# Patient Record
Sex: FEMALE | Race: WHITE | Employment: OTHER
[De-identification: names, ages, dates, MRNs, and addresses within clinical notes are randomized per-mention and may not be internally consistent; named-entity substitution may affect disease eponyms.]

---

## 2017-01-16 DIAGNOSIS — Z79.4 TYPE 2 DIABETES MELLITUS WITH DIABETIC POLYNEUROPATHY, WITH LONG-TERM CURRENT USE OF INSULIN (HCC): ICD-10-CM

## 2017-01-16 DIAGNOSIS — E11.42 TYPE 2 DIABETES MELLITUS WITH DIABETIC POLYNEUROPATHY, WITH LONG-TERM CURRENT USE OF INSULIN (HCC): ICD-10-CM

## 2017-01-16 RX ORDER — NAPROXEN SODIUM 220 MG
TABLET ORAL
Qty: 100 EACH | Refills: 5 | Status: SHIPPED | OUTPATIENT
Start: 2017-01-16 | End: 2017-09-07 | Stop reason: SDUPTHER

## 2017-01-23 ENCOUNTER — OFFICE VISIT (OUTPATIENT)
Dept: FAMILY MEDICINE CLINIC | Facility: CLINIC | Age: 57
End: 2017-01-23

## 2017-01-23 ENCOUNTER — TELEPHONE (OUTPATIENT)
Dept: FAMILY MEDICINE CLINIC | Facility: CLINIC | Age: 57
End: 2017-01-23

## 2017-01-23 VITALS
HEIGHT: 70 IN | HEART RATE: 84 BPM | RESPIRATION RATE: 16 BRPM | WEIGHT: 293 LBS | SYSTOLIC BLOOD PRESSURE: 123 MMHG | DIASTOLIC BLOOD PRESSURE: 80 MMHG | TEMPERATURE: 97.7 F | BODY MASS INDEX: 41.95 KG/M2

## 2017-01-23 DIAGNOSIS — K62.5 RECTAL BLEEDING: ICD-10-CM

## 2017-01-23 DIAGNOSIS — Z23 NEED FOR PNEUMOCOCCAL VACCINATION: ICD-10-CM

## 2017-01-23 DIAGNOSIS — K62.89 RECTAL PAIN: Primary | ICD-10-CM

## 2017-01-23 DIAGNOSIS — Z79.4 TYPE 2 DIABETES MELLITUS WITH DIABETIC POLYNEUROPATHY, WITH LONG-TERM CURRENT USE OF INSULIN (HCC): ICD-10-CM

## 2017-01-23 DIAGNOSIS — M79.7 FIBROMYALGIA: ICD-10-CM

## 2017-01-23 DIAGNOSIS — E11.42 TYPE 2 DIABETES MELLITUS WITH DIABETIC POLYNEUROPATHY, WITH LONG-TERM CURRENT USE OF INSULIN (HCC): ICD-10-CM

## 2017-01-23 LAB — HBA1C MFR BLD: 7.9 %

## 2017-01-23 PROCEDURE — 83036 HEMOGLOBIN GLYCOSYLATED A1C: CPT | Performed by: INTERNAL MEDICINE

## 2017-01-23 PROCEDURE — 90471 IMMUNIZATION ADMIN: CPT | Performed by: INTERNAL MEDICINE

## 2017-01-23 PROCEDURE — 99214 OFFICE O/P EST MOD 30 MIN: CPT | Performed by: INTERNAL MEDICINE

## 2017-01-23 PROCEDURE — 90732 PPSV23 VACC 2 YRS+ SUBQ/IM: CPT | Performed by: INTERNAL MEDICINE

## 2017-01-23 RX ORDER — FLUCONAZOLE 150 MG/1
TABLET ORAL
COMMUNITY
Start: 2016-12-14 | End: 2017-04-21

## 2017-01-23 RX ORDER — POLYETHYLENE GLYCOL 3350 17 G/17G
POWDER, FOR SOLUTION ORAL
Qty: 1 BOTTLE | Refills: 5 | Status: SHIPPED | OUTPATIENT
Start: 2017-01-23 | End: 2017-09-07 | Stop reason: ALTCHOICE

## 2017-01-23 RX ORDER — MELOXICAM 15 MG/1
TABLET ORAL
Qty: 30 TABLET | Refills: 3 | Status: SHIPPED | OUTPATIENT
Start: 2017-01-23 | End: 2017-04-21

## 2017-01-23 RX ORDER — AMITRIPTYLINE HYDROCHLORIDE 25 MG/1
TABLET, FILM COATED ORAL
Qty: 30 TABLET | Status: CANCELLED | OUTPATIENT
Start: 2017-01-23

## 2017-01-23 RX ORDER — BIOTIN 1 MG
TABLET ORAL
Qty: 30 TABLET | Refills: 2 | Status: CANCELLED | OUTPATIENT
Start: 2017-01-23

## 2017-01-23 RX ORDER — ASPIRIN 81 MG/1
TABLET ORAL
Qty: 30 TABLET | Refills: 5 | Status: SHIPPED | OUTPATIENT
Start: 2017-01-23 | End: 2017-04-21 | Stop reason: SDUPTHER

## 2017-01-23 RX ORDER — GABAPENTIN 600 MG/1
600 TABLET ORAL 3 TIMES DAILY
Qty: 90 TABLET | Refills: 3 | Status: SHIPPED | OUTPATIENT
Start: 2017-01-23 | End: 2017-04-21 | Stop reason: SDUPTHER

## 2017-01-23 RX ORDER — GABAPENTIN 600 MG/1
TABLET ORAL
COMMUNITY
Start: 2017-01-11 | End: 2017-01-23 | Stop reason: SDUPTHER

## 2017-01-23 RX ORDER — TIZANIDINE 4 MG/1
TABLET ORAL
Qty: 60 TABLET | Refills: 0 | Status: CANCELLED | OUTPATIENT
Start: 2017-01-23

## 2017-01-23 RX ORDER — CETIRIZINE HYDROCHLORIDE 10 MG/1
TABLET ORAL
Qty: 30 TABLET | Refills: 3 | Status: SHIPPED | OUTPATIENT
Start: 2017-01-23 | End: 2017-04-21

## 2017-01-23 RX ORDER — CARVEDILOL 25 MG/1
25 TABLET ORAL 2 TIMES DAILY
Qty: 60 TABLET | Refills: 3 | Status: SHIPPED | OUTPATIENT
Start: 2017-01-23 | End: 2017-04-21 | Stop reason: SDUPTHER

## 2017-01-23 RX ORDER — ROPINIROLE 1 MG/1
TABLET, FILM COATED ORAL
Qty: 90 TABLET | Refills: 2 | Status: SHIPPED | OUTPATIENT
Start: 2017-01-23 | End: 2017-09-07 | Stop reason: SDUPTHER

## 2017-01-23 RX ORDER — AMITRIPTYLINE HYDROCHLORIDE 25 MG/1
TABLET, FILM COATED ORAL
COMMUNITY
Start: 2017-01-11 | End: 2017-06-30 | Stop reason: SDUPTHER

## 2017-01-23 RX ORDER — AMMONIUM LACTATE 12 G/100G
CREAM TOPICAL
COMMUNITY
Start: 2017-01-11 | End: 2017-09-07 | Stop reason: ALTCHOICE

## 2017-01-23 ASSESSMENT — PATIENT HEALTH QUESTIONNAIRE - PHQ9
2. FEELING DOWN, DEPRESSED OR HOPELESS: 0
SUM OF ALL RESPONSES TO PHQ QUESTIONS 1-9: 0
SUM OF ALL RESPONSES TO PHQ9 QUESTIONS 1 & 2: 0
1. LITTLE INTEREST OR PLEASURE IN DOING THINGS: 0

## 2017-01-26 RX ORDER — BIOTIN 1 MG
TABLET ORAL
Qty: 30 TABLET | Refills: 0 | Status: SHIPPED | OUTPATIENT
Start: 2017-01-26 | End: 2017-02-23 | Stop reason: SDUPTHER

## 2017-01-31 DIAGNOSIS — M79.7 FIBROMYALGIA: ICD-10-CM

## 2017-02-01 RX ORDER — TIZANIDINE 4 MG/1
TABLET ORAL
Qty: 60 TABLET | Refills: 3 | Status: SHIPPED | OUTPATIENT
Start: 2017-02-01 | End: 2017-05-26 | Stop reason: SDUPTHER

## 2017-02-03 ASSESSMENT — ENCOUNTER SYMPTOMS
BLURRED VISION: 0
EYE REDNESS: 0
NAUSEA: 0
BLOOD IN STOOL: 1
HEARTBURN: 0
ABDOMINAL PAIN: 0
SHORTNESS OF BREATH: 0
BACK PAIN: 1
COUGH: 0
CONSTIPATION: 1

## 2017-02-17 ENCOUNTER — TELEPHONE (OUTPATIENT)
Dept: FAMILY MEDICINE CLINIC | Facility: CLINIC | Age: 57
End: 2017-02-17

## 2017-02-17 RX ORDER — GUAIFENESIN/DEXTROMETHORPHAN 100-10MG/5
5 SYRUP ORAL 3 TIMES DAILY PRN
Qty: 120 ML | Refills: 1 | Status: SHIPPED | OUTPATIENT
Start: 2017-02-17 | End: 2017-02-27

## 2017-02-20 ENCOUNTER — OFFICE VISIT (OUTPATIENT)
Dept: FAMILY MEDICINE CLINIC | Facility: CLINIC | Age: 57
End: 2017-02-20

## 2017-02-20 VITALS
RESPIRATION RATE: 16 BRPM | BODY MASS INDEX: 41.95 KG/M2 | HEART RATE: 84 BPM | WEIGHT: 293 LBS | SYSTOLIC BLOOD PRESSURE: 123 MMHG | HEIGHT: 70 IN | TEMPERATURE: 97.8 F | DIASTOLIC BLOOD PRESSURE: 79 MMHG

## 2017-02-20 DIAGNOSIS — E78.00 PURE HYPERCHOLESTEROLEMIA: ICD-10-CM

## 2017-02-20 DIAGNOSIS — M10.132: ICD-10-CM

## 2017-02-20 DIAGNOSIS — T56.0X1A: ICD-10-CM

## 2017-02-20 DIAGNOSIS — J20.8 ACUTE BRONCHITIS DUE TO OTHER SPECIFIED ORGANISMS: Primary | ICD-10-CM

## 2017-02-20 DIAGNOSIS — I10 ESSENTIAL HYPERTENSION: ICD-10-CM

## 2017-02-20 PROCEDURE — 99213 OFFICE O/P EST LOW 20 MIN: CPT | Performed by: INTERNAL MEDICINE

## 2017-02-20 RX ORDER — COLCHICINE 0.6 MG/1
0.6 TABLET ORAL DAILY
Qty: 15 TABLET | Refills: 0 | Status: SHIPPED | OUTPATIENT
Start: 2017-02-20 | End: 2017-07-26 | Stop reason: SDUPTHER

## 2017-02-20 RX ORDER — AZITHROMYCIN 250 MG/1
TABLET, FILM COATED ORAL
Qty: 1 PACKET | Refills: 0 | Status: SHIPPED | OUTPATIENT
Start: 2017-02-20 | End: 2017-03-02

## 2017-02-20 ASSESSMENT — ENCOUNTER SYMPTOMS
COUGH: 1
WHEEZING: 1
ALLERGIC/IMMUNOLOGIC NEGATIVE: 1

## 2017-02-20 ASSESSMENT — PATIENT HEALTH QUESTIONNAIRE - PHQ9
SUM OF ALL RESPONSES TO PHQ QUESTIONS 1-9: 0
2. FEELING DOWN, DEPRESSED OR HOPELESS: 0
1. LITTLE INTEREST OR PLEASURE IN DOING THINGS: 0
SUM OF ALL RESPONSES TO PHQ9 QUESTIONS 1 & 2: 0

## 2017-03-07 RX ORDER — ACETAMINOPHEN 500 MG
500 TABLET ORAL EVERY 6 HOURS PRN
Qty: 30 TABLET | Refills: 1 | Status: SHIPPED | OUTPATIENT
Start: 2017-03-07 | End: 2017-09-07 | Stop reason: ALTCHOICE

## 2017-03-16 RX ORDER — FUROSEMIDE 40 MG/1
TABLET ORAL
Qty: 30 TABLET | Refills: 0 | Status: SHIPPED | OUTPATIENT
Start: 2017-03-16 | End: 2017-04-16 | Stop reason: SDUPTHER

## 2017-03-20 RX ORDER — CETIRIZINE HYDROCHLORIDE 10 MG/1
TABLET ORAL
Qty: 30 TABLET | Refills: 0 | Status: SHIPPED | OUTPATIENT
Start: 2017-03-20 | End: 2017-04-21

## 2017-03-27 DIAGNOSIS — E13.40 NEUROPATHY DUE TO SECONDARY DIABETES (HCC): ICD-10-CM

## 2017-03-27 DIAGNOSIS — T40.2X5A CONSTIPATION DUE TO OPIOID THERAPY: ICD-10-CM

## 2017-03-27 DIAGNOSIS — K59.03 CONSTIPATION DUE TO OPIOID THERAPY: ICD-10-CM

## 2017-03-27 RX ORDER — DOCUSATE SODIUM 100 MG/1
CAPSULE, LIQUID FILLED ORAL
Qty: 30 CAPSULE | Refills: 0 | Status: SHIPPED | OUTPATIENT
Start: 2017-03-27 | End: 2017-09-07 | Stop reason: SDUPTHER

## 2017-03-27 RX ORDER — BACLOFEN 20 MG
TABLET ORAL
Qty: 30 TABLET | Refills: 0 | Status: SHIPPED | OUTPATIENT
Start: 2017-03-27 | End: 2017-04-21 | Stop reason: SDUPTHER

## 2017-04-21 ENCOUNTER — OFFICE VISIT (OUTPATIENT)
Dept: FAMILY MEDICINE CLINIC | Age: 57
End: 2017-04-21
Payer: MEDICARE

## 2017-04-21 ENCOUNTER — TELEPHONE (OUTPATIENT)
Dept: FAMILY MEDICINE CLINIC | Age: 57
End: 2017-04-21

## 2017-04-21 VITALS
BODY MASS INDEX: 41.95 KG/M2 | HEART RATE: 77 BPM | DIASTOLIC BLOOD PRESSURE: 75 MMHG | WEIGHT: 293 LBS | SYSTOLIC BLOOD PRESSURE: 123 MMHG | HEIGHT: 70 IN | RESPIRATION RATE: 16 BRPM | TEMPERATURE: 97 F

## 2017-04-21 DIAGNOSIS — M79.7 FIBROMYALGIA: ICD-10-CM

## 2017-04-21 DIAGNOSIS — I10 ESSENTIAL HYPERTENSION: ICD-10-CM

## 2017-04-21 DIAGNOSIS — R68.2 DRY MOUTH: ICD-10-CM

## 2017-04-21 DIAGNOSIS — J45.30 MILD PERSISTENT ASTHMA WITHOUT COMPLICATION: ICD-10-CM

## 2017-04-21 DIAGNOSIS — M25.511 ACUTE PAIN OF RIGHT SHOULDER: ICD-10-CM

## 2017-04-21 DIAGNOSIS — F32.89 OTHER DEPRESSION: ICD-10-CM

## 2017-04-21 DIAGNOSIS — E66.01 MORBID OBESITY WITH BMI OF 50.0-59.9, ADULT (HCC): ICD-10-CM

## 2017-04-21 LAB — HBA1C MFR BLD: 7.6 %

## 2017-04-21 PROCEDURE — 83036 HEMOGLOBIN GLYCOSYLATED A1C: CPT | Performed by: INTERNAL MEDICINE

## 2017-04-21 PROCEDURE — 99214 OFFICE O/P EST MOD 30 MIN: CPT | Performed by: INTERNAL MEDICINE

## 2017-04-21 RX ORDER — FLUTICASONE PROPIONATE 50 MCG
1 SPRAY, SUSPENSION (ML) NASAL DAILY
Qty: 1 BOTTLE | Refills: 3 | Status: SHIPPED | OUTPATIENT
Start: 2017-04-21 | End: 2017-09-07 | Stop reason: ALTCHOICE

## 2017-04-21 RX ORDER — CARVEDILOL 25 MG/1
25 TABLET ORAL 2 TIMES DAILY
Qty: 60 TABLET | Refills: 3 | Status: SHIPPED | OUTPATIENT
Start: 2017-04-21 | End: 2017-09-07 | Stop reason: SDUPTHER

## 2017-04-21 RX ORDER — ETODOLAC 400 MG/1
400 TABLET, FILM COATED ORAL 2 TIMES DAILY
Qty: 60 TABLET | Refills: 0 | Status: SHIPPED | OUTPATIENT
Start: 2017-04-21 | End: 2017-05-18 | Stop reason: SDUPTHER

## 2017-04-21 RX ORDER — ASPIRIN 81 MG/1
TABLET ORAL
Qty: 30 TABLET | Refills: 5 | Status: SHIPPED | OUTPATIENT
Start: 2017-04-21 | End: 2017-09-07 | Stop reason: SDUPTHER

## 2017-04-21 RX ORDER — BUDESONIDE AND FORMOTEROL FUMARATE DIHYDRATE 160; 4.5 UG/1; UG/1
2 AEROSOL RESPIRATORY (INHALATION) 2 TIMES DAILY
Qty: 1 INHALER | Refills: 6 | Status: SHIPPED | OUTPATIENT
Start: 2017-04-21 | End: 2018-04-12

## 2017-04-21 RX ORDER — GABAPENTIN 600 MG/1
600 TABLET ORAL 3 TIMES DAILY
Qty: 90 TABLET | Refills: 3 | Status: SHIPPED | OUTPATIENT
Start: 2017-04-21 | End: 2017-09-07 | Stop reason: SDUPTHER

## 2017-04-21 RX ORDER — LORATADINE 10 MG/1
TABLET ORAL
COMMUNITY
End: 2017-09-07 | Stop reason: SDUPTHER

## 2017-04-21 RX ORDER — LISINOPRIL 10 MG/1
TABLET ORAL
Qty: 30 TABLET | Refills: 5 | Status: SHIPPED | OUTPATIENT
Start: 2017-04-21 | End: 2017-09-07 | Stop reason: SDUPTHER

## 2017-04-21 ASSESSMENT — PATIENT HEALTH QUESTIONNAIRE - PHQ9
2. FEELING DOWN, DEPRESSED OR HOPELESS: 1
SUM OF ALL RESPONSES TO PHQ9 QUESTIONS 1 & 2: 2
1. LITTLE INTEREST OR PLEASURE IN DOING THINGS: 1
SUM OF ALL RESPONSES TO PHQ QUESTIONS 1-9: 2

## 2017-04-23 ASSESSMENT — ENCOUNTER SYMPTOMS
SORE THROAT: 0
ABDOMINAL PAIN: 0
BLURRED VISION: 0
BACK PAIN: 1
SHORTNESS OF BREATH: 0
BLOOD IN STOOL: 0
EYE REDNESS: 0
COUGH: 0
CONSTIPATION: 0
NAUSEA: 0

## 2017-05-11 ENCOUNTER — HOSPITAL ENCOUNTER (OUTPATIENT)
Age: 57
Setting detail: SPECIMEN
Discharge: HOME | End: 2017-05-11
Payer: MEDICARE

## 2017-05-11 DIAGNOSIS — R68.2 DRY MOUTH: ICD-10-CM

## 2017-05-11 DIAGNOSIS — E66.01 MORBID OBESITY WITH BMI OF 50.0-59.9, ADULT (HCC): ICD-10-CM

## 2017-05-11 DIAGNOSIS — I10 ESSENTIAL HYPERTENSION: ICD-10-CM

## 2017-05-11 LAB
ALBUMIN SERPL-MCNC: 3.9 G/DL (ref 3.5–5.2)
ALBUMIN/GLOBULIN RATIO: 1.1 (ref 1–2.5)
ALP BLD-CCNC: 75 U/L (ref 35–104)
ALT SERPL-CCNC: 24 U/L (ref 5–33)
ANION GAP SERPL CALCULATED.3IONS-SCNC: 15 MMOL/L (ref 9–17)
AST SERPL-CCNC: 22 U/L
BILIRUB SERPL-MCNC: 0.36 MG/DL (ref 0.3–1.2)
BILIRUBIN DIRECT: 0.15 MG/DL
BILIRUBIN, INDIRECT: 0.21 MG/DL (ref 0–1)
BUN BLDV-MCNC: 20 MG/DL (ref 6–20)
BUN/CREAT BLD: ABNORMAL (ref 9–20)
CALCIUM SERPL-MCNC: 9.5 MG/DL (ref 8.6–10.4)
CHLORIDE BLD-SCNC: 96 MMOL/L (ref 98–107)
CHOLESTEROL/HDL RATIO: 6.7
CHOLESTEROL: 200 MG/DL
CO2: 26 MMOL/L (ref 20–31)
CREAT SERPL-MCNC: 0.89 MG/DL (ref 0.5–0.9)
CREATININE URINE: 31.6 MG/DL (ref 28–217)
GFR AFRICAN AMERICAN: >60 ML/MIN
GFR NON-AFRICAN AMERICAN: >60 ML/MIN
GFR SERPL CREATININE-BSD FRML MDRD: ABNORMAL ML/MIN/{1.73_M2}
GFR SERPL CREATININE-BSD FRML MDRD: ABNORMAL ML/MIN/{1.73_M2}
GLOBULIN: NORMAL G/DL (ref 1.5–3.8)
GLUCOSE BLD-MCNC: 189 MG/DL (ref 70–99)
HDLC SERPL-MCNC: 30 MG/DL
LDL CHOLESTEROL DIRECT: 102 MG/DL
LDL CHOLESTEROL: ABNORMAL MG/DL (ref 0–130)
MICROALBUMIN/CREAT 24H UR: 13 MG/L
MICROALBUMIN/CREAT UR-RTO: 41 MCG/MG CREAT
POTASSIUM SERPL-SCNC: 5.2 MMOL/L (ref 3.7–5.3)
SODIUM BLD-SCNC: 137 MMOL/L (ref 135–144)
TOTAL PROTEIN: 7.3 G/DL (ref 6.4–8.3)
TRIGL SERPL-MCNC: 522 MG/DL
VLDLC SERPL CALC-MCNC: ABNORMAL MG/DL (ref 1–30)

## 2017-05-12 LAB
ANA REFERENCE RANGE:: ABNORMAL
ANTI DNA DOUBLE STRANDED: 19 IU/ML
ANTI JO-1 IGG: 4 U/ML
ANTI RNP AB: 6 U/ML
ANTI SSA: 125 U/ML
ANTI SSB: 5 U/ML
ANTI-CENTROMERE: 2 U/ML
ANTI-NUCLEAR ANTIBODY (ANA): POSITIVE
ANTI-SCLERODERMA: 9 U/ML
ANTI-SMITH: 15 U/ML
HISTONE ANTIBODY: 6 U/ML

## 2017-05-18 DIAGNOSIS — M79.7 FIBROMYALGIA: ICD-10-CM

## 2017-05-18 DIAGNOSIS — M35.00 SJOGREN'S SYNDROME (HCC): Primary | ICD-10-CM

## 2017-05-18 DIAGNOSIS — M25.511 ACUTE PAIN OF RIGHT SHOULDER: ICD-10-CM

## 2017-05-19 RX ORDER — ETODOLAC 400 MG/1
400 TABLET, FILM COATED ORAL 2 TIMES DAILY
Qty: 60 TABLET | Refills: 0 | Status: SHIPPED | OUTPATIENT
Start: 2017-05-19 | End: 2017-06-12 | Stop reason: SDUPTHER

## 2017-05-23 ENCOUNTER — TELEPHONE (OUTPATIENT)
Dept: FAMILY MEDICINE CLINIC | Age: 57
End: 2017-05-23

## 2017-05-23 DIAGNOSIS — M35.00 SJOGREN'S SYNDROME (HCC): Primary | ICD-10-CM

## 2017-05-26 DIAGNOSIS — M79.7 FIBROMYALGIA: ICD-10-CM

## 2017-05-26 RX ORDER — TIZANIDINE 4 MG/1
TABLET ORAL
Qty: 60 TABLET | Refills: 3 | Status: SHIPPED | OUTPATIENT
Start: 2017-05-26 | End: 2017-09-07 | Stop reason: SDUPTHER

## 2017-06-12 DIAGNOSIS — M79.7 FIBROMYALGIA: ICD-10-CM

## 2017-06-12 DIAGNOSIS — M25.511 ACUTE PAIN OF RIGHT SHOULDER: ICD-10-CM

## 2017-06-12 RX ORDER — ETODOLAC 400 MG/1
400 TABLET, FILM COATED ORAL 2 TIMES DAILY
Qty: 60 TABLET | Refills: 0 | Status: SHIPPED | OUTPATIENT
Start: 2017-06-12 | End: 2017-07-13 | Stop reason: SDUPTHER

## 2017-06-30 DIAGNOSIS — E11.42 TYPE 2 DIABETES MELLITUS WITH DIABETIC POLYNEUROPATHY, WITH LONG-TERM CURRENT USE OF INSULIN (HCC): ICD-10-CM

## 2017-06-30 DIAGNOSIS — Z79.4 TYPE 2 DIABETES MELLITUS WITH DIABETIC POLYNEUROPATHY, WITH LONG-TERM CURRENT USE OF INSULIN (HCC): ICD-10-CM

## 2017-06-30 RX ORDER — AMITRIPTYLINE HYDROCHLORIDE 25 MG/1
25 TABLET, FILM COATED ORAL NIGHTLY
Qty: 30 TABLET | Refills: 3 | Status: SHIPPED | OUTPATIENT
Start: 2017-06-30 | End: 2017-09-07 | Stop reason: SDUPTHER

## 2017-07-07 RX ORDER — COVID-19 ANTIGEN TEST
KIT MISCELLANEOUS
Qty: 30 TABLET | Refills: 5 | Status: SHIPPED | OUTPATIENT
Start: 2017-07-07 | End: 2017-09-07 | Stop reason: ALTCHOICE

## 2017-07-13 DIAGNOSIS — M79.7 FIBROMYALGIA: ICD-10-CM

## 2017-07-13 DIAGNOSIS — M25.511 ACUTE PAIN OF RIGHT SHOULDER: ICD-10-CM

## 2017-07-13 RX ORDER — ETODOLAC 400 MG/1
400 TABLET, FILM COATED ORAL 2 TIMES DAILY
Qty: 60 TABLET | Refills: 0 | Status: SHIPPED | OUTPATIENT
Start: 2017-07-13 | End: 2017-08-10 | Stop reason: SDUPTHER

## 2017-07-28 RX ORDER — COLCHICINE 0.6 MG/1
0.6 TABLET ORAL DAILY
Qty: 15 TABLET | Refills: 0 | Status: SHIPPED | OUTPATIENT
Start: 2017-07-28 | End: 2017-09-07 | Stop reason: ALTCHOICE

## 2017-07-31 RX ORDER — COLCHICINE 0.6 MG/1
0.6 TABLET ORAL DAILY
Qty: 15 TABLET | Refills: 0 | Status: SHIPPED | OUTPATIENT
Start: 2017-07-31 | End: 2017-09-07

## 2017-08-06 DIAGNOSIS — E13.40 NEUROPATHY DUE TO SECONDARY DIABETES (HCC): ICD-10-CM

## 2017-08-07 ENCOUNTER — HOSPITAL ENCOUNTER (OUTPATIENT)
Dept: MAMMOGRAPHY | Age: 57
Discharge: HOME | End: 2017-08-07
Payer: MEDICARE

## 2017-08-07 DIAGNOSIS — Z13.9 SCREENING FOR CONDITION: ICD-10-CM

## 2017-08-07 PROCEDURE — 77063 BREAST TOMOSYNTHESIS BI: CPT

## 2017-08-07 RX ORDER — BACLOFEN 20 MG
TABLET ORAL
Qty: 30 TABLET | Refills: 0 | Status: SHIPPED | OUTPATIENT
Start: 2017-08-07 | End: 2017-09-07 | Stop reason: SDUPTHER

## 2017-08-08 RX ORDER — CALCIUM CARBONATE/VITAMIN D3 500MG-5MCG
TABLET ORAL
Qty: 60 TABLET | Refills: 0 | Status: SHIPPED | OUTPATIENT
Start: 2017-08-08 | End: 2017-09-07

## 2017-08-10 ENCOUNTER — TELEPHONE (OUTPATIENT)
Dept: FAMILY MEDICINE CLINIC | Facility: CLINIC | Age: 57
End: 2017-08-10

## 2017-08-10 DIAGNOSIS — M79.7 FIBROMYALGIA: ICD-10-CM

## 2017-08-10 DIAGNOSIS — M25.511 ACUTE PAIN OF RIGHT SHOULDER: ICD-10-CM

## 2017-08-10 RX ORDER — ALLOPURINOL 300 MG/1
TABLET ORAL
Qty: 90 TABLET | Refills: 3 | Status: SHIPPED | OUTPATIENT
Start: 2017-08-10 | End: 2017-09-07 | Stop reason: SDUPTHER

## 2017-08-10 RX ORDER — ETODOLAC 400 MG/1
400 TABLET, FILM COATED ORAL 2 TIMES DAILY
Qty: 60 TABLET | Refills: 0 | Status: SHIPPED | OUTPATIENT
Start: 2017-08-10 | End: 2017-09-07 | Stop reason: SDUPTHER

## 2017-08-10 RX ORDER — FUROSEMIDE 40 MG/1
TABLET ORAL
Qty: 30 TABLET | Refills: 3 | Status: SHIPPED | OUTPATIENT
Start: 2017-08-10 | End: 2017-09-07 | Stop reason: SDUPTHER

## 2017-08-10 RX ORDER — CITALOPRAM 40 MG/1
TABLET ORAL
Qty: 30 TABLET | Refills: 3 | Status: SHIPPED | OUTPATIENT
Start: 2017-08-10 | End: 2017-09-07 | Stop reason: SDUPTHER

## 2017-08-11 RX ORDER — FLUCONAZOLE 100 MG/1
100 TABLET ORAL ONCE
Qty: 1 TABLET | Refills: 1 | Status: SHIPPED | OUTPATIENT
Start: 2017-08-11 | End: 2017-08-11

## 2017-08-14 ENCOUNTER — TELEPHONE (OUTPATIENT)
Dept: INTERNAL MEDICINE | Age: 57
End: 2017-08-14

## 2017-08-15 RX ORDER — DOXYCYCLINE HYCLATE 100 MG
100 TABLET ORAL 2 TIMES DAILY
Qty: 14 TABLET | Refills: 0 | Status: SHIPPED | OUTPATIENT
Start: 2017-08-15 | End: 2017-08-22

## 2017-08-17 ENCOUNTER — OFFICE VISIT (OUTPATIENT)
Dept: FAMILY MEDICINE CLINIC | Age: 57
End: 2017-08-17
Payer: MEDICARE

## 2017-08-17 ENCOUNTER — TELEPHONE (OUTPATIENT)
Dept: FAMILY MEDICINE CLINIC | Age: 57
End: 2017-08-17

## 2017-08-17 VITALS
HEART RATE: 84 BPM | WEIGHT: 293 LBS | DIASTOLIC BLOOD PRESSURE: 83 MMHG | BODY MASS INDEX: 41.95 KG/M2 | TEMPERATURE: 97 F | RESPIRATION RATE: 16 BRPM | HEIGHT: 70 IN | OXYGEN SATURATION: 96 % | SYSTOLIC BLOOD PRESSURE: 129 MMHG

## 2017-08-17 DIAGNOSIS — J44.1 COPD EXACERBATION (HCC): Primary | ICD-10-CM

## 2017-08-17 PROCEDURE — 99213 OFFICE O/P EST LOW 20 MIN: CPT | Performed by: INTERNAL MEDICINE

## 2017-08-17 RX ORDER — AZITHROMYCIN 250 MG/1
TABLET, FILM COATED ORAL
Qty: 6 TABLET | Refills: 0 | Status: SHIPPED | OUTPATIENT
Start: 2017-08-17 | End: 2017-08-27

## 2017-08-17 RX ORDER — ALBUTEROL SULFATE 2.5 MG/3ML
2.5 SOLUTION RESPIRATORY (INHALATION) EVERY 6 HOURS PRN
Qty: 120 EACH | Refills: 3 | Status: SHIPPED | OUTPATIENT
Start: 2017-08-17 | End: 2019-05-13 | Stop reason: SDUPTHER

## 2017-08-17 RX ORDER — PREDNISONE 10 MG/1
TABLET ORAL
Qty: 32 TABLET | Refills: 0 | Status: SHIPPED | OUTPATIENT
Start: 2017-08-17 | End: 2017-09-07 | Stop reason: ALTCHOICE

## 2017-08-18 ENCOUNTER — TELEPHONE (OUTPATIENT)
Dept: FAMILY MEDICINE CLINIC | Age: 57
End: 2017-08-18

## 2017-08-18 RX ORDER — ALBUTEROL SULFATE 2.5 MG/3ML
2.5 SOLUTION RESPIRATORY (INHALATION) ONCE
Status: SHIPPED | OUTPATIENT
Start: 2017-08-18

## 2017-08-28 ENCOUNTER — TELEPHONE (OUTPATIENT)
Dept: FAMILY MEDICINE CLINIC | Age: 57
End: 2017-08-28

## 2017-08-28 DIAGNOSIS — J20.9 ACUTE BRONCHITIS, UNSPECIFIED ORGANISM: Primary | ICD-10-CM

## 2017-08-29 ENCOUNTER — HOSPITAL ENCOUNTER (EMERGENCY)
Age: 57
Discharge: HOME | End: 2017-08-29
Attending: EMERGENCY MEDICINE
Payer: MEDICARE

## 2017-08-29 ENCOUNTER — APPOINTMENT (OUTPATIENT)
Dept: GENERAL RADIOLOGY | Age: 57
End: 2017-08-29
Payer: MEDICARE

## 2017-08-29 ENCOUNTER — TELEPHONE (OUTPATIENT)
Dept: FAMILY MEDICINE CLINIC | Age: 57
End: 2017-08-29

## 2017-08-29 VITALS
OXYGEN SATURATION: 97 % | BODY MASS INDEX: 41.95 KG/M2 | HEART RATE: 83 BPM | RESPIRATION RATE: 24 BRPM | WEIGHT: 293 LBS | HEIGHT: 70 IN | SYSTOLIC BLOOD PRESSURE: 116 MMHG | TEMPERATURE: 98.3 F | DIASTOLIC BLOOD PRESSURE: 71 MMHG

## 2017-08-29 DIAGNOSIS — J06.9 UPPER RESPIRATORY TRACT INFECTION, UNSPECIFIED TYPE: Primary | ICD-10-CM

## 2017-08-29 PROCEDURE — 71020 XR CHEST STANDARD TWO VW: CPT

## 2017-08-29 PROCEDURE — 93005 ELECTROCARDIOGRAM TRACING: CPT

## 2017-08-29 PROCEDURE — 99283 EMERGENCY DEPT VISIT LOW MDM: CPT

## 2017-08-29 RX ORDER — DEXTROMETHORPHAN HYDROBROMIDE AND PROMETHAZINE HYDROCHLORIDE 15; 6.25 MG/5ML; MG/5ML
5 SYRUP ORAL 4 TIMES DAILY PRN
Qty: 180 ML | Refills: 0 | Status: SHIPPED | OUTPATIENT
Start: 2017-08-29 | End: 2017-09-05

## 2017-08-29 RX ORDER — BENZONATATE 100 MG/1
100 CAPSULE ORAL 3 TIMES DAILY PRN
Qty: 30 CAPSULE | Refills: 0 | Status: SHIPPED | OUTPATIENT
Start: 2017-08-29 | End: 2017-09-05

## 2017-08-29 ASSESSMENT — ENCOUNTER SYMPTOMS
WHEEZING: 0
COLOR CHANGE: 0
COUGH: 1
DIARRHEA: 0
SORE THROAT: 0
VOMITING: 0
ABDOMINAL PAIN: 0
NAUSEA: 0
CONSTIPATION: 0
SINUS PRESSURE: 0
SHORTNESS OF BREATH: 1
RHINORRHEA: 0

## 2017-08-30 LAB
EKG ATRIAL RATE: 81 BPM
EKG P AXIS: -45 DEGREES
EKG P-R INTERVAL: 138 MS
EKG Q-T INTERVAL: 356 MS
EKG QRS DURATION: 86 MS
EKG QTC CALCULATION (BAZETT): 413 MS
EKG R AXIS: 3 DEGREES
EKG T AXIS: 21 DEGREES
EKG VENTRICULAR RATE: 81 BPM

## 2017-09-06 DIAGNOSIS — M79.7 FIBROMYALGIA: ICD-10-CM

## 2017-09-06 DIAGNOSIS — M25.511 ACUTE PAIN OF RIGHT SHOULDER: ICD-10-CM

## 2017-09-07 ENCOUNTER — OFFICE VISIT (OUTPATIENT)
Dept: FAMILY MEDICINE CLINIC | Age: 57
End: 2017-09-07
Payer: MEDICARE

## 2017-09-07 VITALS
SYSTOLIC BLOOD PRESSURE: 121 MMHG | DIASTOLIC BLOOD PRESSURE: 77 MMHG | BODY MASS INDEX: 41.95 KG/M2 | RESPIRATION RATE: 16 BRPM | WEIGHT: 293 LBS | HEIGHT: 70 IN | HEART RATE: 82 BPM | TEMPERATURE: 98.2 F

## 2017-09-07 DIAGNOSIS — Z79.4 TYPE 2 DIABETES MELLITUS WITH DIABETIC POLYNEUROPATHY, WITH LONG-TERM CURRENT USE OF INSULIN (HCC): Primary | ICD-10-CM

## 2017-09-07 DIAGNOSIS — T40.2X5A CONSTIPATION DUE TO OPIOID THERAPY: ICD-10-CM

## 2017-09-07 DIAGNOSIS — I10 ESSENTIAL HYPERTENSION: ICD-10-CM

## 2017-09-07 DIAGNOSIS — E78.00 PURE HYPERCHOLESTEROLEMIA: ICD-10-CM

## 2017-09-07 DIAGNOSIS — K59.03 CONSTIPATION DUE TO OPIOID THERAPY: ICD-10-CM

## 2017-09-07 DIAGNOSIS — J30.2 SEASONAL ALLERGIC RHINITIS, UNSPECIFIED ALLERGIC RHINITIS TRIGGER: ICD-10-CM

## 2017-09-07 DIAGNOSIS — E55.9 VITAMIN D DEFICIENCY: ICD-10-CM

## 2017-09-07 DIAGNOSIS — M10.9 GOUT, UNSPECIFIED CAUSE, UNSPECIFIED CHRONICITY, UNSPECIFIED SITE: ICD-10-CM

## 2017-09-07 DIAGNOSIS — M79.7 FIBROMYALGIA: ICD-10-CM

## 2017-09-07 DIAGNOSIS — E11.42 TYPE 2 DIABETES MELLITUS WITH DIABETIC POLYNEUROPATHY, WITH LONG-TERM CURRENT USE OF INSULIN (HCC): Primary | ICD-10-CM

## 2017-09-07 DIAGNOSIS — G25.81 RLS (RESTLESS LEGS SYNDROME): ICD-10-CM

## 2017-09-07 DIAGNOSIS — E66.01 MORBID OBESITY, UNSPECIFIED OBESITY TYPE (HCC): ICD-10-CM

## 2017-09-07 DIAGNOSIS — E83.42 HYPOMAGNESEMIA: ICD-10-CM

## 2017-09-07 DIAGNOSIS — E13.40 NEUROPATHY DUE TO SECONDARY DIABETES (HCC): ICD-10-CM

## 2017-09-07 DIAGNOSIS — F32.A DEPRESSION, UNSPECIFIED DEPRESSION TYPE: ICD-10-CM

## 2017-09-07 LAB — HBA1C MFR BLD: 7.3 %

## 2017-09-07 PROCEDURE — 99214 OFFICE O/P EST MOD 30 MIN: CPT | Performed by: INTERNAL MEDICINE

## 2017-09-07 PROCEDURE — 83036 HEMOGLOBIN GLYCOSYLATED A1C: CPT | Performed by: INTERNAL MEDICINE

## 2017-09-07 RX ORDER — FUROSEMIDE 40 MG/1
TABLET ORAL
Qty: 90 TABLET | Refills: 1 | Status: SHIPPED | OUTPATIENT
Start: 2017-09-07 | End: 2017-12-05

## 2017-09-07 RX ORDER — CARVEDILOL 25 MG/1
25 TABLET ORAL 2 TIMES DAILY
Qty: 60 TABLET | Refills: 3 | Status: CANCELLED | OUTPATIENT
Start: 2017-09-07

## 2017-09-07 RX ORDER — LORATADINE 10 MG/1
TABLET ORAL
Qty: 90 TABLET | Refills: 1 | Status: SHIPPED | OUTPATIENT
Start: 2017-09-07 | End: 2018-01-04 | Stop reason: ALTCHOICE

## 2017-09-07 RX ORDER — ASPIRIN 81 MG/1
TABLET ORAL
Qty: 90 TABLET | Refills: 1 | Status: SHIPPED | OUTPATIENT
Start: 2017-09-07 | End: 2018-04-12

## 2017-09-07 RX ORDER — AMITRIPTYLINE HYDROCHLORIDE 50 MG/1
50 TABLET, FILM COATED ORAL NIGHTLY
Qty: 30 TABLET | Refills: 2 | Status: SHIPPED | OUTPATIENT
Start: 2017-09-07 | End: 2017-12-05 | Stop reason: SDUPTHER

## 2017-09-07 RX ORDER — ETODOLAC 400 MG/1
400 TABLET, FILM COATED ORAL 2 TIMES DAILY
Qty: 60 TABLET | Refills: 0 | Status: SHIPPED | OUTPATIENT
Start: 2017-09-07 | End: 2017-10-09 | Stop reason: SDUPTHER

## 2017-09-07 RX ORDER — ROPINIROLE 1 MG/1
TABLET, FILM COATED ORAL
Qty: 90 TABLET | Refills: 1 | Status: SHIPPED | OUTPATIENT
Start: 2017-09-07 | End: 2017-11-06 | Stop reason: SDUPTHER

## 2017-09-07 RX ORDER — CITALOPRAM 40 MG/1
TABLET ORAL
Qty: 90 TABLET | Refills: 1 | Status: SHIPPED | OUTPATIENT
Start: 2017-09-07 | End: 2017-12-05

## 2017-09-07 RX ORDER — ALLOPURINOL 300 MG/1
TABLET ORAL
Qty: 90 TABLET | Refills: 1 | Status: SHIPPED | OUTPATIENT
Start: 2017-09-07 | End: 2018-01-18 | Stop reason: SDUPTHER

## 2017-09-07 RX ORDER — CARVEDILOL 25 MG/1
25 TABLET ORAL 2 TIMES DAILY
Qty: 180 TABLET | Refills: 1 | Status: SHIPPED | OUTPATIENT
Start: 2017-09-07 | End: 2018-05-13 | Stop reason: SDUPTHER

## 2017-09-07 RX ORDER — BIOTIN 1 MG
TABLET ORAL
Qty: 90 TABLET | Refills: 1 | Status: SHIPPED | OUTPATIENT
Start: 2017-09-07 | End: 2018-04-12

## 2017-09-07 RX ORDER — GABAPENTIN 600 MG/1
600 TABLET ORAL
Qty: 180 TABLET | Refills: 2 | Status: SHIPPED | OUTPATIENT
Start: 2017-09-07 | End: 2018-01-15 | Stop reason: SDUPTHER

## 2017-09-07 RX ORDER — CALCIUM CARBONATE/VITAMIN D3 500MG-5MCG
TABLET ORAL
Qty: 90 TABLET | Refills: 1 | Status: SHIPPED | OUTPATIENT
Start: 2017-09-07 | End: 2018-07-03 | Stop reason: ALTCHOICE

## 2017-09-07 RX ORDER — BACLOFEN 20 MG
TABLET ORAL
Qty: 90 TABLET | Refills: 0 | Status: SHIPPED | OUTPATIENT
Start: 2017-09-07 | End: 2017-12-05 | Stop reason: SDUPTHER

## 2017-09-07 RX ORDER — NAPROXEN SODIUM 220 MG
TABLET ORAL
Qty: 100 EACH | Refills: 5 | Status: SHIPPED | OUTPATIENT
Start: 2017-09-07 | End: 2018-01-04

## 2017-09-07 RX ORDER — TIZANIDINE 4 MG/1
TABLET ORAL
Qty: 60 TABLET | Refills: 3 | Status: SHIPPED | OUTPATIENT
Start: 2017-09-07 | End: 2018-02-07 | Stop reason: SDUPTHER

## 2017-09-07 RX ORDER — LISINOPRIL 10 MG/1
TABLET ORAL
Qty: 30 TABLET | Refills: 5 | Status: SHIPPED | OUTPATIENT
Start: 2017-09-07 | End: 2018-04-12 | Stop reason: SDUPTHER

## 2017-09-07 RX ORDER — DOCUSATE SODIUM 100 MG/1
CAPSULE, LIQUID FILLED ORAL
Qty: 90 CAPSULE | Refills: 1 | Status: SHIPPED | OUTPATIENT
Start: 2017-09-07 | End: 2018-07-03 | Stop reason: ALTCHOICE

## 2017-09-12 ENCOUNTER — TELEPHONE (OUTPATIENT)
Dept: FAMILY MEDICINE CLINIC | Age: 57
End: 2017-09-12

## 2017-09-13 ENCOUNTER — TELEPHONE (OUTPATIENT)
Dept: FAMILY MEDICINE CLINIC | Age: 57
End: 2017-09-13

## 2017-10-09 DIAGNOSIS — M79.7 FIBROMYALGIA: ICD-10-CM

## 2017-10-09 DIAGNOSIS — M25.511 ACUTE PAIN OF RIGHT SHOULDER: ICD-10-CM

## 2017-10-10 RX ORDER — ETODOLAC 400 MG/1
400 TABLET, FILM COATED ORAL 2 TIMES DAILY
Qty: 60 TABLET | Refills: 0 | Status: SHIPPED | OUTPATIENT
Start: 2017-10-10 | End: 2017-11-14 | Stop reason: SDUPTHER

## 2017-11-06 DIAGNOSIS — G25.81 RLS (RESTLESS LEGS SYNDROME): ICD-10-CM

## 2017-11-06 RX ORDER — ROPINIROLE 1 MG/1
TABLET, FILM COATED ORAL
Qty: 90 TABLET | Refills: 1 | Status: SHIPPED | OUTPATIENT
Start: 2017-11-06 | End: 2018-05-14 | Stop reason: SDUPTHER

## 2017-11-06 NOTE — TELEPHONE ENCOUNTER
Faxed medication request pended medication please advise thank you! Next Visit Date:    Last ov 9/7/17    No future appointments. Health Maintenance   Topic Date Due    Hepatitis C screen  1960    Diabetic foot exam  07/15/2016    Flu vaccine (1) 09/01/2017    DTaP/Tdap/Td vaccine (1 - Tdap) 01/24/2027 (Originally 9/17/2016)    Diabetic microalbuminuria test  05/11/2018    Lipid screen  05/11/2018    Diabetic hemoglobin A1C test  09/07/2018    Diabetic retinal exam  10/30/2018    Breast cancer screen  08/07/2019    Colon cancer screen colonoscopy  08/17/2025    Pneumococcal med risk  Completed    HIV screen  Completed       Hemoglobin A1C (%)   Date Value   09/07/2017 7.3   04/21/2017 7.6   01/23/2017 7.9             ( goal A1C is < 7)   Microalb/Crt.  Ratio (mcg/mg creat)   Date Value   05/11/2017 41 (H)     LDL Cholesterol (mg/dL)   Date Value   05/11/2017            (goal LDL is <100)   AST (U/L)   Date Value   05/11/2017 22     ALT (U/L)   Date Value   05/11/2017 24     BUN (mg/dL)   Date Value   05/11/2017 20     BP Readings from Last 3 Encounters:   09/07/17 121/77   08/29/17 116/71   08/17/17 129/83          (goal 120/80)    All Future Testing planned in [facility]  Lab Frequency Next Occurrence   XR Chest Standard TWO VW Once 11/29/2017   Magnesium Once 04/16/2018   Uric Acid Once 04/16/2018   Vitamin D 25 Hydroxy Once 31/60/0645   Basic Metabolic Panel Once 85/59/0262               Patient Active Problem List:     HLD (hyperlipidemia)     HTN (hypertension)     Morbid obesity ([address]     Neuropathy due to secondary diabetes ([address]     Depression     Mild persistent asthma without complication     Type 2 diabetes mellitus with diabetic polyneuropathy, with long-term current use of insulin ([address]

## 2017-11-14 DIAGNOSIS — M79.7 FIBROMYALGIA: ICD-10-CM

## 2017-11-14 DIAGNOSIS — M25.511 ACUTE PAIN OF RIGHT SHOULDER: ICD-10-CM

## 2017-11-15 RX ORDER — ETODOLAC 400 MG/1
400 TABLET, FILM COATED ORAL 2 TIMES DAILY
Qty: 60 TABLET | Refills: 1 | Status: SHIPPED | OUTPATIENT
Start: 2017-11-15 | End: 2018-01-04

## 2017-12-04 ENCOUNTER — OFFICE VISIT (OUTPATIENT)
Dept: FAMILY MEDICINE CLINIC | Age: 57
End: 2017-12-04
Payer: MEDICARE

## 2017-12-04 VITALS
BODY MASS INDEX: 41.95 KG/M2 | HEART RATE: 80 BPM | HEIGHT: 70 IN | TEMPERATURE: 97.7 F | OXYGEN SATURATION: 98 % | WEIGHT: 293 LBS | SYSTOLIC BLOOD PRESSURE: 124 MMHG | DIASTOLIC BLOOD PRESSURE: 78 MMHG

## 2017-12-04 DIAGNOSIS — R53.83 FATIGUE, UNSPECIFIED TYPE: Primary | ICD-10-CM

## 2017-12-04 DIAGNOSIS — L02.419 CELLULITIS AND ABSCESS OF LEG: ICD-10-CM

## 2017-12-04 DIAGNOSIS — L03.119 CELLULITIS AND ABSCESS OF LEG: ICD-10-CM

## 2017-12-04 PROCEDURE — 87804 INFLUENZA ASSAY W/OPTIC: CPT | Performed by: NURSE PRACTITIONER

## 2017-12-04 PROCEDURE — G8484 FLU IMMUNIZE NO ADMIN: HCPCS | Performed by: NURSE PRACTITIONER

## 2017-12-04 PROCEDURE — 1036F TOBACCO NON-USER: CPT | Performed by: NURSE PRACTITIONER

## 2017-12-04 PROCEDURE — 3017F COLORECTAL CA SCREEN DOC REV: CPT | Performed by: NURSE PRACTITIONER

## 2017-12-04 PROCEDURE — G8417 CALC BMI ABV UP PARAM F/U: HCPCS | Performed by: NURSE PRACTITIONER

## 2017-12-04 PROCEDURE — G8427 DOCREV CUR MEDS BY ELIG CLIN: HCPCS | Performed by: NURSE PRACTITIONER

## 2017-12-04 PROCEDURE — 99213 OFFICE O/P EST LOW 20 MIN: CPT | Performed by: NURSE PRACTITIONER

## 2017-12-04 PROCEDURE — 3014F SCREEN MAMMO DOC REV: CPT | Performed by: NURSE PRACTITIONER

## 2017-12-04 ASSESSMENT — ENCOUNTER SYMPTOMS
CHEST TIGHTNESS: 0
ABDOMINAL PAIN: 0
VISUAL CHANGE: 0
SWOLLEN GLANDS: 0
SORE THROAT: 0
ALLERGIC/IMMUNOLOGIC NEGATIVE: 1
NAUSEA: 0
SHORTNESS OF BREATH: 0
CHANGE IN BOWEL HABIT: 0
DIARRHEA: 0
EYE DISCHARGE: 0
COUGH: 0
EYES NEGATIVE: 1
ROS SKIN COMMENTS: R UPPER THIGH
VOMITING: 0
EYE ITCHING: 0

## 2017-12-04 ASSESSMENT — PATIENT HEALTH QUESTIONNAIRE - PHQ9
1. LITTLE INTEREST OR PLEASURE IN DOING THINGS: 0
2. FEELING DOWN, DEPRESSED OR HOPELESS: 0
SUM OF ALL RESPONSES TO PHQ QUESTIONS 1-9: 0
SUM OF ALL RESPONSES TO PHQ9 QUESTIONS 1 & 2: 0

## 2017-12-04 NOTE — TELEPHONE ENCOUNTER
Electronic medication refill request,pharmacy on file. Last appointment 09/2017. Please Advise! Contacted patient for follow up appt. Patient currently at [facility] having abscess removed. Patient will call back to schedule. Next Visit Date:  No future appointments. Health Maintenance   Topic Date Due    Hepatitis C screen  1960    Diabetic foot exam  07/15/2016    Flu vaccine (1) 09/01/2017    DTaP/Tdap/Td vaccine (1 - Tdap) 01/24/2027 (Originally 9/17/2016)    Diabetic microalbuminuria test  05/11/2018    Lipid screen  05/11/2018    Diabetic hemoglobin A1C test  09/07/2018    Diabetic retinal exam  10/30/2018    Breast cancer screen  08/07/2019    Colon cancer screen colonoscopy  08/17/2025    Pneumococcal med risk  Completed    HIV screen  Completed       Hemoglobin A1C (%)   Date Value   09/07/2017 7.3   04/21/2017 7.6   01/23/2017 7.9             ( goal A1C is < 7)   Microalb/Crt.  Ratio (mcg/mg creat)   Date Value   05/11/2017 41 (H)     LDL Cholesterol (mg/dL)   Date Value   05/11/2017            (goal LDL is <100)   AST (U/L)   Date Value   05/11/2017 22     ALT (U/L)   Date Value   05/11/2017 24     BUN (mg/dL)   Date Value   05/11/2017 20     BP Readings from Last 3 Encounters:   12/04/17 124/78   09/07/17 121/77   08/29/17 116/71          (goal 120/80)    All Future Testing planned in [facility]  Lab Frequency Next Occurrence   XR Chest Standard TWO VW Once 11/29/2017   Magnesium Once 04/16/2018   Uric Acid Once 04/16/2018   Vitamin D 25 Hydroxy Once 88/87/1325   Basic Metabolic Panel Once 01/20/2122               Patient Active Problem List:     HLD (hyperlipidemia)     HTN (hypertension)     Morbid obesity ([address]     Neuropathy due to secondary diabetes ([address]     Depression     Mild persistent asthma without complication     Type 2 diabetes mellitus with diabetic polyneuropathy, with long-term current use of insulin ([address]

## 2017-12-04 NOTE — PROGRESS NOTES
BUNIONECTOMY Left 02-22-16    with arthrodesis and 2 through 4 with hallux valgus arthroplasty 5th toe on left    COLONOSCOPY  8/17/2015    2 polyps removed, diverticulosis, internal hemorrhoids    FINGER SURGERY      re-attachment of left middle finger    FOOT SURGERY Right 11/11/2015    bunionectomy, 2-3-4th pinning, 5th arthroplasty    HERNIA REPAIR      UMBILICAL    HYSTERECTOMY, TOTAL ABDOMINAL  2001       Family History   Problem Relation Age of Onset    Cancer Mother 61     breast     High Blood Pressure Mother     Stroke Father 62    Depression Father 64     comitted sucide     Diabetes Sister 36    High Blood Pressure Sister     Depression Sister     Cancer Brother 64     prostate    Diabetes Brother     High Blood Pressure Brother     Cancer Paternal Aunt 71     colon, rectal     Cancer Paternal Grandfather 79     lung    Heart Disease Neg Hx        Social History   Substance Use Topics    Smoking status: Former Smoker     Types: Cigarettes     Quit date: 10/25/2013    Smokeless tobacco: Never Used    Alcohol use 0.0 oz/week      Comment: occassional       Current Outpatient Prescriptions   Medication Sig Dispense Refill    etodolac (LODINE) 400 MG tablet Take 1 tablet by mouth 2 times daily 60 tablet 1    rOPINIRole (REQUIP) 1 MG tablet TK 1 T PO QHS 90 tablet 1    insulin 70-30 (NOVOLIN 70/30) (70-30) 100 UNIT per ML injection vial Inject 70 Units into the skin 2 times daily (before meals) 20 mL 2    canagliflozin (INVOKANA) 100 MG TABS tablet Take 1 tablet by mouth every morning (before breakfast) 30 tablet 2    amitriptyline (ELAVIL) 50 MG tablet Take 1 tablet by mouth nightly 30 tablet 2    gabapentin (NEURONTIN) 600 MG tablet Take 1 tablet by mouth 6 times daily 180 tablet 2    allopurinol (ZYLOPRIM) 300 MG tablet TAKE 1 TABLET BY MOUTH EVERY DAY 90 tablet 1    aspirin (ASPIRIN LOW DOSE) 81 MG EC tablet TAKE 1 TABLET BY MOUTH DAILY 90 tablet 1    Biotin 1000 MCG TABS TAKE ONE TABLET BY MOUTH DAILY 90 tablet 1    carvedilol (COREG) 25 MG tablet Take 1 tablet by mouth 2 times daily 180 tablet 1    citalopram (CELEXA) 40 MG tablet TAKE 1 TABLET BY MOUTH DAILY 90 tablet 1    docusate sodium (DOK) 100 MG capsule TAKE ONE CAPSULE BY MOUTH DAILY 90 capsule 1    furosemide (LASIX) 40 MG tablet TAKE ONE TABLET BY MOUTH DAILY 90 tablet 1    lisinopril (PRINIVIL;ZESTRIL) 10 MG tablet TAKE 1 TABLET BY MOUTH DAILY 30 tablet 5    loratadine (CLARITIN) 10 MG tablet 0 tablets.  90 tablet 1    Magnesium Oxide 500 MG TABS TAKE 1 TABLET BY MOUTH DAILY 90 tablet 0    tiZANidine (ZANAFLEX) 4 MG tablet TAKE 1 TABLET BY MOUTH TWICE DAILY 60 tablet 3    OYSCO 500 + D 500-200 MG-UNIT per tablet TK 1 T PO D 90 tablet 1    Insulin Syringe-Needle U-100 (INSULIN SYRINGE 1CC/30GX5/16\") 30G X 5/16\" 1 ML MISC U  each 5    Multiple Vitamins-Minerals (THERAGRAN-M PREMIER 50 PLUS) TABS TAKE ONE TABLET BY MOUTH DAILY 90 tablet 1    albuterol (PROVENTIL) (2.5 MG/3ML) 0.083% nebulizer solution Take 3 mLs by nebulization every 6 hours as needed for Wheezing or Shortness of Breath 120 each 3    Respiratory Therapy Supplies (NEBULIZER COMPRESSOR) KIT Use as directed 1 kit 0    tiotropium (SPIRIVA HANDIHALER) 18 MCG inhalation capsule Inhale 1 capsule into the lungs daily 30 capsule 3    budesonide-formoterol (SYMBICORT) 160-4.5 MCG/ACT AERO Inhale 2 puffs into the lungs 2 times daily 1 Inhaler 6    albuterol sulfate HFA (PROAIR HFA) 108 (90 BASE) MCG/ACT inhaler Inhale 2 puffs into the lungs every 6 hours as needed for Wheezing 1 Inhaler 3    ONETOUCH VERIO strip   1 each 3 times daily   0     Current Facility-Administered Medications   Medication Dose Route Frequency Provider Last Rate Last Dose    ipratropium (ATROVENT) 0.02 % nebulizer solution 0.5 mg  0.5 mg Nebulization Once Franklin Hill MD        albuterol (PROVENTIL) nebulizer solution 2.5 mg  2.5 mg Nebulization Once Poncho Rico Betancourt MD         Allergies   Allergen Reactions    Cymbalta [Duloxetine Hcl] Anaphylaxis    Januvia [Sitagliptin] Shortness Of Breath    Ciprofloxacin Other (See Comments)     muscle spasms occur  muscle spasms occur  cramping    Duloxetine     Lyrica [Pregabalin] Other (See Comments)     Does not help after 2 weeks     Metformin Diarrhea    Metformin And Related Diarrhea    Statins Other (See Comments)     myalgias  myalgias    Morphine Diarrhea, Nausea And Vomiting, Swelling and Nausea Only           Subjective:      Review of Systems   Constitutional: Positive for fatigue and fever. Negative for appetite change, chills and diaphoresis. HENT: Negative for congestion, postnasal drip and sore throat. Eyes: Negative. Negative for discharge and itching. Respiratory: Negative for cough, chest tightness and shortness of breath. Cardiovascular: Negative for chest pain, palpitations and leg swelling. Gastrointestinal: Negative for abdominal pain, anorexia, change in bowel habit, diarrhea, nausea and vomiting. Endocrine: Negative. Genitourinary: Negative for dysuria, frequency and urgency. Musculoskeletal: Negative for arthralgias, joint swelling, myalgias, neck pain and neck stiffness. Skin: Positive for wound. Negative for rash. R upper thigh    Allergic/Immunologic: Negative. Neurological: Negative for dizziness, vertigo, weakness, light-headedness, numbness and headaches. Hematological: Negative for adenopathy. Psychiatric/Behavioral: Negative for confusion. Objective:     Physical Exam   Constitutional: She is oriented to person, place, and time. She appears well-developed and well-nourished. HENT:   Head: Normocephalic. Right Ear: External ear normal.   Left Ear: External ear normal.   Nose: Nose normal.   Mouth/Throat: Oropharynx is clear and moist.   Eyes: Conjunctivae and EOM are normal. Pupils are equal, round, and reactive to light.    Neck: Normal

## 2017-12-05 DIAGNOSIS — E11.42 TYPE 2 DIABETES MELLITUS WITH DIABETIC POLYNEUROPATHY, WITH LONG-TERM CURRENT USE OF INSULIN (HCC): ICD-10-CM

## 2017-12-05 DIAGNOSIS — Z79.4 TYPE 2 DIABETES MELLITUS WITH DIABETIC POLYNEUROPATHY, WITH LONG-TERM CURRENT USE OF INSULIN (HCC): ICD-10-CM

## 2017-12-05 DIAGNOSIS — E13.40 NEUROPATHY DUE TO SECONDARY DIABETES (HCC): ICD-10-CM

## 2017-12-05 RX ORDER — BACLOFEN 20 MG
TABLET ORAL
Qty: 90 TABLET | Refills: 0 | Status: SHIPPED | OUTPATIENT
Start: 2017-12-05 | End: 2018-01-04

## 2017-12-05 RX ORDER — FUROSEMIDE 40 MG/1
TABLET ORAL
Qty: 30 TABLET | Refills: 0 | Status: SHIPPED | OUTPATIENT
Start: 2017-12-05 | End: 2018-01-04 | Stop reason: SDUPTHER

## 2017-12-05 RX ORDER — AMITRIPTYLINE HYDROCHLORIDE 50 MG/1
50 TABLET, FILM COATED ORAL NIGHTLY
Qty: 30 TABLET | Refills: 2 | Status: SHIPPED | OUTPATIENT
Start: 2017-12-05 | End: 2018-03-09 | Stop reason: SDUPTHER

## 2017-12-05 RX ORDER — CITALOPRAM 40 MG/1
TABLET ORAL
Qty: 30 TABLET | Refills: 0 | Status: SHIPPED | OUTPATIENT
Start: 2017-12-05 | End: 2018-09-06 | Stop reason: SDUPTHER

## 2017-12-05 NOTE — TELEPHONE ENCOUNTER
Faxed medication request pended medication please advise thank you! Next Visit Date:    Last ov 9/7/17    No future appointments. Health Maintenance   Topic Date Due    Hepatitis C screen  1960    Diabetic foot exam  07/15/2016    Flu vaccine (1) 09/01/2017    DTaP/Tdap/Td vaccine (1 - Tdap) 01/24/2027 (Originally 9/17/2016)    Diabetic microalbuminuria test  05/11/2018    Lipid screen  05/11/2018    Diabetic hemoglobin A1C test  09/07/2018    Diabetic retinal exam  10/30/2018    Breast cancer screen  08/07/2019    Colon cancer screen colonoscopy  08/17/2025    Pneumococcal med risk  Completed    HIV screen  Completed       Hemoglobin A1C (%)   Date Value   09/07/2017 7.3   04/21/2017 7.6   01/23/2017 7.9             ( goal A1C is < 7)   Microalb/Crt.  Ratio (mcg/mg creat)   Date Value   05/11/2017 41 (H)     LDL Cholesterol (mg/dL)   Date Value   05/11/2017            (goal LDL is <100)   AST (U/L)   Date Value   05/11/2017 22     ALT (U/L)   Date Value   05/11/2017 24     BUN (mg/dL)   Date Value   05/11/2017 20     BP Readings from Last 3 Encounters:   12/04/17 124/78   09/07/17 121/77   08/29/17 116/71          (goal 120/80)    All Future Testing planned in [facility]  Lab Frequency Next Occurrence   XR Chest Standard TWO VW Once 11/29/2017   Magnesium Once 04/16/2018   Uric Acid Once 04/16/2018   Vitamin D 25 Hydroxy Once 96/51/9609   Basic Metabolic Panel Once 89/79/8342               Patient Active Problem List:     HLD (hyperlipidemia)     HTN (hypertension)     Morbid obesity ([address]     Neuropathy due to secondary diabetes ([address]     Depression     Mild persistent asthma without complication     Type 2 diabetes mellitus with diabetic polyneuropathy, with long-term current use of insulin ([address]

## 2018-01-04 ENCOUNTER — OFFICE VISIT (OUTPATIENT)
Dept: FAMILY MEDICINE CLINIC | Age: 58
End: 2018-01-04
Payer: MEDICARE

## 2018-01-04 VITALS
HEIGHT: 70 IN | RESPIRATION RATE: 16 BRPM | HEART RATE: 76 BPM | TEMPERATURE: 97.9 F | DIASTOLIC BLOOD PRESSURE: 67 MMHG | WEIGHT: 293 LBS | BODY MASS INDEX: 41.95 KG/M2 | SYSTOLIC BLOOD PRESSURE: 99 MMHG

## 2018-01-04 DIAGNOSIS — R79.89 CREATININE ELEVATION: ICD-10-CM

## 2018-01-04 DIAGNOSIS — M25.561 CHRONIC PAIN OF RIGHT KNEE: ICD-10-CM

## 2018-01-04 DIAGNOSIS — M1A.0220 CHRONIC GOUT OF LEFT ELBOW, UNSPECIFIED CAUSE: ICD-10-CM

## 2018-01-04 DIAGNOSIS — I10 ESSENTIAL HYPERTENSION: ICD-10-CM

## 2018-01-04 DIAGNOSIS — Z79.4 TYPE 2 DIABETES MELLITUS WITH DIABETIC POLYNEUROPATHY, WITH LONG-TERM CURRENT USE OF INSULIN (HCC): Primary | ICD-10-CM

## 2018-01-04 DIAGNOSIS — E11.42 TYPE 2 DIABETES MELLITUS WITH DIABETIC POLYNEUROPATHY, WITH LONG-TERM CURRENT USE OF INSULIN (HCC): Primary | ICD-10-CM

## 2018-01-04 DIAGNOSIS — G89.29 CHRONIC PAIN OF RIGHT KNEE: ICD-10-CM

## 2018-01-04 PROCEDURE — 3046F HEMOGLOBIN A1C LEVEL >9.0%: CPT | Performed by: INTERNAL MEDICINE

## 2018-01-04 PROCEDURE — G8482 FLU IMMUNIZE ORDER/ADMIN: HCPCS | Performed by: INTERNAL MEDICINE

## 2018-01-04 PROCEDURE — 99214 OFFICE O/P EST MOD 30 MIN: CPT | Performed by: INTERNAL MEDICINE

## 2018-01-04 PROCEDURE — G8427 DOCREV CUR MEDS BY ELIG CLIN: HCPCS | Performed by: INTERNAL MEDICINE

## 2018-01-04 PROCEDURE — G8417 CALC BMI ABV UP PARAM F/U: HCPCS | Performed by: INTERNAL MEDICINE

## 2018-01-04 PROCEDURE — 3017F COLORECTAL CA SCREEN DOC REV: CPT | Performed by: INTERNAL MEDICINE

## 2018-01-04 PROCEDURE — 1036F TOBACCO NON-USER: CPT | Performed by: INTERNAL MEDICINE

## 2018-01-04 PROCEDURE — 3014F SCREEN MAMMO DOC REV: CPT | Performed by: INTERNAL MEDICINE

## 2018-01-04 RX ORDER — COLCHICINE 0.6 MG/1
0.6 TABLET ORAL DAILY
Qty: 30 TABLET | Refills: 1 | Status: SHIPPED | OUTPATIENT
Start: 2018-01-04 | End: 2018-04-12

## 2018-01-04 RX ORDER — FUROSEMIDE 20 MG/1
TABLET ORAL
Qty: 30 TABLET | Refills: 2 | Status: SHIPPED | OUTPATIENT
Start: 2018-01-04 | End: 2018-04-05 | Stop reason: SDUPTHER

## 2018-01-04 RX ORDER — FLUCONAZOLE 100 MG/1
TABLET ORAL
Refills: 0 | COMMUNITY
Start: 2017-12-11 | End: 2018-01-04

## 2018-01-04 RX ORDER — FEXOFENADINE HCL 180 MG/1
180 TABLET ORAL DAILY
Qty: 30 TABLET | Refills: 2 | Status: SHIPPED | OUTPATIENT
Start: 2018-01-04 | End: 2018-04-12

## 2018-01-04 RX ORDER — OXYCODONE HYDROCHLORIDE AND ACETAMINOPHEN 5; 325 MG/1; MG/1
TABLET ORAL
Refills: 0 | COMMUNITY
Start: 2017-12-11 | End: 2018-04-12

## 2018-01-04 NOTE — PROGRESS NOTES
Prediabetic)  09/07/2018    Diabetic retinal exam  10/30/2018    Breast cancer screen  08/07/2019    Colon cancer screen colonoscopy  08/17/2025    Flu vaccine  Completed    Pneumococcal med risk  Completed    HIV screen  Completed
XR KNEE RIGHT (3 VIEWS); Future      · Decrease lasix to 20 mg daily   · Start colchicine 0.6 mg daily   · XR right knee   · Continue other meds as before   · Right thigh abscess- S/P I&D healinh well, no discharge. Packing already removed by patient. · Palma received counseling on the following healthy behaviors: exercise and medication adherence  · Discussed use, benefit, and side effects of prescribed medications. Barriers to medication compliance addressed. All patient questions answered. Pt voiced understanding. · The return visit should be in 4 weeks      Che Ochoa MD  Attending and Faculty Internal Medicine  [facility]  [address] [place] [address] [facility]  [address]  Dept: [phone]  1/4/18      This note is created with the assistance of a speech-recognition program. While intending to generate a document that actually reflects the content of the visit, the document can still have some mistakes which may not have been identified and corrected by editing.

## 2018-01-11 DIAGNOSIS — Z79.4 TYPE 2 DIABETES MELLITUS WITH DIABETIC POLYNEUROPATHY, WITH LONG-TERM CURRENT USE OF INSULIN (HCC): ICD-10-CM

## 2018-01-11 DIAGNOSIS — E11.42 TYPE 2 DIABETES MELLITUS WITH DIABETIC POLYNEUROPATHY, WITH LONG-TERM CURRENT USE OF INSULIN (HCC): ICD-10-CM

## 2018-01-11 NOTE — TELEPHONE ENCOUNTER
Faxed medication request. Pended medication. Please advise thank you! Next Visit Date:    Last ov 1/4/17    Future Appointments  Date Time Provider Abbe Burgessi   2/8/2018 2:00 PM MD [address] Maintenance   Topic Date Due    Hepatitis C screen  1960    Diabetic foot exam  07/15/2016    DTaP/Tdap/Td vaccine (1 - Tdap) 01/24/2027 (Originally 9/17/2016)    Diabetic microalbuminuria test  05/11/2018    Lipid screen  05/11/2018    Potassium monitoring  05/11/2018    Creatinine monitoring  05/11/2018    A1C test (Diabetic or Prediabetic)  09/07/2018    Diabetic retinal exam  10/30/2018    Breast cancer screen  08/07/2019    Colon cancer screen colonoscopy  08/17/2025    Flu vaccine  Completed    Pneumococcal med risk  Completed    HIV screen  Completed       Hemoglobin A1C (%)   Date Value   09/07/2017 7.3   04/21/2017 7.6   01/23/2017 7.9             ( goal A1C is < 7)   Microalb/Crt.  Ratio (mcg/mg creat)   Date Value   05/11/2017 41 (H)     LDL Cholesterol (mg/dL)   Date Value   05/11/2017            (goal LDL is <100)   AST (U/L)   Date Value   05/11/2017 22     ALT (U/L)   Date Value   05/11/2017 24     BUN (mg/dL)   Date Value   05/11/2017 20     BP Readings from Last 3 Encounters:   01/04/18 99/67   12/04/17 124/78   09/07/17 121/77          (goal 120/80)    All Future Testing planned in [facility]  Lab Frequency Next Occurrence   XR Chest Standard TWO VW Once 02/14/2018   Magnesium Once 04/16/2018   Vitamin D 25 Hydroxy Once 04/16/2018   Uric Acid Once 01/04/2018   XR KNEE RIGHT (3 VIEWS) Once 50/94/6518   Basic Metabolic Panel Once 10/91/4671               Patient Active Problem List:     HLD (hyperlipidemia)     HTN (hypertension)     Morbid obesity ([address]     Neuropathy due to secondary diabetes ([address]     Depression     Mild persistent asthma without complication     Type 2 diabetes mellitus with diabetic polyneuropathy, with long-term current

## 2018-01-15 DIAGNOSIS — M79.7 FIBROMYALGIA: ICD-10-CM

## 2018-01-15 RX ORDER — GABAPENTIN 600 MG/1
600 TABLET ORAL 3 TIMES DAILY
Qty: 90 TABLET | Refills: 0 | Status: SHIPPED | OUTPATIENT
Start: 2018-01-15 | End: 2018-02-12 | Stop reason: SDUPTHER

## 2018-01-18 ENCOUNTER — OFFICE VISIT (OUTPATIENT)
Dept: FAMILY MEDICINE CLINIC | Age: 58
End: 2018-01-18
Payer: MEDICARE

## 2018-01-18 VITALS
BODY MASS INDEX: 41.95 KG/M2 | HEART RATE: 93 BPM | SYSTOLIC BLOOD PRESSURE: 120 MMHG | RESPIRATION RATE: 16 BRPM | DIASTOLIC BLOOD PRESSURE: 78 MMHG | HEIGHT: 70 IN | TEMPERATURE: 98 F | WEIGHT: 293 LBS

## 2018-01-18 DIAGNOSIS — Z79.4 TYPE 2 DIABETES MELLITUS WITH DIABETIC POLYNEUROPATHY, WITH LONG-TERM CURRENT USE OF INSULIN (HCC): Primary | ICD-10-CM

## 2018-01-18 DIAGNOSIS — L02.214 ABSCESS OF GROIN, RIGHT: ICD-10-CM

## 2018-01-18 DIAGNOSIS — M10.9 GOUT, UNSPECIFIED CAUSE, UNSPECIFIED CHRONICITY, UNSPECIFIED SITE: ICD-10-CM

## 2018-01-18 DIAGNOSIS — E11.42 TYPE 2 DIABETES MELLITUS WITH DIABETIC POLYNEUROPATHY, WITH LONG-TERM CURRENT USE OF INSULIN (HCC): Primary | ICD-10-CM

## 2018-01-18 LAB — HBA1C MFR BLD: 7.3 %

## 2018-01-18 PROCEDURE — G8427 DOCREV CUR MEDS BY ELIG CLIN: HCPCS | Performed by: INTERNAL MEDICINE

## 2018-01-18 PROCEDURE — 3045F PR MOST RECENT HEMOGLOBIN A1C LEVEL 7.0-9.0%: CPT | Performed by: INTERNAL MEDICINE

## 2018-01-18 PROCEDURE — G8482 FLU IMMUNIZE ORDER/ADMIN: HCPCS | Performed by: INTERNAL MEDICINE

## 2018-01-18 PROCEDURE — 1036F TOBACCO NON-USER: CPT | Performed by: INTERNAL MEDICINE

## 2018-01-18 PROCEDURE — 99213 OFFICE O/P EST LOW 20 MIN: CPT | Performed by: INTERNAL MEDICINE

## 2018-01-18 PROCEDURE — 3014F SCREEN MAMMO DOC REV: CPT | Performed by: INTERNAL MEDICINE

## 2018-01-18 PROCEDURE — G8417 CALC BMI ABV UP PARAM F/U: HCPCS | Performed by: INTERNAL MEDICINE

## 2018-01-18 PROCEDURE — 3017F COLORECTAL CA SCREEN DOC REV: CPT | Performed by: INTERNAL MEDICINE

## 2018-01-18 PROCEDURE — 83036 HEMOGLOBIN GLYCOSYLATED A1C: CPT | Performed by: INTERNAL MEDICINE

## 2018-01-18 RX ORDER — DOXYCYCLINE HYCLATE 100 MG
100 TABLET ORAL 2 TIMES DAILY
Qty: 28 TABLET | Refills: 0 | Status: SHIPPED | OUTPATIENT
Start: 2018-01-18 | End: 2018-02-01

## 2018-01-18 RX ORDER — ALLOPURINOL 300 MG/1
TABLET ORAL
Qty: 90 TABLET | Refills: 1 | Status: SHIPPED | OUTPATIENT
Start: 2018-01-18 | End: 2018-11-09 | Stop reason: SDUPTHER

## 2018-01-18 NOTE — PROGRESS NOTES
Visit Information    Have you changed or started any medications since your last visit including any over-the-counter medicines, vitamins, or herbal medicines? no   Are you having any side effects from any of your medications? -  no  Have you stopped taking any of your medications? Is so, why? -  no    Have you seen any other physician or provider since your last visit? No  Have you had any other diagnostic tests since your last visit? No  Have you been seen in the emergency room and/or had an admission to a hospital since we last saw you? No  Have you had your routine dental cleaning in the past 6 months? yes -     Have you activated your [employer] account? If not, what are your barriers?  Yes     Patient Care Team:  Kay Jaramillo MD as PCP - General (Internal Medicine)    Medical History Review  Past Medical, Family, and Social History reviewed and does not contribute to the patient presenting condition    Health Maintenance   Topic Date Due    Hepatitis C screen  1960    Diabetic foot exam  07/15/2016    DTaP/Tdap/Td vaccine (1 - Tdap) 01/24/2027 (Originally 9/17/2016)    Diabetic microalbuminuria test  05/11/2018    Lipid screen  05/11/2018    Potassium monitoring  05/11/2018    Creatinine monitoring  05/11/2018    A1C test (Diabetic or Prediabetic)  09/07/2018    Diabetic retinal exam  10/30/2018    Breast cancer screen  08/07/2019    Colon cancer screen colonoscopy  08/17/2025    Flu vaccine  Completed    Pneumococcal med risk  Completed    HIV screen  Completed

## 2018-01-18 NOTE — PROGRESS NOTES
tablet Take 1 tablet by mouth 3 times daily for 31 days.  90 tablet 0    insulin 70-30 (NOVOLIN 70/30) (70-30) 100 UNIT per ML injection vial Inject 70 Units into the skin 2 times daily (before meals) 20 mL 2    oxyCODONE-acetaminophen (PERCOCET) 5-325 MG per tablet TK 1 T PO Q 6 H  0    furosemide (LASIX) 20 MG tablet TAKE ONE TABLET BY MOUTH DAILY 30 tablet 2    fexofenadine (ALLEGRA ALLERGY) 180 MG tablet Take 1 tablet by mouth daily 30 tablet 2    colchicine (COLCRYS) 0.6 MG tablet Take 1 tablet by mouth daily 30 tablet 1    citalopram (CELEXA) 40 MG tablet TAKE 1 TABLET BY MOUTH DAILY 30 tablet 0    amitriptyline (ELAVIL) 50 MG tablet Take 1 tablet by mouth nightly 30 tablet 2    rOPINIRole (REQUIP) 1 MG tablet TK 1 T PO QHS 90 tablet 1    allopurinol (ZYLOPRIM) 300 MG tablet TAKE 1 TABLET BY MOUTH EVERY DAY 90 tablet 1    aspirin (ASPIRIN LOW DOSE) 81 MG EC tablet TAKE 1 TABLET BY MOUTH DAILY 90 tablet 1    Biotin 1000 MCG TABS TAKE ONE TABLET BY MOUTH DAILY 90 tablet 1    carvedilol (COREG) 25 MG tablet Take 1 tablet by mouth 2 times daily 180 tablet 1    docusate sodium (DOK) 100 MG capsule TAKE ONE CAPSULE BY MOUTH DAILY 90 capsule 1    lisinopril (PRINIVIL;ZESTRIL) 10 MG tablet TAKE 1 TABLET BY MOUTH DAILY 30 tablet 5    tiZANidine (ZANAFLEX) 4 MG tablet TAKE 1 TABLET BY MOUTH TWICE DAILY 60 tablet 3    OYSCO 500 + D 500-200 MG-UNIT per tablet TK 1 T PO D 90 tablet 1    Multiple Vitamins-Minerals (THERAGRAN-M PREMIER 50 PLUS) TABS TAKE ONE TABLET BY MOUTH DAILY 90 tablet 1    albuterol (PROVENTIL) (2.5 MG/3ML) 0.083% nebulizer solution Take 3 mLs by nebulization every 6 hours as needed for Wheezing or Shortness of Breath 120 each 3    tiotropium (SPIRIVA HANDIHALER) 18 MCG inhalation capsule Inhale 1 capsule into the lungs daily 30 capsule 3    budesonide-formoterol (SYMBICORT) 160-4.5 MCG/ACT AERO Inhale 2 puffs into the lungs 2 times daily 1 Inhaler 6    albuterol sulfate HFA (PROAIR PHYSICAL EXAM:        /78 (Site: Left Arm, Position: Sitting, Cuff Size: Small Adult) Comment (Site): wrist  Pulse 93   Temp 98 °F (36.7 °C) (Oral)   Resp 16   Ht 5' 10\" (1.778 m)   Wt (!) 373 lb 6.4 oz (169.4 kg)   BMI 53.58 kg/m²      General appearance - well appearing, not in  distress. Mental status - alert and cooperative . Head: Normocephalic, without obvious abnormality, atraumatic. Eye: PERRL, conjunctiva/corneas clear, EOM's intact. Neck - Supple, no significant adenopathy . Chest - Clear to auscultation, no wheezes, rales or rhonchi, symmetric air entry. Heart -  regular rhythm, normal S1, S2, no murmurs. Abdomen - Soft, nontender, nondistended, no masses or organomegaly. Neurological - Alert, oriented, normal speech, no focal findings or movement disorder noted. Musculoskeletal - No joint tenderness, deformity or swelling. Extremities -  No pedal edema, no clubbing or cyanosis. Groin - healing scar of old I&D, a small abscess with minimal induration nearby. No significant pus drainage. Mild tenderness present      Labs:       Chemistry        Component Value Date/Time     05/11/2017 1100    K 5.2 05/11/2017 1100    CL 96 (L) 05/11/2017 1100    CO2 26 05/11/2017 1100    BUN 20 05/11/2017 1100    CREATININE 0.89 05/11/2017 1100        Component Value Date/Time    CALCIUM 9.5 05/11/2017 1100    ALKPHOS 75 05/11/2017 1100    AST 22 05/11/2017 1100    ALT 24 05/11/2017 1100    BILITOT 0.36 05/11/2017 1100          No results for input(s): GLUCOSE in the last 72 hours.   Lab Results   Component Value Date    WBC 8.6 02/01/2016    HGB 13.1 02/01/2016    HCT 39.8 02/01/2016    MCV 95.8 02/01/2016     02/01/2016     Lab Results   Component Value Date    TSH 1.55 08/01/2016     Lab Results   Component Value Date    LABA1C 7.3 09/07/2017     Lab Results   Component Value Date    LABMICR 41 (H) 05/11/2017     No components found for: CHLPL  Lab Results   Component Value Date    TRIG 522 (H) 05/11/2017     Lab Results   Component Value Date    HDL 30 (L) 05/11/2017     Lab Results   Component Value Date    LDLCHOLESTEROL      05/11/2017    LDLDIRECT 102 (H) 05/11/2017     The 10-year ASCVD risk score (Mely Thompson, et al., 2013) is: 8.4%    Values used to calculate the score:      Age: 65 years      Sex: Female      Is Non- : No      Diabetic: Yes      Tobacco smoker: No      Systolic Blood Pressure: 313 mmHg      Is BP treated: Yes      HDL Cholesterol: 30 mg/dL      Total Cholesterol: 200 mg/dL    Health Maintenance Due   Topic Date Due    Hepatitis C screen  1960    Diabetic foot exam  07/15/2016        ASSESSMENT AND PLAN    1. Abscess of groin, right    - doxycycline hyclate (VIBRA-TABS) 100 MG tablet; Take 1 tablet by mouth 2 times daily for 14 days  Dispense: 28 tablet; Refill: 0  - mupirocin (BACTROBAN) 2 % ointment; Apply 3 times daily. Dispense: 1 Tube; Refill: 1    2. Gout, unspecified cause, unspecified chronicity, unspecified site    - allopurinol (ZYLOPRIM) 300 MG tablet; TAKE 1 TABLET BY MOUTH EVERY DAY  Dispense: 90 tablet; Refill: 1    3. Type 2 diabetes mellitus with diabetic polyneuropathy, with long-term current use of insulin (HCC)    - POCT glycosylated hemoglobin (Hb A1C)      · Doxy 100 mg BID x 2 weeks   · Bactroban for application 2-3 times /day   · A1c check   · Pt advised to use heating pad, local analgesic patch, massages for back pain relief. · Palma received counseling on the following healthy behaviors: exercise and medication adherence  · Discussed use, benefit, and side effects of prescribed medications. Barriers to medication compliance addressed. All patient questions answered. Pt voiced understanding.    · The return visit - Keep next appointment with MD Gayle Livingston MD  Attending and Faculty Internal Medicine  [facility]  [address] [place] [facility] [address] [facility]  [address]  Dept: [phone]  1/18/18      This note is created with the assistance of a speech-recognition program. While intending to generate a document that actually reflects the content of the visit, the document can still have some mistakes which may not have been identified and corrected by editing.

## 2018-02-07 DIAGNOSIS — M79.7 FIBROMYALGIA: ICD-10-CM

## 2018-02-07 RX ORDER — TIZANIDINE 4 MG/1
TABLET ORAL
Qty: 60 TABLET | Refills: 0 | Status: SHIPPED | OUTPATIENT
Start: 2018-02-07 | End: 2018-03-09 | Stop reason: SDUPTHER

## 2018-02-07 NOTE — TELEPHONE ENCOUNTER
Faxed medication request, Pended medication. Please advise thank you! Next Visit Date:    Last ov 1/18/18    Future Appointments  Date Time Provider Abbe Burgessi   2/8/2018 2:00 PM MD Placido Pacheco 56 Maintenance   Topic Date Due    Hepatitis C screen  1960    Diabetic foot exam  07/15/2016    DTaP/Tdap/Td vaccine (1 - Tdap) 01/24/2027 (Originally 9/17/2016)    Diabetic microalbuminuria test  05/11/2018    Lipid screen  05/11/2018    Potassium monitoring  05/11/2018    Creatinine monitoring  05/11/2018    Diabetic retinal exam  10/30/2018    A1C test (Diabetic or Prediabetic)  01/18/2019    Breast cancer screen  08/07/2019    Colon cancer screen colonoscopy  08/17/2025    Flu vaccine  Completed    Pneumococcal med risk  Completed    HIV screen  Completed       Hemoglobin A1C (%)   Date Value   01/18/2018 7.3   09/07/2017 7.3   04/21/2017 7.6             ( goal A1C is < 7)   Microalb/Crt.  Ratio (mcg/mg creat)   Date Value   05/11/2017 41 (H)     LDL Cholesterol (mg/dL)   Date Value   05/11/2017            (goal LDL is <100)   AST (U/L)   Date Value   05/11/2017 22     ALT (U/L)   Date Value   05/11/2017 24     BUN (mg/dL)   Date Value   05/11/2017 20     BP Readings from Last 3 Encounters:   01/18/18 120/78   01/04/18 99/67   12/04/17 124/78          (goal 120/80)    All Future Testing planned in [facility]  Lab Frequency Next Occurrence   XR Chest Standard TWO VW Once 02/14/2018   Magnesium Once 04/16/2018   Vitamin D 25 Hydroxy Once 04/16/2018   Uric Acid Once 07/12/2018   XR KNEE RIGHT (3 VIEWS) Once 52/23/3975   Basic Metabolic Panel Once 63/56/0887               Patient Active Problem List:     HLD (hyperlipidemia)     HTN (hypertension)     Morbid obesity ([address]     Neuropathy due to secondary diabetes ([address]     Depression     Mild persistent asthma without complication     Type 2 diabetes mellitus with diabetic polyneuropathy, with long-term current use of insulin ([address]

## 2018-02-08 ENCOUNTER — OFFICE VISIT (OUTPATIENT)
Dept: FAMILY MEDICINE CLINIC | Age: 58
End: 2018-02-08
Payer: MEDICARE

## 2018-02-08 VITALS
WEIGHT: 293 LBS | TEMPERATURE: 97.8 F | HEART RATE: 86 BPM | HEIGHT: 70 IN | DIASTOLIC BLOOD PRESSURE: 79 MMHG | BODY MASS INDEX: 41.95 KG/M2 | SYSTOLIC BLOOD PRESSURE: 122 MMHG | RESPIRATION RATE: 16 BRPM

## 2018-02-08 DIAGNOSIS — R76.8 ANA POSITIVE: ICD-10-CM

## 2018-02-08 DIAGNOSIS — L02.214 GROIN ABSCESS: ICD-10-CM

## 2018-02-08 DIAGNOSIS — E66.01 MORBID OBESITY (HCC): ICD-10-CM

## 2018-02-08 DIAGNOSIS — E11.42 TYPE 2 DIABETES MELLITUS WITH DIABETIC POLYNEUROPATHY, WITH LONG-TERM CURRENT USE OF INSULIN (HCC): Primary | ICD-10-CM

## 2018-02-08 DIAGNOSIS — Z79.4 TYPE 2 DIABETES MELLITUS WITH DIABETIC POLYNEUROPATHY, WITH LONG-TERM CURRENT USE OF INSULIN (HCC): Primary | ICD-10-CM

## 2018-02-08 PROCEDURE — 1036F TOBACCO NON-USER: CPT | Performed by: INTERNAL MEDICINE

## 2018-02-08 PROCEDURE — G8417 CALC BMI ABV UP PARAM F/U: HCPCS | Performed by: INTERNAL MEDICINE

## 2018-02-08 PROCEDURE — 99214 OFFICE O/P EST MOD 30 MIN: CPT | Performed by: INTERNAL MEDICINE

## 2018-02-08 PROCEDURE — 3017F COLORECTAL CA SCREEN DOC REV: CPT | Performed by: INTERNAL MEDICINE

## 2018-02-08 PROCEDURE — G8482 FLU IMMUNIZE ORDER/ADMIN: HCPCS | Performed by: INTERNAL MEDICINE

## 2018-02-08 PROCEDURE — G8427 DOCREV CUR MEDS BY ELIG CLIN: HCPCS | Performed by: INTERNAL MEDICINE

## 2018-02-08 PROCEDURE — 3014F SCREEN MAMMO DOC REV: CPT | Performed by: INTERNAL MEDICINE

## 2018-02-08 PROCEDURE — 3045F PR MOST RECENT HEMOGLOBIN A1C LEVEL 7.0-9.0%: CPT | Performed by: INTERNAL MEDICINE

## 2018-02-08 RX ORDER — DOXYCYCLINE HYCLATE 100 MG
100 TABLET ORAL 2 TIMES DAILY
Qty: 20 TABLET | Refills: 0 | Status: SHIPPED | OUTPATIENT
Start: 2018-02-08 | End: 2018-02-18

## 2018-02-08 RX ORDER — MELOXICAM 15 MG/1
15 TABLET ORAL DAILY
COMMUNITY
End: 2018-02-08 | Stop reason: SDUPTHER

## 2018-02-08 RX ORDER — MELOXICAM 15 MG/1
15 TABLET ORAL DAILY
Qty: 30 TABLET | Refills: 2 | Status: SHIPPED | OUTPATIENT
Start: 2018-02-08 | End: 2018-09-17 | Stop reason: SDUPTHER

## 2018-02-08 NOTE — PROGRESS NOTES
Negative for vomiting, abdominal pain, constipation and diarrhea. Endocrine: Negative for cold intolerance, heat intolerance, polydipsia and polyuria. Genitourinary: Negative for dysuria and frequency. Musculoskeletal: Negative for  Myalgia, back pain, bone pain and arthralgias. Neurological: Negative for dizziness, weakness and headaches. PHYSICAL EXAM:        /79 (Site: Left Arm, Position: Sitting, Cuff Size: Large Adult)   Pulse 86   Temp 97.8 °F (36.6 °C) (Oral)   Resp 16   Ht 5' 10\" (1.778 m)   Wt (!) 375 lb 9.6 oz (170.4 kg)   BMI 53.89 kg/m²      General appearance - well appearing, not in  distress. Mental status - alert and cooperative . Head: Normocephalic, without obvious abnormality, atraumatic. Eye: PERRL, conjunctiva/corneas clear, EOM's intact. Neck - Supple, no significant adenopathy . Chest - Clear to auscultation, no wheezes, rales or rhonchi, symmetric air entry. Heart -  regular rhythm, normal S1, S2, no murmurs. Abdomen - Soft, nontender, nondistended, no masses or organomegaly. Her groin exam did not reveal any abscess however a small mildly tender lump was palpable. Neurological - Alert, oriented, normal speech, no focal findings or movement disorder noted. Musculoskeletal - No joint tenderness, deformity or swelling. Extremities -  No pedal edema, no clubbing or cyanosis. Labs:       Chemistry        Component Value Date/Time     05/11/2017 1100    K 5.2 05/11/2017 1100    CL 96 (L) 05/11/2017 1100    CO2 26 05/11/2017 1100    BUN 20 05/11/2017 1100    CREATININE 0.89 05/11/2017 1100        Component Value Date/Time    CALCIUM 9.5 05/11/2017 1100    ALKPHOS 75 05/11/2017 1100    AST 22 05/11/2017 1100    ALT 24 05/11/2017 1100    BILITOT 0.36 05/11/2017 1100          No results for input(s): GLUCOSE in the last 72 hours.   Lab Results   Component Value Date    WBC 8.6 02/01/2016    HGB 13.1 02/01/2016    HCT 39.8 02/01/2016    MCV 95.8 adherence and tobacco cessation  · Discussed use, benefit, and side effects of prescribed medications. Barriers to medication compliance addressed. All patient questions answered. Pt voiced understanding. · The return visit should be in 8 weeks      Carolee Clemente MD  Attending and Faculty Internal Medicine  [facility]  [address] [facility]  [address]  Dept: [phone]  2/8/18      This note is created with the assistance of a speech-recognition program. While intending to generate a document that actually reflects the content of the visit, the document can still have some mistakes which may not have been identified and corrected by editing.

## 2018-02-08 NOTE — PROGRESS NOTES
Chronic Disease Visit Information    BP Readings from Last 3 Encounters:   01/18/18 120/78   01/04/18 99/67   12/04/17 124/78          Hemoglobin A1C (%)   Date Value   01/18/2018 7.3   09/07/2017 7.3   04/21/2017 7.6     Microalb/Crt. Ratio (mcg/mg creat)   Date Value   05/11/2017 41 (H)     LDL Cholesterol (mg/dL)   Date Value   05/11/2017          HDL (mg/dL)   Date Value   05/11/2017 30 (L)     BUN (mg/dL)   Date Value   05/11/2017 20     CREATININE (mg/dL)   Date Value   05/11/2017 0.89     Glucose (mg/dL)   Date Value   05/11/2017 189 (H)            Have you changed or started any medications since your last visit including any over-the-counter medicines, vitamins, or herbal medicines? no   Are you having any side effects from any of your medications? -  no  Have you stopped taking any of your medications? Is so, why? -  no    Have you seen any other physician or provider since your last visit? No  Have you had any other diagnostic tests since your last visit? No  Have you been seen in the emergency room and/or had an admission to a hospital since we last saw you? No  Have you had your annual diabetic retinal (eye) exam? Yes - Records Requested  Have you had your routine dental cleaning in the past 6 months? yes -     Have you activated your [facility] account? If not, what are your barriers?  Yes     Patient Care Team:  Che Ochoa MD as PCP - General (Internal Medicine)         Medical History Review  Past Medical, Family, and Social History reviewed and does contribute to the patient presenting condition    Health Maintenance   Topic Date Due    Hepatitis C screen  1960    Diabetic foot exam  07/15/2016    DTaP/Tdap/Td vaccine (1 - Tdap) 01/24/2027 (Originally 9/17/2016)    Diabetic microalbuminuria test  05/11/2018    Lipid screen  05/11/2018    Potassium monitoring  05/11/2018    Creatinine monitoring  05/11/2018    Diabetic retinal exam  10/30/2018    A1C test (Diabetic or Prediabetic) 01/18/2019    Breast cancer screen  08/07/2019    Colon cancer screen colonoscopy  08/17/2025    Flu vaccine  Completed    Pneumococcal med risk  Completed    HIV screen  Completed

## 2018-02-12 DIAGNOSIS — E11.42 TYPE 2 DIABETES MELLITUS WITH DIABETIC POLYNEUROPATHY, WITH LONG-TERM CURRENT USE OF INSULIN (HCC): Primary | ICD-10-CM

## 2018-02-12 DIAGNOSIS — Z79.4 TYPE 2 DIABETES MELLITUS WITH DIABETIC POLYNEUROPATHY, WITH LONG-TERM CURRENT USE OF INSULIN (HCC): Primary | ICD-10-CM

## 2018-02-12 DIAGNOSIS — M79.7 FIBROMYALGIA: ICD-10-CM

## 2018-02-12 RX ORDER — GABAPENTIN 600 MG/1
600 TABLET ORAL 3 TIMES DAILY
Qty: 90 TABLET | Refills: 2 | Status: SHIPPED | OUTPATIENT
Start: 2018-02-12 | End: 2018-10-18

## 2018-03-09 DIAGNOSIS — E11.42 TYPE 2 DIABETES MELLITUS WITH DIABETIC POLYNEUROPATHY, WITH LONG-TERM CURRENT USE OF INSULIN (HCC): ICD-10-CM

## 2018-03-09 DIAGNOSIS — M79.7 FIBROMYALGIA: ICD-10-CM

## 2018-03-09 DIAGNOSIS — Z79.4 TYPE 2 DIABETES MELLITUS WITH DIABETIC POLYNEUROPATHY, WITH LONG-TERM CURRENT USE OF INSULIN (HCC): ICD-10-CM

## 2018-03-10 RX ORDER — TIZANIDINE 4 MG/1
TABLET ORAL
Qty: 60 TABLET | Refills: 0 | Status: SHIPPED | OUTPATIENT
Start: 2018-03-10 | End: 2018-04-09 | Stop reason: SDUPTHER

## 2018-03-10 RX ORDER — AMITRIPTYLINE HYDROCHLORIDE 50 MG/1
50 TABLET, FILM COATED ORAL NIGHTLY
Qty: 30 TABLET | Refills: 2 | Status: SHIPPED | OUTPATIENT
Start: 2018-03-10 | End: 2018-05-13 | Stop reason: SDUPTHER

## 2018-04-05 RX ORDER — FUROSEMIDE 20 MG/1
TABLET ORAL
Qty: 30 TABLET | Refills: 2 | Status: SHIPPED | OUTPATIENT
Start: 2018-04-05 | End: 2018-05-13 | Stop reason: SDUPTHER

## 2018-04-09 DIAGNOSIS — M79.7 FIBROMYALGIA: ICD-10-CM

## 2018-04-09 DIAGNOSIS — E11.42 TYPE 2 DIABETES MELLITUS WITH DIABETIC POLYNEUROPATHY, WITH LONG-TERM CURRENT USE OF INSULIN (HCC): ICD-10-CM

## 2018-04-09 DIAGNOSIS — Z79.4 TYPE 2 DIABETES MELLITUS WITH DIABETIC POLYNEUROPATHY, WITH LONG-TERM CURRENT USE OF INSULIN (HCC): ICD-10-CM

## 2018-04-09 RX ORDER — TIZANIDINE 4 MG/1
TABLET ORAL
Qty: 60 TABLET | Refills: 0 | Status: SHIPPED | OUTPATIENT
Start: 2018-04-09 | End: 2018-04-12 | Stop reason: SDUPTHER

## 2018-04-12 ENCOUNTER — OFFICE VISIT (OUTPATIENT)
Dept: FAMILY MEDICINE CLINIC | Age: 58
End: 2018-04-12
Payer: MEDICARE

## 2018-04-12 VITALS
DIASTOLIC BLOOD PRESSURE: 73 MMHG | RESPIRATION RATE: 16 BRPM | WEIGHT: 293 LBS | TEMPERATURE: 97.9 F | HEART RATE: 92 BPM | BODY MASS INDEX: 41.95 KG/M2 | SYSTOLIC BLOOD PRESSURE: 108 MMHG | HEIGHT: 70 IN

## 2018-04-12 DIAGNOSIS — E11.42 TYPE 2 DIABETES MELLITUS WITH DIABETIC POLYNEUROPATHY, WITH LONG-TERM CURRENT USE OF INSULIN (HCC): ICD-10-CM

## 2018-04-12 DIAGNOSIS — Z79.4 TYPE 2 DIABETES MELLITUS WITH DIABETIC POLYNEUROPATHY, WITH LONG-TERM CURRENT USE OF INSULIN (HCC): ICD-10-CM

## 2018-04-12 DIAGNOSIS — I10 ESSENTIAL HYPERTENSION: ICD-10-CM

## 2018-04-12 DIAGNOSIS — E66.01 MORBID OBESITY (HCC): Primary | ICD-10-CM

## 2018-04-12 DIAGNOSIS — M79.7 FIBROMYALGIA: ICD-10-CM

## 2018-04-12 PROCEDURE — 3014F SCREEN MAMMO DOC REV: CPT | Performed by: INTERNAL MEDICINE

## 2018-04-12 PROCEDURE — 3045F PR MOST RECENT HEMOGLOBIN A1C LEVEL 7.0-9.0%: CPT | Performed by: INTERNAL MEDICINE

## 2018-04-12 PROCEDURE — 1036F TOBACCO NON-USER: CPT | Performed by: INTERNAL MEDICINE

## 2018-04-12 PROCEDURE — 2022F DILAT RTA XM EVC RTNOPTHY: CPT | Performed by: INTERNAL MEDICINE

## 2018-04-12 PROCEDURE — G8417 CALC BMI ABV UP PARAM F/U: HCPCS | Performed by: INTERNAL MEDICINE

## 2018-04-12 PROCEDURE — 99214 OFFICE O/P EST MOD 30 MIN: CPT | Performed by: INTERNAL MEDICINE

## 2018-04-12 PROCEDURE — 3017F COLORECTAL CA SCREEN DOC REV: CPT | Performed by: INTERNAL MEDICINE

## 2018-04-12 PROCEDURE — G8427 DOCREV CUR MEDS BY ELIG CLIN: HCPCS | Performed by: INTERNAL MEDICINE

## 2018-04-12 RX ORDER — TIZANIDINE 4 MG/1
TABLET ORAL
Qty: 60 TABLET | Refills: 0 | Status: SHIPPED | OUTPATIENT
Start: 2018-04-12 | End: 2018-06-08 | Stop reason: SDUPTHER

## 2018-04-12 RX ORDER — LISINOPRIL 10 MG/1
TABLET ORAL
Qty: 90 TABLET | Refills: 1 | Status: SHIPPED | OUTPATIENT
Start: 2018-04-12 | End: 2018-05-14 | Stop reason: SDUPTHER

## 2018-05-01 ENCOUNTER — TELEPHONE (OUTPATIENT)
Dept: FAMILY MEDICINE CLINIC | Age: 58
End: 2018-05-01

## 2018-05-14 DIAGNOSIS — G25.81 RLS (RESTLESS LEGS SYNDROME): ICD-10-CM

## 2018-05-14 RX ORDER — ROPINIROLE 1 MG/1
TABLET, FILM COATED ORAL
Qty: 90 TABLET | Refills: 0 | Status: SHIPPED | OUTPATIENT
Start: 2018-05-14 | End: 2018-11-09 | Stop reason: SDUPTHER

## 2018-05-29 ENCOUNTER — OFFICE VISIT (OUTPATIENT)
Dept: FAMILY MEDICINE CLINIC | Age: 58
End: 2018-05-29
Payer: MEDICARE

## 2018-05-29 VITALS
DIASTOLIC BLOOD PRESSURE: 65 MMHG | BODY MASS INDEX: 41.95 KG/M2 | WEIGHT: 293 LBS | SYSTOLIC BLOOD PRESSURE: 97 MMHG | HEART RATE: 96 BPM | RESPIRATION RATE: 16 BRPM | TEMPERATURE: 99 F | HEIGHT: 70 IN

## 2018-05-29 DIAGNOSIS — J45.30 MILD PERSISTENT ASTHMA WITHOUT COMPLICATION: ICD-10-CM

## 2018-05-29 DIAGNOSIS — E11.42 TYPE 2 DIABETES MELLITUS WITH DIABETIC POLYNEUROPATHY, WITH LONG-TERM CURRENT USE OF INSULIN (HCC): ICD-10-CM

## 2018-05-29 DIAGNOSIS — Z00.00 HEALTH CARE MAINTENANCE: ICD-10-CM

## 2018-05-29 DIAGNOSIS — J30.9 ALLERGIC SINUSITIS: ICD-10-CM

## 2018-05-29 DIAGNOSIS — Z79.4 TYPE 2 DIABETES MELLITUS WITH DIABETIC POLYNEUROPATHY, WITH LONG-TERM CURRENT USE OF INSULIN (HCC): ICD-10-CM

## 2018-05-29 DIAGNOSIS — Z23 NEED FOR SHINGLES VACCINE: ICD-10-CM

## 2018-05-29 DIAGNOSIS — I10 ESSENTIAL HYPERTENSION: Primary | ICD-10-CM

## 2018-05-29 DIAGNOSIS — G50.0 TRIGEMINAL NEURALGIA: ICD-10-CM

## 2018-05-29 LAB — HBA1C MFR BLD: 6.4 %

## 2018-05-29 PROCEDURE — 3044F HG A1C LEVEL LT 7.0%: CPT | Performed by: INTERNAL MEDICINE

## 2018-05-29 PROCEDURE — 2022F DILAT RTA XM EVC RTNOPTHY: CPT | Performed by: INTERNAL MEDICINE

## 2018-05-29 PROCEDURE — G8427 DOCREV CUR MEDS BY ELIG CLIN: HCPCS | Performed by: INTERNAL MEDICINE

## 2018-05-29 PROCEDURE — 83036 HEMOGLOBIN GLYCOSYLATED A1C: CPT | Performed by: INTERNAL MEDICINE

## 2018-05-29 PROCEDURE — 1036F TOBACCO NON-USER: CPT | Performed by: INTERNAL MEDICINE

## 2018-05-29 PROCEDURE — G8417 CALC BMI ABV UP PARAM F/U: HCPCS | Performed by: INTERNAL MEDICINE

## 2018-05-29 PROCEDURE — 99214 OFFICE O/P EST MOD 30 MIN: CPT | Performed by: INTERNAL MEDICINE

## 2018-05-29 PROCEDURE — 3017F COLORECTAL CA SCREEN DOC REV: CPT | Performed by: INTERNAL MEDICINE

## 2018-05-29 RX ORDER — FLUTICASONE PROPIONATE 50 MCG
1 SPRAY, SUSPENSION (ML) NASAL DAILY
Qty: 1 BOTTLE | Refills: 3 | Status: SHIPPED | OUTPATIENT
Start: 2018-05-29 | End: 2018-05-29 | Stop reason: SDUPTHER

## 2018-05-29 RX ORDER — PEN NEEDLE, DIABETIC 32GX 5/32"
NEEDLE, DISPOSABLE MISCELLANEOUS
COMMUNITY
Start: 2018-03-12 | End: 2018-09-11

## 2018-05-29 RX ORDER — NAPROXEN SODIUM 220 MG
TABLET ORAL
Refills: 5 | COMMUNITY
Start: 2018-05-09 | End: 2018-10-18

## 2018-05-29 RX ORDER — FLUTICASONE PROPIONATE 50 MCG
SPRAY, SUSPENSION (ML) NASAL
Qty: 48 G | Refills: 3 | Status: SHIPPED | OUTPATIENT
Start: 2018-05-29 | End: 2019-01-08 | Stop reason: SDUPTHER

## 2018-05-29 RX ORDER — TIOTROPIUM BROMIDE 18 UG/1
CAPSULE ORAL; RESPIRATORY (INHALATION)
Refills: 3 | COMMUNITY
Start: 2018-05-09 | End: 2018-07-03 | Stop reason: ALTCHOICE

## 2018-05-31 ENCOUNTER — TELEPHONE (OUTPATIENT)
Dept: FAMILY MEDICINE CLINIC | Age: 58
End: 2018-05-31

## 2018-06-07 DIAGNOSIS — F32.A DEPRESSION, UNSPECIFIED DEPRESSION TYPE: ICD-10-CM

## 2018-06-07 RX ORDER — CITALOPRAM 40 MG/1
TABLET ORAL
Qty: 90 TABLET | Refills: 0 | Status: SHIPPED | OUTPATIENT
Start: 2018-06-07 | End: 2018-07-03 | Stop reason: ALTCHOICE

## 2018-06-08 DIAGNOSIS — M79.7 FIBROMYALGIA: ICD-10-CM

## 2018-06-08 DIAGNOSIS — J44.1 COPD EXACERBATION (HCC): ICD-10-CM

## 2018-06-08 RX ORDER — TIOTROPIUM BROMIDE 18 UG/1
CAPSULE ORAL; RESPIRATORY (INHALATION)
Qty: 30 CAPSULE | Refills: 0 | Status: SHIPPED | OUTPATIENT
Start: 2018-06-08 | End: 2018-09-11 | Stop reason: SDUPTHER

## 2018-06-08 RX ORDER — TIZANIDINE 4 MG/1
TABLET ORAL
Qty: 60 TABLET | Refills: 0 | Status: SHIPPED | OUTPATIENT
Start: 2018-06-08 | End: 2018-06-08 | Stop reason: SDUPTHER

## 2018-06-08 RX ORDER — TIZANIDINE 4 MG/1
TABLET ORAL
Qty: 180 TABLET | Refills: 0 | Status: SHIPPED | OUTPATIENT
Start: 2018-06-08 | End: 2018-09-03 | Stop reason: SDUPTHER

## 2018-06-19 DIAGNOSIS — Z79.4 TYPE 2 DIABETES MELLITUS WITH DIABETIC POLYNEUROPATHY, WITH LONG-TERM CURRENT USE OF INSULIN (HCC): ICD-10-CM

## 2018-06-19 DIAGNOSIS — E11.42 TYPE 2 DIABETES MELLITUS WITH DIABETIC POLYNEUROPATHY, WITH LONG-TERM CURRENT USE OF INSULIN (HCC): ICD-10-CM

## 2018-07-03 ENCOUNTER — OFFICE VISIT (OUTPATIENT)
Dept: NEUROLOGY | Age: 58
End: 2018-07-03
Payer: MEDICARE

## 2018-07-03 VITALS
DIASTOLIC BLOOD PRESSURE: 73 MMHG | HEART RATE: 91 BPM | WEIGHT: 293 LBS | HEIGHT: 70 IN | BODY MASS INDEX: 41.95 KG/M2 | SYSTOLIC BLOOD PRESSURE: 115 MMHG

## 2018-07-03 DIAGNOSIS — R51.9 FACIAL PAIN: Primary | ICD-10-CM

## 2018-07-03 DIAGNOSIS — E66.01 OBESITY, MORBID, BMI 50 OR HIGHER (HCC): ICD-10-CM

## 2018-07-03 DIAGNOSIS — G62.9 SENSORY NEUROPATHY: ICD-10-CM

## 2018-07-03 PROCEDURE — 99204 OFFICE O/P NEW MOD 45 MIN: CPT | Performed by: PSYCHIATRY & NEUROLOGY

## 2018-07-03 PROCEDURE — 1036F TOBACCO NON-USER: CPT | Performed by: PSYCHIATRY & NEUROLOGY

## 2018-07-03 PROCEDURE — G8417 CALC BMI ABV UP PARAM F/U: HCPCS | Performed by: PSYCHIATRY & NEUROLOGY

## 2018-07-03 PROCEDURE — 3017F COLORECTAL CA SCREEN DOC REV: CPT | Performed by: PSYCHIATRY & NEUROLOGY

## 2018-07-03 PROCEDURE — G8427 DOCREV CUR MEDS BY ELIG CLIN: HCPCS | Performed by: PSYCHIATRY & NEUROLOGY

## 2018-07-03 RX ORDER — LACOSAMIDE 50 MG/1
TABLET ORAL
Qty: 120 TABLET | Refills: 3 | Status: SHIPPED | OUTPATIENT
Start: 2018-07-03 | End: 2018-08-21 | Stop reason: DRUGHIGH

## 2018-07-03 RX ORDER — LIDOCAINE 50 MG/G
OINTMENT TOPICAL
Qty: 240 G | Refills: 3 | Status: SHIPPED | OUTPATIENT
Start: 2018-07-03 | End: 2018-08-21 | Stop reason: ALTCHOICE

## 2018-07-03 NOTE — PROGRESS NOTES
02/01/2016       PAST MEDICAL HISTORY:         Diagnosis Date    Anxiety     Asthma     Bronchitis     Depression     Fibromyalgia     Gout     HLD (hyperlipidemia) 7/1/2015    Hypertension     [facility]     Obesity     Obstructive sleep apnea     Osteoarthritis     Type 2 diabetes mellitus with diabetic polyneuropathy, with long-term current use of insulin ([facility] 9/7/2017    Type II or unspecified type diabetes mellitus without mention of complication, not stated as uncontrolled     Urinary incontinence         PAST SURGICAL HISTORY:         Procedure Laterality Date    APPENDECTOMY      TAKEN WITH HYSTERECTOMY    BUNIONECTOMY Left 02-22-16    with arthrodesis and 2 through 4 with hallux valgus arthroplasty 5th toe on left    COLONOSCOPY  8/17/2015    2 polyps removed, diverticulosis, internal hemorrhoids    FINGER SURGERY      re-attachment of left middle finger    FOOT SURGERY Right 11/11/2015    bunionectomy, 2-3-4th pinning, 5th arthroplasty    HERNIA REPAIR      UMBILICAL    HYSTERECTOMY, TOTAL ABDOMINAL  2001        SOCIAL HISTORY:     Social History     Social History    Marital status:      Spouse name: N/A    Number of children: 0    Years of education: N/A     Occupational History    Not on file.      Social History Main Topics    Smoking status: Former Smoker     Types: Cigarettes     Quit date: 10/25/2013    Smokeless tobacco: Never Used    Alcohol use 0.0 oz/week      Comment: occassional     Drug use: No    Sexual activity: Yes     Partners: Male     Other Topics Concern    Not on file     Social History Narrative    No narrative on file       MEDICATIONS:     Current Outpatient Prescriptions   Medication Sig Dispense Refill    insulin 70-30 (NOVOLIN 70/30) (70-30) 100 UNIT per ML injection vial Inject 70 Units into the skin 2 times daily (before meals) 20 mL 2    SPIRIVA HANDIHALER 18 MCG inhalation capsule INHALE THE CONTENTS OF 1 CAPSULE INTO THE LUNGS DAILY 30 capsule 0    tiZANidine (ZANAFLEX) 4 MG tablet TAKE 1 TABLET BY MOUTH TWICE DAILY 180 tablet 0    BD PEN NEEDLE ALLI U/F 32G X 4 MM MISC       Insulin Syringe-Needle U-100 (INSULIN SYRINGE 1CC/30GX5/16\") 30G X 5/16\" 1 ML MISC USE TWICE DAILY AS DIRECTED  5    Liraglutide (VICTOZA) 18 MG/3ML SOPN SC injection Inject 1.8 mg into the skin daily Please give patient # 30 32g 4mm needles with  2 refills 9 pen 2    fluticasone (FLONASE) 50 MCG/ACT nasal spray SHAKE LIQUID AND USE 1 SPRAY IN EACH NOSTRIL DAILY 48 g 3    lisinopril (PRINIVIL;ZESTRIL) 10 MG tablet TAKE 1 TABLET BY MOUTH DAILY 90 tablet 1    carvedilol (COREG) 25 MG tablet TAKE 1 TABLET BY MOUTH TWICE DAILY 180 tablet 1    furosemide (LASIX) 20 MG tablet TAKE 1 TABLET BY MOUTH DAILY 90 tablet 1    amitriptyline (ELAVIL) 50 MG tablet TAKE 1 TABLET BY MOUTH EVERY NIGHT 90 tablet 1    rOPINIRole (REQUIP) 1 MG tablet TAKE 1 TABLET BY MOUTH EVERY NIGHT AT BEDTIME 90 tablet 0    gabapentin (NEURONTIN) 600 MG tablet Take 1 tablet by mouth 3 times daily for 90 days.  90 tablet 2    meloxicam (MOBIC) 15 MG tablet Take 1 tablet by mouth daily 30 tablet 2    allopurinol (ZYLOPRIM) 300 MG tablet TAKE 1 TABLET BY MOUTH EVERY DAY 90 tablet 1    citalopram (CELEXA) 40 MG tablet TAKE 1 TABLET BY MOUTH DAILY 30 tablet 0    albuterol (PROVENTIL) (2.5 MG/3ML) 0.083% nebulizer solution Take 3 mLs by nebulization every 6 hours as needed for Wheezing or Shortness of Breath 120 each 3    albuterol sulfate HFA (PROAIR HFA) 108 (90 BASE) MCG/ACT inhaler Inhale 2 puffs into the lungs every 6 hours as needed for Wheezing 1 Inhaler 3     Current Facility-Administered Medications   Medication Dose Route Frequency Provider Last Rate Last Dose    ipratropium (ATROVENT) 0.02 % nebulizer solution 0.5 mg  0.5 mg Nebulization Once Ama Burk MD        albuterol (PROVENTIL) nebulizer solution 2.5 mg  2.5 mg Nebulization Once Ama Burk MD

## 2018-07-09 DIAGNOSIS — M1A.0220 CHRONIC GOUT OF LEFT ELBOW, UNSPECIFIED CAUSE: ICD-10-CM

## 2018-07-09 RX ORDER — COLCHICINE 0.6 MG/1
0.6 TABLET ORAL DAILY
Qty: 30 TABLET | Refills: 1 | Status: SHIPPED | OUTPATIENT
Start: 2018-07-09 | End: 2018-09-02 | Stop reason: SDUPTHER

## 2018-07-09 NOTE — TELEPHONE ENCOUNTER
Patient called in requesting pended medication. Please advise thank you! Next Visit Date:    Last ov 5/29/18    Future Appointments  Date Time Provider Abbe Vasquez   8/21/2018 10:20 AM Kacy Remy MD Neuro Spec [facility]   9/11/2018 11:40 AM MD Shantel Fair FP [facility] [address] Maintenance   Topic Date Due    Hepatitis C screen  1960    Shingles Vaccine (1 of 2 - 2 Dose Series) 04/27/2011    Diabetic microalbuminuria test  05/11/2018    Lipid screen  05/11/2018    Potassium monitoring  05/11/2018    Creatinine monitoring  05/11/2018    DTaP/Tdap/Td vaccine (1 - Tdap) 01/24/2027 (Originally 9/17/2016)    Flu vaccine (1) 09/01/2018    Diabetic retinal exam  10/30/2018    Diabetic foot exam  04/12/2019    A1C test (Diabetic or Prediabetic)  05/29/2019    Breast cancer screen  08/07/2019    Colon cancer screen colonoscopy  05/18/2027    Pneumococcal med risk  Completed    HIV screen  Completed       Hemoglobin A1C (%)   Date Value   05/29/2018 6.4   01/18/2018 7.3   09/07/2017 7.3             ( goal A1C is < 7)   Microalb/Crt.  Ratio (mcg/mg creat)   Date Value   05/11/2017 41 (H)     LDL Cholesterol (mg/dL)   Date Value   05/11/2017 08/01/2016 77       (goal LDL is <100)   AST (U/L)   Date Value   05/11/2017 22     ALT (U/L)   Date Value   05/11/2017 24     BUN (mg/dL)   Date Value   05/11/2017 20     BP Readings from Last 3 Encounters:   07/03/18 115/73   05/29/18 97/65   04/12/18 108/73          (goal 120/80)    All Future Testing planned in [facility]  Lab Frequency Next Occurrence   Magnesium Once 08/30/2018   Vitamin D 25 Hydroxy Once 08/30/2018   Uric Acid Once 07/12/2018   XR KNEE RIGHT (3 VIEWS) Once 48/09/1671   Basic Metabolic Panel Once 71/84/4365   Lipase Once 10/30/2018   CBC Auto Differential Once 10/26/2018   Comprehensive Metabolic Panel Once 60/37/0297   Lipid Panel Once 10/26/2018   Microalbumin, Ur Once 10/26/2018   Hepatitis C Antibody Once 12/06/2018   MRI ORBITS FACE NECK W WO CONTRAST Once 07/10/2018   Creatinine, Serum Once 07/10/2018               Patient Active Problem List:     HLD (hyperlipidemia)     HTN (hypertension)     Morbid obesity (HCC)     Neuropathy due to secondary diabetes ([facility]     Depression     Mild persistent asthma without complication     Type 2 diabetes mellitus with diabetic polyneuropathy, with long-term current use of insulin (HCC)     TIFFANI positive

## 2018-07-12 ENCOUNTER — HOSPITAL ENCOUNTER (OUTPATIENT)
Age: 58
Discharge: HOME | End: 2018-07-12
Payer: MEDICARE

## 2018-07-12 ENCOUNTER — HOSPITAL ENCOUNTER (OUTPATIENT)
Dept: MRI IMAGING | Facility: CLINIC | Age: 58
Discharge: HOME | End: 2018-07-14
Payer: MEDICARE

## 2018-07-12 DIAGNOSIS — R51.9 FACIAL PAIN: ICD-10-CM

## 2018-07-12 DIAGNOSIS — I10 ESSENTIAL HYPERTENSION: ICD-10-CM

## 2018-07-12 DIAGNOSIS — E11.42 TYPE 2 DIABETES MELLITUS WITH DIABETIC POLYNEUROPATHY, WITH LONG-TERM CURRENT USE OF INSULIN (HCC): ICD-10-CM

## 2018-07-12 DIAGNOSIS — Z00.00 HEALTH CARE MAINTENANCE: ICD-10-CM

## 2018-07-12 DIAGNOSIS — Z79.4 TYPE 2 DIABETES MELLITUS WITH DIABETIC POLYNEUROPATHY, WITH LONG-TERM CURRENT USE OF INSULIN (HCC): ICD-10-CM

## 2018-07-12 LAB
ABSOLUTE EOS #: 0.22 K/UL (ref 0–0.44)
ABSOLUTE IMMATURE GRANULOCYTE: 0.06 K/UL (ref 0–0.3)
ABSOLUTE LYMPH #: 1.41 K/UL (ref 1.1–3.7)
ABSOLUTE MONO #: 0.64 K/UL (ref 0.1–1.2)
ALBUMIN SERPL-MCNC: 3.6 G/DL (ref 3.5–5.2)
ALBUMIN/GLOBULIN RATIO: 1.1 (ref 1–2.5)
ALP BLD-CCNC: 86 U/L (ref 35–104)
ALT SERPL-CCNC: 19 U/L (ref 5–33)
ANION GAP SERPL CALCULATED.3IONS-SCNC: 16 MMOL/L (ref 9–17)
AST SERPL-CCNC: 16 U/L
BASOPHILS # BLD: 0 % (ref 0–2)
BASOPHILS ABSOLUTE: 0.04 K/UL (ref 0–0.2)
BILIRUB SERPL-MCNC: 0.25 MG/DL (ref 0.3–1.2)
BUN BLDV-MCNC: 18 MG/DL (ref 6–20)
BUN/CREAT BLD: ABNORMAL (ref 9–20)
CALCIUM SERPL-MCNC: 9.3 MG/DL (ref 8.6–10.4)
CHLORIDE BLD-SCNC: 101 MMOL/L (ref 98–107)
CHOLESTEROL/HDL RATIO: 5.5
CHOLESTEROL: 164 MG/DL
CO2: 23 MMOL/L (ref 20–31)
CREAT SERPL-MCNC: 0.97 MG/DL (ref 0.5–0.9)
CREATININE URINE: 28.3 MG/DL (ref 28–217)
DIFFERENTIAL TYPE: ABNORMAL
EOSINOPHILS RELATIVE PERCENT: 2 % (ref 1–4)
GFR AFRICAN AMERICAN: >60 ML/MIN
GFR NON-AFRICAN AMERICAN: 59 ML/MIN
GFR SERPL CREATININE-BSD FRML MDRD: ABNORMAL ML/MIN/{1.73_M2}
GFR SERPL CREATININE-BSD FRML MDRD: ABNORMAL ML/MIN/{1.73_M2}
GLUCOSE BLD-MCNC: 137 MG/DL (ref 70–99)
HCT VFR BLD CALC: 40.5 % (ref 36.3–47.1)
HDLC SERPL-MCNC: 30 MG/DL
HEMOGLOBIN: 13 G/DL (ref 11.9–15.1)
HEPATITIS C ANTIBODY: NONREACTIVE
IMMATURE GRANULOCYTES: 1 %
LDL CHOLESTEROL: 84 MG/DL (ref 0–130)
LIPASE: 25 U/L (ref 13–60)
LYMPHOCYTES # BLD: 16 % (ref 24–43)
MCH RBC QN AUTO: 30.9 PG (ref 25.2–33.5)
MCHC RBC AUTO-ENTMCNC: 32.1 G/DL (ref 28.4–34.8)
MCV RBC AUTO: 96.2 FL (ref 82.6–102.9)
MICROALBUMIN/CREAT 24H UR: <12 MG/L
MICROALBUMIN/CREAT UR-RTO: NORMAL MCG/MG CREAT
MONOCYTES # BLD: 7 % (ref 3–12)
NRBC AUTOMATED: 0 PER 100 WBC
PDW BLD-RTO: 13.4 % (ref 11.8–14.4)
PLATELET # BLD: 200 K/UL (ref 138–453)
PLATELET ESTIMATE: ABNORMAL
PMV BLD AUTO: 11.3 FL (ref 8.1–13.5)
POC CREATININE: 1.1 MG/DL (ref 0.6–1.4)
POTASSIUM SERPL-SCNC: 4.8 MMOL/L (ref 3.7–5.3)
RBC # BLD: 4.21 M/UL (ref 3.95–5.11)
RBC # BLD: ABNORMAL 10*6/UL
SEG NEUTROPHILS: 74 % (ref 36–65)
SEGMENTED NEUTROPHILS ABSOLUTE COUNT: 6.75 K/UL (ref 1.5–8.1)
SODIUM BLD-SCNC: 140 MMOL/L (ref 135–144)
TOTAL PROTEIN: 6.8 G/DL (ref 6.4–8.3)
TRIGL SERPL-MCNC: 248 MG/DL
VLDLC SERPL CALC-MCNC: ABNORMAL MG/DL (ref 1–30)
WBC # BLD: 9.1 K/UL (ref 3.5–11.3)
WBC # BLD: ABNORMAL 10*3/UL

## 2018-07-12 PROCEDURE — 85025 COMPLETE CBC W/AUTO DIFF WBC: CPT

## 2018-07-12 PROCEDURE — 82043 UR ALBUMIN QUANTITATIVE: CPT

## 2018-07-12 PROCEDURE — 80053 COMPREHEN METABOLIC PANEL: CPT

## 2018-07-12 PROCEDURE — 82570 ASSAY OF URINE CREATININE: CPT

## 2018-07-12 PROCEDURE — 86803 HEPATITIS C AB TEST: CPT

## 2018-07-12 PROCEDURE — 36415 COLL VENOUS BLD VENIPUNCTURE: CPT

## 2018-07-12 PROCEDURE — 70543 MRI ORBT/FAC/NCK W/O &W/DYE: CPT

## 2018-07-12 PROCEDURE — 6360000004 HC RX CONTRAST MEDICATION: Performed by: PSYCHIATRY & NEUROLOGY

## 2018-07-12 PROCEDURE — 83690 ASSAY OF LIPASE: CPT

## 2018-07-12 PROCEDURE — A9579 GAD-BASE MR CONTRAST NOS,1ML: HCPCS | Performed by: PSYCHIATRY & NEUROLOGY

## 2018-07-12 PROCEDURE — 80061 LIPID PANEL: CPT

## 2018-07-12 PROCEDURE — 82565 ASSAY OF CREATININE: CPT

## 2018-07-12 PROCEDURE — 2580000003 HC RX 258: Performed by: PSYCHIATRY & NEUROLOGY

## 2018-07-12 RX ORDER — SODIUM CHLORIDE 0.9 % (FLUSH) 0.9 %
10 SYRINGE (ML) INJECTION 2 TIMES DAILY
Status: DISCONTINUED | OUTPATIENT
Start: 2018-07-12 | End: 2018-07-15 | Stop reason: HOSPADM

## 2018-07-12 RX ADMIN — GADOTERIDOL 20 ML: 279.3 INJECTION, SOLUTION INTRAVENOUS at 11:59

## 2018-07-12 RX ADMIN — Medication 10 ML: at 11:59

## 2018-08-06 RX ORDER — SYRING-NEEDL,DISP,INSUL,0.3 ML 31 GX5/16"
SYRINGE, EMPTY DISPOSABLE MISCELLANEOUS
Qty: 60 EACH | Refills: 2 | Status: SHIPPED | OUTPATIENT
Start: 2018-08-06 | End: 2018-09-11

## 2018-08-06 NOTE — TELEPHONE ENCOUNTER
obesity ([facility]     Neuropathy due to secondary diabetes ([facility]     Depression     Mild persistent asthma without complication     Type 2 diabetes mellitus with diabetic polyneuropathy, with long-term current use of insulin (HCC)     TIFFANI positive

## 2018-08-17 DIAGNOSIS — E11.42 TYPE 2 DIABETES MELLITUS WITH DIABETIC POLYNEUROPATHY, WITH LONG-TERM CURRENT USE OF INSULIN (HCC): ICD-10-CM

## 2018-08-17 DIAGNOSIS — Z79.4 TYPE 2 DIABETES MELLITUS WITH DIABETIC POLYNEUROPATHY, WITH LONG-TERM CURRENT USE OF INSULIN (HCC): ICD-10-CM

## 2018-08-17 RX ORDER — HUMAN INSULIN 100 [USP'U]/ML
INJECTION, SUSPENSION SUBCUTANEOUS
Qty: 20 ML | Refills: 3 | Status: SHIPPED | OUTPATIENT
Start: 2018-08-17 | End: 2018-10-18 | Stop reason: ALTCHOICE

## 2018-08-17 NOTE — TELEPHONE ENCOUNTER
to secondary diabetes ([facility]     Depression     Mild persistent asthma without complication     Type 2 diabetes mellitus with diabetic polyneuropathy, with long-term current use of insulin (HCC)     TIFFANI positive

## 2018-08-21 ENCOUNTER — OFFICE VISIT (OUTPATIENT)
Dept: NEUROLOGY | Age: 58
End: 2018-08-21
Payer: MEDICARE

## 2018-08-21 VITALS
WEIGHT: 293 LBS | HEART RATE: 90 BPM | SYSTOLIC BLOOD PRESSURE: 132 MMHG | HEIGHT: 70 IN | BODY MASS INDEX: 41.95 KG/M2 | DIASTOLIC BLOOD PRESSURE: 77 MMHG

## 2018-08-21 DIAGNOSIS — G62.9 PERIPHERAL POLYNEUROPATHY: ICD-10-CM

## 2018-08-21 DIAGNOSIS — E66.01 OBESITY, MORBID, BMI 50 OR HIGHER (HCC): ICD-10-CM

## 2018-08-21 DIAGNOSIS — R51.9 FACIAL PAIN: ICD-10-CM

## 2018-08-21 DIAGNOSIS — F32.A DEPRESSION, UNSPECIFIED DEPRESSION TYPE: Primary | ICD-10-CM

## 2018-08-21 DIAGNOSIS — F17.200 SMOKING: ICD-10-CM

## 2018-08-21 DIAGNOSIS — R52 PAIN: ICD-10-CM

## 2018-08-21 PROCEDURE — G8417 CALC BMI ABV UP PARAM F/U: HCPCS | Performed by: PSYCHIATRY & NEUROLOGY

## 2018-08-21 PROCEDURE — 3017F COLORECTAL CA SCREEN DOC REV: CPT | Performed by: PSYCHIATRY & NEUROLOGY

## 2018-08-21 PROCEDURE — 4004F PT TOBACCO SCREEN RCVD TLK: CPT | Performed by: PSYCHIATRY & NEUROLOGY

## 2018-08-21 PROCEDURE — G8427 DOCREV CUR MEDS BY ELIG CLIN: HCPCS | Performed by: PSYCHIATRY & NEUROLOGY

## 2018-08-21 PROCEDURE — 99215 OFFICE O/P EST HI 40 MIN: CPT | Performed by: PSYCHIATRY & NEUROLOGY

## 2018-08-21 RX ORDER — LACOSAMIDE 50 MG/1
TABLET ORAL
Qty: 240 TABLET | Refills: 3 | Status: SHIPPED | OUTPATIENT
Start: 2018-08-21 | End: 2018-10-03 | Stop reason: ALTCHOICE
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
## 2018-08-28 DIAGNOSIS — R56.9 SEIZURE (HCC): Primary | ICD-10-CM

## 2018-08-28 RX ORDER — LACOSAMIDE 100 MG/1
100 TABLET ORAL 2 TIMES DAILY
Qty: 60 TABLET | Refills: 0 | Status: SHIPPED | OUTPATIENT
Start: 2018-08-28 | End: 2018-09-20 | Stop reason: SDUPTHER

## 2018-08-28 RX ORDER — LACOSAMIDE 50 MG/1
TABLET ORAL
Qty: 70 TABLET | Refills: 0 | Status: SHIPPED | OUTPATIENT
Start: 2018-08-28 | End: 2018-10-03 | Stop reason: ALTCHOICE

## 2018-09-03 DIAGNOSIS — M79.7 FIBROMYALGIA: ICD-10-CM

## 2018-09-04 NOTE — TELEPHONE ENCOUNTER
HTN (hypertension)     Morbid obesity (HCC)     Neuropathy due to secondary diabetes ([facility] [address] [address]     Depression     Mild persistent asthma without complication     Type 2 diabetes mellitus with diabetic polyneuropathy, with long-term current use of insulin (HCC)     TIFFANI positive

## 2018-09-05 RX ORDER — TIZANIDINE 4 MG/1
TABLET ORAL
Qty: 180 TABLET | Refills: 0 | Status: SHIPPED | OUTPATIENT
Start: 2018-09-05 | End: 2018-12-02 | Stop reason: SDUPTHER

## 2018-09-06 RX ORDER — CITALOPRAM 40 MG/1
TABLET ORAL
Qty: 90 TABLET | Refills: 0 | Status: SHIPPED | OUTPATIENT
Start: 2018-09-06 | End: 2018-09-11 | Stop reason: ALTCHOICE

## 2018-09-06 NOTE — TELEPHONE ENCOUNTER
obesity ([facility] [address]     Neuropathy due to secondary diabetes ([facility] [address]     Depression     Mild persistent asthma without complication     Type 2 diabetes mellitus with diabetic polyneuropathy, with long-term current use of insulin (HCC)     TIFFANI positive

## 2018-09-11 ENCOUNTER — OFFICE VISIT (OUTPATIENT)
Dept: FAMILY MEDICINE CLINIC | Age: 58
End: 2018-09-11
Payer: MEDICARE

## 2018-09-11 VITALS
HEIGHT: 70 IN | BODY MASS INDEX: 41.95 KG/M2 | RESPIRATION RATE: 16 BRPM | TEMPERATURE: 98.1 F | SYSTOLIC BLOOD PRESSURE: 100 MMHG | HEART RATE: 81 BPM | WEIGHT: 293 LBS | DIASTOLIC BLOOD PRESSURE: 67 MMHG

## 2018-09-11 DIAGNOSIS — Z23 NEED FOR SHINGLES VACCINE: ICD-10-CM

## 2018-09-11 DIAGNOSIS — J44.9 CHRONIC OBSTRUCTIVE PULMONARY DISEASE, UNSPECIFIED COPD TYPE (HCC): ICD-10-CM

## 2018-09-11 DIAGNOSIS — E11.42 TYPE 2 DIABETES MELLITUS WITH DIABETIC POLYNEUROPATHY, WITH LONG-TERM CURRENT USE OF INSULIN (HCC): Primary | ICD-10-CM

## 2018-09-11 DIAGNOSIS — M79.7 FIBROMYALGIA: ICD-10-CM

## 2018-09-11 DIAGNOSIS — I10 ESSENTIAL HYPERTENSION: ICD-10-CM

## 2018-09-11 DIAGNOSIS — Z23 NEED FOR INFLUENZA VACCINATION: ICD-10-CM

## 2018-09-11 DIAGNOSIS — F32.A DEPRESSION, UNSPECIFIED DEPRESSION TYPE: ICD-10-CM

## 2018-09-11 DIAGNOSIS — Z79.4 TYPE 2 DIABETES MELLITUS WITH DIABETIC POLYNEUROPATHY, WITH LONG-TERM CURRENT USE OF INSULIN (HCC): Primary | ICD-10-CM

## 2018-09-11 PROCEDURE — 3017F COLORECTAL CA SCREEN DOC REV: CPT | Performed by: INTERNAL MEDICINE

## 2018-09-11 PROCEDURE — 99214 OFFICE O/P EST MOD 30 MIN: CPT | Performed by: INTERNAL MEDICINE

## 2018-09-11 PROCEDURE — G8417 CALC BMI ABV UP PARAM F/U: HCPCS | Performed by: INTERNAL MEDICINE

## 2018-09-11 PROCEDURE — 3044F HG A1C LEVEL LT 7.0%: CPT | Performed by: INTERNAL MEDICINE

## 2018-09-11 PROCEDURE — G0008 ADMIN INFLUENZA VIRUS VAC: HCPCS | Performed by: INTERNAL MEDICINE

## 2018-09-11 PROCEDURE — 2022F DILAT RTA XM EVC RTNOPTHY: CPT | Performed by: INTERNAL MEDICINE

## 2018-09-11 PROCEDURE — G8926 SPIRO NO PERF OR DOC: HCPCS | Performed by: INTERNAL MEDICINE

## 2018-09-11 PROCEDURE — G0444 DEPRESSION SCREEN ANNUAL: HCPCS | Performed by: INTERNAL MEDICINE

## 2018-09-11 PROCEDURE — 3023F SPIROM DOC REV: CPT | Performed by: INTERNAL MEDICINE

## 2018-09-11 PROCEDURE — G8427 DOCREV CUR MEDS BY ELIG CLIN: HCPCS | Performed by: INTERNAL MEDICINE

## 2018-09-11 PROCEDURE — 90686 IIV4 VACC NO PRSV 0.5 ML IM: CPT | Performed by: INTERNAL MEDICINE

## 2018-09-11 PROCEDURE — 4004F PT TOBACCO SCREEN RCVD TLK: CPT | Performed by: INTERNAL MEDICINE

## 2018-09-11 RX ORDER — LISINOPRIL 5 MG/1
TABLET ORAL
Qty: 90 TABLET | Refills: 1 | Status: SHIPPED | OUTPATIENT
Start: 2018-09-11 | End: 2018-10-18

## 2018-09-11 RX ORDER — GABAPENTIN 100 MG/1
100 CAPSULE ORAL 3 TIMES DAILY
Qty: 90 CAPSULE | Refills: 2 | Status: SHIPPED | OUTPATIENT
Start: 2018-09-11 | End: 2019-01-08

## 2018-09-11 RX ORDER — BUPROPION HYDROCHLORIDE 150 MG/1
150 TABLET ORAL EVERY MORNING
Qty: 30 TABLET | Refills: 1 | Status: SHIPPED | OUTPATIENT
Start: 2018-09-11 | End: 2018-11-09 | Stop reason: SDUPTHER

## 2018-09-11 ASSESSMENT — PATIENT HEALTH QUESTIONNAIRE - PHQ9
3. TROUBLE FALLING OR STAYING ASLEEP: 1
9. THOUGHTS THAT YOU WOULD BE BETTER OFF DEAD, OR OF HURTING YOURSELF: 1
8. MOVING OR SPEAKING SO SLOWLY THAT OTHER PEOPLE COULD HAVE NOTICED. OR THE OPPOSITE, BEING SO FIGETY OR RESTLESS THAT YOU HAVE BEEN MOVING AROUND A LOT MORE THAN USUAL: 3
5. POOR APPETITE OR OVEREATING: 3
6. FEELING BAD ABOUT YOURSELF - OR THAT YOU ARE A FAILURE OR HAVE LET YOURSELF OR YOUR FAMILY DOWN: 3
SUM OF ALL RESPONSES TO PHQ9 QUESTIONS 1 & 2: 6
SUM OF ALL RESPONSES TO PHQ QUESTIONS 1-9: 23
7. TROUBLE CONCENTRATING ON THINGS, SUCH AS READING THE NEWSPAPER OR WATCHING TELEVISION: 3
1. LITTLE INTEREST OR PLEASURE IN DOING THINGS: 3
10. IF YOU CHECKED OFF ANY PROBLEMS, HOW DIFFICULT HAVE THESE PROBLEMS MADE IT FOR YOU TO DO YOUR WORK, TAKE CARE OF THINGS AT HOME, OR GET ALONG WITH OTHER PEOPLE: 1
SUM OF ALL RESPONSES TO PHQ QUESTIONS 1-9: 23
4. FEELING TIRED OR HAVING LITTLE ENERGY: 3
2. FEELING DOWN, DEPRESSED OR HOPELESS: 3

## 2018-09-11 NOTE — PROGRESS NOTES
[facility] PHYSICIANS  [facility] [place] [address] [facility] [address]  Dept: [phone]      Today's Date: 9/11/2018  Patient Name: Lien Trimble  Patient's age: 63 y.o., 1960        CHIEF COMPLAINT:    Chief Complaint   Patient presents with    3 Month Follow-Up    Hypertension    Diabetes       History Obtained From:  patient    HISTORY OF PRESENT ILLNESS:      The patient is a 63 y.o. old  female and is here to Follow-up for hypertension, diabetes and depression. Her blood pressure is 827 systolic today. She is on lisinopril 10 mg daily. She complains of occasional dizziness. We'll decrease lisinopril to 5 mg daily. She is on Victoza for diabetes and tolerating it well. She states her depression is not stable however she is not too enthusiastic about seeing a psychiatrist or psychologist.  She is on Celexa and thinks that that is not helping her at all. She is also on Elavil 50 mg daily for fibromyalgia that helps her sleep well. She is also on gabapentin 300 mg 3 times a day. She does not report much benefit from gabapentin and would like to taper it off. We'll decrease, Prandin 200 mg 3 times a day and gradually taper it off. She is willing to try Wellbutrin in place of Celexa. On the basis of positive PHQ-9 screening (PHQ-9 Total Score: 6), the following plan was implemented: medication prescribed: Wellbutrin- 150- patient will call for any significant medication side effects or worsening symptoms of depression. Patient will follow-up in 4 week(s) with PCP.    Patient Active Problem List   Diagnosis    HLD (hyperlipidemia)    HTN (hypertension)    Morbid obesity ([address]    Neuropathy due to secondary diabetes ([address]    Depression    Mild persistent asthma without complication    Type 2 diabetes mellitus with diabetic polyneuropathy, with long-term current use of insulin (HCC)    TIFFANI positive       Past Medical History:   has a past MOUTH DAILY 90 tablet 1    carvedilol (COREG) 25 MG tablet TAKE 1 TABLET BY MOUTH TWICE DAILY 180 tablet 1    furosemide (LASIX) 20 MG tablet TAKE 1 TABLET BY MOUTH DAILY 90 tablet 1    amitriptyline (ELAVIL) 50 MG tablet TAKE 1 TABLET BY MOUTH EVERY NIGHT 90 tablet 1    rOPINIRole (REQUIP) 1 MG tablet TAKE 1 TABLET BY MOUTH EVERY NIGHT AT BEDTIME 90 tablet 0    meloxicam (MOBIC) 15 MG tablet Take 1 tablet by mouth daily 30 tablet 2    allopurinol (ZYLOPRIM) 300 MG tablet TAKE 1 TABLET BY MOUTH EVERY DAY 90 tablet 1    albuterol (PROVENTIL) (2.5 MG/3ML) 0.083% nebulizer solution Take 3 mLs by nebulization every 6 hours as needed for Wheezing or Shortness of Breath 120 each 3    albuterol sulfate HFA (PROAIR HFA) 108 (90 BASE) MCG/ACT inhaler Inhale 2 puffs into the lungs every 6 hours as needed for Wheezing 1 Inhaler 3    Liraglutide (VICTOZA) 18 MG/3ML SOPN SC injection Inject 1.8 mg into the skin daily Please give patient # 30 32g 4mm needles with  2 refills 9 pen 2    gabapentin (NEURONTIN) 600 MG tablet Take 1 tablet by mouth 3 times daily for 90 days. 90 tablet 2     Current Facility-Administered Medications   Medication Dose Route Frequency Provider Last Rate Last Dose    ipratropium (ATROVENT) 0.02 % nebulizer solution 0.5 mg  0.5 mg Nebulization Once Nafisa Shabazz MD        albuterol (PROVENTIL) nebulizer solution 2.5 mg  2.5 mg Nebulization Once Nafisa Shabazz MD             Allergies:  Cymbalta [duloxetine hcl]; Januvia [sitagliptin]; Ciprofloxacin; Duloxetine; Lyrica [pregabalin]; Metformin; Metformin and related; Statins; and Morphine    Social History:   reports that she has been smoking Cigarettes. She has a 21.00 pack-year smoking history. She has never used smokeless tobacco. She reports that she drinks alcohol. She reports that she does not use drugs.      Family History: family history includes Cancer (age of onset: 64) in her brother; Cancer (age of onset: 61) in her mother; morning  Dispense: 30 tablet; Refill: 1    4. Need for influenza vaccination    - INFLUENZA, QUADV, 3 YRS AND OLDER, IM, PF, PREFILL SYR OR SDV, 0.5ML (FLUZONE QUADV, PF)    5. Fibromyalgia    - gabapentin (NEURONTIN) 100 MG capsule; Take 1 capsule by mouth 3 times daily for 90 days. .  Dispense: 90 capsule; Refill: 2    6. Need for shingles vaccine    - zoster recombinant adjuvanted vaccine (SHINGRIX) 50 MCG SUSR injection; Inject 0.5 mLs into the muscle once for 1 dose  Dispense: 0.5 mL; Refill: 0    7. COPD exacerbation (HCC)    - tiotropium (SPIRIVA HANDIHALER) 18 MCG inhalation capsule; INHALE THE CONTENTS OF 1 CAPSULE INTO THE LUNGS DAILY  Dispense: 90 capsule; Refill: 1      · Decrease Lisinopril to 5 mg daily   · Stop Celexa and start Wellbutrin , continue Elavil   · Decrease Neurontin to 100 mg TID  · Flu vaccine and shingles vaccine   · Palma received counseling on the following healthy behaviors: exercise and medication adherence  · Discussed use, benefit, and side effects of prescribed medications. Barriers to medication compliance addressed. All patient questions answered. Pt voiced understanding. · The return visit should be in 4 weeks      Adalberto Ferreira MD  Attending and Faculty Internal Medicine  [facility] PHYSICIANS  [address] [facility] [place] [place] [phone] [facility]  [address]  Dept: [phone]  9/11/18      This note is created with the assistance of a speech-recognition program. While intending to generate a document that actually reflects the content of the visit, the document can still have some mistakes which may not have been identified and corrected by editing.

## 2018-09-11 NOTE — PROGRESS NOTES
Chronic Disease Visit Information    BP Readings from Last 3 Encounters:   08/21/18 132/77   07/03/18 115/73   05/29/18 97/65          Hemoglobin A1C (%)   Date Value   05/29/2018 6.4   01/18/2018 7.3   09/07/2017 7.3     Microalb/Crt. Ratio (mcg/mg creat)   Date Value   07/12/2018 CANNOT BE CALCULATED     LDL Cholesterol (mg/dL)   Date Value   07/12/2018 84     HDL (mg/dL)   Date Value   07/12/2018 30 (L)     BUN (mg/dL)   Date Value   07/12/2018 18     CREATININE (mg/dL)   Date Value   07/12/2018 0.97 (H)     POC Creatinine (mg/dL)   Date Value   07/12/2018 1.1     Glucose (mg/dL)   Date Value   07/12/2018 137 (H)            Have you changed or started any medications since your last visit including any over-the-counter medicines, vitamins, or herbal medicines? no   Are you having any side effects from any of your medications? -  no  Have you stopped taking any of your medications? Is so, why? -  no    Have you seen any other physician or provider since your last visit? Yes - Records Requested Dr. Jaswant Carrillo Neurology   Have you had any other diagnostic tests since your last visit? No  Have you been seen in the emergency room and/or had an admission to a hospital since we last saw you? No  Have you had your annual diabetic retinal (eye) exam? Yes - Records Requested  Have you had your routine dental cleaning in the past 6 months? yes -     Have you activated your [employer] account? If not, what are your barriers?  Yes     Patient Care Team:  Malika Arenas MD as PCP - General (Internal Medicine)         Medical History Review  Past Medical, Family, and Social History reviewed and does contribute to the patient presenting condition    Health Maintenance   Topic Date Due    Shingles Vaccine (1 of 2 - 2 Dose Series) 04/27/2011    Flu vaccine (1) 09/01/2018    DTaP/Tdap/Td vaccine (1 - Tdap) 01/24/2027 (Originally 9/17/2016)    Diabetic retinal exam  10/30/2018    Diabetic foot exam  04/12/2019    A1C test (Diabetic

## 2018-09-26 DIAGNOSIS — R51.9 FACIAL PAIN: ICD-10-CM

## 2018-09-26 RX ORDER — LACOSAMIDE 50 MG/1
TABLET ORAL
Qty: 60 TABLET | Refills: 0 | Status: SHIPPED | OUTPATIENT
Start: 2018-09-26 | End: 2018-10-03 | Stop reason: ALTCHOICE

## 2018-09-26 NOTE — TELEPHONE ENCOUNTER
Pharmacy didn't have the 50 mg Vimpat. She takes 100 mg. bid and a 50 mg. bid. I looked and the 50 mg. went to [facility]. She said that is a mistake should go to [facility]. [facility] and cancelled the RX.  Please send through

## 2018-10-03 ENCOUNTER — OFFICE VISIT (OUTPATIENT)
Dept: NEUROLOGY | Age: 58
End: 2018-10-03
Payer: MEDICARE

## 2018-10-03 VITALS
HEIGHT: 70 IN | BODY MASS INDEX: 41.95 KG/M2 | DIASTOLIC BLOOD PRESSURE: 85 MMHG | WEIGHT: 293 LBS | HEART RATE: 92 BPM | SYSTOLIC BLOOD PRESSURE: 128 MMHG

## 2018-10-03 DIAGNOSIS — G62.9 PERIPHERAL POLYNEUROPATHY: ICD-10-CM

## 2018-10-03 DIAGNOSIS — F17.200 SMOKING: ICD-10-CM

## 2018-10-03 DIAGNOSIS — R30.0 DYSURIA: ICD-10-CM

## 2018-10-03 DIAGNOSIS — F32.A DEPRESSION, UNSPECIFIED DEPRESSION TYPE: ICD-10-CM

## 2018-10-03 DIAGNOSIS — R33.9 URINARY RETENTION: Primary | ICD-10-CM

## 2018-10-03 DIAGNOSIS — R51.9 FACIAL PAIN: ICD-10-CM

## 2018-10-03 PROCEDURE — G8417 CALC BMI ABV UP PARAM F/U: HCPCS | Performed by: PSYCHIATRY & NEUROLOGY

## 2018-10-03 PROCEDURE — 99215 OFFICE O/P EST HI 40 MIN: CPT | Performed by: PSYCHIATRY & NEUROLOGY

## 2018-10-03 PROCEDURE — 4004F PT TOBACCO SCREEN RCVD TLK: CPT | Performed by: PSYCHIATRY & NEUROLOGY

## 2018-10-03 PROCEDURE — G8427 DOCREV CUR MEDS BY ELIG CLIN: HCPCS | Performed by: PSYCHIATRY & NEUROLOGY

## 2018-10-03 PROCEDURE — 3017F COLORECTAL CA SCREEN DOC REV: CPT | Performed by: PSYCHIATRY & NEUROLOGY

## 2018-10-03 PROCEDURE — G8482 FLU IMMUNIZE ORDER/ADMIN: HCPCS | Performed by: PSYCHIATRY & NEUROLOGY

## 2018-10-03 NOTE — PROGRESS NOTES
(2.5 MG/3ML) 0.083% nebulizer solution Take 3 mLs by nebulization every 6 hours as needed for Wheezing or Shortness of Breath 120 each 3    albuterol sulfate HFA (PROAIR HFA) 108 (90 BASE) MCG/ACT inhaler Inhale 2 puffs into the lungs every 6 hours as needed for Wheezing 1 Inhaler 3    gabapentin (NEURONTIN) 600 MG tablet Take 1 tablet by mouth 3 times daily for 90 days.  90 tablet 2     Current Facility-Administered Medications   Medication Dose Route Frequency Provider Last Rate Last Dose    ipratropium (ATROVENT) 0.02 % nebulizer solution 0.5 mg  0.5 mg Nebulization Once Poncho Ibanez MD        albuterol (PROVENTIL) nebulizer solution 2.5 mg  2.5 mg Nebulization Once Marialuisa Sánchez MD           LABS & TESTS:      Lab Results   Component Value Date    WBC 9.1 07/12/2018    HGB 13.0 07/12/2018    HCT 40.5 07/12/2018    MCV 96.2 07/12/2018     07/12/2018         REVIEW OF SYSTEMS:      CONSTITUTIONAL Weight change: absent, Appetite change: present, Fatigue: present    HEENT Ears: normal, Visual disturbance: present    RESPIRATORY Shortness of breath: absent, Cough: present    CARDIOVASCULAR Chest pain: absent, Leg swelling :absent    GI Constipation: present, Diarrhea: present, Swallowing change: present     Urinary frequency: present, Urinary urgency: present, Urinary incontinence: present    MUSCULOSKELETAL Neck pain: absent, Back pain: absent, Stiffness: present, Muscle pain: present, Joint pain: present Restless legs: present    DERMATOLOGIC Hair loss: absent, Skin changes: absent    NEUROLOGIC Memory loss: absent, Confusion: present, Seizures: present Trouble walking or imbalance: present, Dizziness: present, Weakness: present, Numbness: absent Tremor: absent, Spasm: absent, Speech difficulty: absent, Headache: present, Light sensitivity: absent    PSYCHIATRIC Anxiety: absent, Hallucination: absent, Mood disorder: absent    HEMATOLOGIC Abnormal bleeding: absent, Anemia: absent, Clotting

## 2018-10-16 DIAGNOSIS — E11.42 TYPE 2 DIABETES MELLITUS WITH DIABETIC POLYNEUROPATHY, WITH LONG-TERM CURRENT USE OF INSULIN (HCC): ICD-10-CM

## 2018-10-16 DIAGNOSIS — Z79.4 TYPE 2 DIABETES MELLITUS WITH DIABETIC POLYNEUROPATHY, WITH LONG-TERM CURRENT USE OF INSULIN (HCC): ICD-10-CM

## 2018-10-17 RX ORDER — FUROSEMIDE 20 MG/1
TABLET ORAL
Qty: 90 TABLET | Refills: 1 | Status: SHIPPED | OUTPATIENT
Start: 2018-10-17 | End: 2018-10-18

## 2018-10-17 RX ORDER — CITALOPRAM 40 MG/1
TABLET ORAL
Qty: 90 TABLET | Refills: 1 | Status: SHIPPED | OUTPATIENT
Start: 2018-10-17 | End: 2019-01-08

## 2018-10-17 RX ORDER — AMITRIPTYLINE HYDROCHLORIDE 50 MG/1
TABLET, FILM COATED ORAL
Qty: 90 TABLET | Refills: 1 | Status: SHIPPED | OUTPATIENT
Start: 2018-10-17 | End: 2019-01-08 | Stop reason: SDUPTHER

## 2018-10-18 ENCOUNTER — OFFICE VISIT (OUTPATIENT)
Dept: FAMILY MEDICINE CLINIC | Age: 58
End: 2018-10-18
Payer: MEDICARE

## 2018-10-18 ENCOUNTER — HOSPITAL ENCOUNTER (OUTPATIENT)
Age: 58
Discharge: HOME | End: 2018-10-18
Payer: MEDICARE

## 2018-10-18 ENCOUNTER — HOSPITAL ENCOUNTER (OUTPATIENT)
Age: 58
Setting detail: SPECIMEN
Discharge: HOME | End: 2018-10-18
Payer: MEDICARE

## 2018-10-18 VITALS
SYSTOLIC BLOOD PRESSURE: 77 MMHG | WEIGHT: 293 LBS | HEART RATE: 97 BPM | HEIGHT: 70 IN | RESPIRATION RATE: 16 BRPM | BODY MASS INDEX: 41.95 KG/M2 | DIASTOLIC BLOOD PRESSURE: 60 MMHG | TEMPERATURE: 97.8 F

## 2018-10-18 DIAGNOSIS — F32.A DEPRESSION, UNSPECIFIED DEPRESSION TYPE: ICD-10-CM

## 2018-10-18 DIAGNOSIS — Z79.4 TYPE 2 DIABETES MELLITUS WITH DIABETIC POLYNEUROPATHY, WITH LONG-TERM CURRENT USE OF INSULIN (HCC): ICD-10-CM

## 2018-10-18 DIAGNOSIS — R33.9 URINARY RETENTION: Primary | ICD-10-CM

## 2018-10-18 DIAGNOSIS — N39.0 URINARY TRACT INFECTION WITHOUT HEMATURIA, SITE UNSPECIFIED: ICD-10-CM

## 2018-10-18 DIAGNOSIS — K21.9 GASTROESOPHAGEAL REFLUX DISEASE WITHOUT ESOPHAGITIS: ICD-10-CM

## 2018-10-18 DIAGNOSIS — I95.2 HYPOTENSION DUE TO DRUGS: ICD-10-CM

## 2018-10-18 DIAGNOSIS — E11.42 TYPE 2 DIABETES MELLITUS WITH DIABETIC POLYNEUROPATHY, WITH LONG-TERM CURRENT USE OF INSULIN (HCC): ICD-10-CM

## 2018-10-18 LAB
ABSOLUTE EOS #: 0.29 K/UL (ref 0–0.44)
ABSOLUTE IMMATURE GRANULOCYTE: 0.14 K/UL (ref 0–0.3)
ABSOLUTE LYMPH #: 2.09 K/UL (ref 1.1–3.7)
ABSOLUTE MONO #: 0.99 K/UL (ref 0.1–1.2)
ANION GAP SERPL CALCULATED.3IONS-SCNC: 14 MMOL/L (ref 9–17)
BASOPHILS # BLD: 1 % (ref 0–2)
BASOPHILS ABSOLUTE: 0.07 K/UL (ref 0–0.2)
BUN BLDV-MCNC: 19 MG/DL (ref 6–20)
BUN/CREAT BLD: ABNORMAL (ref 9–20)
CALCIUM SERPL-MCNC: 9.7 MG/DL (ref 8.6–10.4)
CHLORIDE BLD-SCNC: 95 MMOL/L (ref 98–107)
CO2: 26 MMOL/L (ref 20–31)
CREAT SERPL-MCNC: 1.24 MG/DL (ref 0.5–0.9)
DIFFERENTIAL TYPE: ABNORMAL
EOSINOPHILS RELATIVE PERCENT: 3 % (ref 1–4)
GFR AFRICAN AMERICAN: 54 ML/MIN
GFR NON-AFRICAN AMERICAN: 45 ML/MIN
GFR SERPL CREATININE-BSD FRML MDRD: ABNORMAL ML/MIN/{1.73_M2}
GFR SERPL CREATININE-BSD FRML MDRD: ABNORMAL ML/MIN/{1.73_M2}
GLUCOSE BLD-MCNC: 95 MG/DL (ref 70–99)
HBA1C MFR BLD: 5.7 %
HCT VFR BLD CALC: 44.1 % (ref 36.3–47.1)
HEMOGLOBIN: 14.2 G/DL (ref 11.9–15.1)
IMMATURE GRANULOCYTES: 1 %
LYMPHOCYTES # BLD: 18 % (ref 24–43)
MCH RBC QN AUTO: 30.9 PG (ref 25.2–33.5)
MCHC RBC AUTO-ENTMCNC: 32.2 G/DL (ref 28.4–34.8)
MCV RBC AUTO: 96.1 FL (ref 82.6–102.9)
MONOCYTES # BLD: 9 % (ref 3–12)
NRBC AUTOMATED: 0 PER 100 WBC
PDW BLD-RTO: 14.2 % (ref 11.8–14.4)
PLATELET # BLD: 227 K/UL (ref 138–453)
PLATELET ESTIMATE: ABNORMAL
PMV BLD AUTO: 12.2 FL (ref 8.1–13.5)
POTASSIUM SERPL-SCNC: 4 MMOL/L (ref 3.7–5.3)
RBC # BLD: 4.59 M/UL (ref 3.95–5.11)
RBC # BLD: ABNORMAL 10*6/UL
SEG NEUTROPHILS: 68 % (ref 36–65)
SEGMENTED NEUTROPHILS ABSOLUTE COUNT: 7.75 K/UL (ref 1.5–8.1)
SODIUM BLD-SCNC: 135 MMOL/L (ref 135–144)
WBC # BLD: 11.3 K/UL (ref 3.5–11.3)
WBC # BLD: ABNORMAL 10*3/UL

## 2018-10-18 PROCEDURE — 99214 OFFICE O/P EST MOD 30 MIN: CPT | Performed by: INTERNAL MEDICINE

## 2018-10-18 PROCEDURE — G8482 FLU IMMUNIZE ORDER/ADMIN: HCPCS | Performed by: INTERNAL MEDICINE

## 2018-10-18 PROCEDURE — G8417 CALC BMI ABV UP PARAM F/U: HCPCS | Performed by: INTERNAL MEDICINE

## 2018-10-18 PROCEDURE — 4004F PT TOBACCO SCREEN RCVD TLK: CPT | Performed by: INTERNAL MEDICINE

## 2018-10-18 PROCEDURE — 2022F DILAT RTA XM EVC RTNOPTHY: CPT | Performed by: INTERNAL MEDICINE

## 2018-10-18 PROCEDURE — 36415 COLL VENOUS BLD VENIPUNCTURE: CPT

## 2018-10-18 PROCEDURE — 81002 URINALYSIS NONAUTO W/O SCOPE: CPT | Performed by: INTERNAL MEDICINE

## 2018-10-18 PROCEDURE — 3017F COLORECTAL CA SCREEN DOC REV: CPT | Performed by: INTERNAL MEDICINE

## 2018-10-18 PROCEDURE — 85025 COMPLETE CBC W/AUTO DIFF WBC: CPT

## 2018-10-18 PROCEDURE — G8427 DOCREV CUR MEDS BY ELIG CLIN: HCPCS | Performed by: INTERNAL MEDICINE

## 2018-10-18 PROCEDURE — 80048 BASIC METABOLIC PNL TOTAL CA: CPT

## 2018-10-18 PROCEDURE — 3044F HG A1C LEVEL LT 7.0%: CPT | Performed by: INTERNAL MEDICINE

## 2018-10-18 PROCEDURE — 83036 HEMOGLOBIN GLYCOSYLATED A1C: CPT | Performed by: INTERNAL MEDICINE

## 2018-10-18 RX ORDER — CEPHALEXIN 500 MG/1
500 CAPSULE ORAL 2 TIMES DAILY
Qty: 14 CAPSULE | Refills: 0 | Status: SHIPPED | OUTPATIENT
Start: 2018-10-18 | End: 2018-10-25

## 2018-10-18 RX ORDER — OMEPRAZOLE 20 MG/1
20 CAPSULE, DELAYED RELEASE ORAL
Qty: 30 CAPSULE | Refills: 3 | Status: SHIPPED | OUTPATIENT
Start: 2018-10-18 | End: 2019-01-08 | Stop reason: SDUPTHER

## 2018-10-18 NOTE — PROGRESS NOTES
[facility] PHYSICIANS  [facility] [place] [address] [place] [address]  Dept: [phone]      Today's Date: 10/18/2018  Patient Name: Tamera Mireles  Patient's age: 63 y.o., 1960        CHIEF COMPLAINT:    Chief Complaint   Patient presents with    1 Month Follow-Up     Major Depression: Use Dx Calculator to add appropriate specificity - HCC59 2019 MAJOR DEPRESSIVE, BIPOLAR, AND PARANOID DISORDERS, HCC Gap    Dizziness    Headache    Abdominal Pain     pressure     Gastroesophageal Reflux    Urinary Retention       History Obtained From:  patient    HISTORY OF PRESENT ILLNESS:      The patient is a 63 y.o. old  female and is here to Follow-up for diabetes and hypertension. She has been feeling dizzy. Her blood pressure is 77 systolic. She is on lisinopril 5 mg daily and Lasix 20 mg daily. She denies any leg edema at present. She also complains of decreased urine output and high colored urine with foul odor. Her urine dipstick in office showed leukocyte esterase positive. He denies any fever or dysuria. For diabetes she is on NovoLog 70/30 and she takes 70 units twice a day. Her blood sugar has been running in low 100s to less than 100. Her A1c is 5.7. She is on Victoza and has lost almost 25 pounds in last 6 months. She states she has lack of appetite after she has a started Victoza. She also complains of heartburn for last 1 month. She has in using Tums over-the-counter. Denies any hematemesis or melena. She is on Wellbutrin for depression and has helped her mild to moderately. We will adjust the dose once her hypotension improves.     Patient Active Problem List   Diagnosis    HLD (hyperlipidemia)    HTN (hypertension)    Morbid obesity ([address]    Neuropathy due to secondary diabetes ([address]    Depression    Mild persistent asthma without complication    Type 2 diabetes mellitus with diabetic polyneuropathy, with long-term current use 07/12/2018 1020    AST 16 07/12/2018 1020    ALT 19 07/12/2018 1020    BILITOT 0.25 (L) 07/12/2018 1020          No results for input(s): GLUCOSE in the last 72 hours. Lab Results   Component Value Date    WBC 9.1 07/12/2018    HGB 13.0 07/12/2018    HCT 40.5 07/12/2018    MCV 96.2 07/12/2018     07/12/2018     Lab Results   Component Value Date    TSH 1.55 08/01/2016     Lab Results   Component Value Date    LABA1C 5.7 10/18/2018     Lab Results   Component Value Date    LABMICR CANNOT BE CALCULATED 07/12/2018     No components found for: CHLPL  Lab Results   Component Value Date    TRIG 248 (H) 07/12/2018     Lab Results   Component Value Date    HDL 30 (L) 07/12/2018     Lab Results   Component Value Date    LDLCHOLESTEROL 84 07/12/2018    LDLDIRECT 102 (H) 05/11/2017     The ASCVD Risk score (Graeme Beatty, et al., 2013) failed to calculate for the following reasons: The valid systolic blood pressure range is 90 to 200 mmHg    Health Maintenance Due   Topic Date Due    Shingles Vaccine (1 of 2 - 2 Dose Series) 04/27/2011    Diabetic retinal exam  10/30/2018        ASSESSMENT AND PLAN    1. Urinary retention    - POCT Urinalysis no Micro    2. Type 2 diabetes mellitus with diabetic polyneuropathy, with long-term current use of insulin ([facility])    - POCT glycosylated hemoglobin (Hb A1C)  - insulin glargine (TOUJEO SOLOSTAR) 300 UNIT/ML injection pen; Inject 50 Units into the skin nightly  Dispense: 3 pen; Refill: 3  - Insulin Pen Needle 32G X 4 MM MISC; 1 each by Does not apply route daily  Dispense: 100 each; Refill: 3    3. Gastroesophageal reflux disease without esophagitis    - omeprazole (PRILOSEC) 20 MG delayed release capsule; Take 1 capsule by mouth daily (with breakfast)  Dispense: 30 capsule; Refill: 3    4. Hypotension due to drugs  Stop lisinopril and lasix     5. Urinary tract infection without hematuria, site unspecified    - cephALEXin (KEFLEX) 500 MG capsule;  Take 1 capsule by mouth 2 times daily for 7 days  Dispense: 14 capsule; Refill: 0  - CBC Auto Differential; Future  - Basic Metabolic Panel; Future  - Urinalysis Reflex to Culture; Future      · Will stop lisinopril and Lasix. · We will stop NovoLog 70 test 30 and start her on Toujeo 50 units daily. She is advised to increase Toujeo by 2 units every 2-3 days if her fasting blood sugar runs more than 120. · Continue Victoza as before. · She also has heartburn for which he'll start omeprazole 20 mg daily. · Labs as ordered. · Start Keflex for UTI. Will follow urine culture. · Palma received counseling on the following healthy behaviors: exercise and medication adherence  · Discussed use, benefit, and side effects of prescribed medications. Barriers to medication compliance addressed. All patient questions answered. Pt voiced understanding. · The return visit should be in 1 week      Marialuisa Sánchez MD  Attending and Faculty Internal Medicine  [facility] PHYSICIANS  [address] [place] [phone] [facility]  [address]  Dept: [phone]  10/18/18      This note is created with the assistance of a speech-recognition program. While intending to generate a document that actually reflects the content of the visit, the document can still have some mistakes which may not have been identified and corrected by editing.

## 2018-10-19 DIAGNOSIS — N39.0 URINARY TRACT INFECTION WITHOUT HEMATURIA, SITE UNSPECIFIED: ICD-10-CM

## 2018-10-19 LAB
-: NORMAL
AMORPHOUS: NORMAL
BACTERIA: NORMAL
BILIRUBIN URINE: NEGATIVE
CASTS UA: NORMAL /LPF (ref 0–8)
COLOR: YELLOW
COMMENT UA: ABNORMAL
CRYSTALS, UA: NORMAL /HPF
EPITHELIAL CELLS UA: NORMAL /HPF (ref 0–5)
GLUCOSE URINE: NEGATIVE
KETONES, URINE: NEGATIVE
LEUKOCYTE ESTERASE, URINE: ABNORMAL
MUCUS: NORMAL
NITRITE, URINE: NEGATIVE
OTHER OBSERVATIONS UA: NORMAL
PH UA: 6 (ref 5–8)
PROTEIN UA: ABNORMAL
RBC UA: NORMAL /HPF (ref 0–4)
RENAL EPITHELIAL, UA: NORMAL /HPF
SPECIFIC GRAVITY UA: 1.01 (ref 1–1.03)
TRICHOMONAS: NORMAL
TURBIDITY: CLEAR
URINE HGB: NEGATIVE
UROBILINOGEN, URINE: NORMAL
WBC UA: NORMAL /HPF (ref 0–5)
YEAST: NORMAL

## 2018-10-21 LAB
CULTURE: ABNORMAL
CULTURE: ABNORMAL
Lab: ABNORMAL
ORGANISM: ABNORMAL
ORGANISM: ABNORMAL
SPECIMEN DESCRIPTION: ABNORMAL
STATUS: ABNORMAL

## 2018-11-01 ENCOUNTER — OFFICE VISIT (OUTPATIENT)
Dept: FAMILY MEDICINE CLINIC | Age: 58
End: 2018-11-01
Payer: MEDICARE

## 2018-11-01 VITALS
BODY MASS INDEX: 41.95 KG/M2 | DIASTOLIC BLOOD PRESSURE: 70 MMHG | HEIGHT: 70 IN | SYSTOLIC BLOOD PRESSURE: 120 MMHG | WEIGHT: 293 LBS | RESPIRATION RATE: 16 BRPM | HEART RATE: 92 BPM | TEMPERATURE: 97.5 F

## 2018-11-01 DIAGNOSIS — Z79.4 TYPE 2 DIABETES MELLITUS WITH DIABETIC POLYNEUROPATHY, WITH LONG-TERM CURRENT USE OF INSULIN (HCC): Primary | ICD-10-CM

## 2018-11-01 DIAGNOSIS — E66.01 MORBID OBESITY (HCC): ICD-10-CM

## 2018-11-01 DIAGNOSIS — M25.50 MULTIPLE JOINT PAIN: ICD-10-CM

## 2018-11-01 DIAGNOSIS — F32.A DEPRESSION, UNSPECIFIED DEPRESSION TYPE: ICD-10-CM

## 2018-11-01 DIAGNOSIS — G43.909 MIGRAINE WITHOUT STATUS MIGRAINOSUS, NOT INTRACTABLE, UNSPECIFIED MIGRAINE TYPE: ICD-10-CM

## 2018-11-01 DIAGNOSIS — R76.8 ANA POSITIVE: ICD-10-CM

## 2018-11-01 DIAGNOSIS — I10 ESSENTIAL HYPERTENSION: ICD-10-CM

## 2018-11-01 DIAGNOSIS — E11.42 TYPE 2 DIABETES MELLITUS WITH DIABETIC POLYNEUROPATHY, WITH LONG-TERM CURRENT USE OF INSULIN (HCC): Primary | ICD-10-CM

## 2018-11-01 DIAGNOSIS — R79.89 CREATININE ELEVATION: ICD-10-CM

## 2018-11-01 PROCEDURE — G8417 CALC BMI ABV UP PARAM F/U: HCPCS | Performed by: INTERNAL MEDICINE

## 2018-11-01 PROCEDURE — G8482 FLU IMMUNIZE ORDER/ADMIN: HCPCS | Performed by: INTERNAL MEDICINE

## 2018-11-01 PROCEDURE — G8427 DOCREV CUR MEDS BY ELIG CLIN: HCPCS | Performed by: INTERNAL MEDICINE

## 2018-11-01 PROCEDURE — 4004F PT TOBACCO SCREEN RCVD TLK: CPT | Performed by: INTERNAL MEDICINE

## 2018-11-01 PROCEDURE — 99214 OFFICE O/P EST MOD 30 MIN: CPT | Performed by: INTERNAL MEDICINE

## 2018-11-01 PROCEDURE — 3017F COLORECTAL CA SCREEN DOC REV: CPT | Performed by: INTERNAL MEDICINE

## 2018-11-01 PROCEDURE — 2022F DILAT RTA XM EVC RTNOPTHY: CPT | Performed by: INTERNAL MEDICINE

## 2018-11-01 PROCEDURE — 3044F HG A1C LEVEL LT 7.0%: CPT | Performed by: INTERNAL MEDICINE

## 2018-11-01 RX ORDER — BUTALBITAL, ACETAMINOPHEN AND CAFFEINE 50; 325; 40 MG/1; MG/1; MG/1
1 TABLET ORAL 2 TIMES DAILY PRN
Qty: 60 TABLET | Refills: 0 | Status: SHIPPED | OUTPATIENT
Start: 2018-11-01 | End: 2019-04-09

## 2018-11-01 NOTE — PROGRESS NOTES
Bunionectomy (Left, 02-22-16); and Colonoscopy (8/17/2015). Medications:    Current Outpatient Prescriptions   Medication Sig Dispense Refill    insulin glargine (TOUJEO SOLOSTAR) 300 UNIT/ML injection pen Inject 50 Units into the skin nightly 3 pen 3    omeprazole (PRILOSEC) 20 MG delayed release capsule Take 1 capsule by mouth daily (with breakfast) 30 capsule 3    Insulin Pen Needle 32G X 4 MM MISC 1 each by Does not apply route daily 100 each 3    citalopram (CELEXA) 40 MG tablet TAKE 1 TABLET BY MOUTH DAILY 90 tablet 1    amitriptyline (ELAVIL) 50 MG tablet TAKE 1 TABLET BY MOUTH AT BEDTIME 90 tablet 1    meloxicam (MOBIC) 15 MG tablet TAKE 1 TABLET BY MOUTH DAILY 30 tablet 2    buPROPion (WELLBUTRIN XL) 150 MG extended release tablet Take 1 tablet by mouth every morning 30 tablet 1    gabapentin (NEURONTIN) 100 MG capsule Take 1 capsule by mouth 3 times daily for 90 days. . 90 capsule 2    tiotropium (SPIRIVA HANDIHALER) 18 MCG inhalation capsule INHALE THE CONTENTS OF 1 CAPSULE INTO THE LUNGS DAILY 90 capsule 1    colchicine (COLCRYS) 0.6 MG tablet TAKE 1 TABLET BY MOUTH DAILY 30 tablet 2    tiZANidine (ZANAFLEX) 4 MG tablet TAKE 1 TABLET BY MOUTH TWICE DAILY 180 tablet 0    fluticasone (FLONASE) 50 MCG/ACT nasal spray SHAKE LIQUID AND USE 1 SPRAY IN EACH NOSTRIL DAILY 48 g 3    carvedilol (COREG) 25 MG tablet TAKE 1 TABLET BY MOUTH TWICE DAILY 180 tablet 1    rOPINIRole (REQUIP) 1 MG tablet TAKE 1 TABLET BY MOUTH EVERY NIGHT AT BEDTIME 90 tablet 0    allopurinol (ZYLOPRIM) 300 MG tablet TAKE 1 TABLET BY MOUTH EVERY DAY 90 tablet 1    albuterol (PROVENTIL) (2.5 MG/3ML) 0.083% nebulizer solution Take 3 mLs by nebulization every 6 hours as needed for Wheezing or Shortness of Breath 120 each 3    albuterol sulfate HFA (PROAIR HFA) 108 (90 BASE) MCG/ACT inhaler Inhale 2 puffs into the lungs every 6 hours as needed for Wheezing 1 Inhaler 3    Liraglutide (VICTOZA) 18 MG/3ML SOPN SC injection Inject 1.8 mg into the skin daily Please give patient # 30 32g 4mm needles with  2 refills 9 pen 2     Current Facility-Administered Medications   Medication Dose Route Frequency Provider Last Rate Last Dose    ipratropium (ATROVENT) 0.02 % nebulizer solution 0.5 mg  0.5 mg Nebulization Once Simeon Villalta MD        albuterol (PROVENTIL) nebulizer solution 2.5 mg  2.5 mg Nebulization Once Simeon Villalta MD             Allergies:  Cymbalta [duloxetine hcl]; Januvia [sitagliptin]; Ciprofloxacin; Duloxetine; Lyrica [pregabalin]; Metformin; Metformin and related; Statins; and Morphine    Social History:   reports that she has been smoking Cigarettes. She has a 21.00 pack-year smoking history. She has never used smokeless tobacco. She reports that she drinks alcohol. She reports that she does not use drugs. Family History: family history includes Cancer (age of onset: 64) in her brother; Cancer (age of onset: 61) in her mother; Cancer (age of onset: 71) in her paternal aunt; Cancer (age of onset: 79) in her paternal grandfather; Depression in her sister; Depression (age of onset: 64) in her father; Diabetes in her brother; Diabetes (age of onset: 36) in her sister; High Blood Pressure in her brother, mother, and sister; Stroke (age of onset: 62) in her father. REVIEW OF SYSTEMS:      Constitutional: Negative for fever and fatigue. HENT: Negative for congestion and sore throat. Eyes: Negative for eye pain and visual disturbance. Respiratory: Negative for chest tightness and shortness of breath. Cardiovascular: Negative for chest pain and orthopnea . Gastrointestinal: Negative for vomiting, abdominal pain, constipation and diarrhea. Endocrine: Negative for cold intolerance, heat intolerance, polydipsia and polyuria. Genitourinary: Negative for dysuria and frequency. Musculoskeletal: Negative for  Myalgia,. Neurological: Negative for dizziness, weakness and headaches.      PHYSICAL EXAM: assistance of a speech-recognition program. While intending to generate a document that actually reflects the content of the visit, the document can still have some mistakes which may not have been identified and corrected by editing.

## 2018-11-02 DIAGNOSIS — Z79.4 TYPE 2 DIABETES MELLITUS WITH DIABETIC POLYNEUROPATHY, WITH LONG-TERM CURRENT USE OF INSULIN (HCC): ICD-10-CM

## 2018-11-02 DIAGNOSIS — E11.42 TYPE 2 DIABETES MELLITUS WITH DIABETIC POLYNEUROPATHY, WITH LONG-TERM CURRENT USE OF INSULIN (HCC): ICD-10-CM

## 2018-11-03 RX ORDER — HUMAN INSULIN 100 [USP'U]/ML
INJECTION, SUSPENSION SUBCUTANEOUS
Qty: 20 ML | OUTPATIENT
Start: 2018-11-03

## 2018-11-05 ENCOUNTER — HOSPITAL ENCOUNTER (OUTPATIENT)
Age: 58
Discharge: HOME | End: 2018-11-05
Payer: MEDICARE

## 2018-11-05 DIAGNOSIS — R76.8 ANA POSITIVE: ICD-10-CM

## 2018-11-05 DIAGNOSIS — R79.89 CREATININE ELEVATION: ICD-10-CM

## 2018-11-05 DIAGNOSIS — M25.50 MULTIPLE JOINT PAIN: ICD-10-CM

## 2018-11-05 LAB
ANION GAP SERPL CALCULATED.3IONS-SCNC: 13 MMOL/L (ref 9–17)
BUN BLDV-MCNC: 15 MG/DL (ref 6–20)
BUN/CREAT BLD: ABNORMAL (ref 9–20)
C-REACTIVE PROTEIN: 12.3 MG/L (ref 0–5)
CALCIUM SERPL-MCNC: 9.3 MG/DL (ref 8.6–10.4)
CHLORIDE BLD-SCNC: 101 MMOL/L (ref 98–107)
CO2: 25 MMOL/L (ref 20–31)
CREAT SERPL-MCNC: 1.04 MG/DL (ref 0.5–0.9)
GFR AFRICAN AMERICAN: >60 ML/MIN
GFR NON-AFRICAN AMERICAN: 55 ML/MIN
GFR SERPL CREATININE-BSD FRML MDRD: ABNORMAL ML/MIN/{1.73_M2}
GFR SERPL CREATININE-BSD FRML MDRD: ABNORMAL ML/MIN/{1.73_M2}
GLUCOSE BLD-MCNC: 165 MG/DL (ref 70–99)
POTASSIUM SERPL-SCNC: 4.5 MMOL/L (ref 3.7–5.3)
RHEUMATOID FACTOR: <10 IU/ML
SEDIMENTATION RATE, ERYTHROCYTE: 14 MM (ref 0–20)
SODIUM BLD-SCNC: 139 MMOL/L (ref 135–144)

## 2018-11-05 PROCEDURE — 86431 RHEUMATOID FACTOR QUANT: CPT

## 2018-11-05 PROCEDURE — 85651 RBC SED RATE NONAUTOMATED: CPT

## 2018-11-05 PROCEDURE — 86225 DNA ANTIBODY NATIVE: CPT

## 2018-11-05 PROCEDURE — 86140 C-REACTIVE PROTEIN: CPT

## 2018-11-05 PROCEDURE — 80048 BASIC METABOLIC PNL TOTAL CA: CPT

## 2018-11-05 PROCEDURE — 36415 COLL VENOUS BLD VENIPUNCTURE: CPT

## 2018-11-05 PROCEDURE — 86235 NUCLEAR ANTIGEN ANTIBODY: CPT

## 2018-11-05 PROCEDURE — 86038 ANTINUCLEAR ANTIBODIES: CPT

## 2018-11-06 LAB
ANA REFERENCE RANGE:: ABNORMAL
ANTI DNA DOUBLE STRANDED: 188 IU/ML
ANTI JO-1 IGG: 6 U/ML
ANTI RNP AB: 41 U/ML
ANTI SSA: 113 U/ML
ANTI SSB: 21 U/ML
ANTI-CENTROMERE: 3 U/ML
ANTI-NUCLEAR ANTIBODY (ANA): POSITIVE
ANTI-SCLERODERMA: 26 U/ML
ANTI-SMITH: 13 U/ML
HISTONE ANTIBODY: 29 U/ML

## 2018-11-09 DIAGNOSIS — M10.9 GOUT, UNSPECIFIED CAUSE, UNSPECIFIED CHRONICITY, UNSPECIFIED SITE: ICD-10-CM

## 2018-11-09 DIAGNOSIS — G25.81 RLS (RESTLESS LEGS SYNDROME): ICD-10-CM

## 2018-11-09 DIAGNOSIS — F32.A DEPRESSION, UNSPECIFIED DEPRESSION TYPE: ICD-10-CM

## 2018-11-09 RX ORDER — ROPINIROLE 1 MG/1
TABLET, FILM COATED ORAL
Qty: 90 TABLET | Refills: 0 | Status: SHIPPED | OUTPATIENT
Start: 2018-11-09 | End: 2019-01-02 | Stop reason: SDUPTHER

## 2018-11-09 RX ORDER — BUPROPION HYDROCHLORIDE 150 MG/1
150 TABLET ORAL EVERY MORNING
Qty: 30 TABLET | Refills: 1 | Status: SHIPPED | OUTPATIENT
Start: 2018-11-09 | End: 2019-01-02 | Stop reason: SDUPTHER

## 2018-11-09 RX ORDER — ALLOPURINOL 300 MG/1
TABLET ORAL
Qty: 90 TABLET | Refills: 1 | Status: SHIPPED | OUTPATIENT
Start: 2018-11-09 | End: 2019-01-08 | Stop reason: SDUPTHER

## 2018-11-09 NOTE — TELEPHONE ENCOUNTER
Patient called in requesting pended medication patient states she is out of medication. Please advise thank you! Last visit: 11/1/18  Last Med refill: 11/1/18    Next Visit Date:  Future Appointments  Date Time Provider Abbe Vasquez   1/8/2019 11:40 AM MD [address] Maintenance   Topic Date Due    Shingles Vaccine (1 of 2 - 2 Dose Series) 04/27/2011    Diabetic retinal exam  10/30/2018    DTaP/Tdap/Td vaccine (1 - Tdap) 01/24/2027 (Originally 9/17/2016)    Diabetic foot exam  04/12/2019    Diabetic microalbuminuria test  07/12/2019    Lipid screen  07/12/2019    Breast cancer screen  08/07/2019    A1C test (Diabetic or Prediabetic)  10/18/2019    Colon cancer screen colonoscopy  05/18/2027    Flu vaccine  Completed    Pneumococcal med risk  Completed    Hepatitis C screen  Completed    HIV screen  Completed       Hemoglobin A1C (%)   Date Value   10/18/2018 5.7   05/29/2018 6.4   01/18/2018 7.3             ( goal A1C is < 7)   Microalb/Crt.  Ratio (mcg/mg creat)   Date Value   07/12/2018 CANNOT BE CALCULATED     LDL Cholesterol (mg/dL)   Date Value   07/12/2018 84   05/11/2017            (goal LDL is <100)   AST (U/L)   Date Value   07/12/2018 16     ALT (U/L)   Date Value   07/12/2018 19     BUN (mg/dL)   Date Value   11/05/2018 15     BP Readings from Last 3 Encounters:   11/01/18 120/70   10/18/18 (!) 77/60   10/03/18 128/85          (goal 120/80)    All Future Testing planned in [facility]  Lab Frequency Next Occurrence   Uric Acid Once 11/22/2018   XR KNEE RIGHT (3 VIEWS) Once 93/29/5524   Basic Metabolic Panel Once 18/67/6117   Creatinine, Serum Once 07/10/2018               Patient Active Problem List:     HLD (hyperlipidemia)     HTN (hypertension)     Morbid obesity (HCC)     Neuropathy due to secondary diabetes ([facility] [address]     Depression     Mild persistent asthma without complication     Type 2 diabetes mellitus with diabetic polyneuropathy, with

## 2018-11-19 DIAGNOSIS — I10 ESSENTIAL HYPERTENSION: ICD-10-CM

## 2018-11-19 NOTE — TELEPHONE ENCOUNTER
Received [facility] request for refill of patient's  medication. Medication pended for physician review, please advise. Thank you! Last visit:11/1/2018  Last Med refill:5/14/2018    Next Visit Date:  Future Appointments  Date Time Provider Abbe Vasquez   1/8/2019 11:40 AM MD [address] Maintenance   Topic Date Due    Shingles Vaccine (1 of 2 - 2 Dose Series) 04/27/2011    Diabetic retinal exam  10/30/2018    DTaP/Tdap/Td vaccine (1 - Tdap) 01/24/2027 (Originally 9/17/2016)    Diabetic foot exam  04/12/2019    Diabetic microalbuminuria test  07/12/2019    Lipid screen  07/12/2019    Breast cancer screen  08/07/2019    A1C test (Diabetic or Prediabetic)  10/18/2019    Colon cancer screen colonoscopy  05/18/2027    Flu vaccine  Completed    Pneumococcal med risk  Completed    Hepatitis C screen  Completed    HIV screen  Completed       Hemoglobin A1C (%)   Date Value   10/18/2018 5.7   05/29/2018 6.4   01/18/2018 7.3             ( goal A1C is < 7)   Microalb/Crt.  Ratio (mcg/mg creat)   Date Value   07/12/2018 CANNOT BE CALCULATED     LDL Cholesterol (mg/dL)   Date Value   07/12/2018 84   05/11/2017            (goal LDL is <100)   AST (U/L)   Date Value   07/12/2018 16     ALT (U/L)   Date Value   07/12/2018 19     BUN (mg/dL)   Date Value   11/05/2018 15     BP Readings from Last 3 Encounters:   11/01/18 120/70   10/18/18 (!) 77/60   10/03/18 128/85          (goal 120/80)    All Future Testing planned in [facility]  Lab Frequency Next Occurrence   Uric Acid Once 11/22/2018   XR KNEE RIGHT (3 VIEWS) Once 96/97/5104   Basic Metabolic Panel Once 82/18/9229   Creatinine, Serum Once 07/10/2018               Patient Active Problem List:     HLD (hyperlipidemia)     HTN (hypertension)     Morbid obesity (HCC)     Neuropathy due to secondary diabetes ([facility] [address]     Depression     Mild persistent asthma without complication     Type 2 diabetes mellitus with diabetic

## 2018-11-20 RX ORDER — CARVEDILOL 25 MG/1
TABLET ORAL
Qty: 180 TABLET | Refills: 0 | Status: SHIPPED | OUTPATIENT
Start: 2018-11-20 | End: 2019-01-02 | Stop reason: SDUPTHER

## 2018-12-02 DIAGNOSIS — M79.7 FIBROMYALGIA: ICD-10-CM

## 2018-12-03 RX ORDER — TIZANIDINE 4 MG/1
TABLET ORAL
Qty: 180 TABLET | Refills: 0 | Status: SHIPPED | OUTPATIENT
Start: 2018-12-03 | End: 2019-01-02 | Stop reason: SDUPTHER

## 2018-12-03 NOTE — TELEPHONE ENCOUNTER
Received HYGIEIA request for refill of patient's  medication. Medication pended for physician review, please advise. Thank you! Last visit:11/1/2018  Last Med refill:9/5/2018    Next Visit Date:  Future Appointments  Date Time Provider Abbe Vasquez   1/8/2019 11:40 AM MD Placido Melendez 56 Maintenance   Topic Date Due    Shingles Vaccine (1 of 2 - 2 Dose Series) 04/27/2011    Diabetic retinal exam  10/30/2018    DTaP/Tdap/Td vaccine (1 - Tdap) 01/24/2027 (Originally 9/17/2016)    Diabetic foot exam  04/12/2019    Diabetic microalbuminuria test  07/12/2019    Lipid screen  07/12/2019    Breast cancer screen  08/07/2019    A1C test (Diabetic or Prediabetic)  10/18/2019    Colon cancer screen colonoscopy  05/18/2027    Flu vaccine  Completed    Pneumococcal med risk  Completed    Hepatitis C screen  Completed    HIV screen  Completed       Hemoglobin A1C (%)   Date Value   10/18/2018 5.7   05/29/2018 6.4   01/18/2018 7.3             ( goal A1C is < 7)   Microalb/Crt.  Ratio (mcg/mg creat)   Date Value   07/12/2018 CANNOT BE CALCULATED     LDL Cholesterol (mg/dL)   Date Value   07/12/2018 84   05/11/2017            (goal LDL is <100)   AST (U/L)   Date Value   07/12/2018 16     ALT (U/L)   Date Value   07/12/2018 19     BUN (mg/dL)   Date Value   11/05/2018 15     BP Readings from Last 3 Encounters:   11/01/18 120/70   10/18/18 (!) 77/60   10/03/18 128/85          (goal 120/80)    All Future Testing planned in [facility]  Lab Frequency Next Occurrence   Basic Metabolic Panel Once 39/34/4878   Creatinine, Serum Once 07/10/2018               Patient Active Problem List:     HLD (hyperlipidemia)     HTN (hypertension)     Morbid obesity (HCC)     Neuropathy due to secondary diabetes ([facility] [address] [address]     Depression     Mild persistent asthma without complication     Type 2 diabetes mellitus with diabetic polyneuropathy, with long-term current use of insulin (HCC)     TIFFANI

## 2018-12-05 ENCOUNTER — HOSPITAL ENCOUNTER (OUTPATIENT)
Age: 58
Setting detail: SPECIMEN
Discharge: HOME | End: 2018-12-05
Payer: MEDICARE

## 2018-12-05 ENCOUNTER — OFFICE VISIT (OUTPATIENT)
Dept: FAMILY MEDICINE CLINIC | Age: 58
End: 2018-12-05
Payer: MEDICARE

## 2018-12-05 VITALS
HEART RATE: 98 BPM | BODY MASS INDEX: 41.95 KG/M2 | RESPIRATION RATE: 16 BRPM | SYSTOLIC BLOOD PRESSURE: 156 MMHG | WEIGHT: 293 LBS | TEMPERATURE: 97.8 F | HEIGHT: 70 IN | DIASTOLIC BLOOD PRESSURE: 96 MMHG

## 2018-12-05 DIAGNOSIS — Z79.4 TYPE 2 DIABETES MELLITUS WITH DIABETIC POLYNEUROPATHY, WITH LONG-TERM CURRENT USE OF INSULIN (HCC): ICD-10-CM

## 2018-12-05 DIAGNOSIS — E86.0 DEHYDRATION: ICD-10-CM

## 2018-12-05 DIAGNOSIS — R35.0 URINARY FREQUENCY: Primary | ICD-10-CM

## 2018-12-05 DIAGNOSIS — R10.9 RIGHT FLANK PAIN: ICD-10-CM

## 2018-12-05 DIAGNOSIS — E11.42 TYPE 2 DIABETES MELLITUS WITH DIABETIC POLYNEUROPATHY, WITH LONG-TERM CURRENT USE OF INSULIN (HCC): ICD-10-CM

## 2018-12-05 DIAGNOSIS — R35.0 URINARY FREQUENCY: ICD-10-CM

## 2018-12-05 LAB
BILIRUBIN, POC: ABNORMAL
BLOOD URINE, POC: ABNORMAL
CLARITY, POC: ABNORMAL
COLOR, POC: ABNORMAL
GLUCOSE URINE, POC: NEGATIVE
KETONES, POC: NEGATIVE
LEUKOCYTE EST, POC: ABNORMAL
NITRITE, POC: NEGATIVE
PH, POC: 6
PROTEIN, POC: ABNORMAL
SPECIFIC GRAVITY, POC: >1.03
UROBILINOGEN, POC: 0.2

## 2018-12-05 PROCEDURE — 4004F PT TOBACCO SCREEN RCVD TLK: CPT | Performed by: PHYSICIAN ASSISTANT

## 2018-12-05 PROCEDURE — 3044F HG A1C LEVEL LT 7.0%: CPT | Performed by: PHYSICIAN ASSISTANT

## 2018-12-05 PROCEDURE — 99214 OFFICE O/P EST MOD 30 MIN: CPT | Performed by: PHYSICIAN ASSISTANT

## 2018-12-05 PROCEDURE — G8417 CALC BMI ABV UP PARAM F/U: HCPCS | Performed by: PHYSICIAN ASSISTANT

## 2018-12-05 PROCEDURE — 2022F DILAT RTA XM EVC RTNOPTHY: CPT | Performed by: PHYSICIAN ASSISTANT

## 2018-12-05 PROCEDURE — G8427 DOCREV CUR MEDS BY ELIG CLIN: HCPCS | Performed by: PHYSICIAN ASSISTANT

## 2018-12-05 PROCEDURE — 3017F COLORECTAL CA SCREEN DOC REV: CPT | Performed by: PHYSICIAN ASSISTANT

## 2018-12-05 PROCEDURE — G8482 FLU IMMUNIZE ORDER/ADMIN: HCPCS | Performed by: PHYSICIAN ASSISTANT

## 2018-12-05 PROCEDURE — 81002 URINALYSIS NONAUTO W/O SCOPE: CPT | Performed by: PHYSICIAN ASSISTANT

## 2018-12-06 ASSESSMENT — ENCOUNTER SYMPTOMS
NAUSEA: 1
BLOOD IN STOOL: 0
VOMITING: 0
SHORTNESS OF BREATH: 0
WHEEZING: 0
CONSTIPATION: 0
ABDOMINAL DISTENTION: 0
BACK PAIN: 1
DIARRHEA: 0
ABDOMINAL PAIN: 1
COUGH: 0
COLOR CHANGE: 0

## 2018-12-06 NOTE — PROGRESS NOTES
[facility]YSICI[facility]  [facility] [place] [address] [place] [address]  Dept: [phone]  Dept Fax: [phone]    Office Progress Note  Date of patient's visit: 12/6/2018  Patient's Name:  Harpreet Titus YOB: 1960            ELOISA ART PA  ================================================================    REASON FOR VISIT/CHIEF COMPLAINT:  Back Pain (low) and Urinary Frequency (with smell )    HISTORY OF PRESENTING ILLNESS:  History was obtained from: patient. Harpreet Titus is a 63 y.o. female who presents with c/o 3-4 days of urinary frequency with dark color and bad odor. She has had 1 day of right flank pain without radiation. She denies any fevers but has had mild chills with nausea and no vomiting. She complains of mild abdominal discomfort with hyperactive bowel sounds but normal bowel movements without constipation, diarrhea or blood in the stools. Patient has previous history of ventral hernia repair and urinary tract infections. She has diabetes and states her blood sugar this morning was 160. She denies any history of kidney stones. Last creatinine last month was 1.04. Patient has had recent evaluation by rheumatologist, which showed positive TIFFANI with elevated anti SSA and Anti ds DNA - we discussed this result and patient has follow-up with rheumatologist on the 11th. Urine dip shows mild dehydration and leukocytes. Urine will be sent for culture.       Patient Active Problem List   Diagnosis    HLD (hyperlipidemia)    HTN (hypertension)    Morbid obesity ([address]    Neuropathy due to secondary diabetes ([address]    Depression    Mild persistent asthma without complication    Type 2 diabetes mellitus with diabetic polyneuropathy, with long-term current use of insulin (HCC)    TIFFANI positive    Chronic obstructive pulmonary disease ([address]       Health Maintenance Due   Topic Date Due    Shingles Vaccine (1 of 2 - 2 Dose Series) HANDIHALER) 18 MCG inhalation capsule INHALE THE CONTENTS OF 1 CAPSULE INTO THE LUNGS DAILY 90 capsule 1    colchicine (COLCRYS) 0.6 MG tablet TAKE 1 TABLET BY MOUTH DAILY 30 tablet 2    fluticasone (FLONASE) 50 MCG/ACT nasal spray SHAKE LIQUID AND USE 1 SPRAY IN EACH NOSTRIL DAILY 48 g 3    albuterol (PROVENTIL) (2.5 MG/3ML) 0.083% nebulizer solution Take 3 mLs by nebulization every 6 hours as needed for Wheezing or Shortness of Breath 120 each 3    albuterol sulfate HFA (PROAIR HFA) 108 (90 BASE) MCG/ACT inhaler Inhale 2 puffs into the lungs every 6 hours as needed for Wheezing 1 Inhaler 3    Liraglutide (VICTOZA) 18 MG/3ML SOPN SC injection Inject 1.8 mg into the skin daily Please give patient # 30 32g 4mm needles with  2 refills 9 pen 2     Current Facility-Administered Medications   Medication Dose Route Frequency Provider Last Rate Last Dose    ipratropium (ATROVENT) 0.02 % nebulizer solution 0.5 mg  0.5 mg Nebulization Once Chikis Echols MD        albuterol (PROVENTIL) nebulizer solution 2.5 mg  2.5 mg Nebulization Once Chikis Echols MD           Social History   Substance Use Topics    Smoking status: Current Every Day Smoker     Packs/day: 0.50     Years: 42.00     Types: Cigarettes    Smokeless tobacco: Never Used    Alcohol use 0.0 oz/week      Comment: occassional        Family History   Problem Relation Age of Onset    Cancer Mother 61        breast     High Blood Pressure Mother     Stroke Father 62    Depression Father 64        comitted sucide     Diabetes Sister 36    High Blood Pressure Sister     Depression Sister     Cancer Brother 64        prostate    Diabetes Brother     High Blood Pressure Brother     Cancer Paternal Aunt 71        colon, rectal     Cancer Paternal Grandfather 79        lung    Heart Disease Neg Hx         Review of Systems   Constitutional: Positive for chills. Negative for appetite change, diaphoresis, fatigue and fever.    Respiratory: Negative for cough, shortness of breath and wheezing. Cardiovascular: Negative for chest pain, palpitations and leg swelling. Gastrointestinal: Positive for abdominal pain (Mild epigastric discomfort) and nausea. Negative for abdominal distention, blood in stool, constipation, diarrhea and vomiting. Genitourinary: Positive for flank pain (right-sided), frequency and urgency. Negative for decreased urine volume, difficulty urinating, dysuria, enuresis, genital sores and hematuria. Musculoskeletal: Positive for back pain (right flank pain). Negative for joint swelling. Skin: Negative for color change and pallor. Neurological: Negative for dizziness, light-headedness and headaches. Physical Exam   Constitutional: She is oriented to person, place, and time. She appears well-developed and well-nourished. Non-toxic appearance. She does not have a sickly appearance. She does not appear ill. No distress (Uncomfortable, but in no apparent distress). Eyes: Conjunctivae and EOM are normal.   Cardiovascular: Normal rate, regular rhythm and normal heart sounds. Pulmonary/Chest: Effort normal and breath sounds normal.   Abdominal: Soft. She exhibits no distension, no ascites and no mass. Bowel sounds are decreased. There is tenderness (mild epigastric without rebound or guarding) in the epigastric area. There is CVA tenderness (right-sided). There is no rigidity, no rebound, no guarding, no tenderness at McBurney's point and negative Su's sign. No hernia. Neurological: She is alert and oriented to person, place, and time. Skin: Skin is warm and dry. She is not diaphoretic. Psychiatric: She has a normal mood and affect.  Her behavior is normal. Judgment and thought content normal.     Vitals:    12/05/18 1445   BP: (!) 156/96   Site: Left Upper Arm   Position: Sitting   Cuff Size: Medium Adult   Pulse: 98   Resp: 16   Temp: 97.8 °F (36.6 °C)   TempSrc: Oral   Weight: (!) 343 lb 6.4 oz (155.8

## 2018-12-07 LAB
CULTURE: NORMAL
Lab: NORMAL
SPECIMEN DESCRIPTION: NORMAL
STATUS: NORMAL

## 2019-01-02 DIAGNOSIS — M79.7 FIBROMYALGIA: ICD-10-CM

## 2019-01-02 DIAGNOSIS — Z79.4 TYPE 2 DIABETES MELLITUS WITH DIABETIC POLYNEUROPATHY, WITH LONG-TERM CURRENT USE OF INSULIN (HCC): ICD-10-CM

## 2019-01-02 DIAGNOSIS — E11.42 TYPE 2 DIABETES MELLITUS WITH DIABETIC POLYNEUROPATHY, WITH LONG-TERM CURRENT USE OF INSULIN (HCC): ICD-10-CM

## 2019-01-02 DIAGNOSIS — F32.A DEPRESSION, UNSPECIFIED DEPRESSION TYPE: ICD-10-CM

## 2019-01-02 DIAGNOSIS — I10 ESSENTIAL HYPERTENSION: ICD-10-CM

## 2019-01-02 DIAGNOSIS — G25.81 RLS (RESTLESS LEGS SYNDROME): ICD-10-CM

## 2019-01-03 RX ORDER — TIZANIDINE 4 MG/1
TABLET ORAL
Qty: 180 TABLET | Refills: 0 | Status: SHIPPED | OUTPATIENT
Start: 2019-01-03 | End: 2019-01-08 | Stop reason: SDUPTHER

## 2019-01-03 RX ORDER — ROPINIROLE 1 MG/1
TABLET, FILM COATED ORAL
Qty: 90 TABLET | Refills: 0 | Status: SHIPPED | OUTPATIENT
Start: 2019-01-03 | End: 2019-01-08 | Stop reason: SDUPTHER

## 2019-01-03 RX ORDER — BUPROPION HYDROCHLORIDE 150 MG/1
150 TABLET ORAL EVERY MORNING
Qty: 30 TABLET | Refills: 1 | Status: SHIPPED | OUTPATIENT
Start: 2019-01-03 | End: 2019-01-08 | Stop reason: SDUPTHER

## 2019-01-03 RX ORDER — CARVEDILOL 25 MG/1
TABLET ORAL
Qty: 180 TABLET | Refills: 0 | Status: SHIPPED | OUTPATIENT
Start: 2019-01-03 | End: 2019-01-08 | Stop reason: SDUPTHER

## 2019-01-08 ENCOUNTER — OFFICE VISIT (OUTPATIENT)
Dept: FAMILY MEDICINE CLINIC | Age: 59
End: 2019-01-08
Payer: MEDICARE

## 2019-01-08 VITALS
WEIGHT: 293 LBS | HEIGHT: 70 IN | DIASTOLIC BLOOD PRESSURE: 80 MMHG | BODY MASS INDEX: 41.95 KG/M2 | SYSTOLIC BLOOD PRESSURE: 120 MMHG | HEART RATE: 94 BPM | TEMPERATURE: 97.8 F | RESPIRATION RATE: 16 BRPM

## 2019-01-08 DIAGNOSIS — F32.A DEPRESSION, UNSPECIFIED DEPRESSION TYPE: ICD-10-CM

## 2019-01-08 DIAGNOSIS — E66.01 MORBID OBESITY WITH BMI OF 45.0-49.9, ADULT (HCC): ICD-10-CM

## 2019-01-08 DIAGNOSIS — E11.42 TYPE 2 DIABETES MELLITUS WITH DIABETIC POLYNEUROPATHY, WITH LONG-TERM CURRENT USE OF INSULIN (HCC): Primary | ICD-10-CM

## 2019-01-08 DIAGNOSIS — E13.40 NEUROPATHY DUE TO SECONDARY DIABETES (HCC): ICD-10-CM

## 2019-01-08 DIAGNOSIS — R10.13 EPIGASTRIC PAIN: ICD-10-CM

## 2019-01-08 DIAGNOSIS — Z79.4 TYPE 2 DIABETES MELLITUS WITH DIABETIC POLYNEUROPATHY, WITH LONG-TERM CURRENT USE OF INSULIN (HCC): Primary | ICD-10-CM

## 2019-01-08 DIAGNOSIS — K21.9 GASTROESOPHAGEAL REFLUX DISEASE WITHOUT ESOPHAGITIS: ICD-10-CM

## 2019-01-08 DIAGNOSIS — M79.7 FIBROMYALGIA: ICD-10-CM

## 2019-01-08 DIAGNOSIS — M10.9 GOUT, UNSPECIFIED CAUSE, UNSPECIFIED CHRONICITY, UNSPECIFIED SITE: ICD-10-CM

## 2019-01-08 DIAGNOSIS — J30.9 ALLERGIC SINUSITIS: ICD-10-CM

## 2019-01-08 DIAGNOSIS — I10 ESSENTIAL HYPERTENSION: ICD-10-CM

## 2019-01-08 DIAGNOSIS — J44.9 CHRONIC OBSTRUCTIVE PULMONARY DISEASE, UNSPECIFIED COPD TYPE (HCC): ICD-10-CM

## 2019-01-08 DIAGNOSIS — G25.81 RLS (RESTLESS LEGS SYNDROME): ICD-10-CM

## 2019-01-08 PROCEDURE — 99214 OFFICE O/P EST MOD 30 MIN: CPT | Performed by: INTERNAL MEDICINE

## 2019-01-08 PROCEDURE — 3023F SPIROM DOC REV: CPT | Performed by: INTERNAL MEDICINE

## 2019-01-08 PROCEDURE — G8926 SPIRO NO PERF OR DOC: HCPCS | Performed by: INTERNAL MEDICINE

## 2019-01-08 PROCEDURE — G8427 DOCREV CUR MEDS BY ELIG CLIN: HCPCS | Performed by: INTERNAL MEDICINE

## 2019-01-08 PROCEDURE — G8417 CALC BMI ABV UP PARAM F/U: HCPCS | Performed by: INTERNAL MEDICINE

## 2019-01-08 PROCEDURE — 2022F DILAT RTA XM EVC RTNOPTHY: CPT | Performed by: INTERNAL MEDICINE

## 2019-01-08 PROCEDURE — 4004F PT TOBACCO SCREEN RCVD TLK: CPT | Performed by: INTERNAL MEDICINE

## 2019-01-08 PROCEDURE — 3017F COLORECTAL CA SCREEN DOC REV: CPT | Performed by: INTERNAL MEDICINE

## 2019-01-08 PROCEDURE — G8482 FLU IMMUNIZE ORDER/ADMIN: HCPCS | Performed by: INTERNAL MEDICINE

## 2019-01-08 PROCEDURE — 3046F HEMOGLOBIN A1C LEVEL >9.0%: CPT | Performed by: INTERNAL MEDICINE

## 2019-01-08 RX ORDER — BUPROPION HYDROCHLORIDE 300 MG/1
300 TABLET ORAL EVERY MORNING
Qty: 30 TABLET | Refills: 2 | Status: SHIPPED | OUTPATIENT
Start: 2019-01-08 | End: 2019-04-09

## 2019-01-08 RX ORDER — ROPINIROLE 1 MG/1
TABLET, FILM COATED ORAL
Qty: 90 TABLET | Refills: 1 | Status: SHIPPED | OUTPATIENT
Start: 2019-01-08 | End: 2019-05-03 | Stop reason: SDUPTHER

## 2019-01-08 RX ORDER — TIZANIDINE 4 MG/1
TABLET ORAL
Qty: 180 TABLET | Refills: 0 | Status: SHIPPED | OUTPATIENT
Start: 2019-01-08 | End: 2019-03-05

## 2019-01-08 RX ORDER — FLUTICASONE PROPIONATE 50 MCG
SPRAY, SUSPENSION (ML) NASAL
Qty: 48 G | Refills: 3 | Status: SHIPPED | OUTPATIENT
Start: 2019-01-08 | End: 2019-04-09

## 2019-01-08 RX ORDER — CARVEDILOL 25 MG/1
TABLET ORAL
Qty: 180 TABLET | Refills: 1 | Status: SHIPPED | OUTPATIENT
Start: 2019-01-08 | End: 2019-05-06 | Stop reason: SDUPTHER

## 2019-01-08 RX ORDER — OMEPRAZOLE 20 MG/1
20 CAPSULE, DELAYED RELEASE ORAL
Qty: 90 CAPSULE | Refills: 1 | Status: SHIPPED | OUTPATIENT
Start: 2019-01-08 | End: 2019-05-03 | Stop reason: SDUPTHER

## 2019-01-08 RX ORDER — AMITRIPTYLINE HYDROCHLORIDE 50 MG/1
TABLET, FILM COATED ORAL
Qty: 90 TABLET | Refills: 1 | Status: SHIPPED | OUTPATIENT
Start: 2019-01-08 | End: 2019-05-06 | Stop reason: SDUPTHER

## 2019-01-08 RX ORDER — MELOXICAM 7.5 MG/1
7.5 TABLET ORAL DAILY
Qty: 30 TABLET | Refills: 3 | Status: SHIPPED | OUTPATIENT
Start: 2019-01-08 | End: 2019-04-09

## 2019-01-08 RX ORDER — ALLOPURINOL 300 MG/1
TABLET ORAL
Qty: 90 TABLET | Refills: 1 | Status: SHIPPED | OUTPATIENT
Start: 2019-01-08 | End: 2019-05-06 | Stop reason: SDUPTHER

## 2019-03-05 DIAGNOSIS — M79.7 FIBROMYALGIA: ICD-10-CM

## 2019-03-05 RX ORDER — TIZANIDINE 4 MG/1
TABLET ORAL
Qty: 180 TABLET | Refills: 0 | Status: SHIPPED | OUTPATIENT
Start: 2019-03-05 | End: 2019-05-06 | Stop reason: SDUPTHER

## 2019-04-01 ENCOUNTER — HOSPITAL ENCOUNTER (OUTPATIENT)
Age: 59
Discharge: HOME | End: 2019-04-01
Payer: MEDICARE

## 2019-04-01 DIAGNOSIS — R10.13 EPIGASTRIC PAIN: ICD-10-CM

## 2019-04-01 LAB — LIPASE: 22 U/L (ref 13–60)

## 2019-04-01 PROCEDURE — 36415 COLL VENOUS BLD VENIPUNCTURE: CPT

## 2019-04-01 PROCEDURE — 83690 ASSAY OF LIPASE: CPT

## 2019-04-09 ENCOUNTER — OFFICE VISIT (OUTPATIENT)
Dept: FAMILY MEDICINE CLINIC | Age: 59
End: 2019-04-09
Payer: MEDICARE

## 2019-04-09 VITALS
RESPIRATION RATE: 16 BRPM | SYSTOLIC BLOOD PRESSURE: 133 MMHG | WEIGHT: 293 LBS | BODY MASS INDEX: 41.95 KG/M2 | DIASTOLIC BLOOD PRESSURE: 88 MMHG | HEART RATE: 100 BPM | HEIGHT: 70 IN | TEMPERATURE: 97.7 F

## 2019-04-09 DIAGNOSIS — M1A.0220 CHRONIC GOUT OF LEFT ELBOW, UNSPECIFIED CAUSE: ICD-10-CM

## 2019-04-09 DIAGNOSIS — F32.A DEPRESSION, UNSPECIFIED DEPRESSION TYPE: ICD-10-CM

## 2019-04-09 DIAGNOSIS — E11.42 TYPE 2 DIABETES MELLITUS WITH DIABETIC POLYNEUROPATHY, WITH LONG-TERM CURRENT USE OF INSULIN (HCC): Primary | ICD-10-CM

## 2019-04-09 DIAGNOSIS — E66.01 MORBID OBESITY (HCC): ICD-10-CM

## 2019-04-09 DIAGNOSIS — E78.00 PURE HYPERCHOLESTEROLEMIA: ICD-10-CM

## 2019-04-09 DIAGNOSIS — Z79.4 TYPE 2 DIABETES MELLITUS WITH DIABETIC POLYNEUROPATHY, WITH LONG-TERM CURRENT USE OF INSULIN (HCC): Primary | ICD-10-CM

## 2019-04-09 DIAGNOSIS — M79.7 FIBROMYALGIA: ICD-10-CM

## 2019-04-09 DIAGNOSIS — I10 ESSENTIAL HYPERTENSION: ICD-10-CM

## 2019-04-09 LAB — HBA1C MFR BLD: 7.5 %

## 2019-04-09 PROCEDURE — 3017F COLORECTAL CA SCREEN DOC REV: CPT | Performed by: INTERNAL MEDICINE

## 2019-04-09 PROCEDURE — 83036 HEMOGLOBIN GLYCOSYLATED A1C: CPT | Performed by: INTERNAL MEDICINE

## 2019-04-09 PROCEDURE — 4004F PT TOBACCO SCREEN RCVD TLK: CPT | Performed by: INTERNAL MEDICINE

## 2019-04-09 PROCEDURE — G8427 DOCREV CUR MEDS BY ELIG CLIN: HCPCS | Performed by: INTERNAL MEDICINE

## 2019-04-09 PROCEDURE — 2022F DILAT RTA XM EVC RTNOPTHY: CPT | Performed by: INTERNAL MEDICINE

## 2019-04-09 PROCEDURE — 99214 OFFICE O/P EST MOD 30 MIN: CPT | Performed by: INTERNAL MEDICINE

## 2019-04-09 PROCEDURE — G8417 CALC BMI ABV UP PARAM F/U: HCPCS | Performed by: INTERNAL MEDICINE

## 2019-04-09 PROCEDURE — 3045F PR MOST RECENT HEMOGLOBIN A1C LEVEL 7.0-9.0%: CPT | Performed by: INTERNAL MEDICINE

## 2019-04-09 RX ORDER — FLUTICASONE PROPIONATE 50 MCG
SPRAY, SUSPENSION (ML) NASAL
COMMUNITY
End: 2020-09-08

## 2019-04-09 RX ORDER — COLCHICINE 0.6 MG/1
0.6 TABLET ORAL DAILY
Qty: 30 TABLET | Refills: 0 | Status: SHIPPED | OUTPATIENT
Start: 2019-04-09 | End: 2019-10-24 | Stop reason: SDUPTHER

## 2019-04-09 NOTE — PROGRESS NOTES
[address] [facility]  [address] [facility] [place] [address]  Dept: [phone]      Today's Date: 4/9/2019  Patient Name: Antonio Diaz  Patient's age: 63 y.o., 1960        CHIEF COMPLAINT:    Chief Complaint   Patient presents with    3 Month Follow-Up    Numbness     right hand        History Obtained From:  patient    HISTORY OF PRESENT ILLNESS:      The patient is a 63 y.o. old  female and is here to follow-up for hypertension, diabetes, fibromyalgia and depression. Her A1c 7.5. She is on Toujeo 50 units daily and Victoza. She is tolerating her medication well. She has some nausea however denies any vomiting. She has seen dermatologist for positive TIFFANI and was told that it is of unknown significance at present. She has depression and fibromyalgia and has been on Wellbutrin and Elavil. She feels that at Wellbutrin has not been helping her. She would like to stop it. She wants to try [address] [place] [address]. Elavil has been helping her with her sleep and she would like to continue that. Her blood pressure is well controlled with current medication. She is on Coreg 25 mg twice a day. She is not on ace which was stopped because of her increasing creatinine. We will restart it next visit. She states that she had attack of gout in her right ankle hip and shoulder. She is on allopurinol 300 mg daily. She would like to have prescription of colchicine for as needed use. It was prescribed.     Patient Active Problem List   Diagnosis    HLD (hyperlipidemia)    HTN (hypertension)    Morbid obesity ([address]    Neuropathy due to secondary diabetes ([address]    Depression    Mild persistent asthma without complication    Type 2 diabetes mellitus with diabetic polyneuropathy, with long-term current use of insulin (HCC)    TIFFANI positive    Chronic obstructive pulmonary disease ([address]       Past Medical History:   has a past medical history of Anxiety, Asthma, Bronchitis, Chronic obstructive pulmonary disease ([facility] [address] Depression, Fibromyalgia, Gout, HLD (hyperlipidemia), Hypertension, Neuropathy, Obesity, Obstructive sleep apnea, Osteoarthritis, Type 2 diabetes mellitus with diabetic polyneuropathy, with long-term current use of insulin ([facility] Type II or unspecified type diabetes mellitus without mention of complication, not stated as uncontrolled, and Urinary incontinence. Past Surgical History:   has a past surgical history that includes hernia repair; Appendectomy; Hysterectomy, total abdominal (2001); Foot surgery (Right, 11/11/2015); Finger surgery; Bunionectomy (Left, 02-22-16); and Colonoscopy (8/17/2015).      Medications:    Current Outpatient Medications   Medication Sig Dispense Refill    fluticasone (FLONASE) 50 MCG/ACT nasal spray fluticasone propionate 50 mcg/actuation nasal spray,suspension      tiZANidine (ZANAFLEX) 4 MG tablet TAKE 1 TABLET BY MOUTH TWICE DAILY 180 tablet 0    buPROPion (WELLBUTRIN XL) 300 MG extended release tablet Take 1 tablet by mouth every morning 30 tablet 2    allopurinol (ZYLOPRIM) 300 MG tablet TAKE 1 TABLET BY MOUTH EVERY DAY 90 tablet 1    amitriptyline (ELAVIL) 50 MG tablet TAKE 1 TABLET BY MOUTH AT BEDTIME 90 tablet 1    carvedilol (COREG) 25 MG tablet TAKE 1 TABLET BY MOUTH TWICE DAILY 180 tablet 1    insulin glargine (TOUJEO SOLOSTAR) 300 UNIT/ML injection pen Inject 50 Units into the skin nightly 3 pen 3    omeprazole (PRILOSEC) 20 MG delayed release capsule Take 1 capsule by mouth daily (with breakfast) 90 capsule 1    rOPINIRole (REQUIP) 1 MG tablet TAKE 1 TABLET BY MOUTH EVERY NIGHT AT BEDTIME 90 tablet 1    tiotropium (SPIRIVA HANDIHALER) 18 MCG inhalation capsule INHALE THE CONTENTS OF 1 CAPSULE INTO THE LUNGS DAILY 90 capsule 1    meloxicam (MOBIC) 7.5 MG tablet Take 1 tablet by mouth daily 30 tablet 3    albuterol (PROVENTIL) (2.5 MG/3ML) 0.083% nebulizer solution Take 3 mLs by nebulization every 6 hours as needed for Wheezing or Shortness of Breath 120 each 3    Liraglutide (VICTOZA) 18 MG/3ML SOPN SC injection Inject 1.8 mg into the skin daily Please give patient # 30 32g 4mm needles with  2 refills 9 pen 2     Current Facility-Administered Medications   Medication Dose Route Frequency Provider Last Rate Last Dose    ipratropium (ATROVENT) 0.02 % nebulizer solution 0.5 mg  0.5 mg Nebulization Once Maddison Alamo MD        albuterol (PROVENTIL) nebulizer solution 2.5 mg  2.5 mg Nebulization Once Maddison Alamo MD             Allergies:  Cymbalta [duloxetine hcl]; Januvia [sitagliptin]; Cephalexin; Ciprofloxacin; Duloxetine; Lyrica [pregabalin]; Metformin; Metformin and related; Statins; and Morphine    Social History:   reports that she has been smoking cigarettes. She has a 21.00 pack-year smoking history. She has never used smokeless tobacco. She reports that she drinks alcohol. She reports that she does not use drugs. Family History: family history includes Cancer (age of onset: 64) in her brother; Cancer (age of onset: 61) in her mother; Cancer (age of onset: 71) in her paternal aunt; Cancer (age of onset: 79) in her paternal grandfather; Depression in her sister; Depression (age of onset: 64) in her father; Diabetes in her brother; Diabetes (age of onset: 36) in her sister; High Blood Pressure in her brother, mother, and sister; Stroke (age of onset: 62) in her father. REVIEW OF SYSTEMS:      Constitutional: Negative for fever and fatigue. HENT: Negative for congestion and sore throat. Eyes: Negative for eye pain and visual disturbance. Respiratory: Negative for chest tightness and shortness of breath. Cardiovascular: Negative for chest pain and orthopnea . Gastrointestinal: Negative for vomiting, abdominal pain, constipation and diarrhea. Endocrine: Negative for cold intolerance, heat intolerance, polydipsia and polyuria. Genitourinary: Negative for dysuria and frequency. Musculoskeletal: Negative for  Myalgia, back pain, bone pain and arthralgias. Neurological: Negative for dizziness, weakness and headaches. PHYSICAL EXAM:        /88 (Site: Left Upper Arm, Position: Sitting, Cuff Size: Large Adult)   Pulse 100   Temp 97.7 °F (36.5 °C) (Oral)   Resp 16   Ht 5' 10\" (1.778 m)   Wt (!) 326 lb (147.9 kg)   BMI 46.78 kg/m²      General appearance - well appearing, not in  distress. Mental status - alert and cooperative . Head: Normocephalic, without obvious abnormality, atraumatic. Eye: PERRL, conjunctiva/corneas clear, EOM's intact. Neck - Supple, no significant adenopathy . Chest - Clear to auscultation, no wheezes, rales or rhonchi, symmetric air entry. Heart -  regular rhythm, normal S1, S2, no murmurs. Abdomen - Soft, nontender, nondistended, no masses or organomegaly. Neurological - Alert, oriented, normal speech, no focal findings or movement disorder noted. .   Extremities -  No pedal edema, no clubbing or cyanosis. Foot exam : callus + no ulcer, B/L DP palpable,  ,monofilament test normal.       Labs:       Chemistry        Component Value Date/Time     11/05/2018 1121    K 4.5 11/05/2018 1121     11/05/2018 1121    CO2 25 11/05/2018 1121    BUN 15 11/05/2018 1121    CREATININE 1.04 (H) 11/05/2018 1121        Component Value Date/Time    CALCIUM 9.3 11/05/2018 1121    ALKPHOS 86 07/12/2018 1020    AST 16 07/12/2018 1020    ALT 19 07/12/2018 1020    BILITOT 0.25 (L) 07/12/2018 1020          No results for input(s): GLUCOSE in the last 72 hours.   Lab Results   Component Value Date    WBC 11.3 10/18/2018    HGB 14.2 10/18/2018    HCT 44.1 10/18/2018    MCV 96.1 10/18/2018     10/18/2018     Lab Results   Component Value Date    TSH 1.55 08/01/2016     Lab Results   Component Value Date    LABA1C 5.7 10/18/2018     Lab Results   Component Value Date    LABMICR CANNOT BE CALCULATED 07/12/2018     No components found for: CHLPL  Lab Results   Component Value Date    TRIG 248 (H) 07/12/2018     Lab Results   Component Value Date    HDL 30 (L) 07/12/2018     Lab Results   Component Value Date    LDLCHOLESTEROL 84 07/12/2018    LDLDIRECT 102 (H) 05/11/2017     The 10-year ASCVD risk score (Chloé Desir, et al., 2013) is: 20%    Values used to calculate the score:      Age: 63 years      Sex: Female      Is Non- : No      Diabetic: Yes      Tobacco smoker: Yes      Systolic Blood Pressure: 944 mmHg      Is BP treated: Yes      HDL Cholesterol: 30 mg/dL      Total Cholesterol: 164 mg/dL    Health Maintenance Due   Topic Date Due    Hepatitis B Vaccine (1 of 3 - Risk 3-dose series) 04/27/1980    Shingles Vaccine (1 of 2) 04/27/2011    Diabetic retinal exam  10/30/2018    Diabetic foot exam  04/12/2019        ASSESSMENT AND PLAN    1. Type 2 diabetes mellitus with diabetic polyneuropathy, with long-term current use of insulin (HCC)    - POCT glycosylated hemoglobin (Hb A1C)  - insulin glargine (TOUJEO SOLOSTAR) 300 UNIT/ML injection pen; Inject 52 Units into the skin nightly  Dispense: 3 pen; Refill: 3  - Liraglutide (VICTOZA) 18 MG/3ML SOPN SC injection; Inject 1.8 mg into the skin daily Please give patient # 30 32g 4mm needles with  2 refills  Dispense: 9 pen; Refill: 2  - HM DIABETES FOOT EXAM    2. Depression, unspecified depression type      3. Essential hypertension      4. Pure hypercholesterolemia      5. Morbid obesity ([address]      6. Fibromyalgia    - milnacipran HCl (SAVELLA) 50 MG TABS; Take 1 tablet by mouth 2 times daily  Dispense: 60 tablet; Refill: 2    7. Chronic gout of left elbow, unspecified cause  - colchicine (COLCRYS) 0.6 MG tablet; Take 1 tablet by mouth daily  Dispense: 30 tablet; Refill: 0      · Increase Toujeo to 52 U daily   · Start Sevalla for fibromyalgia   · Taper off wellbutrin   · Continue elavim   · Colchicine PRN for gout attack.    · Palma received counseling on the following healthy

## 2019-04-09 NOTE — PROGRESS NOTES
Chronic Disease Visit Information    BP Readings from Last 3 Encounters:   01/08/19 120/80   12/05/18 (!) 156/96   11/01/18 120/70          Hemoglobin A1C (%)   Date Value   10/18/2018 5.7   05/29/2018 6.4   01/18/2018 7.3     Microalb/Crt. Ratio (mcg/mg creat)   Date Value   07/12/2018 CANNOT BE CALCULATED     LDL Cholesterol (mg/dL)   Date Value   07/12/2018 84     HDL (mg/dL)   Date Value   07/12/2018 30 (L)     BUN (mg/dL)   Date Value   11/05/2018 15     CREATININE (mg/dL)   Date Value   11/05/2018 1.04 (H)     Glucose (mg/dL)   Date Value   11/05/2018 165 (H)            Have you changed or started any medications since your last visit including any over-the-counter medicines, vitamins, or herbal medicines? no   Are you having any side effects from any of your medications? -  no  Have you stopped taking any of your medications? Is so, why? -  no    Have you seen any other physician or provider since your last visit? No  Have you had any other diagnostic tests since your last visit? No  Have you been seen in the emergency room and/or had an admission to a hospital since we last saw you? No  Have you had your annual diabetic retinal (eye) exam? Yes - Records Requested  Have you had your routine dental cleaning in the past 6 months? yes -     Have you activated your [employer] account? If not, what are your barriers?  Yes     Patient Care Team:  Heather Galindo MD as PCP - General (Internal Medicine)  Heather Galindo MD as PCP - [facility] Attributed Provider         Medical History Review  Past Medical, Family, and Social History reviewed and does contribute to the patient presenting condition    Health Maintenance   Topic Date Due    Hepatitis B Vaccine (1 of 3 - Risk 3-dose series) 04/27/1980    Shingles Vaccine (1 of 2) 04/27/2011    Diabetic retinal exam  10/30/2018    Diabetic foot exam  04/12/2019    DTaP/Tdap/Td vaccine (1 - Tdap) 01/24/2027 (Originally 9/17/2016)    Diabetic microalbuminuria test

## 2019-05-03 DIAGNOSIS — G25.81 RLS (RESTLESS LEGS SYNDROME): ICD-10-CM

## 2019-05-03 DIAGNOSIS — K21.9 GASTROESOPHAGEAL REFLUX DISEASE WITHOUT ESOPHAGITIS: ICD-10-CM

## 2019-05-03 RX ORDER — ROPINIROLE 1 MG/1
TABLET, FILM COATED ORAL
Qty: 90 TABLET | Refills: 1 | Status: SHIPPED | OUTPATIENT
Start: 2019-05-03 | End: 2019-05-07 | Stop reason: SDUPTHER

## 2019-05-03 RX ORDER — OMEPRAZOLE 20 MG/1
20 CAPSULE, DELAYED RELEASE ORAL
Qty: 90 CAPSULE | Refills: 1 | Status: SHIPPED | OUTPATIENT
Start: 2019-05-03 | End: 2019-12-05 | Stop reason: SDUPTHER

## 2019-05-03 NOTE — TELEPHONE ENCOUNTER
Received incoming fax from mail order requesting new prescription. Please Advise! Last visit: 04/19  Last Med refill: 01/19  Does patient have enough medication for 72 hours: No:     Next Visit Date:  Future Appointments   Date Time Provider Abbe Vasquez   7/9/2019 11:00 AM Paddy Simpson MD [facility] [address] Maintenance   Topic Date Due    Hepatitis B Vaccine (1 of 3 - Risk 3-dose series) 04/27/1980    Shingles Vaccine (1 of 2) 04/27/2011    Diabetic retinal exam  10/30/2018    DTaP/Tdap/Td vaccine (1 - Tdap) 01/24/2027 (Originally 9/17/2016)    Diabetic microalbuminuria test  07/12/2019    Lipid screen  07/12/2019    Breast cancer screen  08/07/2019    Diabetic foot exam  04/09/2020    A1C test (Diabetic or Prediabetic)  04/09/2020    Colon cancer screen colonoscopy  05/18/2027    Flu vaccine  Completed    Pneumococcal 0-64 years Vaccine  Completed    Hepatitis C screen  Completed    HIV screen  Completed       Hemoglobin A1C (%)   Date Value   04/09/2019 7.5   10/18/2018 5.7   05/29/2018 6.4             ( goal A1C is < 7)   Microalb/Crt.  Ratio (mcg/mg creat)   Date Value   07/12/2018 CANNOT BE CALCULATED     LDL Cholesterol (mg/dL)   Date Value   07/12/2018 84   05/11/2017            (goal LDL is <100)   AST (U/L)   Date Value   07/12/2018 16     ALT (U/L)   Date Value   07/12/2018 19     BUN (mg/dL)   Date Value   11/05/2018 15     BP Readings from Last 3 Encounters:   04/09/19 133/88   01/08/19 120/80   12/05/18 (!) 156/96          (goal 120/80)    All Future Testing planned in [facility]  Lab Frequency Next Occurrence   Creatinine, Serum Once 07/10/2018               Patient Active Problem List:     HLD (hyperlipidemia)     HTN (hypertension)     Morbid obesity (HCC)     Neuropathy due to secondary diabetes ([address]     Depression     Mild persistent asthma without complication     Type 2 diabetes mellitus with diabetic polyneuropathy, with long-term current use of insulin ([facility]     TIFFANI positive     Chronic obstructive pulmonary disease (HCC)     Fibromyalgia

## 2019-05-06 DIAGNOSIS — E11.42 TYPE 2 DIABETES MELLITUS WITH DIABETIC POLYNEUROPATHY, WITH LONG-TERM CURRENT USE OF INSULIN (HCC): ICD-10-CM

## 2019-05-06 DIAGNOSIS — G25.81 RLS (RESTLESS LEGS SYNDROME): ICD-10-CM

## 2019-05-06 DIAGNOSIS — M79.7 FIBROMYALGIA: ICD-10-CM

## 2019-05-06 DIAGNOSIS — I10 ESSENTIAL HYPERTENSION: ICD-10-CM

## 2019-05-06 DIAGNOSIS — Z79.4 TYPE 2 DIABETES MELLITUS WITH DIABETIC POLYNEUROPATHY, WITH LONG-TERM CURRENT USE OF INSULIN (HCC): ICD-10-CM

## 2019-05-06 DIAGNOSIS — M10.9 GOUT, UNSPECIFIED CAUSE, UNSPECIFIED CHRONICITY, UNSPECIFIED SITE: ICD-10-CM

## 2019-05-06 RX ORDER — CARVEDILOL 25 MG/1
TABLET ORAL
Qty: 180 TABLET | Refills: 1 | Status: SHIPPED | OUTPATIENT
Start: 2019-05-06 | End: 2019-12-06 | Stop reason: SDUPTHER

## 2019-05-06 RX ORDER — TIZANIDINE 4 MG/1
TABLET ORAL
Qty: 180 TABLET | Refills: 0 | Status: SHIPPED | OUTPATIENT
Start: 2019-05-06 | End: 2019-09-04 | Stop reason: SDUPTHER

## 2019-05-06 RX ORDER — AMITRIPTYLINE HYDROCHLORIDE 50 MG/1
TABLET, FILM COATED ORAL
Qty: 90 TABLET | Refills: 1 | Status: SHIPPED | OUTPATIENT
Start: 2019-05-06 | End: 2019-10-07 | Stop reason: SDUPTHER

## 2019-05-06 NOTE — TELEPHONE ENCOUNTER
Patient is going to be out of medications and needs refills prior to mail order. Please advise thank you! Last visit: 4/9/19  Last Med refill:       Next Visit Date:  Future Appointments   Date Time Provider Abbe Christina   7/9/2019 11:00 AM [facility] [address] Maintenance   Topic Date Due    Hepatitis B Vaccine (1 of 3 - Risk 3-dose series) 04/27/1980    Shingles Vaccine (1 of 2) 04/27/2011    Diabetic retinal exam  10/30/2018    DTaP/Tdap/Td vaccine (1 - Tdap) 01/24/2027 (Originally 9/17/2016)    Diabetic microalbuminuria test  07/12/2019    Lipid screen  07/12/2019    Breast cancer screen  08/07/2019    Diabetic foot exam  04/09/2020    A1C test (Diabetic or Prediabetic)  04/09/2020    Colon cancer screen colonoscopy  05/18/2027    Flu vaccine  Completed    Pneumococcal 0-64 years Vaccine  Completed    Hepatitis C screen  Completed    HIV screen  Completed       Hemoglobin A1C (%)   Date Value   04/09/2019 7.5   10/18/2018 5.7   05/29/2018 6.4             ( goal A1C is < 7)   Microalb/Crt.  Ratio (mcg/mg creat)   Date Value   07/12/2018 CANNOT BE CALCULATED     LDL Cholesterol (mg/dL)   Date Value   07/12/2018 84   05/11/2017            (goal LDL is <100)   AST (U/L)   Date Value   07/12/2018 16     ALT (U/L)   Date Value   07/12/2018 19     BUN (mg/dL)   Date Value   11/05/2018 15     BP Readings from Last 3 Encounters:   04/09/19 133/88   01/08/19 120/80   12/05/18 (!) 156/96          (goal 120/80)    All Future Testing planned in [facility]  Lab Frequency Next Occurrence   Creatinine, Serum Once 07/10/2018               Patient Active Problem List:     HLD (hyperlipidemia)     HTN (hypertension)     Morbid obesity (HCC)     Neuropathy due to secondary diabetes ([address]     Depression     Mild persistent asthma without complication     Type 2 diabetes mellitus with diabetic polyneuropathy, with long-term current use of insulin (HCC)     TIFFANI positive     Chronic obstructive pulmonary disease (HCC)     Fibromyalgia

## 2019-05-06 NOTE — TELEPHONE ENCOUNTER
Faxed request from [address] stating patient is switching pharmacies, pended medications. Please advise thank you! Last visit: 4/9/19  Last Med refill: 3/5/19      Next Visit Date:  Future Appointments   Date Time Provider Abbe Vasquez   7/9/2019 11:00 AM [facility] [address] Maintenance   Topic Date Due    Hepatitis B Vaccine (1 of 3 - Risk 3-dose series) 04/27/1980    Shingles Vaccine (1 of 2) 04/27/2011    Diabetic retinal exam  10/30/2018    DTaP/Tdap/Td vaccine (1 - Tdap) 01/24/2027 (Originally 9/17/2016)    Diabetic microalbuminuria test  07/12/2019    Lipid screen  07/12/2019    Breast cancer screen  08/07/2019    Diabetic foot exam  04/09/2020    A1C test (Diabetic or Prediabetic)  04/09/2020    Colon cancer screen colonoscopy  05/18/2027    Flu vaccine  Completed    Pneumococcal 0-64 years Vaccine  Completed    Hepatitis C screen  Completed    HIV screen  Completed       Hemoglobin A1C (%)   Date Value   04/09/2019 7.5   10/18/2018 5.7   05/29/2018 6.4             ( goal A1C is < 7)   Microalb/Crt.  Ratio (mcg/mg creat)   Date Value   07/12/2018 CANNOT BE CALCULATED     LDL Cholesterol (mg/dL)   Date Value   07/12/2018 84   05/11/2017            (goal LDL is <100)   AST (U/L)   Date Value   07/12/2018 16     ALT (U/L)   Date Value   07/12/2018 19     BUN (mg/dL)   Date Value   11/05/2018 15     BP Readings from Last 3 Encounters:   04/09/19 133/88   01/08/19 120/80   12/05/18 (!) 156/96          (goal 120/80)    All Future Testing planned in [facility]  Lab Frequency Next Occurrence   Creatinine, Serum Once 07/10/2018               Patient Active Problem List:     HLD (hyperlipidemia)     HTN (hypertension)     Morbid obesity (HCC)     Neuropathy due to secondary diabetes ([address]     Depression     Mild persistent asthma without complication     Type 2 diabetes mellitus with diabetic polyneuropathy, with long-term current use of insulin (HCC)     TIFFANI positive     Chronic obstructive pulmonary disease (HCC)     Fibromyalgia

## 2019-05-07 RX ORDER — BUPROPION HYDROCHLORIDE 300 MG/1
TABLET ORAL
Qty: 30 TABLET | Refills: 3 | Status: SHIPPED | OUTPATIENT
Start: 2019-05-07 | End: 2019-05-13 | Stop reason: SDUPTHER

## 2019-05-07 RX ORDER — ROPINIROLE 1 MG/1
TABLET, FILM COATED ORAL
Qty: 90 TABLET | Refills: 1 | Status: SHIPPED | OUTPATIENT
Start: 2019-05-07 | End: 2019-11-01 | Stop reason: SDUPTHER

## 2019-05-07 RX ORDER — ALLOPURINOL 300 MG/1
TABLET ORAL
Qty: 90 TABLET | Refills: 1 | Status: SHIPPED | OUTPATIENT
Start: 2019-05-07 | End: 2019-07-30 | Stop reason: SDUPTHER

## 2019-05-13 DIAGNOSIS — J44.1 COPD EXACERBATION (HCC): ICD-10-CM

## 2019-05-13 RX ORDER — BUPROPION HYDROCHLORIDE 300 MG/1
TABLET ORAL
Qty: 90 TABLET | Refills: 3 | Status: SHIPPED | OUTPATIENT
Start: 2019-05-13 | End: 2020-01-16 | Stop reason: SDUPTHER

## 2019-05-13 RX ORDER — ALBUTEROL SULFATE 2.5 MG/3ML
2.5 SOLUTION RESPIRATORY (INHALATION) EVERY 6 HOURS PRN
Qty: 120 EACH | Refills: 3 | Status: SHIPPED | OUTPATIENT
Start: 2019-05-13 | End: 2019-05-23 | Stop reason: SDUPTHER

## 2019-05-13 NOTE — TELEPHONE ENCOUNTER
Sue from [address] [place] [address]  90 day supply of pended medications. Last fills were a short term supply. Please advise thank you! Last visit: 4/9/19  Last Med refill:       Next Visit Date:  Future Appointments   Date Time Provider Abbe Burgessi   7/9/2019 11:00 AM [facility] [address] Maintenance   Topic Date Due    Hepatitis B Vaccine (1 of 3 - Risk 3-dose series) 04/27/1980    Shingles Vaccine (1 of 2) 04/27/2011    Diabetic retinal exam  10/30/2018    DTaP/Tdap/Td vaccine (1 - Tdap) 01/24/2027 (Originally 9/17/2016)    Diabetic microalbuminuria test  07/12/2019    Lipid screen  07/12/2019    Breast cancer screen  08/07/2019    Diabetic foot exam  04/09/2020    A1C test (Diabetic or Prediabetic)  04/09/2020    Colon cancer screen colonoscopy  05/18/2027    Flu vaccine  Completed    Pneumococcal 0-64 years Vaccine  Completed    Hepatitis C screen  Completed    HIV screen  Completed       Hemoglobin A1C (%)   Date Value   04/09/2019 7.5   10/18/2018 5.7   05/29/2018 6.4             ( goal A1C is < 7)   Microalb/Crt.  Ratio (mcg/mg creat)   Date Value   07/12/2018 CANNOT BE CALCULATED     LDL Cholesterol (mg/dL)   Date Value   07/12/2018 84   05/11/2017            (goal LDL is <100)   AST (U/L)   Date Value   07/12/2018 16     ALT (U/L)   Date Value   07/12/2018 19     BUN (mg/dL)   Date Value   11/05/2018 15     BP Readings from Last 3 Encounters:   04/09/19 133/88   01/08/19 120/80   12/05/18 (!) 156/96          (goal 120/80)    All Future Testing planned in [facility]  Lab Frequency Next Occurrence   Creatinine, Serum Once 07/10/2018               Patient Active Problem List:     HLD (hyperlipidemia)     HTN (hypertension)     Morbid obesity (HCC)     Neuropathy due to secondary diabetes ([address]     Depression     Mild persistent asthma without complication     Type 2 diabetes mellitus with diabetic polyneuropathy, with long-term current use of insulin (HCC)     TIFFANI positive     Chronic obstructive pulmonary disease (HCC)     Fibromyalgia

## 2019-05-23 DIAGNOSIS — M10.9 GOUT, UNSPECIFIED CAUSE, UNSPECIFIED CHRONICITY, UNSPECIFIED SITE: ICD-10-CM

## 2019-05-23 DIAGNOSIS — J44.1 COPD EXACERBATION (HCC): ICD-10-CM

## 2019-05-23 RX ORDER — ALBUTEROL SULFATE 2.5 MG/3ML
2.5 SOLUTION RESPIRATORY (INHALATION) EVERY 6 HOURS PRN
Qty: 120 EACH | Refills: 3 | Status: SHIPPED | OUTPATIENT
Start: 2019-05-23 | End: 2021-04-22 | Stop reason: ALTCHOICE

## 2019-05-23 NOTE — TELEPHONE ENCOUNTER
Nathanael called in from [address] [place] [address]  requesting pended medication. Please advise thank you! Last visit: 4/9/19  Last Med refill:       Next Visit Date:  Future Appointments   Date Time Provider Abbe Christina   7/9/2019 11:00 AM [facility] [address] Maintenance   Topic Date Due    Hepatitis B Vaccine (1 of 3 - Risk 3-dose series) 04/27/1980    Shingles Vaccine (1 of 2) 04/27/2011    Diabetic retinal exam  10/30/2018    DTaP/Tdap/Td vaccine (1 - Tdap) 01/24/2027 (Originally 9/17/2016)    Diabetic microalbuminuria test  07/12/2019    Lipid screen  07/12/2019    Breast cancer screen  08/07/2019    Diabetic foot exam  04/09/2020    A1C test (Diabetic or Prediabetic)  04/09/2020    Colon cancer screen colonoscopy  05/18/2027    Flu vaccine  Completed    Pneumococcal 0-64 years Vaccine  Completed    Hepatitis C screen  Completed    HIV screen  Completed       Hemoglobin A1C (%)   Date Value   04/09/2019 7.5   10/18/2018 5.7   05/29/2018 6.4             ( goal A1C is < 7)   Microalb/Crt.  Ratio (mcg/mg creat)   Date Value   07/12/2018 CANNOT BE CALCULATED     LDL Cholesterol (mg/dL)   Date Value   07/12/2018 84   05/11/2017            (goal LDL is <100)   AST (U/L)   Date Value   07/12/2018 16     ALT (U/L)   Date Value   07/12/2018 19     BUN (mg/dL)   Date Value   11/05/2018 15     BP Readings from Last 3 Encounters:   04/09/19 133/88   01/08/19 120/80   12/05/18 (!) 156/96          (goal 120/80)    All Future Testing planned in [facility]  Lab Frequency Next Occurrence   Creatinine, Serum Once 07/10/2018               Patient Active Problem List:     HLD (hyperlipidemia)     HTN (hypertension)     Morbid obesity (HCC)     Neuropathy due to secondary diabetes ([address]     Depression     Mild persistent asthma without complication     Type 2 diabetes mellitus with diabetic polyneuropathy, with long-term current use of insulin (HCC)     TIFFANI positive     Chronic obstructive pulmonary disease (HCC)     Fibromyalgia

## 2019-06-03 ENCOUNTER — APPOINTMENT (OUTPATIENT)
Dept: CT IMAGING | Age: 59
End: 2019-06-03
Payer: MEDICARE

## 2019-06-03 ENCOUNTER — HOSPITAL ENCOUNTER (EMERGENCY)
Age: 59
Discharge: HOME | End: 2019-06-03
Attending: EMERGENCY MEDICINE
Payer: MEDICARE

## 2019-06-03 VITALS
HEIGHT: 70 IN | RESPIRATION RATE: 16 BRPM | HEART RATE: 82 BPM | DIASTOLIC BLOOD PRESSURE: 74 MMHG | SYSTOLIC BLOOD PRESSURE: 136 MMHG | BODY MASS INDEX: 41.95 KG/M2 | OXYGEN SATURATION: 99 % | WEIGHT: 293 LBS | TEMPERATURE: 98 F

## 2019-06-03 DIAGNOSIS — R10.9 ABDOMINAL PAIN, UNSPECIFIED ABDOMINAL LOCATION: Primary | ICD-10-CM

## 2019-06-03 LAB
-: NORMAL
-: NORMAL
ABSOLUTE EOS #: 0.16 K/UL (ref 0–0.44)
ABSOLUTE IMMATURE GRANULOCYTE: 0.08 K/UL (ref 0–0.3)
ABSOLUTE LYMPH #: 1.51 K/UL (ref 1.1–3.7)
ABSOLUTE MONO #: 0.63 K/UL (ref 0.1–1.2)
ALBUMIN SERPL-MCNC: 3.4 G/DL (ref 3.5–5.2)
ALBUMIN/GLOBULIN RATIO: ABNORMAL (ref 1–2.5)
ALP BLD-CCNC: 92 U/L (ref 35–104)
ALT SERPL-CCNC: 13 U/L (ref 5–33)
AMORPHOUS: NORMAL
AMYLASE: 21 U/L (ref 28–100)
ANION GAP SERPL CALCULATED.3IONS-SCNC: 15 MMOL/L (ref 9–17)
AST SERPL-CCNC: 11 U/L
BACTERIA: NORMAL
BASOPHILS # BLD: 0 % (ref 0–2)
BASOPHILS ABSOLUTE: 0.05 K/UL (ref 0–0.2)
BILIRUB SERPL-MCNC: 0.29 MG/DL (ref 0.3–1.2)
BILIRUBIN DIRECT: 0.12 MG/DL
BILIRUBIN URINE: NEGATIVE
BILIRUBIN, INDIRECT: 0.17 MG/DL (ref 0–1)
BUN BLDV-MCNC: 14 MG/DL (ref 6–20)
BUN/CREAT BLD: 21 (ref 9–20)
CALCIUM SERPL-MCNC: 8.8 MG/DL (ref 8.6–10.4)
CASTS UA: NORMAL /LPF
CHLORIDE BLD-SCNC: 101 MMOL/L (ref 98–107)
CO2: 22 MMOL/L (ref 20–31)
COLOR: YELLOW
COMMENT UA: ABNORMAL
CREAT SERPL-MCNC: 0.66 MG/DL (ref 0.5–0.9)
CRYSTALS, UA: NORMAL /HPF
DIFFERENTIAL TYPE: ABNORMAL
EOSINOPHILS RELATIVE PERCENT: 1 % (ref 1–4)
EPITHELIAL CELLS UA: NORMAL /HPF (ref 0–5)
GFR AFRICAN AMERICAN: >60 ML/MIN
GFR NON-AFRICAN AMERICAN: >60 ML/MIN
GFR SERPL CREATININE-BSD FRML MDRD: ABNORMAL ML/MIN/{1.73_M2}
GFR SERPL CREATININE-BSD FRML MDRD: ABNORMAL ML/MIN/{1.73_M2}
GLOBULIN: ABNORMAL G/DL (ref 1.5–3.8)
GLUCOSE BLD-MCNC: 215 MG/DL (ref 70–99)
GLUCOSE URINE: NEGATIVE
HCT VFR BLD CALC: 44.9 % (ref 36.3–47.1)
HEMOGLOBIN: 15.2 G/DL (ref 11.9–15.1)
IMMATURE GRANULOCYTES: 1 %
KETONES, URINE: NEGATIVE
LEUKOCYTE ESTERASE, URINE: ABNORMAL
LIPASE: 18 U/L (ref 13–60)
LYMPHOCYTES # BLD: 12 % (ref 24–43)
MCH RBC QN AUTO: 31.5 PG (ref 25.2–33.5)
MCHC RBC AUTO-ENTMCNC: 33.9 G/DL (ref 28.4–34.8)
MCV RBC AUTO: 93 FL (ref 82.6–102.9)
MONOCYTES # BLD: 5 % (ref 3–12)
MUCUS: NORMAL
NITRITE, URINE: NEGATIVE
NRBC AUTOMATED: 0 PER 100 WBC
OTHER OBSERVATIONS UA: NORMAL
PDW BLD-RTO: 13.1 % (ref 11.8–14.4)
PH UA: 6 (ref 5–8)
PLATELET # BLD: 260 K/UL (ref 138–453)
PLATELET ESTIMATE: ABNORMAL
PMV BLD AUTO: 12.2 FL (ref 8.1–13.5)
POTASSIUM SERPL-SCNC: 4.1 MMOL/L (ref 3.7–5.3)
PROTEIN UA: ABNORMAL
RBC # BLD: 4.83 M/UL (ref 3.95–5.11)
RBC # BLD: ABNORMAL 10*6/UL
RBC UA: NORMAL /HPF (ref 0–2)
REASON FOR REJECTION: NORMAL
RENAL EPITHELIAL, UA: NORMAL /HPF
SEG NEUTROPHILS: 81 % (ref 36–65)
SEGMENTED NEUTROPHILS ABSOLUTE COUNT: 10.1 K/UL (ref 1.5–8.1)
SODIUM BLD-SCNC: 138 MMOL/L (ref 135–144)
SPECIFIC GRAVITY UA: 1.01 (ref 1–1.03)
TOTAL PROTEIN: 6.6 G/DL (ref 6.4–8.3)
TRICHOMONAS: NORMAL
TURBIDITY: ABNORMAL
URINE HGB: NEGATIVE
UROBILINOGEN, URINE: NORMAL
WBC # BLD: 12.5 K/UL (ref 3.5–11.3)
WBC # BLD: ABNORMAL 10*3/UL
WBC UA: NORMAL /HPF (ref 0–5)
YEAST: NORMAL
ZZ NTE CLEAN UP: ORDERED TEST: NORMAL
ZZ NTE WITH NAME CLEAN UP: SPECIMEN SOURCE: NORMAL

## 2019-06-03 PROCEDURE — 36415 COLL VENOUS BLD VENIPUNCTURE: CPT

## 2019-06-03 PROCEDURE — 6360000002 HC RX W HCPCS: Performed by: NURSE PRACTITIONER

## 2019-06-03 PROCEDURE — 99284 EMERGENCY DEPT VISIT MOD MDM: CPT

## 2019-06-03 PROCEDURE — 80076 HEPATIC FUNCTION PANEL: CPT

## 2019-06-03 PROCEDURE — 6370000000 HC RX 637 (ALT 250 FOR IP): Performed by: NURSE PRACTITIONER

## 2019-06-03 PROCEDURE — 83690 ASSAY OF LIPASE: CPT

## 2019-06-03 PROCEDURE — 96374 THER/PROPH/DIAG INJ IV PUSH: CPT

## 2019-06-03 PROCEDURE — 87086 URINE CULTURE/COLONY COUNT: CPT

## 2019-06-03 PROCEDURE — 82150 ASSAY OF AMYLASE: CPT

## 2019-06-03 PROCEDURE — 80048 BASIC METABOLIC PNL TOTAL CA: CPT

## 2019-06-03 PROCEDURE — 81001 URINALYSIS AUTO W/SCOPE: CPT

## 2019-06-03 PROCEDURE — 96375 TX/PRO/DX INJ NEW DRUG ADDON: CPT

## 2019-06-03 PROCEDURE — 6360000004 HC RX CONTRAST MEDICATION: Performed by: NURSE PRACTITIONER

## 2019-06-03 PROCEDURE — 85025 COMPLETE CBC W/AUTO DIFF WBC: CPT

## 2019-06-03 PROCEDURE — 96376 TX/PRO/DX INJ SAME DRUG ADON: CPT

## 2019-06-03 PROCEDURE — 74177 CT ABD & PELVIS W/CONTRAST: CPT

## 2019-06-03 PROCEDURE — 2580000003 HC RX 258: Performed by: NURSE PRACTITIONER

## 2019-06-03 RX ORDER — HYDROCODONE BITARTRATE AND ACETAMINOPHEN 5; 325 MG/1; MG/1
1 TABLET ORAL EVERY 6 HOURS PRN
Qty: 20 TABLET | Refills: 0 | Status: SHIPPED | OUTPATIENT
Start: 2019-06-03 | End: 2019-06-08

## 2019-06-03 RX ORDER — SODIUM CHLORIDE 9 MG/ML
INJECTION, SOLUTION INTRAVENOUS CONTINUOUS
Status: DISCONTINUED | OUTPATIENT
Start: 2019-06-03 | End: 2019-06-03 | Stop reason: HOSPADM

## 2019-06-03 RX ORDER — HYDROCODONE BITARTRATE AND ACETAMINOPHEN 5; 325 MG/1; MG/1
2 TABLET ORAL ONCE
Status: COMPLETED | OUTPATIENT
Start: 2019-06-03 | End: 2019-06-03

## 2019-06-03 RX ORDER — ONDANSETRON 2 MG/ML
4 INJECTION INTRAMUSCULAR; INTRAVENOUS ONCE
Status: COMPLETED | OUTPATIENT
Start: 2019-06-03 | End: 2019-06-03

## 2019-06-03 RX ORDER — SODIUM CHLORIDE 0.9 % (FLUSH) 0.9 %
10 SYRINGE (ML) INJECTION
Status: COMPLETED | OUTPATIENT
Start: 2019-06-03 | End: 2019-06-03

## 2019-06-03 RX ORDER — MORPHINE SULFATE 4 MG/ML
4 INJECTION, SOLUTION INTRAMUSCULAR; INTRAVENOUS ONCE
Status: COMPLETED | OUTPATIENT
Start: 2019-06-03 | End: 2019-06-03

## 2019-06-03 RX ORDER — 0.9 % SODIUM CHLORIDE 0.9 %
80 INTRAVENOUS SOLUTION INTRAVENOUS ONCE
Status: COMPLETED | OUTPATIENT
Start: 2019-06-03 | End: 2019-06-03

## 2019-06-03 RX ADMIN — IOPAMIDOL 75 ML: 755 INJECTION, SOLUTION INTRAVENOUS at 11:12

## 2019-06-03 RX ADMIN — ONDANSETRON 4 MG: 2 INJECTION INTRAMUSCULAR; INTRAVENOUS at 10:31

## 2019-06-03 RX ADMIN — Medication 10 ML: at 11:13

## 2019-06-03 RX ADMIN — HYDROCODONE BITARTRATE AND ACETAMINOPHEN 2 TABLET: 5; 325 TABLET ORAL at 13:58

## 2019-06-03 RX ADMIN — MORPHINE SULFATE 4 MG: 4 INJECTION INTRAVENOUS at 13:04

## 2019-06-03 RX ADMIN — MORPHINE SULFATE 4 MG: 4 INJECTION INTRAVENOUS at 10:33

## 2019-06-03 RX ADMIN — SODIUM CHLORIDE 80 ML: 9 INJECTION, SOLUTION INTRAVENOUS at 11:13

## 2019-06-03 RX ADMIN — SODIUM CHLORIDE: 9 INJECTION, SOLUTION INTRAVENOUS at 10:22

## 2019-06-03 ASSESSMENT — PAIN DESCRIPTION - DESCRIPTORS: DESCRIPTORS: SHOOTING;SHARP

## 2019-06-03 ASSESSMENT — PAIN SCALES - GENERAL
PAINLEVEL_OUTOF10: 8
PAINLEVEL_OUTOF10: 8
PAINLEVEL_OUTOF10: 6
PAINLEVEL_OUTOF10: 9

## 2019-06-03 ASSESSMENT — PAIN DESCRIPTION - PAIN TYPE: TYPE: ACUTE PAIN

## 2019-06-03 ASSESSMENT — PAIN DESCRIPTION - PROGRESSION
CLINICAL_PROGRESSION: GRADUALLY IMPROVING
CLINICAL_PROGRESSION: GRADUALLY IMPROVING

## 2019-06-03 ASSESSMENT — PAIN DESCRIPTION - ORIENTATION: ORIENTATION: MID

## 2019-06-03 ASSESSMENT — PAIN DESCRIPTION - LOCATION: LOCATION: ABDOMEN

## 2019-06-03 NOTE — ED NOTES
Pt to room 19 via w/c with c/o sudden onset mid umbilical abd pain at 7281 today. Pt reports hx of incarcerated hernia, states \"I had one about 10 years ago, and I know how it feels\" and \"This is the exact same pain I had with it before\". Pt denies any N/V/D, urinary symptoms, or fevers. Abd soft, obese, BS active x 4 quads. Respirations even and non labored. Skin PWD, MMM. NAD noted.       Amber Colon RN  06/03/19 [id number]

## 2019-06-04 LAB
CULTURE: NO GROWTH
Lab: NORMAL
SPECIMEN DESCRIPTION: NORMAL

## 2019-06-05 ASSESSMENT — ENCOUNTER SYMPTOMS
CONSTIPATION: 0
SHORTNESS OF BREATH: 0
WHEEZING: 0
COUGH: 0
SINUS PRESSURE: 0
ABDOMINAL PAIN: 1
SORE THROAT: 0
VOMITING: 0
COLOR CHANGE: 0
DIARRHEA: 0
RHINORRHEA: 0
NAUSEA: 0

## 2019-06-05 NOTE — ED PROVIDER NOTES
[address] [place] [address] ED  eMERGENCY dEPARTMENT eNCOUnter      Pt Name: Brian Babin  MRN: [id number]  [place] 1960  Date of evaluation: 6/3/2019  Provider: Abad Nunez NP, AMARILYS [address]       Chief Complaint   Patient presents with    Abdominal Pain     pt believes incarcerated hernia         HISTORY OF PRESENT ILLNESS  (Location/Symptom, Timing/Onset, Context/Setting, Quality, Duration, Modifying Factors, Severity.)   Brian Babin is a 63 y.o. female who presents to the emergency department today by private vehicle for evaluation, pain. Patient states it as an onset of mild umbilical abdominal pain at 4:00 this morning. She states that she has a history of an incarcerated hernia. She states her last was roughly 10 years ago. She states that it feels similar to when her hernia was incarcerated. sHe states that this exact same pain. She denies any nausea vomiting or diarrhea. She denies any pain or burning with urination or blood in her urine. Nursing Notes were reviewed. ALLERGIES     Cymbalta [duloxetine hcl]; Januvia [sitagliptin]; Cephalexin; Ciprofloxacin; Duloxetine; Lyrica [pregabalin];  Metformin; Metformin and related; Statins; and Morphine    CURRENT MEDICATIONS       Discharge Medication List as of 6/3/2019  1:45 PM      CONTINUE these medications which have NOT CHANGED    Details   albuterol (PROVENTIL) (2.5 MG/3ML) 0.083% nebulizer solution Take 3 mLs by nebulization every 6 hours as needed for Wheezing or Shortness of Breath, Disp-120 each, R-3Normal      buPROPion (WELLBUTRIN XL) 300 MG extended release tablet TAKE 1 TABLET BY MOUTH DAILY EVERY MORNING, Disp-90 tablet, R-3Normal      allopurinol (ZYLOPRIM) 300 MG tablet TAKE 1 TABLET BY MOUTH DAILY, Disp-90 tablet, R-1Normal      rOPINIRole (REQUIP) 1 MG tablet TAKE 1 TABLET BY MOUTH EVERY NIGHT AT BEDTIME, Disp-90 tablet, R-1Normal      amitriptyline (ELAVIL) 50 MG tablet TAKE 1 TABLET BY MOUTH AT COLONOSCOPY  2015    2 polyps removed, diverticulosis, internal hemorrhoids    FINGER SURGERY      re-attachment of left middle finger    FOOT SURGERY Right 2015    bunionectomy, 2-3-4th pinning, 5th arthroplasty    HERNIA REPAIR      UMBILICAL    HYSTERECTOMY, TOTAL ABDOMINAL  2001         FAMILY HISTORY           Problem Relation Age of Onset    Cancer Mother 61        breast     High Blood Pressure Mother     Stroke Father 62    Depression Father 64        comitted sucide     Diabetes Sister 36    High Blood Pressure Sister     Depression Sister     Cancer Brother 64        prostate    Diabetes Brother     High Blood Pressure Brother     Cancer Paternal Aunt 71        colon, rectal     Cancer Paternal Grandfather 79        lung    Heart Disease Neg Hx      Family Status   Relation Name Status    Mother Prisca     Father Sandeep Weller     Sister Era Alive    Brother Lucas Alive    PAunt      PGF      Neg Hx  (Not Specified)        SOCIAL HISTORY      reports that she has been smoking cigarettes. She has a 21.00 pack-year smoking history. She has never used smokeless tobacco. She reports that she drank alcohol. She reports that she does not use drugs. REVIEW OF SYSTEMS    (2-9 systems for level 4, 10 or more for level 5)     Review of Systems   Constitutional: Negative for chills, fever and unexpected weight change. HENT: Negative for congestion, rhinorrhea, sinus pressure and sore throat. Respiratory: Negative for cough, shortness of breath and wheezing. Cardiovascular: Negative for chest pain and palpitations. Gastrointestinal: Positive for abdominal pain. Negative for constipation, diarrhea, nausea and vomiting. Genitourinary: Negative for dysuria and hematuria. Musculoskeletal: Negative for arthralgias and myalgias. Skin: Negative for color change and rash. Neurological: Negative for dizziness, weakness and headaches. Hematological: Negative for adenopathy. Except as noted above the remainder of the review of systems was reviewed and negative. PHYSICAL EXAM    (up to 7 for level 4, 8 or more for level 5)     ED Triage Vitals [06/03/19 0951]   BP Temp Temp Source Pulse Resp SpO2 Height Weight   (!) 147/89 98 °F (36.7 °C) Oral 89 18 99 % 5' 10\" (1.778 m) 295 lb (133.8 kg)       Physical Exam   Constitutional: She is oriented to person, place, and time. She appears well-developed and well-nourished. HENT:   Head: Normocephalic and atraumatic. Mouth/Throat: Oropharynx is clear and moist.   Eyes: Pupils are equal, round, and reactive to light. Conjunctivae are normal.   Neck: Normal range of motion. Neck supple. Cardiovascular: Normal rate and regular rhythm. Pulmonary/Chest: Effort normal and breath sounds normal. No stridor. No respiratory distress. Abdominal: Soft. Bowel sounds are normal. She exhibits no distension and no mass. There is tenderness. There is no guarding. No hernia. Musculoskeletal: Normal range of motion. Lymphadenopathy:     She has no cervical adenopathy. Neurological: She is alert and oriented to person, place, and time. Skin: Skin is warm and dry. No rash noted. Psychiatric: She has a normal mood and affect. Vitals reviewed. RADIOLOGY:   Non-plain film images such as CT, Ultrasound and MRI are read by the radiologist. [facility] images are visualized and preliminarily interpreted by the emergency physician with the below findings:    Ct Abdomen Pelvis W Iv Contrast    Result Date: 6/3/2019  EXAMINATION: CT OF THE ABDOMEN AND PELVIS WITH CONTRAST 6/3/2019 11:01 am TECHNIQUE: CT of the abdomen and pelvis was performed with the administration of intravenous contrast. Multiplanar reformatted images are provided for review.  Dose modulation, iterative reconstruction, and/or weight based adjustment of the mA/kV was utilized to reduce the radiation dose to as low as reasonably achievable. COMPARISON: None. HISTORY: ORDERING SYSTEM PROVIDED HISTORY: abd pain, history of hernia repair FINDINGS: Lower Chest: Visualized portions of the lungs are clear. Cardiac and posterior mediastinal structures visualized are unremarkable. Organs: Hypoattenuation throughout the liver reflects hepatic steatosis. Hepatic craniocaudal length is about 22 cm. No discrete hepatic lesion or intrahepatic bile duct dilatation is seen. The gallbladder, kidneys, spleen, adrenal glands and pancreas appear unremarkable. GI/Bowel: Multifocal diverticula involve the descending and sigmoid colon without evidence of acute inflammatory change. No diffuse or focal bowel wall thickening evident. No inflammatory changes evident. No obstruction is seen. The appendix is visualized right lower quadrant, unremarkable in appearance. Pelvis: Sequela from prior anterior midline herniorrhaphy. It appears the patient has had hysterectomy. Urinary bladder is partially filled and appears unremarkable. No pelvic ascites or pneumoperitoneum. Mildly enlarged right obturator lymph node is 10 x 26 mm axial series 2, image 155. Similar appearing left obturator lymph node does not meet CT size criteria for adenopathy measuring 9 mm short axis image 150. Peritoneum/Retroperitoneum: Calcific atherosclerotic disease aorta. No aneurysm. Unremarkable appearance of the IVC. No adenopathy or fluid. Shotty ricardo hepatis lymph nodes are typically reactive, related to hepatic steatosis. Bones/Soft Tissues: Degenerative changes predominate L2-3 and L3-4 with vacuum disc phenomena. Mild bilaterally symmetrical SI joint osteoarthritis. No acute superficial soft tissue or osseous structure abnormality evident. Small midline umbilical hernia contains fat, with another epigastric midline ventral hernia just superior to the herniorrhaphy containing fat as well. 1.  No CT evidence of an acute intra-abdominal or intrapelvic process.  2. Two, small fat containing midline ventral hernias are present adjacent to sequela from prior herniorrhaphy. 3.  Diverticulosis coli without CT evidence of acute diverticulitis. 4.  Calcific atherosclerotic disease aorta. 5. Hepatic steatosis. 6. Nonspecific mild right obturator lymph node enlargement is probably reactive. This could be followed with IV contrast-enhanced CT abdomen/pelvis in 3-6 months to ensure stability. 7. Degenerative changes lumbar spine and bilateral SI joints. RECOMMENDATIONS: IV contrast-enhanced CT abdomen/pelvis in 3-6 months. Interpretation per the Radiologist below, if available at the time of this note:    CT ABDOMEN PELVIS W IV CONTRAST   Final Result   1. No CT evidence of an acute intra-abdominal or intrapelvic process. 2. Two, small fat containing midline ventral hernias are present adjacent to   sequela from prior herniorrhaphy. 3.  Diverticulosis coli without CT evidence of acute diverticulitis. 4.  Calcific atherosclerotic disease aorta. 5. Hepatic steatosis. 6. Nonspecific mild right obturator lymph node enlargement is probably   reactive. This could be followed with IV contrast-enhanced CT abdomen/pelvis   in 3-6 months to ensure stability. 7. Degenerative changes lumbar spine and bilateral SI joints. RECOMMENDATIONS:   IV contrast-enhanced CT abdomen/pelvis in 3-6 months.                  LABS:  Labs Reviewed   CBC WITH AUTO DIFFERENTIAL - Abnormal; Notable for the following components:       Result Value    WBC 12.5 (*)     Hemoglobin 15.2 (*)     Seg Neutrophils 81 (*)     Lymphocytes 12 (*)     Immature Granulocytes 1 (*)     Segs Absolute 10.10 (*)     All other components within normal limits   URINE RT REFLEX TO CULTURE - Abnormal; Notable for the following components:    Turbidity UA SLIGHTLY CLOUDY (*)     Protein, UA 1+ (*)     Leukocyte Esterase, Urine TRACE (*)     All other components within normal limits   AMYLASE - Abnormal; Notable for the following components:    Amylase 21 (*)     All other components within normal limits   BASIC METABOLIC PANEL - Abnormal; Notable for the following components:    Glucose 215 (*)     Bun/Cre Ratio 21 (*)     All other components within normal limits   HEPATIC FUNCTION PANEL - Abnormal; Notable for the following components:    Alb 3.4 (*)     Total Bilirubin 0.29 (*)     All other components within normal limits   URINE CULTURE CLEAN CATCH   SPECIMEN REJECTION   LIPASE   MICROSCOPIC URINALYSIS       All other labs were within normal range or not returned as of this dictation. EMERGENCY DEPARTMENT COURSE and DIFFERENTIAL DIAGNOSIS/MDM:   Vitals:    Vitals:    06/03/19 0951 06/03/19 1400   BP: (!) 147/89 136/74   Pulse: 89 82   Resp: 18 16   Temp: 98 °F (36.7 °C)    TempSrc: Oral    SpO2: 99% 99%   Weight: 295 lb (133.8 kg)    Height: 5' 10\" (1.778 m)        Medical Decision Making: Patient was given IV fluids and IV morphine. Her CT scan does not show incarcerated hernia. He will be discharged home. Follow-up with primary care physician  Medications   morphine sulfate (PF) injection 4 mg (4 mg Intravenous Given 6/3/19 1033)   ondansetron (ZOFRAN) injection 4 mg (4 mg Intravenous Given 6/3/19 1031)   0.9 % sodium chloride bolus (0 mLs Intravenous Stopped 6/3/19 1118)   iopamidol (ISOVUE-370) 76 % injection 75 mL (75 mLs Intravenous Given 6/3/19 1112)   sodium chloride flush 0.9 % injection 10 mL (10 mLs Intravenous Given 6/3/19 1113)   morphine sulfate (PF) injection 4 mg (4 mg Intravenous Given 6/3/19 1304)   HYDROcodone-acetaminophen (NORCO) 5-325 MG per tablet 2 tablet (2 tablets Oral Given 6/3/19 1358)       FINAL IMPRESSION      1.  Abdominal pain, unspecified abdominal location          DISPOSITION/PLAN   DISPOSITION Decision To Discharge 06/03/2019 01:44:28 PM      PATIENT REFERRED TO:   Paddy Simpson MD  [address] [id number]    Call in 2 days      [facility] [place] [facility] ED  [address] [facility]  [phone]    If symptoms worsen      DISCHARGE MEDICATIONS:     Discharge Medication List as of 6/3/2019  1:45 PM      START taking these medications    Details   HYDROcodone-acetaminophen (NORCO) 5-325 MG per tablet Take 1 tablet by mouth every 6 hours as needed for Pain for up to 5 days. , Disp-20 tablet, R-0Print                 (Please note that portions of this note were completed with a voice recognition program.  Efforts were made to edit the dictations but occasionally words are mis-transcribed.)    [address] [facility] NP, APRN - CNP  Certified Nurse Practitioner          Brenden Pepe, AMARILYS - CNP  06/05/19 [facility], APRMIC - CNP  06/05/19 [id number]

## 2019-07-09 ENCOUNTER — OFFICE VISIT (OUTPATIENT)
Dept: FAMILY MEDICINE CLINIC | Age: 59
End: 2019-07-09
Payer: MEDICARE

## 2019-07-09 VITALS
HEART RATE: 102 BPM | DIASTOLIC BLOOD PRESSURE: 79 MMHG | BODY MASS INDEX: 41.95 KG/M2 | WEIGHT: 293 LBS | HEIGHT: 70 IN | TEMPERATURE: 98.3 F | SYSTOLIC BLOOD PRESSURE: 117 MMHG

## 2019-07-09 DIAGNOSIS — L98.9 SKIN LESION: Primary | ICD-10-CM

## 2019-07-09 PROCEDURE — 99213 OFFICE O/P EST LOW 20 MIN: CPT | Performed by: PHYSICIAN ASSISTANT

## 2019-07-09 PROCEDURE — 4004F PT TOBACCO SCREEN RCVD TLK: CPT | Performed by: PHYSICIAN ASSISTANT

## 2019-07-09 PROCEDURE — G8427 DOCREV CUR MEDS BY ELIG CLIN: HCPCS | Performed by: PHYSICIAN ASSISTANT

## 2019-07-09 PROCEDURE — G8417 CALC BMI ABV UP PARAM F/U: HCPCS | Performed by: PHYSICIAN ASSISTANT

## 2019-07-09 PROCEDURE — 3017F COLORECTAL CA SCREEN DOC REV: CPT | Performed by: PHYSICIAN ASSISTANT

## 2019-07-09 ASSESSMENT — ENCOUNTER SYMPTOMS
ROS SKIN COMMENTS: SKIN LESION
COLOR CHANGE: 0

## 2019-07-09 NOTE — PROGRESS NOTES
Lyrica [Pregabalin] Other (See Comments)     Does not help after 2 weeks     Metformin Diarrhea    Metformin And Related Diarrhea    Statins Other (See Comments)     myalgias  myalgias  myalgias    Morphine Diarrhea, Nausea And Vomiting, Swelling and Nausea Only         Current Outpatient Medications   Medication Sig Dispense Refill    albuterol (PROVENTIL) (2.5 MG/3ML) 0.083% nebulizer solution Take 3 mLs by nebulization every 6 hours as needed for Wheezing or Shortness of Breath 120 each 3    buPROPion (WELLBUTRIN XL) 300 MG extended release tablet TAKE 1 TABLET BY MOUTH DAILY EVERY MORNING 90 tablet 3    allopurinol (ZYLOPRIM) 300 MG tablet TAKE 1 TABLET BY MOUTH DAILY 90 tablet 1    rOPINIRole (REQUIP) 1 MG tablet TAKE 1 TABLET BY MOUTH EVERY NIGHT AT BEDTIME 90 tablet 1    amitriptyline (ELAVIL) 50 MG tablet TAKE 1 TABLET BY MOUTH AT BEDTIME 90 tablet 1    milnacipran HCl (SAVELLA) 50 MG TABS Take 1 tablet by mouth 2 times daily 60 tablet 2    tiZANidine (ZANAFLEX) 4 MG tablet TAKE 1 TABLET BY MOUTH TWICE DAILY 180 tablet 0    Liraglutide (VICTOZA) 18 MG/3ML SOPN SC injection Inject 1.8 mg into the skin daily Please give patient # 30 32g 4mm needles with  2 refills 9 pen 2    insulin glargine (TOUJEO SOLOSTAR) 300 UNIT/ML injection pen Inject 52 Units into the skin nightly 3 pen 3    carvedilol (COREG) 25 MG tablet TAKE 1 TABLET BY MOUTH TWICE DAILY 180 tablet 1    omeprazole (PRILOSEC) 20 MG delayed release capsule Take 1 capsule by mouth daily (with breakfast) 90 capsule 1    fluticasone (FLONASE) 50 MCG/ACT nasal spray fluticasone propionate 50 mcg/actuation nasal spray,suspension      tiotropium (SPIRIVA HANDIHALER) 18 MCG inhalation capsule INHALE THE CONTENTS OF 1 CAPSULE INTO THE LUNGS DAILY 90 capsule 1    colchicine (COLCRYS) 0.6 MG tablet Take 1 tablet by mouth daily 30 tablet 0     Current Facility-Administered Medications   Medication Dose Route Frequency Provider Last Rate Last

## 2019-07-25 ENCOUNTER — OFFICE VISIT (OUTPATIENT)
Dept: DERMATOLOGY | Age: 59
End: 2019-07-25
Payer: MEDICARE

## 2019-07-25 VITALS
DIASTOLIC BLOOD PRESSURE: 82 MMHG | OXYGEN SATURATION: 94 % | HEIGHT: 70 IN | SYSTOLIC BLOOD PRESSURE: 136 MMHG | BODY MASS INDEX: 41.95 KG/M2 | HEART RATE: 97 BPM | WEIGHT: 293 LBS

## 2019-07-25 DIAGNOSIS — L73.2 HIDRADENITIS SUPPURATIVA: Primary | ICD-10-CM

## 2019-07-25 DIAGNOSIS — A63.0 CONDYLOMA ACUMINATA: ICD-10-CM

## 2019-07-25 PROCEDURE — 4004F PT TOBACCO SCREEN RCVD TLK: CPT | Performed by: DERMATOLOGY

## 2019-07-25 PROCEDURE — 3017F COLORECTAL CA SCREEN DOC REV: CPT | Performed by: DERMATOLOGY

## 2019-07-25 PROCEDURE — 99202 OFFICE O/P NEW SF 15 MIN: CPT | Performed by: DERMATOLOGY

## 2019-07-25 PROCEDURE — G8427 DOCREV CUR MEDS BY ELIG CLIN: HCPCS | Performed by: DERMATOLOGY

## 2019-07-25 PROCEDURE — G8417 CALC BMI ABV UP PARAM F/U: HCPCS | Performed by: DERMATOLOGY

## 2019-07-25 PROCEDURE — 17110 DESTRUCTION B9 LES UP TO 14: CPT | Performed by: DERMATOLOGY

## 2019-07-25 RX ORDER — CLINDAMYCIN PHOSPHATE 10 UG/ML
LOTION TOPICAL
Qty: 60 ML | Refills: 3 | Status: SHIPPED | OUTPATIENT
Start: 2019-07-25 | End: 2019-10-07 | Stop reason: ALTCHOICE

## 2019-07-25 RX ORDER — DOXYCYCLINE HYCLATE 100 MG/1
CAPSULE ORAL
Qty: 60 CAPSULE | Refills: 2 | Status: SHIPPED | OUTPATIENT
Start: 2019-07-25 | End: 2019-10-07

## 2019-07-30 DIAGNOSIS — M10.9 GOUT, UNSPECIFIED CAUSE, UNSPECIFIED CHRONICITY, UNSPECIFIED SITE: ICD-10-CM

## 2019-07-30 RX ORDER — ALLOPURINOL 300 MG/1
TABLET ORAL
Qty: 90 TABLET | Refills: 1 | Status: SHIPPED | OUTPATIENT
Start: 2019-07-30 | End: 2019-11-27 | Stop reason: SDUPTHER

## 2019-07-30 NOTE — TELEPHONE ENCOUNTER
Faxed medication request pended medication. Please advise thank you! Last visit: 7/25/19  Last Med refill:6/7/19    Next Visit Date:  Future Appointments   Date Time Provider Abbe Burgessi   8/29/2019 10:15 AM Delena Lanes, MD  derm [address] Maintenance   Topic Date Due    Hepatitis B Vaccine (1 of 3 - Risk 3-dose series) 04/27/1980    Shingles Vaccine (1 of 2) 04/27/2011    Diabetic retinal exam  10/30/2018    Diabetic microalbuminuria test  07/12/2019    Lipid screen  07/12/2019    Breast cancer screen  08/07/2019    DTaP/Tdap/Td vaccine (1 - Tdap) 01/24/2027 (Originally 9/17/2016)    Flu vaccine (1) 09/01/2019    Diabetic foot exam  04/09/2020    A1C test (Diabetic or Prediabetic)  04/09/2020    Colon cancer screen colonoscopy  05/18/2027    Pneumococcal 0-64 years Vaccine  Completed    Hepatitis C screen  Completed    HIV screen  Completed       Hemoglobin A1C (%)   Date Value   04/09/2019 7.5   10/18/2018 5.7   05/29/2018 6.4             ( goal A1C is < 7)   Microalb/Crt.  Ratio (mcg/mg creat)   Date Value   07/12/2018 CANNOT BE CALCULATED     LDL Cholesterol (mg/dL)   Date Value   07/12/2018 84   05/11/2017            (goal LDL is <100)   AST (U/L)   Date Value   06/03/2019 11     ALT (U/L)   Date Value   06/03/2019 13     BUN (mg/dL)   Date Value   06/03/2019 14     BP Readings from Last 3 Encounters:   07/25/19 136/82   07/09/19 117/79   06/03/19 136/74          (goal 120/80)    All Future Testing planned in [facility]  Lab Frequency Next Occurrence               Patient Active Problem List:     HLD (hyperlipidemia)     HTN (hypertension)     Morbid obesity (HCC)     Neuropathy due to secondary diabetes ([facility] [address]     Depression     Mild persistent asthma without complication     Type 2 diabetes mellitus with diabetic polyneuropathy, with long-term current use of insulin (HCC)     TIFFANI positive     Chronic obstructive pulmonary disease (HCC)     Fibromyalgia

## 2019-08-21 ENCOUNTER — CARE COORDINATION (OUTPATIENT)
Dept: CARE COORDINATION | Age: 59
End: 2019-08-21

## 2019-08-21 NOTE — CARE COORDINATION
omeprazole (PRILOSEC) 20 MG delayed release capsule Take 1 capsule by mouth daily (with breakfast) 5/3/19   Poncho Frye MD   fluticasone (FLONASE) 50 MCG/ACT nasal spray fluticasone propionate 50 mcg/actuation nasal spray,suspension    Historical Provider, MD   colchicine (COLCRYS) 0.6 MG tablet Take 1 tablet by mouth daily 4/9/19   Viry Torres MD   tiotropium (Morelia ) 18 MCG inhalation capsule INHALE THE CONTENTS OF 1 CAPSULE INTO THE LUNGS DAILY 1/8/19   Viry Torres MD       Future Appointments   Date Time Provider Abbe Vasquez   8/29/2019 10:15 AM Valente Jack MD  derm [address] [facility]

## 2019-09-04 DIAGNOSIS — M79.7 FIBROMYALGIA: ICD-10-CM

## 2019-09-04 RX ORDER — TIZANIDINE 4 MG/1
TABLET ORAL
Qty: 180 TABLET | Refills: 0 | Status: SHIPPED | OUTPATIENT
Start: 2019-09-04 | End: 2019-12-06 | Stop reason: SDUPTHER

## 2019-09-20 DIAGNOSIS — Z79.4 TYPE 2 DIABETES MELLITUS WITH DIABETIC POLYNEUROPATHY, WITH LONG-TERM CURRENT USE OF INSULIN (HCC): ICD-10-CM

## 2019-09-20 DIAGNOSIS — E11.42 TYPE 2 DIABETES MELLITUS WITH DIABETIC POLYNEUROPATHY, WITH LONG-TERM CURRENT USE OF INSULIN (HCC): ICD-10-CM

## 2019-09-23 RX ORDER — AMITRIPTYLINE HYDROCHLORIDE 50 MG/1
TABLET, FILM COATED ORAL
Qty: 90 TABLET | Refills: 0 | Status: SHIPPED | OUTPATIENT
Start: 2019-09-23 | End: 2019-10-07

## 2019-10-07 ENCOUNTER — OFFICE VISIT (OUTPATIENT)
Dept: FAMILY MEDICINE CLINIC | Age: 59
End: 2019-10-07
Payer: MEDICARE

## 2019-10-07 VITALS
OXYGEN SATURATION: 98 % | SYSTOLIC BLOOD PRESSURE: 142 MMHG | DIASTOLIC BLOOD PRESSURE: 92 MMHG | HEART RATE: 102 BPM | WEIGHT: 293 LBS | BODY MASS INDEX: 41.95 KG/M2 | TEMPERATURE: 98.1 F | HEIGHT: 70 IN | RESPIRATION RATE: 18 BRPM

## 2019-10-07 DIAGNOSIS — R63.5 WEIGHT GAIN: ICD-10-CM

## 2019-10-07 DIAGNOSIS — Z79.4 TYPE 2 DIABETES MELLITUS WITH DIABETIC POLYNEUROPATHY, WITH LONG-TERM CURRENT USE OF INSULIN (HCC): ICD-10-CM

## 2019-10-07 DIAGNOSIS — M79.7 FIBROMYALGIA: ICD-10-CM

## 2019-10-07 DIAGNOSIS — Z12.31 ENCOUNTER FOR SCREENING MAMMOGRAM FOR BREAST CANCER: ICD-10-CM

## 2019-10-07 DIAGNOSIS — Z13.29 THYROID DISORDER SCREEN: ICD-10-CM

## 2019-10-07 DIAGNOSIS — Z13.220 SCREENING FOR HYPERLIPIDEMIA: ICD-10-CM

## 2019-10-07 DIAGNOSIS — E11.42 TYPE 2 DIABETES MELLITUS WITH DIABETIC POLYNEUROPATHY, WITH LONG-TERM CURRENT USE OF INSULIN (HCC): ICD-10-CM

## 2019-10-07 DIAGNOSIS — Z23 NEEDS FLU SHOT: Primary | ICD-10-CM

## 2019-10-07 LAB — HBA1C MFR BLD: 7.4 %

## 2019-10-07 PROCEDURE — 3045F PR MOST RECENT HEMOGLOBIN A1C LEVEL 7.0-9.0%: CPT | Performed by: PHYSICIAN ASSISTANT

## 2019-10-07 PROCEDURE — G0008 ADMIN INFLUENZA VIRUS VAC: HCPCS | Performed by: PHYSICIAN ASSISTANT

## 2019-10-07 PROCEDURE — G8482 FLU IMMUNIZE ORDER/ADMIN: HCPCS | Performed by: PHYSICIAN ASSISTANT

## 2019-10-07 PROCEDURE — 2022F DILAT RTA XM EVC RTNOPTHY: CPT | Performed by: PHYSICIAN ASSISTANT

## 2019-10-07 PROCEDURE — 83036 HEMOGLOBIN GLYCOSYLATED A1C: CPT | Performed by: PHYSICIAN ASSISTANT

## 2019-10-07 PROCEDURE — 99214 OFFICE O/P EST MOD 30 MIN: CPT | Performed by: PHYSICIAN ASSISTANT

## 2019-10-07 PROCEDURE — 90686 IIV4 VACC NO PRSV 0.5 ML IM: CPT | Performed by: PHYSICIAN ASSISTANT

## 2019-10-07 PROCEDURE — 3017F COLORECTAL CA SCREEN DOC REV: CPT | Performed by: PHYSICIAN ASSISTANT

## 2019-10-07 PROCEDURE — G8417 CALC BMI ABV UP PARAM F/U: HCPCS | Performed by: PHYSICIAN ASSISTANT

## 2019-10-07 PROCEDURE — G8427 DOCREV CUR MEDS BY ELIG CLIN: HCPCS | Performed by: PHYSICIAN ASSISTANT

## 2019-10-07 PROCEDURE — 4004F PT TOBACCO SCREEN RCVD TLK: CPT | Performed by: PHYSICIAN ASSISTANT

## 2019-10-07 RX ORDER — AMITRIPTYLINE HYDROCHLORIDE 75 MG/1
TABLET, FILM COATED ORAL
Qty: 30 TABLET | Refills: 1 | Status: SHIPPED | OUTPATIENT
Start: 2019-10-07 | End: 2019-11-27 | Stop reason: SDUPTHER

## 2019-10-07 RX ORDER — HYDROCHLOROTHIAZIDE 25 MG/1
25 TABLET ORAL EVERY MORNING
Qty: 30 TABLET | Refills: 5 | Status: SHIPPED | OUTPATIENT
Start: 2019-10-07 | End: 2020-01-16 | Stop reason: SDUPTHER

## 2019-10-07 ASSESSMENT — ENCOUNTER SYMPTOMS
VOMITING: 0
EYES NEGATIVE: 1
WHEEZING: 0
ABDOMINAL PAIN: 0
SHORTNESS OF BREATH: 0
DIARRHEA: 0
CONSTIPATION: 0
NAUSEA: 0
BACK PAIN: 0
SINUS PAIN: 0
SORE THROAT: 0
BLOOD IN STOOL: 0
CHEST TIGHTNESS: 0
COUGH: 0

## 2019-10-16 DIAGNOSIS — M1A.0220 CHRONIC GOUT OF LEFT ELBOW, UNSPECIFIED CAUSE: ICD-10-CM

## 2019-10-24 DIAGNOSIS — M10.9 GOUT, UNSPECIFIED CAUSE, UNSPECIFIED CHRONICITY, UNSPECIFIED SITE: Primary | ICD-10-CM

## 2019-10-24 RX ORDER — NAPROXEN 250 MG/1
250 TABLET ORAL 2 TIMES DAILY PRN
Qty: 20 TABLET | Refills: 0 | Status: SHIPPED | OUTPATIENT
Start: 2019-10-24 | End: 2020-06-05

## 2019-10-24 RX ORDER — COLCHICINE 0.6 MG/1
0.6 TABLET ORAL DAILY
Qty: 30 TABLET | Refills: 0 | Status: SHIPPED | OUTPATIENT
Start: 2019-10-24 | End: 2020-04-09 | Stop reason: SDUPTHER

## 2019-10-31 DIAGNOSIS — G25.81 RLS (RESTLESS LEGS SYNDROME): ICD-10-CM

## 2019-11-01 RX ORDER — ROPINIROLE 1 MG/1
TABLET, FILM COATED ORAL
Qty: 90 TABLET | Refills: 1 | Status: SHIPPED | OUTPATIENT
Start: 2019-11-01 | End: 2019-12-06 | Stop reason: SDUPTHER

## 2019-11-27 DIAGNOSIS — Z79.4 TYPE 2 DIABETES MELLITUS WITH DIABETIC POLYNEUROPATHY, WITH LONG-TERM CURRENT USE OF INSULIN (HCC): ICD-10-CM

## 2019-11-27 DIAGNOSIS — M10.9 GOUT, UNSPECIFIED CAUSE, UNSPECIFIED CHRONICITY, UNSPECIFIED SITE: ICD-10-CM

## 2019-11-27 DIAGNOSIS — E11.42 TYPE 2 DIABETES MELLITUS WITH DIABETIC POLYNEUROPATHY, WITH LONG-TERM CURRENT USE OF INSULIN (HCC): ICD-10-CM

## 2019-11-27 RX ORDER — ALLOPURINOL 300 MG/1
TABLET ORAL
Qty: 90 TABLET | Refills: 0 | Status: SHIPPED | OUTPATIENT
Start: 2019-11-27 | End: 2020-04-13 | Stop reason: SDUPTHER

## 2019-11-27 RX ORDER — AMITRIPTYLINE HYDROCHLORIDE 75 MG/1
TABLET, FILM COATED ORAL
Qty: 60 TABLET | Refills: 0 | Status: SHIPPED | OUTPATIENT
Start: 2019-11-27 | End: 2020-01-16 | Stop reason: SDUPTHER

## 2019-11-27 RX ORDER — PEN NEEDLE, DIABETIC 31 GX5/16"
NEEDLE, DISPOSABLE MISCELLANEOUS
Qty: 100 EACH | Refills: 0 | Status: SHIPPED | OUTPATIENT
Start: 2019-11-27 | End: 2019-12-10 | Stop reason: SDUPTHER

## 2019-11-27 RX ORDER — MILNACIPRAN HYDROCHLORIDE 25 MG/1
TABLET, FILM COATED ORAL
Qty: 120 TABLET | Refills: 0 | Status: SHIPPED | OUTPATIENT
Start: 2019-11-27 | End: 2020-01-16 | Stop reason: SDUPTHER

## 2019-12-02 RX ORDER — INSULIN GLARGINE 300 U/ML
INJECTION, SOLUTION SUBCUTANEOUS
Qty: 6 ML | Refills: 0 | Status: SHIPPED | OUTPATIENT
Start: 2019-12-02 | End: 2019-12-05 | Stop reason: SDUPTHER

## 2019-12-05 DIAGNOSIS — Z79.4 TYPE 2 DIABETES MELLITUS WITH DIABETIC POLYNEUROPATHY, WITH LONG-TERM CURRENT USE OF INSULIN (HCC): ICD-10-CM

## 2019-12-05 DIAGNOSIS — K21.9 GASTROESOPHAGEAL REFLUX DISEASE WITHOUT ESOPHAGITIS: ICD-10-CM

## 2019-12-05 DIAGNOSIS — G25.81 RLS (RESTLESS LEGS SYNDROME): ICD-10-CM

## 2019-12-05 DIAGNOSIS — M79.7 FIBROMYALGIA: ICD-10-CM

## 2019-12-05 DIAGNOSIS — I10 ESSENTIAL HYPERTENSION: ICD-10-CM

## 2019-12-05 DIAGNOSIS — E11.42 TYPE 2 DIABETES MELLITUS WITH DIABETIC POLYNEUROPATHY, WITH LONG-TERM CURRENT USE OF INSULIN (HCC): ICD-10-CM

## 2019-12-06 RX ORDER — OMEPRAZOLE 20 MG/1
20 CAPSULE, DELAYED RELEASE ORAL
Qty: 90 CAPSULE | Refills: 1 | Status: SHIPPED | OUTPATIENT
Start: 2019-12-06 | End: 2020-01-16 | Stop reason: SDUPTHER

## 2019-12-06 RX ORDER — ROPINIROLE 1 MG/1
TABLET, FILM COATED ORAL
Qty: 90 TABLET | Refills: 1 | Status: SHIPPED | OUTPATIENT
Start: 2019-12-06 | End: 2020-01-16 | Stop reason: SDUPTHER

## 2019-12-06 RX ORDER — TIZANIDINE 4 MG/1
TABLET ORAL
Qty: 180 TABLET | Refills: 0 | Status: SHIPPED | OUTPATIENT
Start: 2019-12-06 | End: 2020-01-16 | Stop reason: SDUPTHER

## 2019-12-06 RX ORDER — CARVEDILOL 25 MG/1
TABLET ORAL
Qty: 180 TABLET | Refills: 1 | Status: SHIPPED | OUTPATIENT
Start: 2019-12-06 | End: 2020-01-16 | Stop reason: SDUPTHER

## 2019-12-10 DIAGNOSIS — Z79.4 TYPE 2 DIABETES MELLITUS WITH DIABETIC POLYNEUROPATHY, WITH LONG-TERM CURRENT USE OF INSULIN (HCC): ICD-10-CM

## 2019-12-10 DIAGNOSIS — E11.42 TYPE 2 DIABETES MELLITUS WITH DIABETIC POLYNEUROPATHY, WITH LONG-TERM CURRENT USE OF INSULIN (HCC): ICD-10-CM

## 2019-12-10 RX ORDER — AMITRIPTYLINE HYDROCHLORIDE 75 MG/1
TABLET, FILM COATED ORAL
Qty: 60 TABLET | Refills: 0 | OUTPATIENT
Start: 2019-12-10

## 2019-12-17 ENCOUNTER — TELEPHONE (OUTPATIENT)
Dept: FAMILY MEDICINE CLINIC | Age: 59
End: 2019-12-17

## 2019-12-17 RX ORDER — SULFACETAMIDE SODIUM 100 MG/ML
2 SOLUTION/ DROPS OPHTHALMIC 4 TIMES DAILY
Qty: 1 BOTTLE | Refills: 0 | Status: SHIPPED | OUTPATIENT
Start: 2019-12-17 | End: 2019-12-27

## 2019-12-24 DIAGNOSIS — E11.42 TYPE 2 DIABETES MELLITUS WITH DIABETIC POLYNEUROPATHY, WITH LONG-TERM CURRENT USE OF INSULIN (HCC): ICD-10-CM

## 2019-12-24 DIAGNOSIS — Z79.4 TYPE 2 DIABETES MELLITUS WITH DIABETIC POLYNEUROPATHY, WITH LONG-TERM CURRENT USE OF INSULIN (HCC): ICD-10-CM

## 2019-12-24 RX ORDER — AMITRIPTYLINE HYDROCHLORIDE 50 MG/1
TABLET, FILM COATED ORAL
Qty: 90 TABLET | Refills: 0 | Status: SHIPPED | OUTPATIENT
Start: 2019-12-24 | End: 2020-01-16

## 2020-01-16 ENCOUNTER — OFFICE VISIT (OUTPATIENT)
Dept: FAMILY MEDICINE CLINIC | Age: 60
End: 2020-01-16
Payer: MEDICARE

## 2020-01-16 ENCOUNTER — HOSPITAL ENCOUNTER (OUTPATIENT)
Age: 60
Setting detail: SPECIMEN
Discharge: HOME | End: 2020-01-16
Payer: MEDICARE

## 2020-01-16 VITALS
HEART RATE: 91 BPM | TEMPERATURE: 98.1 F | WEIGHT: 293 LBS | DIASTOLIC BLOOD PRESSURE: 90 MMHG | BODY MASS INDEX: 41.95 KG/M2 | SYSTOLIC BLOOD PRESSURE: 136 MMHG | HEIGHT: 70 IN | RESPIRATION RATE: 16 BRPM

## 2020-01-16 LAB
BILIRUBIN, POC: NEGATIVE
BLOOD URINE, POC: NEGATIVE
CLARITY, POC: ABNORMAL
COLOR, POC: YELLOW
GLUCOSE URINE, POC: NEGATIVE
HBA1C MFR BLD: 7.9 %
KETONES, POC: NEGATIVE
LEUKOCYTE EST, POC: ABNORMAL
NITRITE, POC: NEGATIVE
PH, POC: 6.5
PROTEIN, POC: NEGATIVE
SPECIFIC GRAVITY, POC: 1.02
UROBILINOGEN, POC: 0.2

## 2020-01-16 PROCEDURE — 81002 URINALYSIS NONAUTO W/O SCOPE: CPT | Performed by: PHYSICIAN ASSISTANT

## 2020-01-16 PROCEDURE — 4004F PT TOBACCO SCREEN RCVD TLK: CPT | Performed by: PHYSICIAN ASSISTANT

## 2020-01-16 PROCEDURE — 2022F DILAT RTA XM EVC RTNOPTHY: CPT | Performed by: PHYSICIAN ASSISTANT

## 2020-01-16 PROCEDURE — 83036 HEMOGLOBIN GLYCOSYLATED A1C: CPT | Performed by: PHYSICIAN ASSISTANT

## 2020-01-16 PROCEDURE — G8482 FLU IMMUNIZE ORDER/ADMIN: HCPCS | Performed by: PHYSICIAN ASSISTANT

## 2020-01-16 PROCEDURE — 99214 OFFICE O/P EST MOD 30 MIN: CPT | Performed by: PHYSICIAN ASSISTANT

## 2020-01-16 PROCEDURE — G8427 DOCREV CUR MEDS BY ELIG CLIN: HCPCS | Performed by: PHYSICIAN ASSISTANT

## 2020-01-16 PROCEDURE — G8417 CALC BMI ABV UP PARAM F/U: HCPCS | Performed by: PHYSICIAN ASSISTANT

## 2020-01-16 PROCEDURE — 3017F COLORECTAL CA SCREEN DOC REV: CPT | Performed by: PHYSICIAN ASSISTANT

## 2020-01-16 RX ORDER — BUPROPION HYDROCHLORIDE 300 MG/1
TABLET ORAL
Qty: 90 TABLET | Refills: 3 | Status: SHIPPED | OUTPATIENT
Start: 2020-01-16 | End: 2020-06-05

## 2020-01-16 RX ORDER — TIZANIDINE 4 MG/1
TABLET ORAL
Qty: 180 TABLET | Refills: 2 | Status: SHIPPED | OUTPATIENT
Start: 2020-01-16 | End: 2020-08-28

## 2020-01-16 RX ORDER — OMEPRAZOLE 20 MG/1
20 CAPSULE, DELAYED RELEASE ORAL
Qty: 90 CAPSULE | Refills: 1 | Status: SHIPPED | OUTPATIENT
Start: 2020-01-16 | End: 2020-06-05

## 2020-01-16 RX ORDER — CARVEDILOL 25 MG/1
TABLET ORAL
Qty: 180 TABLET | Refills: 1 | Status: SHIPPED | OUTPATIENT
Start: 2020-01-16 | End: 2020-09-08 | Stop reason: SDUPTHER

## 2020-01-16 RX ORDER — ROPINIROLE 1 MG/1
TABLET, FILM COATED ORAL
Qty: 90 TABLET | Refills: 1 | Status: SHIPPED | OUTPATIENT
Start: 2020-01-16 | End: 2020-08-28

## 2020-01-16 RX ORDER — HYDROCHLOROTHIAZIDE 25 MG/1
25 TABLET ORAL EVERY MORNING
Qty: 90 TABLET | Refills: 2 | Status: SHIPPED | OUTPATIENT
Start: 2020-01-16 | End: 2020-06-05

## 2020-01-16 RX ORDER — SULFAMETHOXAZOLE AND TRIMETHOPRIM 800; 160 MG/1; MG/1
1 TABLET ORAL 2 TIMES DAILY
Qty: 14 TABLET | Refills: 0 | Status: SHIPPED | OUTPATIENT
Start: 2020-01-16 | End: 2020-01-23

## 2020-01-16 RX ORDER — AMITRIPTYLINE HYDROCHLORIDE 75 MG/1
TABLET, FILM COATED ORAL
Qty: 90 TABLET | Refills: 2 | Status: SHIPPED | OUTPATIENT
Start: 2020-01-16 | End: 2020-09-08 | Stop reason: SDUPTHER

## 2020-01-16 ASSESSMENT — ENCOUNTER SYMPTOMS
SORE THROAT: 0
RHINORRHEA: 0
PHOTOPHOBIA: 0
ABDOMINAL PAIN: 1
ABDOMINAL DISTENTION: 0
NAUSEA: 0
SHORTNESS OF BREATH: 0
BACK PAIN: 0
DIARRHEA: 0
CONSTIPATION: 0
COLOR CHANGE: 0
COUGH: 0
TROUBLE SWALLOWING: 0
SINUS PRESSURE: 0
SINUS PAIN: 0
VOMITING: 0
WHEEZING: 0
EYE REDNESS: 0
CHEST TIGHTNESS: 0
BLOOD IN STOOL: 0

## 2020-01-16 NOTE — PROGRESS NOTES
disturbance. Respiratory: Negative for cough, chest tightness, shortness of breath and wheezing. Cardiovascular: Negative for chest pain, palpitations and leg swelling. Gastrointestinal: Positive for abdominal pain. Negative for abdominal distention, blood in stool, constipation, diarrhea, nausea and vomiting. Endocrine: Negative. Genitourinary: Negative for decreased urine volume, difficulty urinating, dysuria, frequency, hematuria and urgency. Musculoskeletal: Negative for back pain and gait problem. Skin: Negative for color change, pallor and rash. Neurological: Negative for dizziness, syncope, weakness, light-headedness, numbness and headaches. Hematological: Negative for adenopathy. Does not bruise/bleed easily. Psychiatric/Behavioral: Negative for agitation, behavioral problems, confusion, decreased concentration, dysphoric mood, hallucinations, self-injury, sleep disturbance and suicidal ideas. The patient is not nervous/anxious and is not hyperactive. Physical Exam  Vitals signs and nursing note reviewed. Constitutional:       General: She is not in acute distress. Appearance: She is well-developed. She is obese. She is not ill-appearing, toxic-appearing or diaphoretic. HENT:      Head: Normocephalic and atraumatic. Right Ear: External ear normal.      Left Ear: External ear normal.      Nose: Nose normal.   Eyes:      General: No scleral icterus. Right eye: No discharge. Left eye: No discharge. Conjunctiva/sclera: Conjunctivae normal.      Pupils: Pupils are equal, round, and reactive to light. Neck:      Musculoskeletal: Normal range of motion and neck supple. Vascular: No JVD. Trachea: No tracheal deviation. Cardiovascular:      Rate and Rhythm: Normal rate and regular rhythm. Heart sounds: Normal heart sounds. No murmur. No friction rub. No gallop.     Pulmonary:      Effort: Pulmonary effort is normal. No respiratory distress. Breath sounds: Normal breath sounds. No stridor. No wheezing or rales. Chest:      Chest wall: No tenderness. Abdominal:      General: Bowel sounds are normal. There is no distension. Palpations: Abdomen is soft. There is no mass. Tenderness: There is generalized tenderness. There is no right CVA tenderness, left CVA tenderness, guarding or rebound. Hernia: A hernia is present. Comments: Patient has normal active bowel sounds. Abdomen is soft and mildly diffusely tender without rebound or guarding. Patient is most tender over the midline incision without any specific point tenderness. There are defects noted along the previous incision and hernias easily reducible   Musculoskeletal: Normal range of motion. General: No tenderness. Skin:     General: Skin is warm and dry. Coloration: Skin is not pale. Findings: No erythema or rash. Neurological:      Mental Status: She is alert and oriented to person, place, and time. Cranial Nerves: No cranial nerve deficit. Coordination: Coordination normal.      Deep Tendon Reflexes: Reflexes are normal and symmetric. Psychiatric:         Behavior: Behavior normal. Behavior is cooperative. Thought Content:  Thought content normal.         Judgment: Judgment normal.           Vitals:    01/16/20 0951   BP: (!) 136/90   Site: Left Upper Arm   Position: Sitting   Cuff Size: Large Adult   Pulse: 91   Resp: 16   Temp: 98.1 °F (36.7 °C)   TempSrc: Oral   Weight: (!) 321 lb 6.4 oz (145.8 kg)   Height: 5' 10\" (1.778 m)     BP Readings from Last 3 Encounters:   01/16/20 (!) 136/90   10/07/19 (!) 142/92   07/25/19 136/82              DIAGNOSTIC FINDINGS:  CBC:  Lab Results   Component Value Date    WBC 12.5 06/03/2019    HGB 15.2 06/03/2019     06/03/2019       BMP:    Lab Results   Component Value Date     06/03/2019    K 4.1 06/03/2019     06/03/2019    CO2 22 06/03/2019    BUN 14 prescribed medications. Barriers to medication compliance addressed. All patient questions answered. Pt voiced understanding. · Patient instructed to return to the office if symptoms do not resolve or go directly to the ER if the symptoms worsen - patient voiced understanding. · Patient given educational materials - see patient instructions    Pebbles BELL  [facility]  1/16/2020, 11:15 AM    This note is created with the assistance of a speech-recognition program. While intending to generate a document that actually reflects the content of the visit, the document can still have some mistakes which may not have been identified and corrected by editing.

## 2020-01-16 NOTE — PROGRESS NOTES
Visit Information    Have you changed or started any medications since your last visit including any over-the-counter medicines, vitamins, or herbal medicines? no   Are you having any side effects from any of your medications? -  no  Have you stopped taking any of your medications? Is so, why? -  no    Have you seen any other physician or provider since your last visit? No  Have you had any other diagnostic tests since your last visit? No  Have you been seen in the emergency room and/or had an admission to a hospital since we last saw you? No  Have you had your routine dental cleaning in the past 6 months? no    Have you activated your [employer] account? If not, what are your barriers?  Yes     Patient Care Team:  General Nicole PA-C as PCP - General (Physician Assistant)  General Nicole PA-C as PCP - [facility]    Medical History Review  Past Medical, Family, and Social History reviewed and does not contribute to the patient presenting condition    Health Maintenance   Topic Date Due    Hepatitis B vaccine (1 of 3 - Risk 3-dose series) 04/27/1980    Diabetic retinal exam  10/30/2018    Annual Wellness Visit (AWV)  05/29/2019    Diabetic microalbuminuria test  07/12/2019    Lipid screen  07/12/2019    Breast cancer screen  08/07/2019    DTaP/Tdap/Td vaccine (1 - Tdap) 01/24/2027 (Originally [birth year]27/1971)    Diabetic foot exam  04/09/2020    Potassium monitoring  06/03/2020    Creatinine monitoring  06/03/2020    A1C test (Diabetic or Prediabetic)  10/07/2020    Colon cancer screen colonoscopy  05/18/2027    Flu vaccine  Completed    Shingles Vaccine  Completed    Pneumococcal 0-64 years Vaccine  Completed    Hepatitis C screen  Completed    HIV screen  Completed

## 2020-01-18 LAB
CULTURE: ABNORMAL
CULTURE: ABNORMAL
Lab: ABNORMAL
SPECIMEN DESCRIPTION: ABNORMAL

## 2020-01-21 NOTE — TELEPHONE ENCOUNTER
Pt states she is taking 75mg of her Savella daily, but that [employer] only received a fax for 50. Please advise. Thank you.

## 2020-01-21 NOTE — TELEPHONE ENCOUNTER
50 mg dose was listed on her medication list.  Please inquire if the medication are capsules or tablets. If she already received the medication and they are capsules then I will need to send a new prescription. If they are tablets then advised her to take 1-1/2 tablets. If she needs a short supply sent to another pharmacy while waiting for mail order please pend that order for me.

## 2020-01-22 NOTE — TELEPHONE ENCOUNTER
Pt was made aware, and takes tablets. Pended the Savella short-term for [facility] the pt wanted. Pt also stated she will still need a corrected long-term supply sent to [facility] [place].

## 2020-01-23 ENCOUNTER — HOSPITAL ENCOUNTER (OUTPATIENT)
Age: 60
Setting detail: SPECIMEN
Discharge: HOME | End: 2020-01-23
Payer: MEDICARE

## 2020-01-23 ENCOUNTER — HOSPITAL ENCOUNTER (OUTPATIENT)
Dept: CT IMAGING | Facility: CLINIC | Age: 60
Discharge: HOME | End: 2020-01-25
Payer: MEDICARE

## 2020-01-23 LAB
ABSOLUTE EOS #: 0.19 K/UL (ref 0–0.44)
ABSOLUTE IMMATURE GRANULOCYTE: 0.08 K/UL (ref 0–0.3)
ABSOLUTE LYMPH #: 1.63 K/UL (ref 1.1–3.7)
ABSOLUTE MONO #: 0.64 K/UL (ref 0.1–1.2)
ALBUMIN SERPL-MCNC: 4.1 G/DL (ref 3.5–5.2)
ALBUMIN/GLOBULIN RATIO: 1.1 (ref 1–2.5)
ALP BLD-CCNC: 105 U/L (ref 35–104)
ALT SERPL-CCNC: 24 U/L (ref 5–33)
ANION GAP SERPL CALCULATED.3IONS-SCNC: 21 MMOL/L (ref 9–17)
AST SERPL-CCNC: 22 U/L
BASOPHILS # BLD: 1 % (ref 0–2)
BASOPHILS ABSOLUTE: 0.08 K/UL (ref 0–0.2)
BILIRUB SERPL-MCNC: 0.18 MG/DL (ref 0.3–1.2)
BILIRUBIN DIRECT: 0.1 MG/DL
BILIRUBIN, INDIRECT: 0.08 MG/DL (ref 0–1)
BUN BLDV-MCNC: 16 MG/DL (ref 6–20)
BUN/CREAT BLD: ABNORMAL (ref 9–20)
CALCIUM SERPL-MCNC: 9.4 MG/DL (ref 8.6–10.4)
CHLORIDE BLD-SCNC: 95 MMOL/L (ref 98–107)
CO2: 20 MMOL/L (ref 20–31)
CREAT SERPL-MCNC: 1.04 MG/DL (ref 0.5–0.9)
CREATININE URINE: 119.8 MG/DL (ref 28–217)
DIFFERENTIAL TYPE: ABNORMAL
EOSINOPHILS RELATIVE PERCENT: 2 % (ref 1–4)
GFR AFRICAN AMERICAN: >60 ML/MIN
GFR NON-AFRICAN AMERICAN: 54 ML/MIN
GFR SERPL CREATININE-BSD FRML MDRD: ABNORMAL ML/MIN/{1.73_M2}
GFR SERPL CREATININE-BSD FRML MDRD: ABNORMAL ML/MIN/{1.73_M2}
GLOBULIN: ABNORMAL G/DL (ref 1.5–3.8)
GLUCOSE BLD-MCNC: 193 MG/DL (ref 70–99)
HCT VFR BLD CALC: 43.4 % (ref 36.3–47.1)
HEMOGLOBIN: 14.1 G/DL (ref 11.9–15.1)
IMMATURE GRANULOCYTES: 1 %
LIPASE: 17 U/L (ref 13–60)
LYMPHOCYTES # BLD: 16 % (ref 24–43)
MCH RBC QN AUTO: 31.1 PG (ref 25.2–33.5)
MCHC RBC AUTO-ENTMCNC: 32.5 G/DL (ref 28.4–34.8)
MCV RBC AUTO: 95.6 FL (ref 82.6–102.9)
MICROALBUMIN/CREAT 24H UR: 64 MG/L
MICROALBUMIN/CREAT UR-RTO: 53 MCG/MG CREAT
MONOCYTES # BLD: 6 % (ref 3–12)
NRBC AUTOMATED: 0 PER 100 WBC
PDW BLD-RTO: 13.1 % (ref 11.8–14.4)
PLATELET # BLD: 280 K/UL (ref 138–453)
PLATELET ESTIMATE: ABNORMAL
PMV BLD AUTO: 10.9 FL (ref 8.1–13.5)
POC CREATININE: 1.1 MG/DL (ref 0.6–1.4)
POTASSIUM SERPL-SCNC: 4.2 MMOL/L (ref 3.7–5.3)
RBC # BLD: 4.54 M/UL (ref 3.95–5.11)
RBC # BLD: ABNORMAL 10*6/UL
SEG NEUTROPHILS: 74 % (ref 36–65)
SEGMENTED NEUTROPHILS ABSOLUTE COUNT: 7.71 K/UL (ref 1.5–8.1)
SODIUM BLD-SCNC: 136 MMOL/L (ref 135–144)
TOTAL PROTEIN: 7.7 G/DL (ref 6.4–8.3)
TSH SERPL DL<=0.05 MIU/L-ACNC: 2.08 MIU/L (ref 0.3–5)
WBC # BLD: 10.3 K/UL (ref 3.5–11.3)
WBC # BLD: ABNORMAL 10*3/UL

## 2020-01-23 PROCEDURE — 74177 CT ABD & PELVIS W/CONTRAST: CPT

## 2020-01-23 PROCEDURE — 82565 ASSAY OF CREATININE: CPT

## 2020-01-23 PROCEDURE — 6360000004 HC RX CONTRAST MEDICATION: Performed by: PHYSICIAN ASSISTANT

## 2020-01-23 PROCEDURE — 2580000003 HC RX 258: Performed by: PHYSICIAN ASSISTANT

## 2020-01-23 RX ORDER — SODIUM CHLORIDE 0.9 % (FLUSH) 0.9 %
10 SYRINGE (ML) INJECTION PRN
Status: DISCONTINUED | OUTPATIENT
Start: 2020-01-23 | End: 2020-01-26 | Stop reason: HOSPADM

## 2020-01-23 RX ORDER — 0.9 % SODIUM CHLORIDE 0.9 %
80 INTRAVENOUS SOLUTION INTRAVENOUS ONCE
Status: COMPLETED | OUTPATIENT
Start: 2020-01-23 | End: 2020-01-23

## 2020-01-23 RX ADMIN — IOHEXOL 50 ML: 240 INJECTION, SOLUTION INTRATHECAL; INTRAVASCULAR; INTRAVENOUS; ORAL at 10:17

## 2020-01-23 RX ADMIN — Medication 10 ML: at 10:17

## 2020-01-23 RX ADMIN — IOVERSOL 75 ML: 741 INJECTION INTRA-ARTERIAL; INTRAVENOUS at 10:17

## 2020-01-23 RX ADMIN — SODIUM CHLORIDE 80 ML: 9 INJECTION, SOLUTION INTRAVENOUS at 10:17

## 2020-01-24 LAB
CHOLESTEROL, FASTING: 198 MG/DL
CHOLESTEROL/HDL RATIO: 5.8
HDLC SERPL-MCNC: 34 MG/DL
LDL CHOLESTEROL DIRECT: 108 MG/DL
LDL CHOLESTEROL: ABNORMAL MG/DL (ref 0–130)
TRIGLYCERIDE, FASTING: 479 MG/DL
VLDLC SERPL CALC-MCNC: ABNORMAL MG/DL (ref 1–30)

## 2020-01-27 ENCOUNTER — TELEPHONE (OUTPATIENT)
Dept: FAMILY MEDICINE CLINIC | Age: 60
End: 2020-01-27

## 2020-01-27 RX ORDER — FENOFIBRATE 160 MG/1
160 TABLET ORAL DAILY
Qty: 30 TABLET | Refills: 5 | Status: SHIPPED | OUTPATIENT
Start: 2020-01-27 | End: 2020-01-27

## 2020-01-27 RX ORDER — FENOFIBRATE 160 MG/1
160 TABLET ORAL DAILY
Qty: 90 TABLET | Refills: 3 | Status: SHIPPED | OUTPATIENT
Start: 2020-01-27 | End: 2020-04-13 | Stop reason: SDUPTHER

## 2020-01-27 NOTE — TELEPHONE ENCOUNTER
Patient called back and lab results discussed. Patient is noted to have significant elevation in triglyceride levels. They have increased since her last check as well. Patient states that she has been on a couple of different statins in the past and was unable to tolerate them due to medication side effects. We discussed starting fenofibrate and diet modifications. I discussed risks of continued hypertriglyceridemia. Patient's questions were answered and patient states understanding and agrees with plan. We will recheck lipid profile in 3 months. Lipase was within normal limits. Patient CT scan shows consistent with ventral hernias. Patient is scheduling appointment with general surgery for evaluation of the hernias.

## 2020-02-24 RX ORDER — PEN NEEDLE, DIABETIC 31 GX5/16"
NEEDLE, DISPOSABLE MISCELLANEOUS
Qty: 100 EACH | Refills: 3 | Status: SHIPPED | OUTPATIENT
Start: 2020-02-24 | End: 2020-09-08

## 2020-02-24 NOTE — TELEPHONE ENCOUNTER
Type 2 diabetes mellitus with diabetic polyneuropathy, with long-term current use of insulin (HCC)     TIFFANI positive     Chronic obstructive pulmonary disease (HCC)     Fibromyalgia

## 2020-03-23 RX ORDER — AMITRIPTYLINE HYDROCHLORIDE 50 MG/1
TABLET, FILM COATED ORAL
Qty: 90 TABLET | Refills: 0 | Status: SHIPPED | OUTPATIENT
Start: 2020-03-23 | End: 2020-06-22

## 2020-03-23 NOTE — TELEPHONE ENCOUNTER
diabetes ([facility]     Depression     Mild persistent asthma without complication     Type 2 diabetes mellitus with diabetic polyneuropathy, with long-term current use of insulin (HCC)     TIFFANI positive     Chronic obstructive pulmonary disease (HCC)     Fibromyalgia

## 2020-04-09 RX ORDER — COLCHICINE 0.6 MG/1
0.6 TABLET ORAL DAILY
Qty: 30 TABLET | Refills: 0 | Status: SHIPPED | OUTPATIENT
Start: 2020-04-09 | End: 2020-07-16

## 2020-04-13 RX ORDER — MILNACIPRAN HYDROCHLORIDE 25 MG/1
25 TABLET, FILM COATED ORAL 2 TIMES DAILY
Qty: 180 TABLET | Refills: 1 | Status: SHIPPED | OUTPATIENT
Start: 2020-04-13 | End: 2020-10-26

## 2020-04-13 RX ORDER — ALLOPURINOL 300 MG/1
TABLET ORAL
Qty: 90 TABLET | Refills: 1 | Status: SHIPPED | OUTPATIENT
Start: 2020-04-13 | End: 2020-09-08 | Stop reason: SDUPTHER

## 2020-04-13 RX ORDER — FENOFIBRATE 160 MG/1
160 TABLET ORAL DAILY
Qty: 90 TABLET | Refills: 1 | Status: SHIPPED | OUTPATIENT
Start: 2020-04-13 | End: 2020-09-08 | Stop reason: SDUPTHER

## 2020-05-21 RX ORDER — INSULIN GLARGINE 300 U/ML
52 INJECTION, SOLUTION SUBCUTANEOUS DAILY
Qty: 24 ML | Refills: 2 | Status: SHIPPED | OUTPATIENT
Start: 2020-05-21 | End: 2020-09-08 | Stop reason: SDUPTHER

## 2020-05-21 NOTE — TELEPHONE ENCOUNTER
Next Visit Date:  Future Appointments   Date Time Provider Abbe Vasquez   6/5/2020 10:00 AM [facility] [address] Maintenance   Topic Date Due    Hepatitis B vaccine (1 of 3 - Risk 3-dose series) 04/27/1980    Diabetic retinal exam  10/30/2018    Annual Wellness Visit (AWV)  05/29/2019    Breast cancer screen  08/07/2019    Diabetic foot exam  04/09/2020    DTaP/Tdap/Td vaccine (1 - Tdap) 01/24/2027 (Originally 4/27/1980)    A1C test (Diabetic or Prediabetic)  01/16/2021    Diabetic microalbuminuria test  01/23/2021    Lipid screen  01/23/2021    Potassium monitoring  01/23/2021    Creatinine monitoring  01/23/2021    Colon cancer screen colonoscopy  05/18/2027    Flu vaccine  Completed    Shingles Vaccine  Completed    Pneumococcal 0-64 years Vaccine  Completed    Hepatitis C screen  Completed    HIV screen  Completed    Hepatitis A vaccine  Aged Out    Hib vaccine  Aged Out    Meningococcal (ACWY) vaccine  Aged Out       Hemoglobin A1C (%)   Date Value   01/16/2020 7.9   10/07/2019 7.4   04/09/2019 7.5             ( goal A1C is < 7)   Microalb/Crt.  Ratio (mcg/mg creat)   Date Value   01/23/2020 53 (H)     LDL Cholesterol (mg/dL)   Date Value   01/23/2020 07/12/2018 84       (goal LDL is <100)   AST (U/L)   Date Value   01/23/2020 22     ALT (U/L)   Date Value   01/23/2020 24     BUN (mg/dL)   Date Value   01/23/2020 16     BP Readings from Last 3 Encounters:   01/16/20 (!) 136/90   10/07/19 (!) 142/92   07/25/19 136/82          (goal 120/80)    All Future Testing planned in [facility]  Lab Frequency Next Occurrence   TRU Digital Screen Bilateral [[id number]] Once 05/05/2020   Lipid, Fasting Once 04/27/2020               Patient Active Problem List:     HLD (hyperlipidemia)     HTN (hypertension)     Morbid obesity ([address]     Neuropathy due to secondary diabetes ([address]     Depression     Mild persistent asthma without complication     Type 2 diabetes

## 2020-06-05 ENCOUNTER — OFFICE VISIT (OUTPATIENT)
Dept: FAMILY MEDICINE CLINIC | Age: 60
End: 2020-06-05
Payer: MEDICARE

## 2020-06-05 VITALS
HEIGHT: 70 IN | DIASTOLIC BLOOD PRESSURE: 92 MMHG | SYSTOLIC BLOOD PRESSURE: 128 MMHG | OXYGEN SATURATION: 96 % | BODY MASS INDEX: 41.95 KG/M2 | WEIGHT: 293 LBS | HEART RATE: 90 BPM | TEMPERATURE: 98.3 F

## 2020-06-05 PROCEDURE — G0438 PPPS, INITIAL VISIT: HCPCS | Performed by: PHYSICIAN ASSISTANT

## 2020-06-05 PROCEDURE — 3017F COLORECTAL CA SCREEN DOC REV: CPT | Performed by: PHYSICIAN ASSISTANT

## 2020-06-05 RX ORDER — VITAMIN B COMPLEX
1 CAPSULE ORAL DAILY
COMMUNITY

## 2020-06-05 RX ORDER — DIPHENHYDRAMINE HYDROCHLORIDE 25 MG/1
TABLET ORAL
COMMUNITY

## 2020-06-05 RX ORDER — LISINOPRIL 5 MG/1
5 TABLET ORAL DAILY
COMMUNITY
End: 2020-09-08

## 2020-06-05 RX ORDER — ASCORBIC ACID 500 MG
500 TABLET ORAL DAILY
COMMUNITY

## 2020-06-05 ASSESSMENT — PATIENT HEALTH QUESTIONNAIRE - PHQ9
SUM OF ALL RESPONSES TO PHQ QUESTIONS 1-9: 2
SUM OF ALL RESPONSES TO PHQ QUESTIONS 1-9: 2
SUM OF ALL RESPONSES TO PHQ QUESTIONS 1-9: 10

## 2020-06-05 ASSESSMENT — LIFESTYLE VARIABLES: HOW OFTEN DO YOU HAVE A DRINK CONTAINING ALCOHOL: 0

## 2020-06-18 NOTE — PATIENT INSTRUCTIONS
Personalized Preventive Plan for Anmol Gomez - 6/5/2020  Medicare offers a range of preventive health benefits. Some of the tests and screenings are paid in full while other may be subject to a deductible, co-insurance, and/or copay. Some of these benefits include a comprehensive review of your medical history including lifestyle, illnesses that may run in your family, and various assessments and screenings as appropriate. After reviewing your medical record and screening and assessments performed today your provider may have ordered immunizations, labs, imaging, and/or referrals for you. A list of these orders (if applicable) as well as your Preventive Care list are included within your After Visit Summary for your review. Other Preventive Recommendations:    · A preventive eye exam performed by an eye specialist is recommended every 1-2 years to screen for glaucoma; cataracts, macular degeneration, and other eye disorders. · A preventive dental visit is recommended every 6 months. · Try to get at least 150 minutes of exercise per week or 10,000 steps per day on a pedometer . · Order or download the FREE \"Exercise & Physical Activity: Your Everyday Guide\" from [employer]. Call 0-439.762.4713 or search [employer] online. · You need 4335-4821 mg of calcium and 7258-1585 IU of vitamin D per day. It is possible to meet your calcium requirement with diet alone, but a vitamin D supplement is usually necessary to meet this goal.  · When exposed to the sun, use a sunscreen that protects against both UVA and UVB radiation with an SPF of 30 or greater. Reapply every 2 to 3 hours or after sweating, drying off with a towel, or swimming. · Always wear a seat belt when traveling in a car. Always wear a helmet when riding a bicycle or motorcycle.

## 2020-06-18 NOTE — PROGRESS NOTES
(ZYLOPRIM) 300 MG tablet TAKE 1 TABLET EVERY DAY Yes Nafisa Zuñiga PA-C   colchicine (COLCRYS) 0.6 MG tablet Take 1 tablet by mouth daily Yes Dorma Baumgarten, PA-C   amitriptyline (ELAVIL) 50 MG tablet TAKE 1 TABLET BY MOUTH EVERY NIGHT Yes Nafisa Mora PA-C   B-D UF III MINI PEN NEEDLES 31G X 5 MM MISC USE EVERY DAY Yes Nafisa Mora PA-C   milnacipran HCl (SAVELLA) 50 MG TABS Take 50 mg by mouth daily Yes Historical Provider, MD   amitriptyline (ELAVIL) 75 MG tablet TAKE 1 TABLET AT BEDTIME Yes Dorma Baumgarten, PA-C   carvedilol (COREG) 25 MG tablet TAKE 1 TABLET BY MOUTH TWICE DAILY Yes Nafisa Mora PA-C   Liraglutide (VICTOZA) 18 MG/3ML SOPN SC injection Inject 1.8 mg into the skin daily Please give patient # 30 32g 4mm needles with  2 refills Yes Dorma Baumgarten, PA-C   rOPINIRole (REQUIP) 1 MG tablet TAKE 1 TABLET BY MOUTH EVERY NIGHT AT BEDTIME Yes Dorma Baumgarten, PA-C   tiZANidine (ZANAFLEX) 4 MG tablet TAKE 1 TABLET BY MOUTH TWICE DAILY Yes Nafisa Mora PA-C   albuterol (PROVENTIL) (2.5 MG/3ML) 0.083% nebulizer solution Take 3 mLs by nebulization every 6 hours as needed for Wheezing or Shortness of Breath Yes Poncho Dumont MD   fluticasone (FLONASE) 50 MCG/ACT nasal spray fluticasone propionate 50 mcg/actuation nasal spray,suspension Yes Historical Provider, MD   tiotropium ([facility]) 18 MCG inhalation capsule INHALE THE CONTENTS OF 1 CAPSULE INTO THE LUNGS DAILY Yes Shelbie Sandhu MD       Past Medical History:   Diagnosis Date    Anxiety     Asthma     Bronchitis     Chronic obstructive pulmonary disease ([facility] 9/11/2018    Depression     Fibromyalgia     Gout     HLD (hyperlipidemia) 7/1/2015    Hypertension     Neuropathy     Obesity     Obstructive sleep apnea     Osteoarthritis     Type 2 diabetes mellitus with diabetic polyneuropathy, with long-term current use of insulin ([facility] 9/7/2017    Type II or unspecified type diabetes mellitus without mention of complication, not stated as uncontrolled     Urinary incontinence        Past Surgical History:   Procedure Laterality Date    APPENDECTOMY      TAKEN WITH HYSTERECTOMY    BUNIONECTOMY Left 02-22-16    with arthrodesis and 2 through 4 with hallux valgus arthroplasty 5th toe on left    COLONOSCOPY  8/17/2015    2 polyps removed, diverticulosis, internal hemorrhoids    FINGER SURGERY      re-attachment of left middle finger    FOOT SURGERY Right 11/11/2015    bunionectomy, 2-3-4th pinning, 5th arthroplasty    HERNIA REPAIR      UMBILICAL    HYSTERECTOMY, TOTAL ABDOMINAL  2001       Family History   Problem Relation Age of Onset    Cancer Mother 61        breast     High Blood Pressure Mother     Stroke Father 62    Depression Father 64        comitted sucide     Diabetes Sister 36    High Blood Pressure Sister     Depression Sister     Cancer Brother 64        prostate    Diabetes Brother     High Blood Pressure Brother     Cancer Paternal Aunt 71        colon, rectal     Cancer Paternal Grandfather 79        lung    Heart Disease Neg Hx        CareTeam (Including outside providers/suppliers regularly involved in providing care):   Patient Care Team:  Jasbir Dash PA-C as PCP - General (Physician Assistant)  Jasbir Dash PA-C as PCP - [facility] Empaneled Provider    Wt Readings from Last 3 Encounters:   06/05/20 (!) 322 lb 4.8 oz (146.2 kg)   01/16/20 (!) 321 lb 6.4 oz (145.8 kg)   10/07/19 (!) 322 lb 9.6 oz (146.3 kg)     Vitals:    06/05/20 1005   BP: (!) 128/92   Pulse: 90   Temp: 98.3 °F (36.8 °C)   SpO2: 96%   Weight: (!) 322 lb 4.8 oz (146.2 kg)   Height: 5' 10\" (1.778 m)     Body mass index is 46.25 kg/m². Based upon direct observation of the patient, evaluation of cognition reveals recent and remote memory intact.     General Appearance: alert and oriented to person, place and time, well developed and well- nourished, in no acute distress  Skin: warm and dry, no rash or Hepatitis C screen  Completed    HIV screen  Completed    Hepatitis A vaccine  Aged Out    Hib vaccine  Aged Out    Meningococcal (ACWY) vaccine  Aged Out     Recommendations for [employer] Due: see orders and patient instructions/AVS.  . Recommended screening schedule for the next 5-10 years is provided to the patient in written form: see Patient Roxann Tiwari was seen today for medicare awv. Diagnoses and all orders for this visit:    Balance problems  -     MRI BRAIN WO CONTRAST; Future  -     Demetria Espino MD, Neurology, [address]    Frontal headache  -     MRI BRAIN WO CONTRAST; Future  -     Demetria Espino MD, Neurology, [address]    Breast cancer screening by mammogram  -     TRU DIGITAL SCREEN W CAD BILATERAL; Future    Mass of soft tissue of neck  -     US HEAD NECK SOFT TISSUE THYROID; Future    Routine general medical examination at a health care facility              Shayna De La Paz is a 62 y.o. female being evaluated by a Virtual Visit (video and audio) encounter to address concerns as mentioned above. A caregiver was present when appropriate. Due to this being a TeleHealth encounter ([facility]), evaluation of the following organ systems was limited: Vitals/Constitutional/EENT/Resp/CV/GI//MS/Neuro/Skin/Heme-Lymph-Imm. Pursuant to the emergency declaration under the [facility] [address] authority and the [employer] Act, this Virtual Visit was conducted with patient's (and/or legal guardian's) consent, to reduce the patient's risk of exposure to COVID-19 and provide necessary medical care. The patient (and/or legal guardian) has also been advised to contact this office for worsening conditions or problems, and seek emergency medical treatment and/or call 911 if deemed necessary.      Patient identification was verified at the start of the visit: Yes    Services were provided through a video synchronous discussion virtually to substitute for in-person clinic visit. Patient and provider were located at their individual homes. --ELOISA BELL PA-C on 6/18/2020 at 8:31 AM    An electronic signature was used to authenticate this note.

## 2020-06-22 RX ORDER — AMITRIPTYLINE HYDROCHLORIDE 50 MG/1
TABLET, FILM COATED ORAL
Qty: 90 TABLET | Refills: 0 | Status: SHIPPED | OUTPATIENT
Start: 2020-06-22 | End: 2020-09-08 | Stop reason: SDUPTHER

## 2020-07-09 ENCOUNTER — HOSPITAL ENCOUNTER (OUTPATIENT)
Dept: MRI IMAGING | Age: 60
Discharge: HOME | End: 2020-07-11
Payer: MEDICARE

## 2020-07-09 ENCOUNTER — HOSPITAL ENCOUNTER (OUTPATIENT)
Dept: ULTRASOUND IMAGING | Age: 60
Discharge: HOME | End: 2020-07-11
Payer: MEDICARE

## 2020-07-09 ENCOUNTER — HOSPITAL ENCOUNTER (OUTPATIENT)
Dept: MAMMOGRAPHY | Age: 60
Discharge: HOME | End: 2020-07-11
Payer: MEDICARE

## 2020-07-09 PROCEDURE — 77063 BREAST TOMOSYNTHESIS BI: CPT

## 2020-07-09 PROCEDURE — 76536 US EXAM OF HEAD AND NECK: CPT

## 2020-07-16 RX ORDER — COLCHICINE 0.6 MG/1
TABLET, FILM COATED ORAL
Qty: 30 TABLET | Refills: 0 | Status: SHIPPED | OUTPATIENT
Start: 2020-07-16 | End: 2020-10-16 | Stop reason: SDUPTHER

## 2020-07-16 NOTE — TELEPHONE ENCOUNTER
Next Visit Date:  Future Appointments   Date Time Provider Abbe Vasquez   9/4/2020 10:15 AM Aldona Prior [facility] [address] Maintenance   Topic Date Due    Hepatitis B vaccine (1 of 3 - Risk 3-dose series) 04/27/1980    Diabetic retinal exam  10/30/2018    Diabetic foot exam  04/09/2020    DTaP/Tdap/Td vaccine (1 - Tdap) 01/24/2027 (Originally 4/27/1980)    Flu vaccine (1) 09/01/2020    A1C test (Diabetic or Prediabetic)  01/16/2021    Diabetic microalbuminuria test  01/23/2021    Lipid screen  01/23/2021    Potassium monitoring  01/23/2021    Creatinine monitoring  01/23/2021    Annual Wellness Visit (AWV)  06/06/2021    Breast cancer screen  07/09/2022    Colon cancer screen colonoscopy  05/18/2027    Shingles Vaccine  Completed    Pneumococcal 0-64 years Vaccine  Completed    Hepatitis C screen  Completed    HIV screen  Completed    Hepatitis A vaccine  Aged Out    Hib vaccine  Aged Out    Meningococcal (ACWY) vaccine  Aged Out       Hemoglobin A1C (%)   Date Value   01/16/2020 7.9   10/07/2019 7.4   04/09/2019 7.5             ( goal A1C is < 7)   Microalb/Crt.  Ratio (mcg/mg creat)   Date Value   01/23/2020 53 (H)     LDL Cholesterol (mg/dL)   Date Value   01/23/2020 07/12/2018 84       (goal LDL is <100)   AST (U/L)   Date Value   01/23/2020 22     ALT (U/L)   Date Value   01/23/2020 24     BUN (mg/dL)   Date Value   01/23/2020 16     BP Readings from Last 3 Encounters:   06/05/20 (!) 128/92   01/16/20 (!) 136/90   10/07/19 (!) 142/92          (goal 120/80)    All Future Testing planned in [facility]  Lab Frequency Next Occurrence   TRU Digital Screen Bilateral [[id number]] Once 05/05/2020   Lipid, Fasting Once 04/27/2020               Patient Active Problem List:     HLD (hyperlipidemia)     HTN (hypertension)     Morbid obesity ([address]     Neuropathy due to secondary diabetes ([address]     Depression     Mild persistent asthma without complication     Type 2 diabetes mellitus with diabetic polyneuropathy, with long-term current use of insulin (HCC)     TIFFANI positive     Chronic obstructive pulmonary disease (HCC)     Fibromyalgia

## 2020-07-27 ENCOUNTER — TELEPHONE (OUTPATIENT)
Dept: FAMILY MEDICINE CLINIC | Age: 60
End: 2020-07-27

## 2020-07-27 RX ORDER — FLUCONAZOLE 150 MG/1
150 TABLET ORAL
Qty: 2 TABLET | Refills: 0 | Status: SHIPPED | OUTPATIENT
Start: 2020-07-27 | End: 2020-08-02

## 2020-07-27 NOTE — TELEPHONE ENCOUNTER
Patient called stating she has a Yeast infection. Sx include burning and foul odor. No other sx. Please send to [facility] [email or web address]. Also patient states that the water pill is too strong and is causing leg cramps at night wakes her up very painful. Please advise thank you!

## 2020-07-27 NOTE — TELEPHONE ENCOUNTER
I will send in diflucan but I do not see a water pill on her list.  Please clarify her medication list and ask her what medications she is taking.

## 2020-08-18 RX ORDER — MILNACIPRAN HYDROCHLORIDE 50 MG/1
TABLET, FILM COATED ORAL
Qty: 180 TABLET | Refills: 0 | Status: SHIPPED | OUTPATIENT
Start: 2020-08-18 | End: 2021-01-11 | Stop reason: SDUPTHER

## 2020-08-18 NOTE — TELEPHONE ENCOUNTER
Next Visit Date:  Future Appointments   Date Time Provider Abbe Vasquez   9/8/2020 10:30 AM [facility] [address] Maintenance   Topic Date Due    Hepatitis B vaccine (1 of 3 - Risk 3-dose series) 04/27/1980    Diabetic retinal exam  10/30/2018    Diabetic foot exam  04/09/2020    DTaP/Tdap/Td vaccine (1 - Tdap) 01/24/2027 (Originally 4/27/1980)    Flu vaccine (1) 09/01/2020    A1C test (Diabetic or Prediabetic)  01/16/2021    Diabetic microalbuminuria test  01/23/2021    Lipid screen  01/23/2021    Potassium monitoring  01/23/2021    Creatinine monitoring  01/23/2021    Annual Wellness Visit (AWV)  06/06/2021    Breast cancer screen  07/09/2022    Colon cancer screen colonoscopy  05/18/2027    Shingles Vaccine  Completed    Pneumococcal 0-64 years Vaccine  Completed    Hepatitis C screen  Completed    HIV screen  Completed    Hepatitis A vaccine  Aged Out    Hib vaccine  Aged Out    Meningococcal (ACWY) vaccine  Aged Out       Hemoglobin A1C (%)   Date Value   01/16/2020 7.9   10/07/2019 7.4   04/09/2019 7.5             ( goal A1C is < 7)   Microalb/Crt.  Ratio (mcg/mg creat)   Date Value   01/23/2020 53 (H)     LDL Cholesterol (mg/dL)   Date Value   01/23/2020 07/12/2018 84       (goal LDL is <100)   AST (U/L)   Date Value   01/23/2020 22     ALT (U/L)   Date Value   01/23/2020 24     BUN (mg/dL)   Date Value   01/23/2020 16     BP Readings from Last 3 Encounters:   06/05/20 (!) 128/92   01/16/20 (!) 136/90   10/07/19 (!) 142/92          (goal 120/80)    All Future Testing planned in [facility]  Lab Frequency Next Occurrence   TRU Digital Screen Bilateral [[id number]] Once 05/05/2020   Lipid, Fasting Once 04/27/2020               Patient Active Problem List:     HLD (hyperlipidemia)     HTN (hypertension)     Morbid obesity ([address]     Neuropathy due to secondary diabetes ([address]     Depression     Mild persistent asthma without complication     Type 2 diabetes mellitus with diabetic polyneuropathy, with long-term current use of insulin (HCC)     TIFFANI positive     Chronic obstructive pulmonary disease (HCC)     Fibromyalgia

## 2020-09-08 ENCOUNTER — OFFICE VISIT (OUTPATIENT)
Dept: FAMILY MEDICINE CLINIC | Age: 60
End: 2020-09-08
Payer: MEDICARE

## 2020-09-08 VITALS
BODY MASS INDEX: 41.95 KG/M2 | RESPIRATION RATE: 16 BRPM | DIASTOLIC BLOOD PRESSURE: 84 MMHG | TEMPERATURE: 96.6 F | WEIGHT: 293 LBS | HEIGHT: 70 IN | HEART RATE: 86 BPM | SYSTOLIC BLOOD PRESSURE: 126 MMHG

## 2020-09-08 LAB — HBA1C MFR BLD: 9.8 %

## 2020-09-08 PROCEDURE — G8417 CALC BMI ABV UP PARAM F/U: HCPCS | Performed by: PHYSICIAN ASSISTANT

## 2020-09-08 PROCEDURE — 3017F COLORECTAL CA SCREEN DOC REV: CPT | Performed by: PHYSICIAN ASSISTANT

## 2020-09-08 PROCEDURE — 4004F PT TOBACCO SCREEN RCVD TLK: CPT | Performed by: PHYSICIAN ASSISTANT

## 2020-09-08 PROCEDURE — 3046F HEMOGLOBIN A1C LEVEL >9.0%: CPT | Performed by: PHYSICIAN ASSISTANT

## 2020-09-08 PROCEDURE — 2022F DILAT RTA XM EVC RTNOPTHY: CPT | Performed by: PHYSICIAN ASSISTANT

## 2020-09-08 PROCEDURE — G8427 DOCREV CUR MEDS BY ELIG CLIN: HCPCS | Performed by: PHYSICIAN ASSISTANT

## 2020-09-08 PROCEDURE — G0008 ADMIN INFLUENZA VIRUS VAC: HCPCS | Performed by: PHYSICIAN ASSISTANT

## 2020-09-08 PROCEDURE — 90686 IIV4 VACC NO PRSV 0.5 ML IM: CPT | Performed by: PHYSICIAN ASSISTANT

## 2020-09-08 PROCEDURE — 3023F SPIROM DOC REV: CPT | Performed by: PHYSICIAN ASSISTANT

## 2020-09-08 PROCEDURE — G8926 SPIRO NO PERF OR DOC: HCPCS | Performed by: PHYSICIAN ASSISTANT

## 2020-09-08 PROCEDURE — 83036 HEMOGLOBIN GLYCOSYLATED A1C: CPT | Performed by: PHYSICIAN ASSISTANT

## 2020-09-08 PROCEDURE — 99214 OFFICE O/P EST MOD 30 MIN: CPT | Performed by: PHYSICIAN ASSISTANT

## 2020-09-08 RX ORDER — DOXYCYCLINE HYCLATE 100 MG
100 TABLET ORAL 2 TIMES DAILY
Qty: 20 TABLET | Refills: 0 | Status: SHIPPED | OUTPATIENT
Start: 2020-09-08 | End: 2020-09-18

## 2020-09-08 RX ORDER — LIRAGLUTIDE 6 MG/ML
1.8 INJECTION SUBCUTANEOUS DAILY
Qty: 9 PEN | Refills: 2 | Status: SHIPPED | OUTPATIENT
Start: 2020-09-08 | End: 2020-12-08 | Stop reason: SDUPTHER

## 2020-09-08 RX ORDER — SEMAGLUTIDE 1.34 MG/ML
0.5 INJECTION, SOLUTION SUBCUTANEOUS WEEKLY
Qty: 3 PEN | Refills: 1 | Status: SHIPPED | OUTPATIENT
Start: 2020-09-08 | End: 2020-10-07

## 2020-09-08 RX ORDER — SEMAGLUTIDE 1.34 MG/ML
0.5 INJECTION, SOLUTION SUBCUTANEOUS WEEKLY
Qty: 1 PEN | Refills: 0 | COMMUNITY
Start: 2020-09-08 | End: 2020-10-07

## 2020-09-08 RX ORDER — TIZANIDINE 4 MG/1
TABLET ORAL
Qty: 180 TABLET | Refills: 2 | Status: SHIPPED | OUTPATIENT
Start: 2020-09-08 | End: 2021-12-23 | Stop reason: SDUPTHER

## 2020-09-08 RX ORDER — FENOFIBRATE 160 MG/1
160 TABLET ORAL DAILY
Qty: 90 TABLET | Refills: 1 | Status: SHIPPED | OUTPATIENT
Start: 2020-09-08 | End: 2020-10-26

## 2020-09-08 RX ORDER — INSULIN GLARGINE 300 U/ML
52 INJECTION, SOLUTION SUBCUTANEOUS DAILY
Qty: 24 ML | Refills: 2 | Status: SHIPPED | OUTPATIENT
Start: 2020-09-08 | End: 2021-09-17 | Stop reason: SDUPTHER

## 2020-09-08 RX ORDER — AMITRIPTYLINE HYDROCHLORIDE 50 MG/1
TABLET, FILM COATED ORAL
Qty: 90 TABLET | Refills: 0 | Status: SHIPPED | OUTPATIENT
Start: 2020-09-08 | End: 2021-04-22

## 2020-09-08 RX ORDER — OMEPRAZOLE 20 MG/1
CAPSULE, DELAYED RELEASE ORAL
COMMUNITY
Start: 2020-08-12 | End: 2021-02-04

## 2020-09-08 RX ORDER — AMITRIPTYLINE HYDROCHLORIDE 75 MG/1
TABLET, FILM COATED ORAL
Qty: 90 TABLET | Refills: 2 | Status: SHIPPED | OUTPATIENT
Start: 2020-09-08 | End: 2020-10-26

## 2020-09-08 RX ORDER — ROPINIROLE 1 MG/1
TABLET, FILM COATED ORAL
Qty: 90 TABLET | Refills: 1 | Status: SHIPPED | OUTPATIENT
Start: 2020-09-08 | End: 2021-04-15 | Stop reason: SDUPTHER

## 2020-09-08 RX ORDER — ALLOPURINOL 300 MG/1
TABLET ORAL
Qty: 90 TABLET | Refills: 1 | Status: SHIPPED | OUTPATIENT
Start: 2020-09-08 | End: 2021-03-10 | Stop reason: SDUPTHER

## 2020-09-08 RX ORDER — HYDROCHLOROTHIAZIDE 25 MG/1
TABLET ORAL
COMMUNITY
Start: 2020-07-15 | End: 2020-11-12

## 2020-09-08 RX ORDER — CARVEDILOL 25 MG/1
TABLET ORAL
Qty: 180 TABLET | Refills: 1 | Status: SHIPPED | OUTPATIENT
Start: 2020-09-08 | End: 2021-03-10 | Stop reason: SDUPTHER

## 2020-09-08 SDOH — ECONOMIC STABILITY: TRANSPORTATION INSECURITY
IN THE PAST 12 MONTHS, HAS THE LACK OF TRANSPORTATION KEPT YOU FROM MEDICAL APPOINTMENTS OR FROM GETTING MEDICATIONS?: NO

## 2020-09-08 SDOH — ECONOMIC STABILITY: INCOME INSECURITY: HOW HARD IS IT FOR YOU TO PAY FOR THE VERY BASICS LIKE FOOD, HOUSING, MEDICAL CARE, AND HEATING?: NOT HARD AT ALL

## 2020-09-08 SDOH — ECONOMIC STABILITY: TRANSPORTATION INSECURITY
IN THE PAST 12 MONTHS, HAS LACK OF TRANSPORTATION KEPT YOU FROM MEETINGS, WORK, OR FROM GETTING THINGS NEEDED FOR DAILY LIVING?: NO

## 2020-09-08 SDOH — ECONOMIC STABILITY: FOOD INSECURITY: WITHIN THE PAST 12 MONTHS, YOU WORRIED THAT YOUR FOOD WOULD RUN OUT BEFORE YOU GOT MONEY TO BUY MORE.: NEVER TRUE

## 2020-09-08 SDOH — ECONOMIC STABILITY: FOOD INSECURITY: WITHIN THE PAST 12 MONTHS, THE FOOD YOU BOUGHT JUST DIDN'T LAST AND YOU DIDN'T HAVE MONEY TO GET MORE.: NEVER TRUE

## 2020-09-08 ASSESSMENT — ENCOUNTER SYMPTOMS
NAUSEA: 0
VOMITING: 0
ABDOMINAL DISTENTION: 0
SHORTNESS OF BREATH: 0
COUGH: 0
COLOR CHANGE: 0
BLOOD IN STOOL: 0
ANAL BLEEDING: 0
ABDOMINAL PAIN: 0
BACK PAIN: 0
DIARRHEA: 0
WHEEZING: 0
CONSTIPATION: 0
CHEST TIGHTNESS: 0
RECTAL PAIN: 0

## 2020-09-08 NOTE — PROGRESS NOTES
[address] PRIMARY CARE  600 [facility] [address]  Dept: [phone]  Dept Fax: [phone]    Office Progress Note  Date of patient's visit: 9/8/2020  Patient's Name:  Jose Granda YOB: 1960            ELOISA ART PA  ================================================================    REASON FOR VISIT/CHIEF COMPLAINT:  Hypertension; Polydipsia; Hand Pain (Rt); Finger Injury (Lt ring finger ); and Eczema (vaginal area )    HISTORY OF PRESENTING ILLNESS:  History was obtained from: patient. Jose Granda is a 62 y.o. is here for follow up for hypertension, diabetes, recurrent vaginal rash, hand pain. Patient's blood pressure is well controlled. She denies any chest pain, headaches, dizziness, shortness of breath or heart palpitations. Patient's blood sugars are elevated and hemoglobin A1c is 9.8. We discussed diet and exercise modifications and medication adjustments. Patient states that she is only eating once daily. We discussed need for close monitoring of blood sugar over the next several days so medication adjustments can be made appropriately. She is agreeable to starting Ozempic. She states that she has been thirsty all the time but denies any other systemic symptoms. Patient has had recurrent vaginal rash from hidradenitis. She states that she has noticed the rash again over the last few days. She denies any fevers, chills, vaginal discharge or urinary symptoms. Patient has taken doxycycline in the past with good relief. Patient has chronic history of bilateral hand pain. She states that she has been having increased numbness in her first second and third digit of the bilateral hands. Patient also states that she has noticed muscle wasting of the thenar eminence bilaterally. Patient has seen Dr. Libby Wu previously and would like to follow-up with him. Referral is placed.       Patient Active Problem List Diagnosis    HLD (hyperlipidemia)    HTN (hypertension)    Morbid obesity (HCC)    Neuropathy due to secondary diabetes ([facility] [address]    Depression    Mild persistent asthma without complication    Type 2 diabetes mellitus with diabetic polyneuropathy, with long-term current use of insulin (HCC)    TIFFANI positive    Chronic obstructive pulmonary disease (HCC)    Fibromyalgia       Health Maintenance Due   Topic Date Due    Hepatitis B vaccine (1 of 3 - Risk 3-dose series) 04/27/1980    Diabetic retinal exam  10/30/2018    Flu vaccine (1) 09/01/2020       Allergies   Allergen Reactions    Cymbalta [Duloxetine Hcl] Anaphylaxis and Other (See Comments)    Januvia [Sitagliptin] Shortness Of Breath    Cephalexin Other (See Comments)    Ciprofloxacin Other (See Comments)     muscle spasms occur  muscle spasms occur  muscle spasms occur  cramping    Duloxetine     Lyrica [Pregabalin] Other (See Comments)     Does not help after 2 weeks     Metformin Diarrhea    Metformin And Related Diarrhea    Statins Other (See Comments)     myalgias      Morphine Diarrhea, Nausea And Vomiting, Swelling and Nausea Only         Current Outpatient Medications   Medication Sig Dispense Refill    omeprazole (PRILOSEC) 20 MG delayed release capsule       hydroCHLOROthiazide (HYDRODIURIL) 25 MG tablet       rOPINIRole (REQUIP) 1 MG tablet TAKE 1 TABLET EVERY NIGHT AT BEDTIME 90 tablet 1    amitriptyline (ELAVIL) 50 MG tablet TAKE 1 TABLET BY MOUTH EVERY NIGHT 90 tablet 0    amitriptyline (ELAVIL) 75 MG tablet TAKE 1 TABLET AT BEDTIME 90 tablet 2    carvedilol (COREG) 25 MG tablet TAKE 1 TABLET BY MOUTH TWICE DAILY 180 tablet 1    allopurinol (ZYLOPRIM) 300 MG tablet TAKE 1 TABLET EVERY DAY 90 tablet 1    fenofibrate (TRIGLIDE) 160 MG tablet Take 1 tablet by mouth daily 90 tablet 1    tiZANidine (ZANAFLEX) 4 MG tablet TAKE 1 TABLET TWICE DAILY 180 tablet 2    Liraglutide (VICTOZA) 18 MG/3ML SOPN SC injection Inject 1.8 mg into the skin daily Please give patient # 30 32g 4mm needles with  2 refills 9 pen 2    Insulin Glargine, 1 Unit Dial, (TOUJEO SOLOSTAR) 300 UNIT/ML SOPN Inject 52 Units into the skin daily 24 mL 2    doxycycline hyclate (VIBRA-TABS) 100 MG tablet Take 1 tablet by mouth 2 times daily for 10 days 20 tablet 0    Semaglutide,0.25 or 0.5MG/DOS, (OZEMPIC, 0.25 OR 0.5 MG/DOSE,) 2 MG/1.5ML SOPN Inject 0.5 mg into the skin once a week 3 pen 1    SAVELLA 50 MG TABS TAKE 1 TABLET BY MOUTH TWICE DAILY 180 tablet 0    COLCRYS 0.6 MG tablet TAKE 1 TABLET BY MOUTH DAILY 30 tablet 0    b complex vitamins capsule Take 1 capsule by mouth daily      Multiple Vitamins-Minerals (WOMENS DAILY FORMULA PO) Take by mouth      vitamin C (ASCORBIC ACID) 500 MG tablet Take 500 mg by mouth daily      Biotin 5 MG CAPS Take by mouth      milnacipran HCl (SAVELLA) 25 MG TABS Take 1 tablet by mouth 2 times daily 180 tablet 1    albuterol (PROVENTIL) (2.5 MG/3ML) 0.083% nebulizer solution Take 3 mLs by nebulization every 6 hours as needed for Wheezing or Shortness of Breath 120 each 3    tiotropium (SPIRIVA HANDIHALER) 18 MCG inhalation capsule INHALE THE CONTENTS OF 1 CAPSULE INTO THE LUNGS DAILY 90 capsule 1     Current Facility-Administered Medications   Medication Dose Route Frequency Provider Last Rate Last Dose    ipratropium (ATROVENT) 0.02 % nebulizer solution 0.5 mg  0.5 mg Nebulization Once Zakiya Niño MD        albuterol (PROVENTIL) nebulizer solution 2.5 mg  2.5 mg Nebulization Once Zakiya Niño MD           Social History     Tobacco Use    Smoking status: Current Every Day Smoker     Packs/day: 0.50     Years: 42.00     Pack years: 21.00     Types: Cigarettes    Smokeless tobacco: Never Used   Substance Use Topics    Alcohol use: Not Currently     Alcohol/week: 0.0 standard drinks     Comment: occassional     Drug use: No       Family History   Problem Relation Age of Onset    Cancer Mother 61 breast     High Blood Pressure Mother     Stroke Father 62    Depression Father 64        comitted sucide     Diabetes Sister 36    High Blood Pressure Sister     Depression Sister     Cancer Brother 64        prostate    Diabetes Brother     High Blood Pressure Brother     Cancer Paternal Aunt 71        colon, rectal     Cancer Paternal Grandfather 79        lung    Heart Disease Neg Hx         Review of Systems   Constitutional: Negative for appetite change, chills, diaphoresis, fatigue, fever and unexpected weight change. Respiratory: Negative for cough, chest tightness, shortness of breath and wheezing. Cardiovascular: Negative for chest pain, palpitations and leg swelling. Gastrointestinal: Negative for abdominal distention, abdominal pain, anal bleeding, blood in stool, constipation, diarrhea, nausea, rectal pain and vomiting. Genitourinary: Negative for decreased urine volume, difficulty urinating, dysuria, flank pain, frequency, hematuria and urgency. Musculoskeletal: Positive for arthralgias. Negative for back pain, gait problem, joint swelling, myalgias, neck pain and neck stiffness. Skin: Positive for rash. Negative for color change, pallor and wound. Neurological: Positive for numbness. Negative for dizziness, tremors, seizures, syncope, speech difficulty, weakness, light-headedness and headaches. Psychiatric/Behavioral: Negative. Physical Exam  Vitals signs and nursing note reviewed. Constitutional:       General: She is not in acute distress. Appearance: Normal appearance. She is well-developed and well-groomed. She is not ill-appearing, toxic-appearing or diaphoretic. HENT:      Head: Normocephalic and atraumatic. Right Ear: Tympanic membrane, ear canal and external ear normal.      Left Ear: Tympanic membrane, ear canal and external ear normal.      Nose: Nose normal.      Mouth/Throat:      Lips: Pink.       Mouth: Mucous membranes are moist. HDL 34 (L) 01/23/2020    TRIG 248 (H) 07/12/2018       No results found for this visit on 09/08/20. ASSESSMENT AND PLAN:   Diagnosis Orders   1. Type 2 diabetes mellitus with diabetic polyneuropathy, with long-term current use of insulin ([facility])   DIABETES FOOT EXAM    POCT glycosylated hemoglobin (Hb A1C)    amitriptyline (ELAVIL) 50 MG tablet    amitriptyline (ELAVIL) 75 MG tablet    Liraglutide (VICTOZA) 18 MG/3ML SOPN SC injection    Semaglutide,0.25 or 0.5MG/DOS, (OZEMPIC, 0.25 OR 0.5 MG/DOSE,) 2 MG/1.5ML SOPN   2. RLS (restless legs syndrome)  rOPINIRole (REQUIP) 1 MG tablet   3. Essential hypertension  carvedilol (COREG) 25 MG tablet   4. Gout, unspecified cause, unspecified chronicity, unspecified site  allopurinol (ZYLOPRIM) 300 MG tablet   5. Hypertriglyceridemia  fenofibrate (TRIGLIDE) 160 MG tablet    Lipid, Fasting   6. Fibromyalgia  tiZANidine (ZANAFLEX) 4 MG tablet   7. Need for influenza vaccination  INFLUENZA, QUADV, 3 YRS AND OLDER, IM PF, PREFILL SYR OR SDV, 0.5ML (AFLURIA QUADV, PF)   8. Bilateral carpal tunnel syndrome  External Referral To Orthopedic Surgery   9. Hidradenitis  doxycycline hyclate (VIBRA-TABS) 100 MG tablet   10. Chronic obstructive pulmonary disease, unspecified COPD type ([address]  Well controlled   11. Morbid obesity ([address]  Discussed diet and exercise modifications, start ozempic   12. Mild persistent asthma without complication  Well controlled       FOLLOW UP AND INSTRUCTIONS:  · Return in about 3 months (around 12/8/2020), or if symptoms worsen or fail to improve. · Discussed use, benefit, and side effects of prescribed medications. Barriers to medication compliance addressed. All patient questions answered. Pt voiced understanding. · Patient instructed to return to the office if symptoms do not resolve or go directly to the ER if the symptoms worsen - patient voiced understanding.     · Patient given educational materials - see patient instructions    Islas Proc. Pittman Parmjit [address]  [facility]  9/8/2020, 10:37 AM    This note is created with the assistance of a speech-recognition program. While intending to generate a document that actually reflects the content of the visit, the document can still have some mistakes which may not have been identified and corrected by editing.

## 2020-09-08 NOTE — PROGRESS NOTES
Vaccine Information Sheet, \"Influenza - Inactivated\"  given to Jose Granda, or parent/legal guardian of  Jose Granda and verbalized understanding. Patient responses:    Have you ever had a reaction to a flu vaccine? No  Do you have any current illness? No  Have you ever had Guillian Ponce Syndrome? No  Do you have a serious allergy to any of the following: Neomycin, Polymyxin, Thimerosal, eggs or egg products? No    Flu vaccine given per order. Please see immunization tab. Risks and benefits explained. Current VIS given.

## 2020-09-16 ENCOUNTER — TELEPHONE (OUTPATIENT)
Dept: FAMILY MEDICINE CLINIC | Age: 60
End: 2020-09-16

## 2020-09-16 NOTE — TELEPHONE ENCOUNTER
Pt call office with BS readings for the past 8 days.     9/8/20, BS fasting reading 7am 230/ Non fasting BS 1:30pm 186    9/9/20, BS fasting reading 7am 244/ Non fasting BS 2pm 166    9/10/20, BS fasting reading 7am 234/ Non fasting BS 1pm  162    9/11/20, BS fasting reading 7am  212/ Non fasting BS 12pm 211    9/12/20, BS fasting reading 7am  212/ Non fasting BS 2pm 164    9/13/20, BS fasting reading 7am 239/ Non fasting BS 2pm 162    9/14/20, BS fasting reading 8am 223    9/15/20, BS fasting reading 8am 215

## 2020-09-17 ENCOUNTER — HOSPITAL ENCOUNTER (OUTPATIENT)
Age: 60
Setting detail: SPECIMEN
Discharge: HOME | End: 2020-09-17
Payer: MEDICARE

## 2020-09-17 LAB
CHOLESTEROL, FASTING: 170 MG/DL
CHOLESTEROL/HDL RATIO: 4.9
HDLC SERPL-MCNC: 35 MG/DL
LDL CHOLESTEROL: 83 MG/DL (ref 0–130)
TRIGLYCERIDE, FASTING: 258 MG/DL
VLDLC SERPL CALC-MCNC: ABNORMAL MG/DL (ref 1–30)

## 2020-09-17 RX ORDER — FLUCONAZOLE 150 MG/1
150 TABLET ORAL ONCE
Qty: 1 TABLET | Refills: 0 | Status: SHIPPED | OUTPATIENT
Start: 2020-09-17 | End: 2020-09-17

## 2020-10-05 ENCOUNTER — TELEPHONE (OUTPATIENT)
Dept: FAMILY MEDICINE CLINIC | Age: 60
End: 2020-10-05

## 2020-10-07 ENCOUNTER — OFFICE VISIT (OUTPATIENT)
Dept: FAMILY MEDICINE CLINIC | Age: 60
End: 2020-10-07
Payer: MEDICARE

## 2020-10-07 VITALS
HEIGHT: 70 IN | SYSTOLIC BLOOD PRESSURE: 140 MMHG | BODY MASS INDEX: 41.95 KG/M2 | HEART RATE: 89 BPM | DIASTOLIC BLOOD PRESSURE: 84 MMHG | TEMPERATURE: 97 F | WEIGHT: 293 LBS | RESPIRATION RATE: 16 BRPM

## 2020-10-07 PROCEDURE — 3017F COLORECTAL CA SCREEN DOC REV: CPT | Performed by: PHYSICIAN ASSISTANT

## 2020-10-07 PROCEDURE — G8427 DOCREV CUR MEDS BY ELIG CLIN: HCPCS | Performed by: PHYSICIAN ASSISTANT

## 2020-10-07 PROCEDURE — 4004F PT TOBACCO SCREEN RCVD TLK: CPT | Performed by: PHYSICIAN ASSISTANT

## 2020-10-07 PROCEDURE — G8417 CALC BMI ABV UP PARAM F/U: HCPCS | Performed by: PHYSICIAN ASSISTANT

## 2020-10-07 PROCEDURE — G8482 FLU IMMUNIZE ORDER/ADMIN: HCPCS | Performed by: PHYSICIAN ASSISTANT

## 2020-10-07 PROCEDURE — 99213 OFFICE O/P EST LOW 20 MIN: CPT | Performed by: PHYSICIAN ASSISTANT

## 2020-10-07 RX ORDER — CLINDAMYCIN HYDROCHLORIDE 300 MG/1
300 CAPSULE ORAL 4 TIMES DAILY
Qty: 40 CAPSULE | Refills: 0 | Status: SHIPPED | OUTPATIENT
Start: 2020-10-07 | End: 2020-10-17

## 2020-10-07 RX ORDER — INSULIN LISPRO 100 [IU]/ML
INJECTION, SOLUTION INTRAVENOUS; SUBCUTANEOUS
Qty: 3 PEN | Refills: 1 | Status: SHIPPED | OUTPATIENT
Start: 2020-10-07 | End: 2020-10-14 | Stop reason: SDUPTHER

## 2020-10-07 RX ORDER — FLUCONAZOLE 150 MG/1
150 TABLET ORAL ONCE
Qty: 2 TABLET | Refills: 0 | Status: SHIPPED | OUTPATIENT
Start: 2020-10-07 | End: 2020-10-07

## 2020-10-07 RX ORDER — SEMAGLUTIDE 1.34 MG/ML
INJECTION, SOLUTION SUBCUTANEOUS
COMMUNITY
End: 2020-12-24 | Stop reason: SDUPTHER

## 2020-10-08 PROBLEM — K04.7 DENTAL ABSCESS: Status: ACTIVE | Noted: 2020-10-08

## 2020-10-08 PROBLEM — R73.9 HYPERGLYCEMIA: Status: ACTIVE | Noted: 2020-10-08

## 2020-10-08 PROBLEM — B37.9 YEAST INFECTION: Status: ACTIVE | Noted: 2020-10-08

## 2020-10-08 ASSESSMENT — ENCOUNTER SYMPTOMS
RECTAL PAIN: 0
VOMITING: 0
ABDOMINAL PAIN: 0
CHEST TIGHTNESS: 0
DIARRHEA: 0
ANAL BLEEDING: 0
BLOOD IN STOOL: 0
WHEEZING: 0
NAUSEA: 0
COLOR CHANGE: 0
BACK PAIN: 0
COUGH: 0
CONSTIPATION: 0
ABDOMINAL DISTENTION: 0
SHORTNESS OF BREATH: 0

## 2020-10-08 NOTE — PROGRESS NOTES
[address] PRIMARY CARE  600 [facility] [address]  Dept: [phone]  Dept Fax: [phone]    Office Progress Note  Date of patient's visit: 10/8/2020  Patient's Name:  Rayna Sicard YOB: 1960            ELOISA BELL  ================================================================    REASON FOR VISIT/CHIEF COMPLAINT:  Discuss Labs    HISTORY OF PRESENTING ILLNESS:  History was obtained from: patient. Rayna Sicard is a 62 y.o. is here for follow up for diabetes. Patient states that she was scheduled to have right carpal tunnel surgery done and it was canceled due to elevated blood sugar. Patient has had recent dental infection and drainage from the area in her mouth but has not seen her dentist.  She denies any fevers or chills. She has had no difficulty swallowing or breathing. Patient does have a dentist but has not scheduled an appointment. Patient states that her symptoms have been present for over a month. Patient's blood sugar was over 300 in preop area. Patient states that she has also had a recurrent yeast infection which we discussed is most likely due to her elevated blood sugar. Patient is agreeable to starting a sliding scale of Humalog, treating the dental infection and following up with her dentist.  Patient's instructed that she cannot reschedule her surgery until all of these are under control. Patient states understanding and agrees with plan.       Patient Active Problem List   Diagnosis    HLD (hyperlipidemia)    HTN (hypertension)    Morbid obesity ([address]    Neuropathy due to secondary diabetes ([facility] [address]    Depression    Mild persistent asthma without complication    Type 2 diabetes mellitus with diabetic polyneuropathy, with long-term current use of insulin (HCC)    TIFFANI positive    Chronic obstructive pulmonary disease (HCC)    Fibromyalgia    Dental abscess    Yeast infection    Hyperglycemia Health Maintenance Due   Topic Date Due    Hepatitis B vaccine (1 of 3 - Risk 3-dose series) 04/27/1980    Diabetic retinal exam  10/30/2018       Allergies   Allergen Reactions    Cymbalta [Duloxetine Hcl] Anaphylaxis and Other (See Comments)    Januvia [Sitagliptin] Shortness Of Breath    Cephalexin Other (See Comments)    Ciprofloxacin Other (See Comments)     muscle spasms occur  muscle spasms occur  muscle spasms occur  cramping    Duloxetine     Lyrica [Pregabalin] Other (See Comments)     Does not help after 2 weeks     Metformin Diarrhea    Metformin And Related Diarrhea    Statins Other (See Comments)     myalgias      Morphine Diarrhea, Nausea And Vomiting, Swelling and Nausea Only         Current Outpatient Medications   Medication Sig Dispense Refill    insulin lispro, 1 Unit Dial, (HUMALOG KWIKPEN) 100 UNIT/ML SOPN 0-150 = 0 units  151-200 = 2 units  201-250 = 4 units  251-300 = 6 units  301-350 = 8 units  351-400+ = 10 units 3 pen 1    clindamycin (CLEOCIN) 300 MG capsule Take 1 capsule by mouth 4 times daily for 10 days 40 capsule 0    Semaglutide,0.25 or 0.5MG/DOS, (OZEMPIC, 0.25 OR 0.5 MG/DOSE,) 2 MG/1.5ML SOPN Ozempic 0.25 mg or 0.5 mg (2 mg/1.5 mL) subcutaneous pen injector      omeprazole (PRILOSEC) 20 MG delayed release capsule       hydroCHLOROthiazide (HYDRODIURIL) 25 MG tablet       rOPINIRole (REQUIP) 1 MG tablet TAKE 1 TABLET EVERY NIGHT AT BEDTIME 90 tablet 1    amitriptyline (ELAVIL) 50 MG tablet TAKE 1 TABLET BY MOUTH EVERY NIGHT 90 tablet 0    amitriptyline (ELAVIL) 75 MG tablet TAKE 1 TABLET AT BEDTIME 90 tablet 2    carvedilol (COREG) 25 MG tablet TAKE 1 TABLET BY MOUTH TWICE DAILY 180 tablet 1    allopurinol (ZYLOPRIM) 300 MG tablet TAKE 1 TABLET EVERY DAY 90 tablet 1    fenofibrate (TRIGLIDE) 160 MG tablet Take 1 tablet by mouth daily 90 tablet 1    tiZANidine (ZANAFLEX) 4 MG tablet TAKE 1 TABLET TWICE DAILY 180 tablet 2    Liraglutide (VICTOZA) 18 MG/3ML SOPN SC injection Inject 1.8 mg into the skin daily Please give patient # 30 32g 4mm needles with  2 refills 9 pen 2    Insulin Glargine, 1 Unit Dial, (TOUJEO SOLOSTAR) 300 UNIT/ML SOPN Inject 52 Units into the skin daily 24 mL 2    SAVELLA 50 MG TABS TAKE 1 TABLET BY MOUTH TWICE DAILY 180 tablet 0    COLCRYS 0.6 MG tablet TAKE 1 TABLET BY MOUTH DAILY 30 tablet 0    b complex vitamins capsule Take 1 capsule by mouth daily      Multiple Vitamins-Minerals (WOMENS DAILY FORMULA PO) Take by mouth      vitamin C (ASCORBIC ACID) 500 MG tablet Take 500 mg by mouth daily      Biotin 5 MG CAPS Take by mouth      milnacipran HCl (SAVELLA) 25 MG TABS Take 1 tablet by mouth 2 times daily 180 tablet 1    albuterol (PROVENTIL) (2.5 MG/3ML) 0.083% nebulizer solution Take 3 mLs by nebulization every 6 hours as needed for Wheezing or Shortness of Breath 120 each 3    tiotropium (SPIRIVA HANDIHALER) 18 MCG inhalation capsule INHALE THE CONTENTS OF 1 CAPSULE INTO THE LUNGS DAILY 90 capsule 1     Current Facility-Administered Medications   Medication Dose Route Frequency Provider Last Rate Last Dose    ipratropium (ATROVENT) 0.02 % nebulizer solution 0.5 mg  0.5 mg Nebulization Once Juli Lawson MD        albuterol (PROVENTIL) nebulizer solution 2.5 mg  2.5 mg Nebulization Once Juli Lawson MD           Social History     Tobacco Use    Smoking status: Current Every Day Smoker     Packs/day: 0.50     Years: 42.00     Pack years: 21.00     Types: Cigarettes    Smokeless tobacco: Never Used   Substance Use Topics    Alcohol use: Not Currently     Alcohol/week: 0.0 standard drinks     Comment: occassional     Drug use: No       Family History   Problem Relation Age of Onset    Cancer Mother 61        breast     High Blood Pressure Mother     Stroke Father 62    Depression Father 64        comitted sucide     Diabetes Sister 36    High Blood Pressure Sister     Depression Sister     Cancer Brother 64        prostate    Diabetes Brother     High Blood Pressure Brother     Cancer Paternal Aunt 71        colon, rectal     Cancer Paternal Grandfather 79        lung    Heart Disease Neg Hx         Review of Systems   Constitutional: Negative for appetite change, chills, diaphoresis, fatigue, fever and unexpected weight change. HENT: Positive for dental problem. Respiratory: Negative for cough, chest tightness, shortness of breath and wheezing. Cardiovascular: Negative for chest pain, palpitations and leg swelling. Gastrointestinal: Negative for abdominal distention, abdominal pain, anal bleeding, blood in stool, constipation, diarrhea, nausea, rectal pain and vomiting. Genitourinary: Negative for decreased urine volume, dysuria, frequency and urgency. Musculoskeletal: Negative for back pain and myalgias. Skin: Positive for rash. Negative for color change, pallor and wound. Neurological: Negative for dizziness, syncope, weakness, light-headedness and headaches. Physical Exam  Vitals signs and nursing note reviewed. Constitutional:       General: She is not in acute distress. Appearance: Normal appearance. She is obese. She is not ill-appearing, toxic-appearing or diaphoretic. HENT:      Head: Normocephalic and atraumatic. Mouth/Throat:      Lips: Pink. No lesions. Mouth: Mucous membranes are moist.      Dentition: Dental caries present. Tongue: No lesions. Tongue does not deviate from midline. Pharynx: Oropharynx is clear. Uvula midline. No oropharyngeal exudate or posterior oropharyngeal erythema. Comments: Patient has multiple dental caries and partial dentures. There is an open area in the gumline in the upper mouth with drainage. Uvula is midline no airway compromise no angioedema  Eyes:      General:         Right eye: No discharge. Left eye: No discharge.       Conjunctiva/sclera: Conjunctivae normal.   Cardiovascular:      Rate and Rhythm: Normal rate and regular rhythm. Heart sounds: Normal heart sounds. Pulmonary:      Effort: Pulmonary effort is normal.      Breath sounds: Normal breath sounds. Abdominal:      General: Abdomen is flat. Bowel sounds are normal.      Palpations: Abdomen is soft. Neurological:      General: No focal deficit present. Mental Status: She is alert and oriented to person, place, and time. Psychiatric:         Mood and Affect: Mood normal.         Behavior: Behavior normal.         Thought Content: Thought content normal.         Judgment: Judgment normal.           Vitals:    10/07/20 1228   BP: (!) 140/84   Site: Left Upper Arm   Position: Sitting   Cuff Size: Large Adult   Pulse: 89   Resp: 16   Temp: 97 °F (36.1 °C)   TempSrc: Temporal   Weight: (!) 317 lb (143.8 kg)   Height: 5' 10\" (1.778 m)     BP Readings from Last 3 Encounters:   10/07/20 (!) 140/84   09/08/20 126/84   06/05/20 (!) 128/92              DIAGNOSTIC FINDINGS:  CBC:  Lab Results   Component Value Date    WBC 10.3 01/23/2020    HGB 14.1 01/23/2020     01/23/2020       BMP:    Lab Results   Component Value Date     01/23/2020    K 4.2 01/23/2020    CL 95 01/23/2020    CO2 20 01/23/2020    BUN 16 01/23/2020    CREATININE 1.1 01/23/2020    CREATININE 1.04 01/23/2020    GLUCOSE 193 01/23/2020         FASTING LIPID PANEL:  Lab Results   Component Value Date    CHOL 164 07/12/2018    HDL 35 (L) 09/17/2020    TRIG 248 (H) 07/12/2018       No results found for this visit on 10/07/20. ASSESSMENT AND PLAN:   Diagnosis Orders   1. Dental abscess  clindamycin (CLEOCIN) 300 MG capsule   2. Yeast infection  fluconazole (DIFLUCAN) 150 MG tablet   3. Hyperglycemia  Semaglutide,0.25 or 0.5MG/DOS, (OZEMPIC, 0.25 OR 0.5 MG/DOSE,) 2 MG/1.5ML SOPN    insulin lispro, 1 Unit Dial, (HUMALOG KWIKPEN) 100 UNIT/ML SOPN     Patient is to take antibiotics and Diflucan as written. She is to use insulin sliding scale as written.   She is instructed to follow-up with dentist as soon as possible. She is to log her blood sugars twice daily and report them back to the office. Patient is to be reevaluated in the office in 1 week or sooner if needed. FOLLOW UP AND INSTRUCTIONS:  Return in about 1 week (around 10/14/2020), or if symptoms worsen or fail to improve. · Discussed use, benefit, and side effects of prescribed medications. Barriers to medication compliance addressed. All patient questions answered. Pt voiced understanding. · Patient instructed to return to the office if symptoms do not resolve or go directly to the ER if the symptoms worsen - patient voiced understanding. · Patient given educational materials - see patient instructions    Brenda [address] [facility]  10/8/2020, 1:56 PM    This note is created with the assistance of a speech-recognition program. While intending to generate a document that actually reflects the content of the visit, the document can still have some mistakes which may not have been identified and corrected by editing.

## 2020-10-14 RX ORDER — INSULIN LISPRO 100 [IU]/ML
INJECTION, SOLUTION INTRAVENOUS; SUBCUTANEOUS
Qty: 3 PEN | Refills: 1 | Status: SHIPPED | OUTPATIENT
Start: 2020-10-14 | End: 2022-07-19

## 2020-10-14 NOTE — TELEPHONE ENCOUNTER
Faxed request from [facility] stating \" rx or insulin lispro u100 kwikpen inj missing dosing frequency. \"Please advise thank you! Next Visit Date:  Future Appointments   Date Time Provider Abbe Vasquez   12/8/2020 10:45 AM Sumit Deleon [facility] AND WOMEN'S [facility] [address] Maintenance   Topic Date Due    Hepatitis B vaccine (1 of 3 - Risk 3-dose series) 04/27/1980    Diabetic retinal exam  10/30/2018    DTaP/Tdap/Td vaccine (1 - Tdap) 01/24/2027 (Originally 4/27/1980)    A1C test (Diabetic or Prediabetic)  12/08/2020    Diabetic microalbuminuria test  01/23/2021    Potassium monitoring  01/23/2021    Creatinine monitoring  01/23/2021    Annual Wellness Visit (AWV)  06/06/2021    Diabetic foot exam  09/08/2021    Lipid screen  09/17/2021    Breast cancer screen  07/09/2022    Colon cancer screen colonoscopy  05/18/2027    Flu vaccine  Completed    Shingles Vaccine  Completed    Pneumococcal 0-64 years Vaccine  Completed    Hepatitis C screen  Completed    HIV screen  Completed    Hepatitis A vaccine  Aged Out    Hib vaccine  Aged Out    Meningococcal (ACWY) vaccine  Aged Out       Hemoglobin A1C (%)   Date Value   09/08/2020 9.8   01/16/2020 7.9   10/07/2019 7.4             ( goal A1C is < 7)   Microalb/Crt.  Ratio (mcg/mg creat)   Date Value   01/23/2020 53 (H)     LDL Cholesterol (mg/dL)   Date Value   09/17/2020 83   01/23/2020            (goal LDL is <100)   AST (U/L)   Date Value   01/23/2020 22     ALT (U/L)   Date Value   01/23/2020 24     BUN (mg/dL)   Date Value   01/23/2020 16     BP Readings from Last 3 Encounters:   10/07/20 (!) 140/84   09/08/20 126/84   06/05/20 (!) 128/92          (goal 120/80)    All Future Testing planned in [facility]  Lab Frequency Next Occurrence   TRU Digital Screen Bilateral [[id number]] Once 05/05/2020   Lipid, Fasting Once 04/27/2020               Patient Active Problem List:     HLD (hyperlipidemia)     HTN (hypertension)     Morbid obesity ([address] Neuropathy due to secondary diabetes ([facility] [address] [address]     Depression     Mild persistent asthma without complication     Type 2 diabetes mellitus with diabetic polyneuropathy, with long-term current use of insulin (HCC)     TIFFANI positive     Chronic obstructive pulmonary disease (HCC)     Fibromyalgia     Dental abscess     Yeast infection     Hyperglycemia

## 2020-10-16 RX ORDER — COLCHICINE 0.6 MG/1
TABLET ORAL
Qty: 30 TABLET | Refills: 0 | Status: SHIPPED | OUTPATIENT
Start: 2020-10-16 | End: 2021-03-15 | Stop reason: SDUPTHER

## 2020-10-16 NOTE — TELEPHONE ENCOUNTER
Patient pharmacy called in requesting refill, they had the wrong fax number. Please advise thank you!

## 2020-10-22 ENCOUNTER — TELEPHONE (OUTPATIENT)
Dept: FAMILY MEDICINE CLINIC | Age: 60
End: 2020-10-22

## 2020-10-22 RX ORDER — INSULIN ASPART 100 [IU]/ML
INJECTION, SUSPENSION SUBCUTANEOUS
Qty: 5 PEN | Refills: 3 | Status: SHIPPED | OUTPATIENT
Start: 2020-10-22 | End: 2021-03-15 | Stop reason: ALTCHOICE

## 2020-10-22 NOTE — TELEPHONE ENCOUNTER
Faxed request from [facility] stating humalog is not covered or high copay, requesting novolog 100 u/ml flex pen. Please advise thank you!

## 2020-11-12 RX ORDER — HYDROCHLOROTHIAZIDE 25 MG/1
TABLET ORAL
Qty: 90 TABLET | Refills: 3 | Status: SHIPPED | OUTPATIENT
Start: 2020-11-12 | End: 2021-10-11 | Stop reason: SINTOL

## 2020-11-12 NOTE — TELEPHONE ENCOUNTER
Next Visit Date:  Future Appointments   Date Time Provider Abbe Vasquez   12[facility]8/2020 10:45 [facility] [address] Maintenance   Topic Date Due    Hepatitis B vaccine (1 of 3 - Risk 3-dose series) 04/27/1980    Diabetic retinal exam  10/30/2018    DTaP/Tdap/Td vaccine (1 - Tdap) 01/24/2027 (Originally 4/27/1980)    A1C test (Diabetic or Prediabetic)  12/08/2020    Diabetic microalbuminuria test  01/23/2021    Potassium monitoring  01/23/2021    Creatinine monitoring  01/23/2021    Annual Wellness Visit (AWV)  06/06/2021    Diabetic foot exam  09/08/2021    Lipid screen  09/17/2021    Breast cancer screen  07/09/2022    Colon cancer screen colonoscopy  05/18/2027    Flu vaccine  Completed    Shingles Vaccine  Completed    Pneumococcal 0-64 years Vaccine  Completed    Hepatitis C screen  Completed    HIV screen  Completed    Hepatitis A vaccine  Aged Out    Hib vaccine  Aged Out    Meningococcal (ACWY) vaccine  Aged Out       Hemoglobin A1C (%)   Date Value   09/08/2020 9.8   01/16/2020 7.9   10/07/2019 7.4             ( goal A1C is < 7)   Microalb/Crt.  Ratio (mcg/mg creat)   Date Value   01/23/2020 53 (H)     LDL Cholesterol (mg/dL)   Date Value   09/17/2020 83   01/23/2020            (goal LDL is <100)   AST (U/L)   Date Value   01/23/2020 22     ALT (U/L)   Date Value   01/23/2020 24     BUN (mg/dL)   Date Value   01/23/2020 16     BP Readings from Last 3 Encounters:   10/07/20 (!) 140/84   09/08/20 126/84   06/05/20 (!) 128/92          (goal 120/80)    All Future Testing planned in [facility]  Lab Frequency Next Occurrence   TRU Digital Screen Bilateral [RSH1003] Once 05/05/2020   Lipid, Fasting Once 04/27/2020               Patient Active Problem List:     HLD (hyperlipidemia)     HTN (hypertension)     Morbid obesity ([address]     Neuropathy due to secondary diabetes ([address]     Depression     Mild persistent asthma without complication     Type 2 diabetes

## 2020-12-08 ENCOUNTER — OFFICE VISIT (OUTPATIENT)
Dept: FAMILY MEDICINE CLINIC | Age: 60
End: 2020-12-08
Payer: MEDICARE

## 2020-12-08 VITALS
BODY MASS INDEX: 41.95 KG/M2 | HEART RATE: 78 BPM | OXYGEN SATURATION: 98 % | DIASTOLIC BLOOD PRESSURE: 80 MMHG | TEMPERATURE: 97.3 F | RESPIRATION RATE: 16 BRPM | HEIGHT: 70 IN | SYSTOLIC BLOOD PRESSURE: 128 MMHG | WEIGHT: 293 LBS

## 2020-12-08 LAB — HBA1C MFR BLD: 7.8 %

## 2020-12-08 PROCEDURE — G8417 CALC BMI ABV UP PARAM F/U: HCPCS | Performed by: PHYSICIAN ASSISTANT

## 2020-12-08 PROCEDURE — 2022F DILAT RTA XM EVC RTNOPTHY: CPT | Performed by: PHYSICIAN ASSISTANT

## 2020-12-08 PROCEDURE — 4004F PT TOBACCO SCREEN RCVD TLK: CPT | Performed by: PHYSICIAN ASSISTANT

## 2020-12-08 PROCEDURE — 83036 HEMOGLOBIN GLYCOSYLATED A1C: CPT | Performed by: PHYSICIAN ASSISTANT

## 2020-12-08 PROCEDURE — 99213 OFFICE O/P EST LOW 20 MIN: CPT | Performed by: PHYSICIAN ASSISTANT

## 2020-12-08 PROCEDURE — G8482 FLU IMMUNIZE ORDER/ADMIN: HCPCS | Performed by: PHYSICIAN ASSISTANT

## 2020-12-08 PROCEDURE — G8427 DOCREV CUR MEDS BY ELIG CLIN: HCPCS | Performed by: PHYSICIAN ASSISTANT

## 2020-12-08 PROCEDURE — 3017F COLORECTAL CA SCREEN DOC REV: CPT | Performed by: PHYSICIAN ASSISTANT

## 2020-12-08 PROCEDURE — 3051F HG A1C>EQUAL 7.0%<8.0%: CPT | Performed by: PHYSICIAN ASSISTANT

## 2020-12-08 RX ORDER — LIRAGLUTIDE 6 MG/ML
1.8 INJECTION SUBCUTANEOUS DAILY
Qty: 9 PEN | Refills: 2 | Status: SHIPPED | OUTPATIENT
Start: 2020-12-08 | End: 2021-03-15 | Stop reason: ALTCHOICE

## 2020-12-08 RX ORDER — INSULIN ASPART 100 [IU]/ML
INJECTION, SOLUTION INTRAVENOUS; SUBCUTANEOUS
COMMUNITY
Start: 2020-10-27 | End: 2022-07-19

## 2020-12-18 RX ORDER — SEMAGLUTIDE 1.34 MG/ML
0.25 INJECTION, SOLUTION SUBCUTANEOUS WEEKLY
Qty: 5 PEN | Refills: 0 | Status: SHIPPED | OUTPATIENT
Start: 2020-12-18 | End: 2021-01-04 | Stop reason: SDUPTHER

## 2020-12-18 NOTE — TELEPHONE ENCOUNTER
Faxed medication request for ozempic, I do not see this medication in the patients chart  please advise thank you! Next Visit Date:  Future Appointments   Date Time Provider Abbe Vasquez   3/8/2021 10:15 AM Rodrigue Thompson [facility] [address] Maintenance   Topic Date Due    Hepatitis B vaccine (1 of 3 - Risk 3-dose series) 04/27/1980    Diabetic retinal exam  10/30/2018    DTaP/Tdap/Td vaccine (1 - Tdap) 01/24/2027 (Originally 4/27/1980)    Diabetic microalbuminuria test  01/23/2021    Potassium monitoring  01/23/2021    Creatinine monitoring  01/23/2021    Annual Wellness Visit (AWV)  06/06/2021    Diabetic foot exam  09/08/2021    Lipid screen  09/17/2021    A1C test (Diabetic or Prediabetic)  12/08/2021    Breast cancer screen  07/09/2022    Colon cancer screen colonoscopy  05/18/2027    Flu vaccine  Completed    Shingles Vaccine  Completed    Pneumococcal 0-64 years Vaccine  Completed    Hepatitis C screen  Completed    HIV screen  Completed    Hepatitis A vaccine  Aged Out    Hib vaccine  Aged Out    Meningococcal (ACWY) vaccine  Aged Out       Hemoglobin A1C (%)   Date Value   12/08/2020 7.8   09/08/2020 9.8   01/16/2020 7.9             ( goal A1C is < 7)   Microalb/Crt.  Ratio (mcg/mg creat)   Date Value   01/23/2020 53 (H)     LDL Cholesterol (mg/dL)   Date Value   09/17/2020 83   01/23/2020            (goal LDL is <100)   AST (U/L)   Date Value   01/23/2020 22     ALT (U/L)   Date Value   01/23/2020 24     BUN (mg/dL)   Date Value   01/23/2020 16     BP Readings from Last 3 Encounters:   12/08/20 128/80   10/07/20 (!) 140/84   09/08/20 126/84          (goal 120/80)    All Future Testing planned in [facility]  Lab Frequency Next Occurrence   Lipid, Fasting Once 04/27/2020               Patient Active Problem List:     HLD (hyperlipidemia)     HTN (hypertension)     Morbid obesity ([address]     Neuropathy due to secondary diabetes ([address]     Depression     Mild persistent asthma without complication     Type 2 diabetes mellitus with diabetic polyneuropathy, with long-term current use of insulin (HCC)     TIFFANI positive     Chronic obstructive pulmonary disease (HCC)     Fibromyalgia     Dental abscess     Yeast infection     Hyperglycemia

## 2020-12-20 ASSESSMENT — ENCOUNTER SYMPTOMS
SHORTNESS OF BREATH: 0
NAUSEA: 0
CHEST TIGHTNESS: 0
VOMITING: 0
CONSTIPATION: 0
COUGH: 0
DIARRHEA: 0
BLOOD IN STOOL: 0
ABDOMINAL PAIN: 0
BACK PAIN: 0
WHEEZING: 0

## 2020-12-21 NOTE — PROGRESS NOTES
[address] PRIMARY CARE  [address] [place] [address]  Dept: [phone]  Dept Fax: [phone]    Office Progress Note  Date of patient's visit: 12/20/2020  Patient's Name:  Christophe Villanueva YOB: 1960            ELOISA ART PA  ================================================================    REASON FOR VISIT/CHIEF COMPLAINT:  Medication Check    HISTORY OF PRESENTING ILLNESS:  History was obtained from: patient. Christophe Villanueva is a 62 y.o. is here for follow up for diabetes. Hemoglobin A1c has improved over 2 points since last check in September with use of Victoza added to her regimen. Patient states that she is feeling better overall. She has no new complaints today.       Patient Active Problem List   Diagnosis    HLD (hyperlipidemia)    HTN (hypertension)    Morbid obesity ([address]    Neuropathy due to secondary diabetes ([address]    Depression    Mild persistent asthma without complication    Type 2 diabetes mellitus with diabetic polyneuropathy, with long-term current use of insulin (HCC)    TIFFANI positive    Chronic obstructive pulmonary disease ([address]    Fibromyalgia    Dental abscess    Yeast infection    Hyperglycemia       Health Maintenance Due   Topic Date Due    Hepatitis B vaccine (1 of 3 - Risk 3-dose series) 04/27/1980    Diabetic retinal exam  10/30/2018       Allergies   Allergen Reactions    Cymbalta [Duloxetine Hcl] Anaphylaxis and Other (See Comments)    Januvia [Sitagliptin] Shortness Of Breath    Cephalexin Other (See Comments)    Ciprofloxacin Other (See Comments)     muscle spasms occur  muscle spasms occur  muscle spasms occur  cramping    Duloxetine     Lyrica [Pregabalin] Other (See Comments)     Does not help after 2 weeks     Metformin Diarrhea    Metformin And Related Diarrhea    Statins Other (See Comments)     myalgias      Morphine Diarrhea, Nausea And Vomiting, Swelling and Nausea Only Current Outpatient Medications   Medication Sig Dispense Refill    NOVOLOG FLEXPEN 100 UNIT/ML injection pen       Liraglutide (VICTOZA) 18 MG/3ML SOPN SC injection Inject 1.8 mg into the skin daily Please give patient # 30 32g 4mm needles with  2 refills 9 pen 2    hydroCHLOROthiazide (HYDRODIURIL) 25 MG tablet TAKE 1 TABLET EVERY MORNING 90 tablet 3    amitriptyline (ELAVIL) 75 MG tablet TAKE 1 TABLET AT BEDTIME (SUBSTITUTED FOR ELAVIL) 90 tablet 2    fenofibrate (TRIGLIDE) 160 MG tablet TAKE 1 TABLET EVERY DAY 90 tablet 1    SAVELLA 25 MG TABS TAKE 1 TABLET TWICE DAILY 180 tablet 1    insulin aspart protamine-insulin aspart (NOVOLOG MIX 70/30 FLEXPEN) (70-30) 100 UNIT/ML injection 0-150 = 0 units  151-200 = 2 units  201-250 = 4 units  251-300 = 6 units  301-350 = 8 units  351-400+ = 10 units 5 pen 3    colchicine (COLCRYS) 0.6 MG tablet TAKE 1 TABLET BY MOUTH DAILY 30 tablet 0    insulin lispro, 1 Unit Dial, (HUMALOG KWIKPEN) 100 UNIT/ML SOPN 0-150 = 0 units  151-200 = 2 units  201-250 = 4 units  251-300 = 6 units  301-350 = 8 units  351-400+ = 10 units 3 pen 1    Semaglutide,0.25 or 0.5MG/DOS, (OZEMPIC, 0.25 OR 0.5 MG/DOSE,) 2 MG/1.5ML SOPN Ozempic 0.25 mg or 0.5 mg (2 mg/1.5 mL) subcutaneous pen injector      omeprazole (PRILOSEC) 20 MG delayed release capsule       rOPINIRole (REQUIP) 1 MG tablet TAKE 1 TABLET EVERY NIGHT AT BEDTIME 90 tablet 1    amitriptyline (ELAVIL) 50 MG tablet TAKE 1 TABLET BY MOUTH EVERY NIGHT 90 tablet 0    carvedilol (COREG) 25 MG tablet TAKE 1 TABLET BY MOUTH TWICE DAILY 180 tablet 1    allopurinol (ZYLOPRIM) 300 MG tablet TAKE 1 TABLET EVERY DAY 90 tablet 1    tiZANidine (ZANAFLEX) 4 MG tablet TAKE 1 TABLET TWICE DAILY 180 tablet 2    Insulin Glargine, 1 Unit Dial, (TOUJEO SOLOSTAR) 300 UNIT/ML SOPN Inject 52 Units into the skin daily 24 mL 2    SAVELLA 50 MG TABS TAKE 1 TABLET BY MOUTH TWICE DAILY 180 tablet 0    b complex vitamins capsule Take 1 capsule by mouth daily      Multiple Vitamins-Minerals (WOMENS DAILY FORMULA PO) Take by mouth      vitamin C (ASCORBIC ACID) 500 MG tablet Take 500 mg by mouth daily      Biotin 5 MG CAPS Take by mouth      albuterol (PROVENTIL) (2.5 MG/3ML) 0.083% nebulizer solution Take 3 mLs by nebulization every 6 hours as needed for Wheezing or Shortness of Breath 120 each 3    tiotropium (SPIRIVA HANDIHALER) 18 MCG inhalation capsule INHALE THE CONTENTS OF 1 CAPSULE INTO THE LUNGS DAILY 90 capsule 1    Semaglutide,0.25 or 0.5MG/DOS, (OZEMPIC, 0.25 OR 0.5 MG/DOSE,) 2 MG/1.5ML SOPN Inject 0.25 mg into the skin once a week 5 pen 0     Current Facility-Administered Medications   Medication Dose Route Frequency Provider Last Rate Last Admin    ipratropium (ATROVENT) 0.02 % nebulizer solution 0.5 mg  0.5 mg Nebulization Once Sebas Cannon MD        albuterol (PROVENTIL) nebulizer solution 2.5 mg  2.5 mg Nebulization Once Sebas Cannon MD           Social History     Tobacco Use    Smoking status: Current Every Day Smoker     Packs/day: 0.50     Years: 42.00     Pack years: 21.00     Types: Cigarettes    Smokeless tobacco: Never Used   Substance Use Topics    Alcohol use: Not Currently     Alcohol/week: 0.0 standard drinks     Comment: occassional     Drug use: No       Family History   Problem Relation Age of Onset    Cancer Mother 61        breast     High Blood Pressure Mother     Stroke Father 62    Depression Father 64        comitted sucide     Diabetes Sister 36    High Blood Pressure Sister     Depression Sister     Cancer Brother 64        prostate    Diabetes Brother     High Blood Pressure Brother     Cancer Paternal Aunt 71        colon, rectal     Cancer Paternal Grandfather 79        lung    Heart Disease Neg Hx         Review of Systems   Constitutional: Negative for appetite change, chills, diaphoresis, fatigue, fever and unexpected weight change.    Respiratory: Negative for cough, chest tightness, shortness of breath and wheezing. Cardiovascular: Negative for chest pain, palpitations and leg swelling. Gastrointestinal: Negative for abdominal pain, blood in stool, constipation, diarrhea, nausea and vomiting. Genitourinary: Negative for dysuria, frequency and urgency. Musculoskeletal: Negative for back pain and myalgias. Neurological: Negative for dizziness, syncope, weakness, light-headedness and headaches. Physical Exam  Vitals signs and nursing note reviewed. Constitutional:       General: She is not in acute distress. Appearance: Normal appearance. She is well-developed. She is not ill-appearing, toxic-appearing or diaphoretic. HENT:      Head: Normocephalic and atraumatic. Eyes:      General: No scleral icterus. Right eye: No discharge. Left eye: No discharge. Conjunctiva/sclera: Conjunctivae normal.   Neck:      Musculoskeletal: Normal range of motion and neck supple. Thyroid: No thyroid mass, thyromegaly or thyroid tenderness. Cardiovascular:      Rate and Rhythm: Normal rate and regular rhythm. Heart sounds: Normal heart sounds. No murmur. No friction rub. No gallop. Pulmonary:      Effort: Pulmonary effort is normal. No respiratory distress. Breath sounds: Normal breath sounds. No wheezing or rales. Chest:      Chest wall: No tenderness. Abdominal:      General: Bowel sounds are normal.      Palpations: Abdomen is soft. Tenderness: There is no abdominal tenderness. Musculoskeletal: Normal range of motion. General: No tenderness. Skin:     General: Skin is warm and dry. Neurological:      General: No focal deficit present. Mental Status: She is alert and oriented to person, place, and time.    Psychiatric:         Attention and Perception: Attention and perception normal.         Mood and Affect: Mood and affect normal.         Speech: Speech normal.         Behavior: Behavior normal. Behavior is cooperative. Thought Content: Thought content normal.         Cognition and Memory: Cognition and memory normal.         Judgment: Judgment normal.           Vitals:    12/08/20 0944 12/08/20 1034   BP: (!) 140/88 128/80   Site: Left Upper Arm Left Upper Arm   Position: Sitting Sitting   Cuff Size: Large Adult Large Adult   Pulse: 78    Resp: 16    Temp: 97.3 °F (36.3 °C)    TempSrc: Skin    SpO2: 98%    Weight: (!) 323 lb (146.5 kg)    Height: 5' 10\" (1.778 m)      BP Readings from Last 3 Encounters:   12/08/20 128/80   10/07/20 (!) 140/84   09/08/20 126/84              DIAGNOSTIC FINDINGS:  CBC:  Lab Results   Component Value Date    WBC 10.3 01/23/2020    HGB 14.1 01/23/2020     01/23/2020       BMP:    Lab Results   Component Value Date     01/23/2020    K 4.2 01/23/2020    CL 95 01/23/2020    CO2 20 01/23/2020    BUN 16 01/23/2020    CREATININE 1.1 01/23/2020    CREATININE 1.04 01/23/2020    GLUCOSE 193 01/23/2020         FASTING LIPID PANEL:  Lab Results   Component Value Date    CHOL 164 07/12/2018    HDL 35 (L) 09/17/2020    TRIG 248 (H) 07/12/2018       Results for orders placed or performed in visit on 12/08/20   POCT glycosylated hemoglobin (Hb A1C)   Result Value Ref Range    Hemoglobin A1C 7.8 %         ASSESSMENT AND PLAN:   Diagnosis Orders   1. Type 2 diabetes mellitus with diabetic polyneuropathy, with long-term current use of insulin ([facility])  POCT glycosylated hemoglobin (Hb A1C)    Liraglutide (VICTOZA) 18 MG/3ML SOPN SC injection       FOLLOW UP AND INSTRUCTIONS:  Return in about 3 months (around 3/8/2021), or if symptoms worsen or fail to improve. · Discussed use, benefit, and side effects of prescribed medications. Barriers to medication compliance addressed. All patient questions answered. Pt voiced understanding.      · Patient instructed to return to the office if symptoms do not resolve or go directly to the ER if the symptoms worsen - patient voiced understanding. · Patient given educational materials - see patient instructions    Brenda BELL  [facility]  12/20/2020, 8:32 PM    This note is created with the assistance of a speech-recognition program. While intending to generate a document that actually reflects the content of the visit, the document can still have some mistakes which may not have been identified and corrected by editing.

## 2020-12-22 NOTE — TELEPHONE ENCOUNTER
[facility]oke with [facility] and made them aware that she is to taking the 0.5 weekly and let the patient know that I clarified the dose

## 2020-12-24 RX ORDER — SEMAGLUTIDE 1.34 MG/ML
INJECTION, SOLUTION SUBCUTANEOUS
Qty: 5 PEN | Refills: 1 | Status: SHIPPED | OUTPATIENT
Start: 2020-12-24 | End: 2021-04-22 | Stop reason: SINTOL

## 2020-12-30 RX ORDER — SEMAGLUTIDE 1.34 MG/ML
0.25 INJECTION, SOLUTION SUBCUTANEOUS WEEKLY
Qty: 5 PEN | Refills: 0 | OUTPATIENT
Start: 2020-12-30

## 2020-12-30 NOTE — TELEPHONE ENCOUNTER
Faxed rx request     Next Visit Date:  Future Appointments   Date Time Provider Abbe Vasquez   3/8/2021 10:15 AM Sumit Deleon [facility] [address] Maintenance   Topic Date Due    Hepatitis B vaccine (1 of 3 - Risk 3-dose series) 04/27/1980    Diabetic retinal exam  10/30/2018    DTaP/Tdap/Td vaccine (1 - Tdap) 01/24/2027 (Originally 4/27/1980)    Diabetic microalbuminuria test  01/23/2021    Potassium monitoring  01/23/2021    Creatinine monitoring  01/23/2021    Annual Wellness Visit (AWV)  06/06/2021    Diabetic foot exam  09/08/2021    Lipid screen  09/17/2021    A1C test (Diabetic or Prediabetic)  12/08/2021    Breast cancer screen  07/09/2022    Colon cancer screen colonoscopy  05/18/2027    Flu vaccine  Completed    Shingles Vaccine  Completed    Pneumococcal 0-64 years Vaccine  Completed    Hepatitis C screen  Completed    HIV screen  Completed    Hepatitis A vaccine  Aged Out    Hib vaccine  Aged Out    Meningococcal (ACWY) vaccine  Aged Out       Hemoglobin A1C (%)   Date Value   12/08/2020 7.8   09/08/2020 9.8   01/16/2020 7.9             ( goal A1C is < 7)   Microalb/Crt.  Ratio (mcg/mg creat)   Date Value   01/23/2020 53 (H)     LDL Cholesterol (mg/dL)   Date Value   09/17/2020 83   01/23/2020            (goal LDL is <100)   AST (U/L)   Date Value   01/23/2020 22     ALT (U/L)   Date Value   01/23/2020 24     BUN (mg/dL)   Date Value   01/23/2020 16     BP Readings from Last 3 Encounters:   12/08/20 128/80   10/07/20 (!) 140/84   09/08/20 126/84          (goal 120/80)    All Future Testing planned in [facility]  Lab Frequency Next Occurrence   Lipid, Fasting Once 04/27/2020               Patient Active Problem List:     HLD (hyperlipidemia)     HTN (hypertension)     Morbid obesity (HCC)     Neuropathy due to secondary diabetes ([address]     Depression     Mild persistent asthma without complication     Type 2 diabetes mellitus with diabetic polyneuropathy, with long-term current use of insulin (HCC)     TIFFANI positive     Chronic obstructive pulmonary disease ([facility]     Fibromyalgia     Dental abscess     Yeast infection     Hyperglycemia no

## 2021-01-04 RX ORDER — SEMAGLUTIDE 1.34 MG/ML
0.25 INJECTION, SOLUTION SUBCUTANEOUS WEEKLY
Qty: 5 PEN | Refills: 0 | Status: SHIPPED | OUTPATIENT
Start: 2021-01-04 | End: 2021-04-22 | Stop reason: SINTOL

## 2021-01-04 NOTE — TELEPHONE ENCOUNTER
Faxed Rx request.  Message: Rx for ozempic 0.25mg or 0.5mg. Directions are incomplete   Please advise   Next Visit Date:  Future Appointments   Date Time Provider Abbe Vasquez   3/8/2021 10:15 AM Willow Talbot [facility] [address] Maintenance   Topic Date Due    Hepatitis B vaccine (1 of 3 - Risk 3-dose series) 04/27/1980    Diabetic retinal exam  10/30/2018    DTaP/Tdap/Td vaccine (1 - Tdap) 01/24/2027 (Originally 4/27/1980)    Diabetic microalbuminuria test  01/23/2021    Potassium monitoring  01/23/2021    Creatinine monitoring  01/23/2021    Annual Wellness Visit (AWV)  06/06/2021    Diabetic foot exam  09/08/2021    Lipid screen  09/17/2021    A1C test (Diabetic or Prediabetic)  12/08/2021    Breast cancer screen  07/09/2022    Colon cancer screen colonoscopy  05/18/2027    Flu vaccine  Completed    Shingles Vaccine  Completed    Pneumococcal 0-64 years Vaccine  Completed    Hepatitis C screen  Completed    HIV screen  Completed    Hepatitis A vaccine  Aged Out    Hib vaccine  Aged Out    Meningococcal (ACWY) vaccine  Aged Out       Hemoglobin A1C (%)   Date Value   12/08/2020 7.8   09/08/2020 9.8   01/16/2020 7.9             ( goal A1C is < 7)   Microalb/Crt.  Ratio (mcg/mg creat)   Date Value   01/23/2020 53 (H)     LDL Cholesterol (mg/dL)   Date Value   09/17/2020 83   01/23/2020            (goal LDL is <100)   AST (U/L)   Date Value   01/23/2020 22     ALT (U/L)   Date Value   01/23/2020 24     BUN (mg/dL)   Date Value   01/23/2020 16     BP Readings from Last 3 Encounters:   12/08/20 128/80   10/07/20 (!) 140/84   09/08/20 126/84          (goal 120/80)    All Future Testing planned in [facility]  Lab Frequency Next Occurrence   Lipid, Fasting Once 04/27/2020               Patient Active Problem List:     HLD (hyperlipidemia)     HTN (hypertension)     Morbid obesity ([address]     Neuropathy due to secondary diabetes ([address]     Depression     Mild persistent asthma without complication     Type 2 diabetes mellitus with diabetic polyneuropathy, with long-term current use of insulin (HCC)     TIFFANI positive     Chronic obstructive pulmonary disease ([facility]     Fibromyalgia     Dental abscess     Yeast infection     Hyperglycemia

## 2021-01-11 RX ORDER — MILNACIPRAN HYDROCHLORIDE 50 MG/1
TABLET, FILM COATED ORAL
Qty: 180 TABLET | Refills: 0 | Status: SHIPPED | OUTPATIENT
Start: 2021-01-11 | End: 2021-04-23 | Stop reason: SDUPTHER

## 2021-01-11 NOTE — TELEPHONE ENCOUNTER
Faxed medication request pended medication please advise thank you! Next Visit Date:  Future Appointments   Date Time Provider Abbe Vasquez   3/8/2021 10:15 AM Kenny Funes [facility] [address] Maintenance   Topic Date Due    Hepatitis B vaccine (1 of 3 - Risk 3-dose series) 04/27/1980    Diabetic retinal exam  10/30/2018    Diabetic microalbuminuria test  01/23/2021    Potassium monitoring  01/23/2021    Creatinine monitoring  01/23/2021    DTaP/Tdap/Td vaccine (1 - Tdap) 01/24/2027 (Originally 4/27/1980)    Annual Wellness Visit (AWV)  06/06/2021    Diabetic foot exam  09/08/2021    Lipid screen  09/17/2021    A1C test (Diabetic or Prediabetic)  12/08/2021    Breast cancer screen  07/09/2022    Colon cancer screen colonoscopy  05/18/2027    Flu vaccine  Completed    Shingles Vaccine  Completed    Pneumococcal 0-64 years Vaccine  Completed    Hepatitis C screen  Completed    HIV screen  Completed    Hepatitis A vaccine  Aged Out    Hib vaccine  Aged Out    Meningococcal (ACWY) vaccine  Aged Out       Hemoglobin A1C (%)   Date Value   12/08/2020 7.8   09/08/2020 9.8   01/16/2020 7.9             ( goal A1C is < 7)   Microalb/Crt.  Ratio (mcg/mg creat)   Date Value   01/23/2020 53 (H)     LDL Cholesterol (mg/dL)   Date Value   09/17/2020 83   01/23/2020            (goal LDL is <100)   AST (U/L)   Date Value   01/23/2020 22     ALT (U/L)   Date Value   01/23/2020 24     BUN (mg/dL)   Date Value   01/23/2020 16     BP Readings from Last 3 Encounters:   12/08/20 128/80   10/07/20 (!) 140/84   09/08/20 126/84          (goal 120/80)    All Future Testing planned in [facility]  Lab Frequency Next Occurrence   Lipid, Fasting Once 04/27/2020               Patient Active Problem List:     HLD (hyperlipidemia)     HTN (hypertension)     Morbid obesity (HCC)     Neuropathy due to secondary diabetes ([address]     Depression     Mild persistent asthma without complication     Type 2 diabetes mellitus with diabetic polyneuropathy, with long-term current use of insulin (HCC)     TIFFANI positive     Chronic obstructive pulmonary disease ([facility] [address]     Fibromyalgia     Dental abscess     Yeast infection     Hyperglycemia

## 2021-01-19 NOTE — TELEPHONE ENCOUNTER
Faxed medication refill request. Pended medication. Please advise. Thank you! Next Visit Date:  Future Appointments   Date Time Provider Abbe Christina   3/8/2021 10:15 AM Angel [facility]unes [facility] [address] Maintenance   Topic Date Due    Hepatitis B vaccine (1 of 3 - Risk 3-dose series) 04/27/1980    Diabetic retinal exam  10/30/2018    Diabetic microalbuminuria test  01/23/2021    Potassium monitoring  01/23/2021    Creatinine monitoring  01/23/2021    DTaP/Tdap/Td vaccine (1 - Tdap) 01/24/2027 (Originally 4/27/1980)    Annual Wellness Visit (AWV)  06/06/2021    Diabetic foot exam  09/08/2021    Lipid screen  09/17/2021    A1C test (Diabetic or Prediabetic)  12/08/2021    Breast cancer screen  07/09/2022    Colon cancer screen colonoscopy  05/18/2027    Flu vaccine  Completed    Shingles Vaccine  Completed    Pneumococcal 0-64 years Vaccine  Completed    Hepatitis C screen  Completed    HIV screen  Completed    Hepatitis A vaccine  Aged Out    Hib vaccine  Aged Out    Meningococcal (ACWY) vaccine  Aged Out       Hemoglobin A1C (%)   Date Value   12/08/2020 7.8   09/08/2020 9.8   01/16/2020 7.9             ( goal A1C is < 7)   Microalb/Crt.  Ratio (mcg/mg creat)   Date Value   01/23/2020 53 (H)     LDL Cholesterol (mg/dL)   Date Value   09/17/2020 83   01/23/2020            (goal LDL is <100)   AST (U/L)   Date Value   01/23/2020 22     ALT (U/L)   Date Value   01/23/2020 24     BUN (mg/dL)   Date Value   01/23/2020 16     BP Readings from Last 3 Encounters:   12/08/20 128/80   10/07/20 (!) 140/84   09/08/20 126/84          (goal 120/80)    All Future Testing planned in [facility]  Lab Frequency Next Occurrence   Lipid, Fasting Once 04/27/2020               Patient Active Problem List:     HLD (hyperlipidemia)     HTN (hypertension)     Morbid obesity ([address]     Neuropathy due to secondary diabetes ([address]     Depression     Mild persistent asthma without complication Type 2 diabetes mellitus with diabetic polyneuropathy, with long-term current use of insulin (HCC)     TIFFANI positive     Chronic obstructive pulmonary disease ([facility] [address]     Fibromyalgia     Dental abscess     Yeast infection     Hyperglycemia

## 2021-01-20 RX ORDER — MILNACIPRAN HYDROCHLORIDE 25 MG/1
TABLET, FILM COATED ORAL
Qty: 180 TABLET | Refills: 1 | OUTPATIENT
Start: 2021-01-20

## 2021-02-04 RX ORDER — OMEPRAZOLE 20 MG/1
CAPSULE, DELAYED RELEASE ORAL
Qty: 90 CAPSULE | Refills: 0 | Status: SHIPPED | OUTPATIENT
Start: 2021-02-04 | End: 2021-03-10 | Stop reason: SDUPTHER

## 2021-02-04 NOTE — TELEPHONE ENCOUNTER
Next Visit Date:  Future Appointments   Date Time Provider Abbe Christina   3/8/2021 10:15 AM [facility] [address] Maintenance   Topic Date Due    Hepatitis B vaccine (1 of 3 - Risk 3-dose series) 04/27/1980    Diabetic retinal exam  10/30/2018    Diabetic microalbuminuria test  01/23/2021    Potassium monitoring  01/23/2021    Creatinine monitoring  01/23/2021    DTaP/Tdap/Td vaccine (1 - Tdap) 01/24/2027 (Originally 4/27/1980)    Annual Wellness Visit (AWV)  06/06/2021    Diabetic foot exam  09/08/2021    Lipid screen  09/17/2021    A1C test (Diabetic or Prediabetic)  12/08/2021    Breast cancer screen  07/09/2022    Colon cancer screen colonoscopy  05/18/2027    Flu vaccine  Completed    Shingles Vaccine  Completed    Pneumococcal 0-64 years Vaccine  Completed    Hepatitis C screen  Completed    HIV screen  Completed    Hepatitis A vaccine  Aged Out    Hib vaccine  Aged Out    Meningococcal (ACWY) vaccine  Aged Out       Hemoglobin A1C (%)   Date Value   12/08/2020 7.8   09/08/2020 9.8   01/16/2020 7.9             ( goal A1C is < 7)   Microalb/Crt.  Ratio (mcg/mg creat)   Date Value   01/23/2020 53 (H)     LDL Cholesterol (mg/dL)   Date Value   09/17/2020 83   01/23/2020            (goal LDL is <100)   AST (U/L)   Date Value   01/23/2020 22     ALT (U/L)   Date Value   01/23/2020 24     BUN (mg/dL)   Date Value   01/23/2020 16     BP Readings from Last 3 Encounters:   12/08/20 128/80   10/07/20 (!) 140/84   09/08/20 126/84          (goal 120/80)    All Future Testing planned in [facility]  Lab Frequency Next Occurrence               Patient Active Problem List:     HLD (hyperlipidemia)     HTN (hypertension)     Morbid obesity (HCC)     Neuropathy due to secondary diabetes ([address]     Depression     Mild persistent asthma without complication     Type 2 diabetes mellitus with diabetic polyneuropathy, with long-term current use of insulin (HCC)     TIFFANI positive Chronic obstructive pulmonary disease (HCC)     Fibromyalgia     Dental abscess     Yeast infection     Hyperglycemia

## 2021-02-08 NOTE — TELEPHONE ENCOUNTER
Received faxed medication refill request from [facility] in [place], [place] for blood glucose meter, alcohol pads, test strips, lancet device, lancets, and control solution. Please advise, thank you! Next Visit Date:  Future Appointments   Date Time Provider Abbe Vasquez   3/8/2021 10:15 AM Rip Foote Majarad [facility] [address] Maintenance   Topic Date Due    Hepatitis B vaccine (1 of 3 - Risk 3-dose series) 04/27/1980    Diabetic retinal exam  10/30/2018    Diabetic microalbuminuria test  01/23/2021    Potassium monitoring  01/23/2021    Creatinine monitoring  01/23/2021    DTaP/Tdap/Td vaccine (1 - Tdap) 01/24/2027 (Originally 4/27/1980)    Annual Wellness Visit (AWV)  06/06/2021    Diabetic foot exam  09/08/2021    Lipid screen  09/17/2021    A1C test (Diabetic or Prediabetic)  12/08/2021    Breast cancer screen  07/09/2022    Colon cancer screen colonoscopy  05/18/2027    Flu vaccine  Completed    Shingles Vaccine  Completed    Pneumococcal 0-64 years Vaccine  Completed    Hepatitis C screen  Completed    HIV screen  Completed    Hepatitis A vaccine  Aged Out    Hib vaccine  Aged Out    Meningococcal (ACWY) vaccine  Aged Out       Hemoglobin A1C (%)   Date Value   12/08/2020 7.8   09/08/2020 9.8   01/16/2020 7.9             ( goal A1C is < 7)   Microalb/Crt.  Ratio (mcg/mg creat)   Date Value   01/23/2020 53 (H)     LDL Cholesterol (mg/dL)   Date Value   09/17/2020 83   01/23/2020            (goal LDL is <100)   AST (U/L)   Date Value   01/23/2020 22     ALT (U/L)   Date Value   01/23/2020 24     BUN (mg/dL)   Date Value   01/23/2020 16     BP Readings from Last 3 Encounters:   12/08/20 128/80   10/07/20 (!) 140/84   09/08/20 126/84          (goal 120/80)    All Future Testing planned in [facility]  Lab Frequency Next Occurrence               Patient Active Problem List:     HLD (hyperlipidemia)     HTN (hypertension)     Morbid obesity (HCC)     Neuropathy due to secondary diabetes ([facility] [address]     Depression     Mild persistent asthma without complication     Type 2 diabetes mellitus with diabetic polyneuropathy, with long-term current use of insulin (HCC)     TIFFANI positive     Chronic obstructive pulmonary disease ([facility] [address]     Fibromyalgia     Dental abscess     Yeast infection     Hyperglycemia

## 2021-02-17 ENCOUNTER — TELEPHONE (OUTPATIENT)
Dept: FAMILY MEDICINE CLINIC | Age: 61
End: 2021-02-17

## 2021-02-17 NOTE — TELEPHONE ENCOUNTER
Received faxed medication refill request for Diclofenac Sodium gel 1% and Clobetasol Propionate Ointment 0.05%. Pharmacy listed is the [facility]. Please advise, thank you! Next Visit Date:  No future appointments. Health Maintenance   Topic Date Due    Hepatitis B vaccine (1 of 3 - Risk 3-dose series) 04/27/1980    Diabetic retinal exam  10/30/2018    Diabetic microalbuminuria test  01/23/2021    Potassium monitoring  01/23/2021    Creatinine monitoring  01/23/2021    DTaP/Tdap/Td vaccine (1 - Tdap) 01/24/2027 (Originally 4/27/1980)    Annual Wellness Visit (AWV)  06/06/2021    Diabetic foot exam  09/08/2021    Lipid screen  09/17/2021    A1C test (Diabetic or Prediabetic)  12/08/2021    Breast cancer screen  07/09/2022    Colon cancer screen colonoscopy  05/18/2027    Flu vaccine  Completed    Shingles Vaccine  Completed    Pneumococcal 0-64 years Vaccine  Completed    Hepatitis C screen  Completed    HIV screen  Completed    Hepatitis A vaccine  Aged Out    Hib vaccine  Aged Out    Meningococcal (ACWY) vaccine  Aged Out       Hemoglobin A1C (%)   Date Value   12/08/2020 7.8   09/08/2020 9.8   01/16/2020 7.9             ( goal A1C is < 7)   Microalb/Crt.  Ratio (mcg/mg creat)   Date Value   01/23/2020 53 (H)     LDL Cholesterol (mg/dL)   Date Value   09/17/2020 83   01/23/2020            (goal LDL is <100)   AST (U/L)   Date Value   01/23/2020 22     ALT (U/L)   Date Value   01/23/2020 24     BUN (mg/dL)   Date Value   01/23/2020 16     BP Readings from Last 3 Encounters:   12/08/20 128/80   10/07/20 (!) 140/84   09/08/20 126/84          (goal 120/80)    All Future Testing planned in [facility]  Lab Frequency Next Occurrence               Patient Active Problem List:     HLD (hyperlipidemia)     HTN (hypertension)     Morbid obesity (HCC)     Neuropathy due to secondary diabetes ([facility] [address]     Depression     Mild persistent asthma without complication     Type 2 diabetes mellitus with diabetic polyneuropathy, with long-term current use of insulin (HCC)     TIFFANI positive     Chronic obstructive pulmonary disease ([facility] [address]     Fibromyalgia     Dental abscess     Yeast infection     Hyperglycemia

## 2021-03-05 DIAGNOSIS — E78.1 HYPERTRIGLYCERIDEMIA: ICD-10-CM

## 2021-03-05 RX ORDER — FENOFIBRATE 160 MG/1
160 TABLET ORAL DAILY
Qty: 90 TABLET | Refills: 1 | Status: SHIPPED | OUTPATIENT
Start: 2021-03-05 | End: 2022-01-10 | Stop reason: SDUPTHER

## 2021-03-05 RX ORDER — PEN NEEDLE, DIABETIC 30 GX5/16"
1 NEEDLE, DISPOSABLE MISCELLANEOUS 3 TIMES DAILY
Qty: 100 EACH | Refills: 3 | Status: SHIPPED | OUTPATIENT
Start: 2021-03-05 | End: 2021-10-11

## 2021-03-05 NOTE — TELEPHONE ENCOUNTER
Received faxed medication refill request, prescription pended. Please advise, thank you! Next Visit Date:  Future Appointments   Date Time Provider Abbe Burgessi   3/15/2021  1:15 PM [facility] [address] Maintenance   Topic Date Due    Hepatitis B vaccine (1 of 3 - Risk 3-dose series) Never done    Diabetic retinal exam  10/30/2018    Diabetic microalbuminuria test  01/23/2021    Potassium monitoring  01/23/2021    Creatinine monitoring  01/23/2021    DTaP/Tdap/Td vaccine (1 - Tdap) 01/24/2027 (Originally 4/27/1980)    Annual Wellness Visit (AWV)  06/06/2021    Diabetic foot exam  09/08/2021    Lipid screen  09/17/2021    A1C test (Diabetic or Prediabetic)  12/08/2021    Breast cancer screen  07/09/2022    Colon cancer screen colonoscopy  05/18/2027    Flu vaccine  Completed    Shingles Vaccine  Completed    Pneumococcal 0-64 years Vaccine  Completed    Hepatitis C screen  Completed    HIV screen  Completed    Hepatitis A vaccine  Aged Out    Hib vaccine  Aged Out    Meningococcal (ACWY) vaccine  Aged Out       Hemoglobin A1C (%)   Date Value   12/08/2020 7.8   09/08/2020 9.8   01/16/2020 7.9             ( goal A1C is < 7)   Microalb/Crt.  Ratio (mcg/mg creat)   Date Value   01/23/2020 53 (H)     LDL Cholesterol (mg/dL)   Date Value   09/17/2020 83   01/23/2020            (goal LDL is <100)   AST (U/L)   Date Value   01/23/2020 22     ALT (U/L)   Date Value   01/23/2020 24     BUN (mg/dL)   Date Value   01/23/2020 16     BP Readings from Last 3 Encounters:   12/08/20 128/80   10/07/20 (!) 140/84   09/08/20 126/84          (goal 120/80)    All Future Testing planned in [facility]  Lab Frequency Next Occurrence               Patient Active Problem List:     HLD (hyperlipidemia)     HTN (hypertension)     Morbid obesity (HCC)     Neuropathy due to secondary diabetes ([facility] [address]     Depression     Mild persistent asthma without complication     Type 2 diabetes mellitus with diabetic polyneuropathy, with long-term current use of insulin (HCC)     TIFFANI positive     Chronic obstructive pulmonary disease ([facility] [address]     Fibromyalgia     Dental abscess     Yeast infection     Hyperglycemia

## 2021-03-10 DIAGNOSIS — E11.42 TYPE 2 DIABETES MELLITUS WITH DIABETIC POLYNEUROPATHY, WITH LONG-TERM CURRENT USE OF INSULIN (HCC): ICD-10-CM

## 2021-03-10 DIAGNOSIS — I10 ESSENTIAL HYPERTENSION: ICD-10-CM

## 2021-03-10 DIAGNOSIS — Z79.4 TYPE 2 DIABETES MELLITUS WITH DIABETIC POLYNEUROPATHY, WITH LONG-TERM CURRENT USE OF INSULIN (HCC): ICD-10-CM

## 2021-03-10 DIAGNOSIS — M10.9 GOUT, UNSPECIFIED CAUSE, UNSPECIFIED CHRONICITY, UNSPECIFIED SITE: ICD-10-CM

## 2021-03-10 RX ORDER — ALLOPURINOL 300 MG/1
TABLET ORAL
Qty: 90 TABLET | Refills: 1 | Status: SHIPPED | OUTPATIENT
Start: 2021-03-10 | End: 2021-09-13

## 2021-03-10 RX ORDER — CARVEDILOL 25 MG/1
TABLET ORAL
Qty: 180 TABLET | Refills: 1 | Status: SHIPPED | OUTPATIENT
Start: 2021-03-10 | End: 2021-08-24

## 2021-03-10 RX ORDER — OMEPRAZOLE 20 MG/1
20 CAPSULE, DELAYED RELEASE ORAL DAILY
Qty: 90 CAPSULE | Refills: 0 | Status: SHIPPED | OUTPATIENT
Start: 2021-03-10 | End: 2021-07-30

## 2021-03-10 NOTE — TELEPHONE ENCOUNTER
Chronic obstructive pulmonary disease (HCC)     Fibromyalgia     Dental abscess     Yeast infection     Hyperglycemia

## 2021-03-15 ENCOUNTER — OFFICE VISIT (OUTPATIENT)
Dept: FAMILY MEDICINE CLINIC | Age: 61
End: 2021-03-15
Payer: MEDICARE

## 2021-03-15 DIAGNOSIS — R11.2 NON-INTRACTABLE VOMITING WITH NAUSEA, UNSPECIFIED VOMITING TYPE: Primary | ICD-10-CM

## 2021-03-15 DIAGNOSIS — M1A.09X0 CHRONIC GOUT OF MULTIPLE SITES, UNSPECIFIED CAUSE: ICD-10-CM

## 2021-03-15 PROCEDURE — 99212 OFFICE O/P EST SF 10 MIN: CPT | Performed by: STUDENT IN AN ORGANIZED HEALTH CARE EDUCATION/TRAINING PROGRAM

## 2021-03-15 RX ORDER — COLCHICINE 0.6 MG/1
TABLET ORAL
Qty: 30 TABLET | Refills: 0 | Status: SHIPPED | OUTPATIENT
Start: 2021-03-15 | End: 2021-07-30 | Stop reason: SDUPTHER

## 2021-03-15 NOTE — PROGRESS NOTES
Osmel Palomo is a 62 y.o. female evaluated via telephone on 3/15/2021. Consent:  She and/or health care decision maker is aware that that she may receive a bill for this telephone service, depending on her insurance coverage, and has provided verbal consent to proceed: Yes      Documentation:  I communicated with the patient and/or health care decision maker about nausea vomiting and diarrhea. Details of this discussion including any medical advice provided:     Wayne Ba was seen today for other, diarrhea and emesis. She says she has been having nausea vomiting and diarrhea for past 2 days. Denies any fevers, chills. Has some productive cough but no shortness of breath or wheezing. Her partner is having similar symptoms and they did not eat outside anywhere recently. She has been having body aches, fatigue and myalgias. Appetite has been slightly low. She has been taking over-the-counter cough medicine which has been helping. Needs refill for colchicine in case of any acute episode of gout. Diagnoses and all orders for this visit:    Non-intractable vomiting with nausea, unspecified vomiting type  -     COVID-19; Future  -     Rapid Influenza A/B Antigens; Future    Chronic gout of multiple sites, unspecified cause  -     colchicine (COLCRYS) 0.6 MG tablet; TAKE 1 TABLET BY MOUTH DAILY          I affirm this is a Patient Initiated Episode with a Patient who has not had a related appointment within my department in the past 7 days or scheduled within the next 24 hours. Patient identification was verified at the start of the visit: Yes    Total Time: minutes: 5-10 minutes    The visit was conducted pursuant to the emergency declaration under the [address] [place] [address], [address] [place] [address] authority and the [employer] Act. Patient identification was verified, and a caregiver was present when appropriate.  The patient was located in a state where the provider was credentialed to provide care.     Note: not billable if this call serves to triage the patient into an appointment for the relevant concern      Colt Garvey

## 2021-03-17 ENCOUNTER — TELEPHONE (OUTPATIENT)
Dept: FAMILY MEDICINE CLINIC | Age: 61
End: 2021-03-17

## 2021-03-17 DIAGNOSIS — R11.2 NON-INTRACTABLE VOMITING WITH NAUSEA, UNSPECIFIED VOMITING TYPE: Primary | ICD-10-CM

## 2021-03-17 RX ORDER — ONDANSETRON 4 MG/1
4 TABLET, FILM COATED ORAL 3 TIMES DAILY PRN
Qty: 15 TABLET | Refills: 0 | Status: SHIPPED | OUTPATIENT
Start: 2021-03-17 | End: 2021-12-01 | Stop reason: SDUPTHER

## 2021-03-17 NOTE — TELEPHONE ENCOUNTER
Patient would like to know if she could get something for Nausea and Diarrhea.  Please advise Thanks

## 2021-03-18 DIAGNOSIS — R11.2 NON-INTRACTABLE VOMITING WITH NAUSEA, UNSPECIFIED VOMITING TYPE: Primary | ICD-10-CM

## 2021-03-18 NOTE — PROGRESS NOTES
Called pt to schedule 1 month follow up. Pt states she is still not doing well still vomitting and diarrhea can't keep anything in. See fax in provider bin

## 2021-03-18 NOTE — PROGRESS NOTES
Please check with her if she got tested for Covid and flu. I can send her some prescription for Zofran for nausea.

## 2021-03-29 RX ORDER — MILNACIPRAN HYDROCHLORIDE 25 MG/1
TABLET, FILM COATED ORAL
Qty: 180 TABLET | Refills: 1 | OUTPATIENT
Start: 2021-03-29

## 2021-03-29 NOTE — TELEPHONE ENCOUNTER
Next Visit Date:  Future Appointments   Date Time Provider Abbe Burgessi   4/22/2021 10:30 AM Nadine Nieves [facility] [address] Maintenance   Topic Date Due    COVID-19 Vaccine (1) Never done    Hepatitis B vaccine (1 of 3 - Risk 3-dose series) Never done    Diabetic retinal exam  10/30/2018    Diabetic microalbuminuria test  01/23/2021    Potassium monitoring  01/23/2021    Creatinine monitoring  01/23/2021    DTaP/Tdap/Td vaccine (1 - Tdap) 01/24/2027 (Originally 4/27/1980)    Annual Wellness Visit (AWV)  06/06/2021    Diabetic foot exam  09/08/2021    Lipid screen  09/17/2021    A1C test (Diabetic or Prediabetic)  12/08/2021    Breast cancer screen  07/09/2022    Colon cancer screen colonoscopy  05/18/2027    Flu vaccine  Completed    Shingles Vaccine  Completed    Pneumococcal 0-64 years Vaccine  Completed    Hepatitis C screen  Completed    HIV screen  Completed    Hepatitis A vaccine  Aged Out    Hib vaccine  Aged Out    Meningococcal (ACWY) vaccine  Aged Out       Hemoglobin A1C (%)   Date Value   12/08/2020 7.8   09/08/2020 9.8   01/16/2020 7.9             ( goal A1C is < 7)   Microalb/Crt.  Ratio (mcg/mg creat)   Date Value   01/23/2020 53 (H)     LDL Cholesterol (mg/dL)   Date Value   09/17/2020 83   01/23/2020            (goal LDL is <100)   AST (U/L)   Date Value   01/23/2020 22     ALT (U/L)   Date Value   01/23/2020 24     BUN (mg/dL)   Date Value   01/23/2020 16     BP Readings from Last 3 Encounters:   12/08/20 128/80   10/07/20 (!) 140/84   09/08/20 126/84          (goal 120/80)    All Future Testing planned in [facility]  Lab Frequency Next Occurrence   COVID-19 Once 03/15/2021   Rapid Influenza A/B Antigens Once 03/15/2021               Patient Active Problem List:     HLD (hyperlipidemia)     HTN (hypertension)     Morbid obesity (HCC)     Neuropathy due to secondary diabetes ([facility] [address]     Depression     Mild persistent asthma without complication     Type 2 diabetes mellitus with diabetic polyneuropathy, with long-term current use of insulin (HCC)     TIFFANI positive     Chronic obstructive pulmonary disease ([facility] [address]     Fibromyalgia     Dental abscess     Yeast infection     Hyperglycemia

## 2021-03-30 ENCOUNTER — HOSPITAL ENCOUNTER (OUTPATIENT)
Age: 61
Setting detail: SPECIMEN
Discharge: HOME | End: 2021-03-30
Payer: MEDICARE

## 2021-03-30 ENCOUNTER — NURSE ONLY (OUTPATIENT)
Dept: PRIMARY CARE CLINIC | Age: 61
End: 2021-03-30

## 2021-03-30 DIAGNOSIS — Z20.822 ENCOUNTER FOR LABORATORY TESTING FOR COVID-19 VIRUS: Primary | ICD-10-CM

## 2021-03-31 DIAGNOSIS — R11.2 NON-INTRACTABLE VOMITING WITH NAUSEA, UNSPECIFIED VOMITING TYPE: ICD-10-CM

## 2021-03-31 LAB
SARS-COV-2: NORMAL
SARS-COV-2: NOT DETECTED
SOURCE: NORMAL

## 2021-04-15 DIAGNOSIS — G25.81 RLS (RESTLESS LEGS SYNDROME): ICD-10-CM

## 2021-04-15 RX ORDER — ROPINIROLE 1 MG/1
TABLET, FILM COATED ORAL
Qty: 90 TABLET | Refills: 1 | Status: SHIPPED | OUTPATIENT
Start: 2021-04-15 | End: 2021-08-24

## 2021-04-15 NOTE — TELEPHONE ENCOUNTER
Depression     Mild persistent asthma without complication     Type 2 diabetes mellitus with diabetic polyneuropathy, with long-term current use of insulin (HCC)     TIFFANI positive     Chronic obstructive pulmonary disease (HCC)     Fibromyalgia     Dental abscess     Yeast infection     Hyperglycemia

## 2021-04-22 ENCOUNTER — OFFICE VISIT (OUTPATIENT)
Dept: FAMILY MEDICINE CLINIC | Age: 61
End: 2021-04-22
Payer: MEDICARE

## 2021-04-22 VITALS
OXYGEN SATURATION: 98 % | DIASTOLIC BLOOD PRESSURE: 82 MMHG | TEMPERATURE: 97.3 F | RESPIRATION RATE: 16 BRPM | WEIGHT: 205 LBS | BODY MASS INDEX: 29.41 KG/M2 | HEART RATE: 87 BPM | SYSTOLIC BLOOD PRESSURE: 138 MMHG

## 2021-04-22 DIAGNOSIS — Z72.0 TOBACCO USE: ICD-10-CM

## 2021-04-22 DIAGNOSIS — J44.9 CHRONIC OBSTRUCTIVE PULMONARY DISEASE, UNSPECIFIED COPD TYPE (HCC): ICD-10-CM

## 2021-04-22 DIAGNOSIS — Z79.4 TYPE 2 DIABETES MELLITUS WITH DIABETIC POLYNEUROPATHY, WITH LONG-TERM CURRENT USE OF INSULIN (HCC): Primary | ICD-10-CM

## 2021-04-22 DIAGNOSIS — I10 ESSENTIAL HYPERTENSION: ICD-10-CM

## 2021-04-22 DIAGNOSIS — E11.21 MICROALBUMINURIC DIABETIC NEPHROPATHY (HCC): ICD-10-CM

## 2021-04-22 DIAGNOSIS — M1A.09X1 IDIOPATHIC CHRONIC GOUT OF MULTIPLE SITES WITH TOPHUS: ICD-10-CM

## 2021-04-22 DIAGNOSIS — N18.31 STAGE 3A CHRONIC KIDNEY DISEASE (HCC): ICD-10-CM

## 2021-04-22 DIAGNOSIS — E11.42 TYPE 2 DIABETES MELLITUS WITH DIABETIC POLYNEUROPATHY, WITH LONG-TERM CURRENT USE OF INSULIN (HCC): Primary | ICD-10-CM

## 2021-04-22 DIAGNOSIS — E78.00 PURE HYPERCHOLESTEROLEMIA: ICD-10-CM

## 2021-04-22 DIAGNOSIS — F41.9 ANXIETY: ICD-10-CM

## 2021-04-22 LAB
CREATININE URINE POCT: 50
MICROALBUMIN/CREAT 24H UR: 30 MG/G{CREAT}
MICROALBUMIN/CREAT UR-RTO: ABNORMAL

## 2021-04-22 PROCEDURE — 99214 OFFICE O/P EST MOD 30 MIN: CPT | Performed by: STUDENT IN AN ORGANIZED HEALTH CARE EDUCATION/TRAINING PROGRAM

## 2021-04-22 PROCEDURE — 82044 UR ALBUMIN SEMIQUANTITATIVE: CPT | Performed by: STUDENT IN AN ORGANIZED HEALTH CARE EDUCATION/TRAINING PROGRAM

## 2021-04-22 RX ORDER — ESCITALOPRAM OXALATE 10 MG/1
10 TABLET ORAL DAILY
Qty: 90 TABLET | Refills: 1 | Status: SHIPPED | OUTPATIENT
Start: 2021-04-22 | End: 2021-04-28 | Stop reason: ALTCHOICE

## 2021-04-22 RX ORDER — ALBUTEROL SULFATE 90 UG/1
2 AEROSOL, METERED RESPIRATORY (INHALATION) EVERY 6 HOURS PRN
COMMUNITY
End: 2021-04-22 | Stop reason: SDUPTHER

## 2021-04-22 RX ORDER — ALBUTEROL SULFATE 90 UG/1
2 AEROSOL, METERED RESPIRATORY (INHALATION) EVERY 6 HOURS PRN
Qty: 1 INHALER | Refills: 3 | Status: SHIPPED | OUTPATIENT
Start: 2021-04-22

## 2021-04-22 RX ORDER — AMITRIPTYLINE HYDROCHLORIDE 25 MG/1
25 TABLET, FILM COATED ORAL NIGHTLY
Qty: 45 TABLET | Refills: 0 | Status: SHIPPED | OUTPATIENT
Start: 2021-04-22 | End: 2021-04-29 | Stop reason: SDUPTHER

## 2021-04-22 RX ORDER — MILNACIPRAN HYDROCHLORIDE 25 MG/1
25 TABLET, FILM COATED ORAL 2 TIMES DAILY
COMMUNITY
End: 2021-04-23 | Stop reason: SDUPTHER

## 2021-04-22 ASSESSMENT — ENCOUNTER SYMPTOMS
CHEST TIGHTNESS: 0
VOMITING: 0
COUGH: 0
SORE THROAT: 0
CONSTIPATION: 0
ABDOMINAL PAIN: 0
SHORTNESS OF BREATH: 0
NAUSEA: 0
WHEEZING: 0
DIARRHEA: 1
EYE DISCHARGE: 0

## 2021-04-22 NOTE — PROGRESS NOTES
[address] PRIMARY CARE  [address] [place] [address]  Dept: [phone]  Dept Fax: [phone]    Penny Garcia is a 62 y.o. female who is a Established patient, who presents today for her medical conditions/complaints as noted below:  Chief Complaint   Patient presents with    Follow-up     2 month f/u she took her self off off some dm medicine (ozempic)    Diabetes         HPI:     Here today to follow-up on diabetes and hypertension. She says that she stopped taking Ozempic last week because she was having severe stomach issues with it. She says that she has been doing a lot better and her diarrhea has improved since she stopped taking the Ozempic. She is on Toujeo 52 units daily. Was not able to tolerate Metformin or Januvia. She has been on Victoza before. She says that her blood sugars are well controlled now and she feels better on the current regimen. She has been on carvedilol and hydrochlorothiazide for her blood pressure, could not tolerate lisinopril due to muscle cramps. Per her chart, she was not started on lisinopril due to her elevated creatinine. She says that the hydrochlorothiazide makes her go to the bathroom every 2 hours at nighttime even though she takes the dose in the morning. She also takes amitriptyline 75 mg at bedtime, says it was started for insomnia but is not helping at all. She says that she lies in her bed unable to sleep with her mind racing. Says that she has kind of OCD-like symptoms and she keeps thinking all day planning ahead. She is on Savella 50 mg twice daily for fibromyalgia and says that it has been helping. She is a current everyday smoker, has tried several therapies in the past including Wellbutrin, Chantix and nicotine patches and none of those helped. Hemoglobin A1C (%)   Date Value   12/08/2020 7.8   09/08/2020 9.8   01/16/2020 7.9             ( goal A1Cis < 7)   Microalb/Crt.  Ratio (mcg/mg creat)   Date Value   01/23/2020 53 (H)     LDL Cholesterol (mg/dL)   Date Value   09/17/2020 83   01/23/2020 07/12/2018 84       (goal LDL is <100)   AST (U/L)   Date Value   01/23/2020 22     ALT (U/L)   Date Value   01/23/2020 24     BUN (mg/dL)   Date Value   01/23/2020 16     BP Readings from Last 3 Encounters:   04/22/21 138/82   12/08/20 128/80   10/07/20 (!) 140/84          (goal 120/80)    Past Medical History:   Diagnosis Date    Anxiety     Asthma     Bronchitis     Chronic obstructive pulmonary disease ([facility] [address] 9/11/2018    Depression     Fibromyalgia     Gout     HLD (hyperlipidemia) 7/1/2015    Hypertension     Neuropathy     Obesity     Obstructive sleep apnea     Osteoarthritis     Stage 3a chronic kidney disease  4/22/2021    Type 2 diabetes mellitus with diabetic polyneuropathy, with long-term current use of insulin ([facility] [address] 9/7/2017    Type II or unspecified type diabetes mellitus without mention of complication, not stated as uncontrolled     Urinary incontinence       Past Surgical History:   Procedure Laterality Date    APPENDECTOMY      TAKEN WITH HYSTERECTOMY    BUNIONECTOMY Left 02-22-16    with arthrodesis and 2 through 4 with hallux valgus arthroplasty 5th toe on left    COLONOSCOPY  8/17/2015    2 polyps removed, diverticulosis, internal hemorrhoids    FINGER SURGERY      re-attachment of left middle finger    FOOT SURGERY Right 11/11/2015    bunionectomy, 2-3-4th pinning, 5th arthroplasty    HERNIA REPAIR      UMBILICAL    HYSTERECTOMY, TOTAL ABDOMINAL  2001       Family History   Problem Relation Age of Onset    Cancer Mother 61        breast     High Blood Pressure Mother     Stroke Father 62    Depression Father 64        comitted sucide     Diabetes Sister 36    High Blood Pressure Sister     Depression Sister     Cancer Brother 64        prostate    Diabetes Brother     High Blood Pressure Brother     Cancer Paternal Aunt 77        colon, rectal  Cancer Paternal Grandfather 79        lung    Heart Disease Neg Hx        Social History     Tobacco Use    Smoking status: Current Every Day Smoker     Packs/day: 0.50     Years: 42.00     Pack years: 21.00     Types: Cigarettes    Smokeless tobacco: Never Used   Substance Use Topics    Alcohol use: Not Currently     Alcohol/week: 0.0 standard drinks     Comment: occassional       Current Outpatient Medications   Medication Sig Dispense Refill    milnacipran HCl (SAVELLA) 25 MG TABS Take 25 mg by mouth 2 times daily      albuterol sulfate HFA (VENTOLIN HFA) 108 (90 Base) MCG/ACT inhaler Inhale 2 puffs into the lungs every 6 hours as needed for Wheezing 1 Inhaler 3    amitriptyline (ELAVIL) 25 MG tablet Take 1 tablet by mouth nightly Take 2 tab at night for 2 weeks and then 1 tab at night for next 2 weeks and then stop.  45 tablet 0    escitalopram (LEXAPRO) 10 MG tablet Take 1 tablet by mouth daily 90 tablet 1    nicotine (NICOTROL) 10 MG inhaler Inhale 1 puff into the lungs as needed for Smoking cessation Do not use more than 4 cartridges in 24 hrs 1 Inhaler 3    Handicap Placard MISC by Does not apply route Expires 4/22/2022 1 each 0    rOPINIRole (REQUIP) 1 MG tablet TAKE 1 TABLET EVERY NIGHT AT BEDTIME 90 tablet 1    colchicine (COLCRYS) 0.6 MG tablet TAKE 1 TABLET BY MOUTH DAILY 30 tablet 0    allopurinol (ZYLOPRIM) 300 MG tablet TAKE 1 TABLET EVERY DAY 90 tablet 1    carvedilol (COREG) 25 MG tablet TAKE 1 TABLET BY MOUTH TWICE DAILY 180 tablet 1    omeprazole (PRILOSEC) 20 MG delayed release capsule Take 1 capsule by mouth Daily 90 capsule 0    fenofibrate (TRIGLIDE) 160 MG tablet Take 1 tablet by mouth daily 90 tablet 1    Insulin Pen Needle (PEN NEEDLES 3/16\") 31G X 5 MM MISC 1 each by Does not apply route 3 times daily 100 each 3    milnacipran HCl (SAVELLA) 50 MG TABS TAKE 1 TABLET BY MOUTH TWICE DAILY 180 tablet 0    hydroCHLOROthiazide (HYDRODIURIL) 25 MG tablet TAKE 1 TABLET EVERY MORNING 90 tablet 3    insulin lispro, 1 Unit Dial, (HUMALOG KWIKPEN) 100 UNIT/ML SOPN 0-150 = 0 units  151-200 = 2 units  201-250 = 4 units  251-300 = 6 units  301-350 = 8 units  351-400+ = 10 units 3 pen 1    tiZANidine (ZANAFLEX) 4 MG tablet TAKE 1 TABLET TWICE DAILY 180 tablet 2    Insulin Glargine, 1 Unit Dial, (TOUJEO SOLOSTAR) 300 UNIT/ML SOPN Inject 52 Units into the skin daily 24 mL 2    b complex vitamins capsule Take 1 capsule by mouth daily      Multiple Vitamins-Minerals (WOMENS DAILY FORMULA PO) Take by mouth      vitamin C (ASCORBIC ACID) 500 MG tablet Take 500 mg by mouth daily      Biotin 5 MG CAPS Take by mouth      tiotropium (SPIRIVA HANDIHALER) 18 MCG inhalation capsule INHALE THE CONTENTS OF 1 CAPSULE INTO THE LUNGS DAILY 90 capsule 1    ondansetron (ZOFRAN) 4 MG tablet Take 1 tablet by mouth 3 times daily as needed for Nausea or Vomiting (Patient not taking: Reported on 4/22/2021) 15 tablet 0    NOVOLOG FLEXPEN 100 UNIT/ML injection pen        Current Facility-Administered Medications   Medication Dose Route Frequency Provider Last Rate Last Admin    ipratropium (ATROVENT) 0.02 % nebulizer solution 0.5 mg  0.5 mg Nebulization Once Poncho Pappas MD        albuterol (PROVENTIL) nebulizer solution 2.5 mg  2.5 mg Nebulization Once Poncho Pappas MD         Allergies   Allergen Reactions    Cymbalta [Duloxetine Hcl] Anaphylaxis and Other (See Comments)    Januvia [Sitagliptin] Shortness Of Breath    Cephalexin Other (See Comments)    Ciprofloxacin Other (See Comments)     muscle spasms occur  muscle spasms occur  muscle spasms occur  cramping    Duloxetine     Lyrica [Pregabalin] Other (See Comments)     Does not help after 2 weeks     Metformin Diarrhea    Metformin And Related Diarrhea    Statins Other (See Comments)     myalgias      Morphine Diarrhea, Nausea And Vomiting, Swelling and Nausea Only       Health Maintenance   Topic Date Due    COVID-19 Vaccine (1) Never done    Hepatitis B vaccine (1 of 3 - Risk 3-dose series) Never done    Diabetic retinal exam  10/30/2018    Potassium monitoring  01/23/2021    Creatinine monitoring  01/23/2021    DTaP/Tdap/Td vaccine (1 - Tdap) 01/24/2027 (Originally 4[birth year]27/1979)    Annual Wellness Visit (AWV)  06/06/2021    Diabetic foot exam  09/08/2021    Lipid screen  09/17/2021    A1C test (Diabetic or Prediabetic)  12/08/2021    Diabetic microalbuminuria test  04/22/2022    Breast cancer screen  07/09/2022    Colon cancer screen colonoscopy  05/18/2027    Flu vaccine  Completed    Shingles Vaccine  Completed    Pneumococcal 0-64 years Vaccine  Completed    Hepatitis C screen  Completed    HIV screen  Completed    Hepatitis A vaccine  Aged Out    Hib vaccine  Aged Out    Meningococcal (ACWY) vaccine  Aged Out       Subjective:     Review of Systems   Constitutional: Positive for fatigue. Negative for appetite change and fever. HENT: Negative for congestion, ear pain, hearing loss and sore throat. Eyes: Negative for discharge and visual disturbance. Respiratory: Negative for cough, chest tightness, shortness of breath and wheezing. Cardiovascular: Negative for chest pain, palpitations and leg swelling. Gastrointestinal: Positive for diarrhea. Negative for abdominal pain, constipation, nausea and vomiting. Genitourinary: Negative for flank pain, frequency, hematuria and urgency. Musculoskeletal: Positive for arthralgias and joint swelling. Negative for gait problem and myalgias. Skin: Negative. Neurological: Negative for dizziness, weakness, numbness and headaches. Psychiatric/Behavioral: Positive for sleep disturbance. Negative for dysphoric mood. The patient is nervous/anxious. Objective:     Physical Exam  Vitals signs reviewed. Constitutional:       Appearance: Normal appearance. She is normal weight. HENT:      Head: Normocephalic and atraumatic.       Nose: Nose normal. Mouth/Throat:      Mouth: Mucous membranes are moist.      Pharynx: Oropharynx is clear. Eyes:      Extraocular Movements: Extraocular movements intact. Conjunctiva/sclera: Conjunctivae normal.      Pupils: Pupils are equal, round, and reactive to light. Neck:      Musculoskeletal: Normal range of motion and neck supple. Cardiovascular:      Rate and Rhythm: Normal rate and regular rhythm. Heart sounds: Normal heart sounds. No murmur. No gallop. Pulmonary:      Effort: Pulmonary effort is normal. No respiratory distress. Breath sounds: Normal breath sounds. No stridor. No wheezing. Abdominal:      General: Bowel sounds are normal. There is no distension. Palpations: Abdomen is soft. Tenderness: There is no abdominal tenderness. There is no guarding or rebound. Musculoskeletal: Normal range of motion. General: No swelling or tenderness. Skin:     General: Skin is warm and dry. Coloration: Skin is not jaundiced. Findings: No rash. Neurological:      General: No focal deficit present. Mental Status: She is alert and oriented to person, place, and time. Psychiatric:         Mood and Affect: Mood normal.         Behavior: Behavior normal.         Thought Content: Thought content normal.         Judgment: Judgment normal.       /82   Pulse 87   Temp 97.3 °F (36.3 °C) (Skin)   Resp 16   Wt 205 lb (93 kg)   SpO2 98%   BMI 29.41 kg/m²     Assessment/Plan:   1. Type 2 diabetes mellitus with diabetic polyneuropathy, with long-term current use of insulin ([facility])  -     Hemoglobin A1C; Future  -     TSH with Reflex; Future  -     Handicap Placard MISC; Starting Thu 4/22/2021, Disp-1 each, R-0, PrintExpires 4/22/2022  -     POCT microalbumin  2. Anxiety  -     amitriptyline (ELAVIL) 25 MG tablet;  Take 1 tablet by mouth nightly Take 2 tab at night for 2 weeks and then 1 tab at night for next 2 weeks and then stop., Disp-45 tablet, R-0Normal  - escitalopram (LEXAPRO) 10 MG tablet; Take 1 tablet by mouth daily, Disp-90 tablet, R-1Normal  3. Essential hypertension  -     CBC Auto Differential; Future  -     Comprehensive Metabolic Panel; Future  4. Stage 3a chronic kidney disease   -     Comprehensive Metabolic Panel; Future  -     Vitamin D 25 Hydroxy; Future  5. Microalbuminuric diabetic nephropathy ([facility] [address]  6. Chronic obstructive pulmonary disease, unspecified COPD type (HCC)  -     albuterol sulfate HFA (VENTOLIN HFA) 108 (90 Base) MCG/ACT inhaler; Inhale 2 puffs into the lungs every 6 hours as needed for Wheezing, Disp-1 Inhaler, R-3Normal  7. Tobacco use  -     nicotine (NICOTROL) 10 MG inhaler; Inhale 1 puff into the lungs as needed for Smoking cessation Do not use more than 4 cartridges in 24 hrs, Disp-1 Inhaler, R-3Normal  8. Pure hypercholesterolemia  -     Lipid Panel; Future  9. Idiopathic chronic gout of multiple sites with tophus  -     Uric Acid; Future  10. BMI 29.0-29.9,adult    1. Diabetes-last A1c 7.8. On Toujeo 50 units daily, along with Humalog sliding scale. Stopped taking Ozempic due to side effects. Has not been able to tolerate Metformin and Januvia previously. Repeat A1c ordered. 2.  Hypertension-controlled. On carvedilol 25 mg twice daily and hydrochlorothiazide 25 mg daily. Not on ACE/ARB inhibitor due to elevated creatinine. 3.  Anxiety- Was taking amitriptyline 75 mg at bedtime, not helping with insomnia. Discussed weaning off of amitriptyline by taking 50 mg at bedtime for 2 weeks and then 25 mg for 2 weeks before stopping. 4. COPD-symptoms controlled. On Spiriva and albuterol    5. Fibromyalgia-stable, on Savella 50 mg twice daily. Had allergic reactions to Cymbalta and Lyrica. 6.  Smoker-previously failed therapies with Wellbutrin, Chantix and nicotine patches. Started on Nicotrol inhaler today. 7.  Gout-on allopurinol 300 mg daily along with colchicine 0.6 mg for flareups.       Initially discussed switching from amitriptyline to Lexapro but later realized that she is already on [facility] for fibromyalgia. Will discuss alternative treatment options at next visit. Return in about 1 month (around 5/22/2021) for Follow up DM/HTN. Orders Placed This Encounter   Procedures    CBC Auto Differential     Standing Status:   Future     Standing Expiration Date:   7/22/2021    Comprehensive Metabolic Panel     Standing Status:   Future     Standing Expiration Date:   7/22/2021    Hemoglobin A1C     Standing Status:   Future     Standing Expiration Date:   7/22/2021    Lipid Panel     Standing Status:   Future     Standing Expiration Date:   7/22/2021     Order Specific Question:   Is Patient Fasting?/# of Hours     Answer: Yes    TSH with Reflex     Standing Status:   Future     Standing Expiration Date:   7/22/2021    Vitamin D 25 Hydroxy     Standing Status:   Future     Standing Expiration Date:   7/22/2021    Uric Acid     Standing Status:   Future     Standing Expiration Date:   7/22/2021    POCT microalbumin     Orders Placed This Encounter   Medications    albuterol sulfate HFA (VENTOLIN HFA) 108 (90 Base) MCG/ACT inhaler     Sig: Inhale 2 puffs into the lungs every 6 hours as needed for Wheezing     Dispense:  1 Inhaler     Refill:  3    DISCONTD: Handicap Placard MISC     Sig: by Does not apply route     Dispense:  1 each     Refill:  0    amitriptyline (ELAVIL) 25 MG tablet     Sig: Take 1 tablet by mouth nightly Take 2 tab at night for 2 weeks and then 1 tab at night for next 2 weeks and then stop.      Dispense:  45 tablet     Refill:  0    escitalopram (LEXAPRO) 10 MG tablet     Sig: Take 1 tablet by mouth daily     Dispense:  90 tablet     Refill:  1    nicotine (NICOTROL) 10 MG inhaler     Sig: Inhale 1 puff into the lungs as needed for Smoking cessation Do not use more than 4 cartridges in 24 hrs     Dispense:  1 Inhaler     Refill:  3    Handicap Placard MISC     Sig: by Does not apply route Expires 4/22/2022     Dispense:  1 each     Refill:  0       Patient given educational materials - see patient instructions. Discussed use, benefit, and side effects of prescribed medications. All patientquestions answered. Pt voiced understanding. Reviewed health maintenance. Instructedto continue current medications, diet and exercise. Patient agreed with treatmentplan. Follow up as directed.      Electronically signed by Liz Sahu MD on 4/22/2021 at 1:44 PM

## 2021-04-23 RX ORDER — MILNACIPRAN HYDROCHLORIDE 50 MG/1
TABLET, FILM COATED ORAL
Qty: 180 TABLET | Refills: 0 | Status: SHIPPED | OUTPATIENT
Start: 2021-04-23 | End: 2021-07-08

## 2021-04-23 RX ORDER — MILNACIPRAN HYDROCHLORIDE 25 MG/1
25 TABLET, FILM COATED ORAL 2 TIMES DAILY
Qty: 180 TABLET | Refills: 0 | Status: SHIPPED | OUTPATIENT
Start: 2021-04-23 | End: 2021-04-28 | Stop reason: ALTCHOICE

## 2021-04-23 NOTE — TELEPHONE ENCOUNTER
Received faxed medication refill request, prescription pended. Please advise, thank you! Next Visit Date:  No future appointments. Health Maintenance   Topic Date Due    COVID-19 Vaccine (1) Never done    Hepatitis B vaccine (1 of 3 - Risk 3-dose series) Never done    Diabetic retinal exam  10/30/2018    Potassium monitoring  01/23/2021    Creatinine monitoring  01/23/2021    DTaP/Tdap/Td vaccine (1 - Tdap) 01/24/2027 (Originally [birth year]27/1979)    Annual Wellness Visit (AWV)  06/06/2021    Diabetic foot exam  09/08/2021    Lipid screen  09/17/2021    A1C test (Diabetic or Prediabetic)  12/08/2021    Diabetic microalbuminuria test  04/22/2022    Breast cancer screen  07/09/2022    Colon cancer screen colonoscopy  05/18/2027    Flu vaccine  Completed    Shingles Vaccine  Completed    Pneumococcal 0-64 years Vaccine  Completed    Hepatitis C screen  Completed    HIV screen  Completed    Hepatitis A vaccine  Aged Out    Hib vaccine  Aged Out    Meningococcal (ACWY) vaccine  Aged Out       Hemoglobin A1C (%)   Date Value   12/08/2020 7.8   09/08/2020 9.8   01/16/2020 7.9             ( goal A1C is < 7)   Microalb/Crt.  Ratio (mcg/mg creat)   Date Value   01/23/2020 53 (H)     LDL Cholesterol (mg/dL)   Date Value   09/17/2020 83   01/23/2020            (goal LDL is <100)   AST (U/L)   Date Value   01/23/2020 22     ALT (U/L)   Date Value   01/23/2020 24     BUN (mg/dL)   Date Value   01/23/2020 16     BP Readings from Last 3 Encounters:   04/22/21 138/82   12/08/20 128/80   10/07/20 (!) 140/84          (goal 120/80)    All Future Testing planned in [facility]  Lab Frequency Next Occurrence   Rapid Influenza A/B Antigens Once 03/15/2021   CBC Auto Differential Once 05/06/2021   Comprehensive Metabolic Panel Once 89/31/9124   Hemoglobin A1C Once 05/06/2021   Lipid Panel Once 05/06/2021   TSH with Reflex Once 05/06/2021   Vitamin D 25 Hydroxy Once 05/06/2021   Uric Acid Once 05/06/2021 Patient Active Problem List:     HLD (hyperlipidemia)     HTN (hypertension)     Neuropathy due to secondary diabetes ([facility] [address]     Depression     Mild persistent asthma without complication     Type 2 diabetes mellitus with diabetic polyneuropathy, with long-term current use of insulin (HCC)     TIFFANI positive     Chronic obstructive pulmonary disease (HCC)     Fibromyalgia     Dental abscess     Yeast infection     Hyperglycemia     BMI 29.0-29.9,adult     Stage 3a chronic kidney disease      Tobacco use     Anxiety     Idiopathic chronic gout of multiple sites with tophus     Microalbuminuric diabetic nephropathy ([facility] [address]

## 2021-04-28 ENCOUNTER — TELEPHONE (OUTPATIENT)
Dept: FAMILY MEDICINE CLINIC | Age: 61
End: 2021-04-28

## 2021-04-28 DIAGNOSIS — M79.7 FIBROMYALGIA: Primary | ICD-10-CM

## 2021-04-28 RX ORDER — MILNACIPRAN HYDROCHLORIDE 50 MG/1
50 TABLET, FILM COATED ORAL 2 TIMES DAILY
Qty: 180 TABLET | Refills: 1 | Status: SHIPPED | OUTPATIENT
Start: 2021-04-28 | End: 2021-07-08 | Stop reason: SDUPTHER

## 2021-04-28 NOTE — TELEPHONE ENCOUNTER
Sent in new prescription for Savella 50 mg twice daily. Discontinued Lexapro. Please schedule the patient for a follow-up appointment to discuss alternate therapies for anxiety and insomnia.

## 2021-04-28 NOTE — TELEPHONE ENCOUNTER
Received fax from [address] requesting clarification on patient's prescriptions. Note states \"received rxs for Savella 50mg and Escitalopram 10mg, please clarify current therapy\". Response can be sent via a new e-prescription.

## 2021-04-29 DIAGNOSIS — F41.9 ANXIETY: ICD-10-CM

## 2021-04-29 RX ORDER — AMITRIPTYLINE HYDROCHLORIDE 25 MG/1
25 TABLET, FILM COATED ORAL NIGHTLY
Qty: 45 TABLET | Refills: 0 | Status: SHIPPED | OUTPATIENT
Start: 2021-04-29 | End: 2021-05-07 | Stop reason: ALTCHOICE

## 2021-04-29 NOTE — TELEPHONE ENCOUNTER
Next Visit Date:  Future Appointments   Date Time Provider Abbe Christina   5/7/2021  2:00 PM Verito Samuels MD [facility] [address] Maintenance   Topic Date Due    COVID-19 Vaccine (1) Never done    Diabetic retinal exam  10/30/2018    Potassium monitoring  01/23/2021    Creatinine monitoring  01/23/2021    DTaP/Tdap/Td vaccine (1 - Tdap) 01/24/2027 (Originally [birth year]27/1979)    Annual Wellness Visit (AWV)  06/06/2021    Diabetic foot exam  09/08/2021    Lipid screen  09/17/2021    A1C test (Diabetic or Prediabetic)  12/08/2021    Diabetic microalbuminuria test  04/22/2022    Breast cancer screen  07/09/2022    Colon cancer screen colonoscopy  05/18/2027    Flu vaccine  Completed    Shingles Vaccine  Completed    Pneumococcal 0-64 years Vaccine  Completed    Hepatitis C screen  Completed    HIV screen  Completed    Hepatitis A vaccine  Aged Out    Hib vaccine  Aged Out    Meningococcal (ACWY) vaccine  Aged Out       Hemoglobin A1C (%)   Date Value   12/08/2020 7.8   09/08/2020 9.8   01/16/2020 7.9             ( goal A1C is < 7)   Microalb/Crt.  Ratio (mcg/mg creat)   Date Value   01/23/2020 53 (H)     LDL Cholesterol (mg/dL)   Date Value   09/17/2020 83   01/23/2020            (goal LDL is <100)   AST (U/L)   Date Value   01/23/2020 22     ALT (U/L)   Date Value   01/23/2020 24     BUN (mg/dL)   Date Value   01/23/2020 16     BP Readings from Last 3 Encounters:   04/22/21 138/82   12/08/20 128/80   10/07/20 (!) 140/84          (goal 120/80)    All Future Testing planned in [facility]  Lab Frequency Next Occurrence   Rapid Influenza A/B Antigens Once 07/28/2021   CBC Auto Differential Once 05/06/2021   Comprehensive Metabolic Panel Once 67/72/6715   Hemoglobin A1C Once 05/06/2021   Lipid Panel Once 05/06/2021   TSH with Reflex Once 05/06/2021   Vitamin D 25 Hydroxy Once 05/06/2021   Uric Acid Once 05/06/2021               Patient Active Problem List:     HLD (hyperlipidemia)     HTN (hypertension)     Neurop[facility] diabetes ([facility]     Depression     Mild persistent asthma without complication     Type 2 diabetes mellitus with diabetic polyneuropathy, with long-term current use of insulin (HCC)     TIFFANI positive     Chronic obstructive pulmonary disease (HCC)     Fibromyalgia     Dental abscess     Yeast infection     Hyperglycemia     BMI 29.0-29.9,adult     Stage 3a chronic kidney disease      Tobacco use     Anxiety     Idiopathic chronic gout of multiple sites with tophus     Microalbuminuric diabetic nephropathy ([facility] [address]

## 2021-05-03 ENCOUNTER — TELEPHONE (OUTPATIENT)
Dept: FAMILY MEDICINE CLINIC | Age: 61
End: 2021-05-03

## 2021-05-03 ENCOUNTER — HOSPITAL ENCOUNTER (OUTPATIENT)
Age: 61
Setting detail: SPECIMEN
Discharge: HOME | End: 2021-05-03
Payer: MEDICARE

## 2021-05-03 DIAGNOSIS — Z72.0 TOBACCO USE: Primary | ICD-10-CM

## 2021-05-03 DIAGNOSIS — E11.42 TYPE 2 DIABETES MELLITUS WITH DIABETIC POLYNEUROPATHY, WITH LONG-TERM CURRENT USE OF INSULIN (HCC): ICD-10-CM

## 2021-05-03 DIAGNOSIS — E78.00 PURE HYPERCHOLESTEROLEMIA: ICD-10-CM

## 2021-05-03 DIAGNOSIS — M1A.09X1 IDIOPATHIC CHRONIC GOUT OF MULTIPLE SITES WITH TOPHUS: ICD-10-CM

## 2021-05-03 DIAGNOSIS — N18.31 STAGE 3A CHRONIC KIDNEY DISEASE (HCC): ICD-10-CM

## 2021-05-03 DIAGNOSIS — I10 ESSENTIAL HYPERTENSION: ICD-10-CM

## 2021-05-03 DIAGNOSIS — Z79.4 TYPE 2 DIABETES MELLITUS WITH DIABETIC POLYNEUROPATHY, WITH LONG-TERM CURRENT USE OF INSULIN (HCC): ICD-10-CM

## 2021-05-03 LAB
ABSOLUTE EOS #: 0.15 K/UL (ref 0–0.44)
ABSOLUTE IMMATURE GRANULOCYTE: 0.05 K/UL (ref 0–0.3)
ABSOLUTE LYMPH #: 1.07 K/UL (ref 1.1–3.7)
ABSOLUTE MONO #: 0.61 K/UL (ref 0.1–1.2)
ALBUMIN SERPL-MCNC: 4 G/DL (ref 3.5–5.2)
ALBUMIN/GLOBULIN RATIO: 1.2 (ref 1–2.5)
ALP BLD-CCNC: 62 U/L (ref 35–104)
ALT SERPL-CCNC: 19 U/L (ref 5–33)
ANION GAP SERPL CALCULATED.3IONS-SCNC: 15 MMOL/L (ref 9–17)
AST SERPL-CCNC: 24 U/L
BASOPHILS # BLD: 1 % (ref 0–2)
BASOPHILS ABSOLUTE: 0.06 K/UL (ref 0–0.2)
BILIRUB SERPL-MCNC: 0.27 MG/DL (ref 0.3–1.2)
BUN BLDV-MCNC: 11 MG/DL (ref 8–23)
BUN/CREAT BLD: ABNORMAL (ref 9–20)
CALCIUM SERPL-MCNC: 9.5 MG/DL (ref 8.6–10.4)
CHLORIDE BLD-SCNC: 98 MMOL/L (ref 98–107)
CHOLESTEROL/HDL RATIO: 4.2
CHOLESTEROL: 165 MG/DL
CO2: 24 MMOL/L (ref 20–31)
CREAT SERPL-MCNC: 0.76 MG/DL (ref 0.5–0.9)
DIFFERENTIAL TYPE: ABNORMAL
EOSINOPHILS RELATIVE PERCENT: 2 % (ref 1–4)
ESTIMATED AVERAGE GLUCOSE: 212 MG/DL
GFR AFRICAN AMERICAN: >60 ML/MIN
GFR NON-AFRICAN AMERICAN: >60 ML/MIN
GFR SERPL CREATININE-BSD FRML MDRD: ABNORMAL ML/MIN/{1.73_M2}
GFR SERPL CREATININE-BSD FRML MDRD: ABNORMAL ML/MIN/{1.73_M2}
GLUCOSE BLD-MCNC: 173 MG/DL (ref 70–99)
HBA1C MFR BLD: 9 % (ref 4–6)
HCT VFR BLD CALC: 42.1 % (ref 36.3–47.1)
HDLC SERPL-MCNC: 39 MG/DL
HEMOGLOBIN: 13.3 G/DL (ref 11.9–15.1)
IMMATURE GRANULOCYTES: 1 %
LDL CHOLESTEROL: 80 MG/DL (ref 0–130)
LYMPHOCYTES # BLD: 12 % (ref 24–43)
MCH RBC QN AUTO: 30.5 PG (ref 25.2–33.5)
MCHC RBC AUTO-ENTMCNC: 31.6 G/DL (ref 28.4–34.8)
MCV RBC AUTO: 96.6 FL (ref 82.6–102.9)
MONOCYTES # BLD: 7 % (ref 3–12)
NRBC AUTOMATED: 0 PER 100 WBC
PDW BLD-RTO: 13.8 % (ref 11.8–14.4)
PLATELET # BLD: 283 K/UL (ref 138–453)
PLATELET ESTIMATE: ABNORMAL
PMV BLD AUTO: 10.8 FL (ref 8.1–13.5)
POTASSIUM SERPL-SCNC: 4.1 MMOL/L (ref 3.7–5.3)
RBC # BLD: 4.36 M/UL (ref 3.95–5.11)
RBC # BLD: ABNORMAL 10*6/UL
SEG NEUTROPHILS: 77 % (ref 36–65)
SEGMENTED NEUTROPHILS ABSOLUTE COUNT: 7.18 K/UL (ref 1.5–8.1)
SODIUM BLD-SCNC: 137 MMOL/L (ref 135–144)
TOTAL PROTEIN: 7.4 G/DL (ref 6.4–8.3)
TRIGL SERPL-MCNC: 231 MG/DL
TSH SERPL DL<=0.05 MIU/L-ACNC: 1.36 MIU/L (ref 0.3–5)
URIC ACID: 4.5 MG/DL (ref 2.4–5.7)
VITAMIN D 25-HYDROXY: 17.1 NG/ML (ref 30–100)
VLDLC SERPL CALC-MCNC: ABNORMAL MG/DL (ref 1–30)
WBC # BLD: 9.1 K/UL (ref 3.5–11.3)
WBC # BLD: ABNORMAL 10*3/UL

## 2021-05-03 RX ORDER — VARENICLINE TARTRATE
KIT
Qty: 1 BOX | Refills: 0 | Status: SHIPPED | OUTPATIENT
Start: 2021-05-03 | End: 2021-05-07 | Stop reason: SINTOL

## 2021-05-03 NOTE — TELEPHONE ENCOUNTER
Nicotrol is denied, chantix starting month box tier-6 Ql does not require a PA. Please advise thank you!

## 2021-05-04 DIAGNOSIS — E55.9 VITAMIN D DEFICIENCY: Primary | ICD-10-CM

## 2021-05-04 RX ORDER — CHOLECALCIFEROL (VITAMIN D3) 125 MCG
1 CAPSULE ORAL DAILY
Qty: 30 CAPSULE | Refills: 2 | Status: SHIPPED | OUTPATIENT
Start: 2021-05-04

## 2021-05-07 ENCOUNTER — OFFICE VISIT (OUTPATIENT)
Dept: FAMILY MEDICINE CLINIC | Age: 61
End: 2021-05-07
Payer: MEDICARE

## 2021-05-07 VITALS
WEIGHT: 293 LBS | HEIGHT: 70 IN | DIASTOLIC BLOOD PRESSURE: 74 MMHG | BODY MASS INDEX: 41.95 KG/M2 | TEMPERATURE: 96.6 F | SYSTOLIC BLOOD PRESSURE: 130 MMHG

## 2021-05-07 DIAGNOSIS — M79.7 FIBROMYALGIA: ICD-10-CM

## 2021-05-07 DIAGNOSIS — G56.01 ACUTE CARPAL TUNNEL SYNDROME OF RIGHT WRIST: ICD-10-CM

## 2021-05-07 DIAGNOSIS — Z79.4 TYPE 2 DIABETES MELLITUS WITH DIABETIC POLYNEUROPATHY, WITH LONG-TERM CURRENT USE OF INSULIN (HCC): Primary | ICD-10-CM

## 2021-05-07 DIAGNOSIS — F41.9 ANXIETY: ICD-10-CM

## 2021-05-07 DIAGNOSIS — E11.42 TYPE 2 DIABETES MELLITUS WITH DIABETIC POLYNEUROPATHY, WITH LONG-TERM CURRENT USE OF INSULIN (HCC): Primary | ICD-10-CM

## 2021-05-07 PROCEDURE — 99214 OFFICE O/P EST MOD 30 MIN: CPT | Performed by: STUDENT IN AN ORGANIZED HEALTH CARE EDUCATION/TRAINING PROGRAM

## 2021-05-07 PROCEDURE — 3052F HG A1C>EQUAL 8.0%<EQUAL 9.0%: CPT | Performed by: STUDENT IN AN ORGANIZED HEALTH CARE EDUCATION/TRAINING PROGRAM

## 2021-05-07 RX ORDER — BUPROPION HYDROCHLORIDE 150 MG/1
150 TABLET ORAL EVERY MORNING
Qty: 30 TABLET | Refills: 3 | Status: SHIPPED | OUTPATIENT
Start: 2021-05-07 | End: 2021-08-02

## 2021-05-07 RX ORDER — DULAGLUTIDE 1.5 MG/.5ML
1.5 INJECTION, SOLUTION SUBCUTANEOUS WEEKLY
Qty: 4 PEN | Refills: 1 | Status: SHIPPED | OUTPATIENT
Start: 2021-05-07 | End: 2021-06-08 | Stop reason: SDUPTHER

## 2021-05-07 RX ORDER — FOLIC ACID 1 MG/1
1 TABLET ORAL DAILY
COMMUNITY

## 2021-05-07 NOTE — PROGRESS NOTES
[address] PRIMARY CARE  [address] [place] [address] [address] [place] [address]  Dept: [phone]  Dept Fax: [phone]    Ori Ziegler is a 62 y.o. female who is a Established patient, who presents today for her medical conditions/complaints as noted below:  Chief Complaint   Patient presents with    Anxiety    Insomnia    Other     hair loss from medication     Other     referral for hand specialist        HPI:     She is here today to follow-up on diabetes, anxiety and insomnia. She stopped taking Ozempic because it was giving her bad diarrhea and poor appetite. States that her last dose was 2 weeks ago. She has been taking Toujeo 52 units at bedtime. Her blood sugar this morning was 186. She says that she can see her blood sugars are going up. Her recent A1c was 9.0. She has been on Victoza before and that worked fine for her. She could not tolerate Metformin due to stomach side effects. She says that the amitriptyline was not helping her and is not able to sleep at night. She feels that her mind keeps racing when she tries to sleep at night. Feels that she has OCD and she keeps trying to make lists of things all the time. She also gets up couple of times at night for urination. States that she has had this issue for long time now. Denies any urinary incontinence except for mild leakage with sneezing or coughing. She also has fibromyalgia for which she is on Savella. States that her symptoms are better controlled after she started taking 75 mg twice daily. Complaining of pain in her right wrist with numbness in her thumb and index finger. States that she has had left hand carpal tunnel surgery by Dr. Eddie Stoddard and wants to follow-up with him again. Hemoglobin A1C (%)   Date Value   05/03/2021 9.0 (H)   12/08/2020 7.8   09/08/2020 9.8             ( goal A1Cis < 7)   Microalb/Crt.  Ratio (mcg/mg creat)   Date Value   01/23/2020 53 (H)     LDL Cholesterol (mg/dL)   Date Value   05/03/2021 80   09/17/2020 83   01/23/2020            (goal LDL is <100)   AST (U/L)   Date Value   05/03/2021 24     ALT (U/L)   Date Value   05/03/2021 19     BUN (mg/dL)   Date Value   05/03/2021 11     BP Readings from Last 3 Encounters:   05/07/21 130/74   04/22/21 138/82   12/08/20 128/80          (goal 120/80)    Past Medical History:   Diagnosis Date    Anxiety     Asthma     BMI 29.0-29.9,adult 4/22/2021    Bronchitis     Chronic obstructive pulmonary disease (HCC) 9/11/2018    Depression     Fibromyalgia     Gout     HLD (hyperlipidemia) 7/1/2015    Hyperglycemia 10/8/2020    Hypertension     Neuropathy     Obesity     Obstructive sleep apnea     Osteoarthritis     Stage 3a chronic kidney disease  4/22/2021    Type 2 diabetes mellitus with diabetic polyneuropathy, with long-term current use of insulin ([facility] [address] 9/7/2017    Type II or unspecified type diabetes mellitus without mention of complication, not stated as uncontrolled     Urinary incontinence     Yeast infection 10/8/2020      Past Surgical History:   Procedure Laterality Date    APPENDECTOMY      TAKEN WITH HYSTERECTOMY    BUNIONECTOMY Left 02-22-16    with arthrodesis and 2 through 4 with hallux valgus arthroplasty 5th toe on left    COLONOSCOPY  8/17/2015    2 polyps removed, diverticulosis, internal hemorrhoids    FINGER SURGERY      re-attachment of left middle finger    FOOT SURGERY Right 11/11/2015    bunionectomy, 2-3-4th pinning, 5th arthroplasty    HERNIA REPAIR      UMBILICAL    HYSTERECTOMY, TOTAL ABDOMINAL  2001       Family History   Problem Relation Age of Onset    Cancer Mother 61        breast     High Blood Pressure Mother     Stroke Father 62    Depression Father 64        comitted sucide     Diabetes Sister 36    High Blood Pressure Sister     Depression Sister     Cancer Brother 64        prostate    Diabetes Brother     High Blood Pressure Brother     Cancer Paternal Aunt 71        colon, rectal     Cancer Paternal Grandfather 79        lung    Heart Disease Neg Hx        Social History     Tobacco Use    Smoking status: Current Every Day Smoker     Packs/day: 0.50     Years: 42.00     Pack years: 21.00     Types: Cigarettes    Smokeless tobacco: Never Used   Substance Use Topics    Alcohol use: Not Currently     Alcohol/week: 0.0 standard drinks     Comment: occassional       Current Outpatient Medications   Medication Sig Dispense Refill    folic acid (FOLVITE) 1 MG tablet Take 1 mg by mouth daily      Dulaglutide (TRULICITY) 1.5 GZ/1.2VQ SOPN Inject 1.5 mg into the skin once a week 4 pen 1    buPROPion (WELLBUTRIN XL) 150 MG extended release tablet Take 1 tablet by mouth every morning 30 tablet 3    Cholecalciferol (VITAMIN D) 125 MCG (5000 UT) CAPS Take 1 tablet by mouth daily 30 capsule 2    milnacipran HCl (SAVELLA) 50 MG TABS Take 1 tablet by mouth 2 times daily 180 tablet 1    milnacipran HCl (SAVELLA) 50 MG TABS TAKE 1 TABLET BY MOUTH TWICE DAILY (Patient taking differently: 25 mg TAKE 1 TABLET BY MOUTH TWICE DAILY) 180 tablet 0    albuterol sulfate HFA (VENTOLIN HFA) 108 (90 Base) MCG/ACT inhaler Inhale 2 puffs into the lungs every 6 hours as needed for Wheezing 1 Inhaler 3    rOPINIRole (REQUIP) 1 MG tablet TAKE 1 TABLET EVERY NIGHT AT BEDTIME 90 tablet 1    ondansetron (ZOFRAN) 4 MG tablet Take 1 tablet by mouth 3 times daily as needed for Nausea or Vomiting 15 tablet 0    colchicine (COLCRYS) 0.6 MG tablet TAKE 1 TABLET BY MOUTH DAILY 30 tablet 0    allopurinol (ZYLOPRIM) 300 MG tablet TAKE 1 TABLET EVERY DAY 90 tablet 1    carvedilol (COREG) 25 MG tablet TAKE 1 TABLET BY MOUTH TWICE DAILY 180 tablet 1    omeprazole (PRILOSEC) 20 MG delayed release capsule Take 1 capsule by mouth Daily 90 capsule 0    fenofibrate (TRIGLIDE) 160 MG tablet Take 1 tablet by mouth daily 90 tablet 1    NOVOLOG FLEXPEN 100 UNIT/ML injection pen       hydroCHLOROthiazide (HYDRODIURIL) 25 MG tablet TAKE 1 TABLET EVERY MORNING 90 tablet 3    insulin lispro, 1 Unit Dial, (HUMALOG KWIKPEN) 100 UNIT/ML SOPN 0-150 = 0 units  151-200 = 2 units  201-250 = 4 units  251-300 = 6 units  301-350 = 8 units  351-400+ = 10 units 3 pen 1    tiZANidine (ZANAFLEX) 4 MG tablet TAKE 1 TABLET TWICE DAILY 180 tablet 2    Insulin Glargine, 1 Unit Dial, (TOUJEO SOLOSTAR) 300 UNIT/ML SOPN Inject 52 Units into the skin daily 24 mL 2    b complex vitamins capsule Take 1 capsule by mouth daily      Multiple Vitamins-Minerals (WOMENS DAILY FORMULA PO) Take by mouth      vitamin C (ASCORBIC ACID) 500 MG tablet Take 500 mg by mouth daily      tiotropium (SPIRIVA HANDIHALER) 18 MCG inhalation capsule INHALE THE CONTENTS OF 1 CAPSULE INTO THE LUNGS DAILY 90 capsule 1    Handicap Placard MISC by Does not apply route Expires 4/22/2022 1 each 0    Insulin Pen Needle (PEN NEEDLES 3/16\") 31G X 5 MM MISC 1 each by Does not apply route 3 times daily 100 each 3    Biotin 5 MG CAPS Take by mouth       Current Facility-Administered Medications   Medication Dose Route Frequency Provider Last Rate Last Admin    ipratropium (ATROVENT) 0.02 % nebulizer solution 0.5 mg  0.5 mg Nebulization Once Poncho Shell MD        albuterol (PROVENTIL) nebulizer solution 2.5 mg  2.5 mg Nebulization Once Poncho Shell MD         Allergies   Allergen Reactions    Cymbalta [Duloxetine Hcl] Anaphylaxis and Other (See Comments)    Januvia [Sitagliptin] Shortness Of Breath    Cephalexin Other (See Comments)    Ciprofloxacin Other (See Comments)     muscle spasms occur  muscle spasms occur  muscle spasms occur  cramping    Duloxetine     Lyrica [Pregabalin] Other (See Comments)     Does not help after 2 weeks     Metformin Diarrhea    Metformin And Related Diarrhea    Statins Other (See Comments)     myalgias      Morphine Diarrhea, Nausea And Vomiting, Swelling and Nausea Only       Health Maintenance   Topic Date Due    Diabetic retinal exam  10/30/2018    Annual Wellness Visit (AWV)  06/06/2021    DTaP/Tdap/Td vaccine (1 - Tdap) 01/24/2027 (Originally 4/27/1980)    A1C test (Diabetic or Prediabetic)  08/03/2021    Diabetic foot exam  09/08/2021    Diabetic microalbuminuria test  04/22/2022    Lipid screen  05/03/2022    Potassium monitoring  05/03/2022    Creatinine monitoring  05/03/2022    Breast cancer screen  07/09/2022    Colon cancer screen colonoscopy  05/18/2027    Flu vaccine  Completed    Shingles Vaccine  Completed    Pneumococcal 0-64 years Vaccine  Completed    COVID-19 Vaccine  Completed    Hepatitis C screen  Completed    HIV screen  Completed    Hepatitis A vaccine  Aged Out    Hib vaccine  Aged Out    Meningococcal (ACWY) vaccine  Aged Out       Subjective:     Review of Systems   Constitutional: Negative for appetite change, fatigue and fever. HENT: Negative for congestion, ear pain, hearing loss and sore throat. Eyes: Negative for discharge and visual disturbance. Respiratory: Negative for cough, chest tightness, shortness of breath and wheezing. Cardiovascular: Negative for chest pain, palpitations and leg swelling. Gastrointestinal: Negative for abdominal pain, constipation, diarrhea, nausea and vomiting. Genitourinary: Negative for flank pain, frequency, hematuria and urgency. Musculoskeletal: Positive for arthralgias and myalgias. Negative for gait problem and joint swelling. Skin: Negative. Neurological: Negative for dizziness, weakness, numbness and headaches. Psychiatric/Behavioral: Positive for dysphoric mood and sleep disturbance. The patient is nervous/anxious. Objective:     Physical Exam  Vitals signs reviewed. Constitutional:       Appearance: Normal appearance. She is normal weight. HENT:      Head: Normocephalic and atraumatic.       Nose: Nose normal.      Mouth/Throat:      Mouth: Mucous membranes are moist. Pharynx: Oropharynx is clear. Eyes:      Extraocular Movements: Extraocular movements intact. Conjunctiva/sclera: Conjunctivae normal.      Pupils: Pupils are equal, round, and reactive to light. Neck:      Musculoskeletal: Normal range of motion and neck supple. Cardiovascular:      Rate and Rhythm: Normal rate and regular rhythm. Heart sounds: Normal heart sounds. No murmur. No gallop. Pulmonary:      Effort: Pulmonary effort is normal. No respiratory distress. Breath sounds: Normal breath sounds. No stridor. No wheezing. Abdominal:      General: Bowel sounds are normal. There is no distension. Palpations: Abdomen is soft. Tenderness: There is no abdominal tenderness. There is no guarding or rebound. Musculoskeletal: Normal range of motion. General: No swelling or tenderness. Skin:     General: Skin is warm and dry. Coloration: Skin is not jaundiced. Findings: No rash. Neurological:      General: No focal deficit present. Mental Status: She is alert and oriented to person, place, and time. Psychiatric:         Mood and Affect: Mood normal.         Behavior: Behavior normal.         Thought Content: Thought content normal.         Judgment: Judgment normal.       /74 (Site: Right Upper Arm, Position: Sitting, Cuff Size: Medium Adult)   Temp 96.6 °F (35.9 °C) (Temporal)   Ht 5' 10\" (1.778 m)   Wt (!) 307 lb (139.3 kg)   BMI 44.05 kg/m²     Assessment/Plan:   1. Type 2 diabetes mellitus with diabetic polyneuropathy, with long-term current use of insulin ([facility])  -     Dulaglutide (TRULICITY) 1.5 UH/5.9RX SOPN; Inject 1.5 mg into the skin once a week, Disp-4 pen, R-1Normal  2. Anxiety  -     buPROPion (WELLBUTRIN XL) 150 MG extended release tablet; Take 1 tablet by mouth every morning, Disp-30 tablet, R-3Normal  3. Acute carpal tunnel syndrome of right wrist  -     External Referral To Orthopedic Surgery  4. Fibromyalgia  5.  BMI 40.0-44.9, adult [facility])    Type 2 diabetes-recent A1c 9.0. Could not tolerate Ozempic or Metformin. Started on Trulicity 1.5 mg/week, continue Toujeo 52 units at bedtime. Anxiety-stopped taking amitriptyline as it was not working. Started on Wellbutrin 150 mg daily to help with anxiety, and smoking cessation. Right carpal tunnel-has had previous left-sided carpal tunnel surgery. Now having symptoms on right side. Referral placed for orthopedics. Fibromyalgia-stable, on Savella 75 mg twice daily. Return in about 1 month (around 6/7/2021) for Follow up DM/HTN. Orders Placed This Encounter   Procedures    External Referral To Orthopedic Surgery     Referral Priority:   Routine     Referral Type:   Consult for Advice and Opinion     Referral Reason:   Specialty Services Required     Referred to Provider:   Geoff Grier MD     Requested Specialty:   Orthopedic Surgery     Number of Visits Requested:   1     Orders Placed This Encounter   Medications    Dulaglutide (TRULICITY) 1.5 PQ/7.8ZM SOPN     Sig: Inject 1.5 mg into the skin once a week     Dispense:  4 pen     Refill:  1    buPROPion (WELLBUTRIN XL) 150 MG extended release tablet     Sig: Take 1 tablet by mouth every morning     Dispense:  30 tablet     Refill:  3       Patient given educational materials - see patient instructions. Discussed use, benefit, and side effects of prescribed medications. All patientquestions answered. Pt voiced understanding. Reviewed health maintenance. Instructedto continue current medications, diet and exercise. Patient agreed with treatmentplan. Follow up as directed.      Electronically signed by Zeyad Sparks MD on 5/8/2021 at 4:04 PM

## 2021-05-08 PROBLEM — B37.9 YEAST INFECTION: Status: RESOLVED | Noted: 2020-10-08 | Resolved: 2021-05-08

## 2021-05-08 PROBLEM — R73.9 HYPERGLYCEMIA: Status: RESOLVED | Noted: 2020-10-08 | Resolved: 2021-05-08

## 2021-05-08 ASSESSMENT — ENCOUNTER SYMPTOMS
WHEEZING: 0
NAUSEA: 0
CONSTIPATION: 0
VOMITING: 0
DIARRHEA: 0
CHEST TIGHTNESS: 0
SHORTNESS OF BREATH: 0
COUGH: 0
SORE THROAT: 0
EYE DISCHARGE: 0
ABDOMINAL PAIN: 0

## 2021-06-09 ENCOUNTER — TELEPHONE (OUTPATIENT)
Dept: FAMILY MEDICINE CLINIC | Age: 61
End: 2021-06-09

## 2021-06-09 DIAGNOSIS — R61 EXCESSIVE SWEATING: Primary | ICD-10-CM

## 2021-06-09 NOTE — TELEPHONE ENCOUNTER
Received fax from the [address] on [address] [facility] that patient's Drysol is not covered by insurance. Please send a different medication, alternative was not included on fax. Thank you!

## 2021-06-30 ENCOUNTER — TELEPHONE (OUTPATIENT)
Dept: FAMILY MEDICINE CLINIC | Age: 61
End: 2021-06-30

## 2021-06-30 DIAGNOSIS — F41.9 ANXIETY: ICD-10-CM

## 2021-06-30 DIAGNOSIS — B37.31 VAGINAL CANDIDIASIS: Primary | ICD-10-CM

## 2021-06-30 RX ORDER — FLUCONAZOLE 150 MG/1
150 TABLET ORAL ONCE
Qty: 1 TABLET | Refills: 0 | Status: SHIPPED | OUTPATIENT
Start: 2021-06-30 | End: 2021-09-21 | Stop reason: SDUPTHER

## 2021-06-30 RX ORDER — HYDROXYZINE HYDROCHLORIDE 25 MG/1
25 TABLET, FILM COATED ORAL EVERY 8 HOURS PRN
Qty: 30 TABLET | Refills: 0 | Status: SHIPPED | OUTPATIENT
Start: 2021-06-30 | End: 2021-07-15 | Stop reason: SDUPTHER

## 2021-06-30 NOTE — TELEPHONE ENCOUNTER
I called her back and discussed her symptoms. She denies any chest pain, shortness of breath or palpitations. Has been having some cold sweats especially when she is stressed. She is also not been sleeping well. Complains of yeast infection after trying vaginal estrogen. Discussed trying hydroxyzine for anxiety and sleep. Prescription sent for both hydroxyzine and fluconazole. Advised her to call back if symptoms do not improve.

## 2021-06-30 NOTE — TELEPHONE ENCOUNTER
----- Message from Shireen Baker sent at 6/30/2021 10:34 AM EDT -----  Subject: Message to Provider    QUESTIONS  Information for Provider? Patient did not want to schedule an appt at this   time. She wants to speak with Doctor instead. Told her I will send a   message and go from there. She is not sleeping well, on Sunday she had   profuse sweating and couldn't cool down, and at the same time, she had   vomiting/diarrhea. The vomiting/diarrhea has stopped but still sweating   excessively. She said she also has a yeast infection and the hormone   therapy is not working. Can someone please follow up with her. Thank You.   ---------------------------------------------------------------------------  --------------  Sunni COPELAND  What is the best way for the office to contact you? OK to leave message on   voicemail  Preferred Call Back Phone Number? [phone]  ---------------------------------------------------------------------------  --------------  SCRIPT ANSWERS  Relationship to Patient?  Self

## 2021-07-15 DIAGNOSIS — F41.9 ANXIETY: ICD-10-CM

## 2021-07-15 RX ORDER — HYDROXYZINE HYDROCHLORIDE 25 MG/1
25 TABLET, FILM COATED ORAL EVERY 8 HOURS PRN
Qty: 90 TABLET | Refills: 0 | Status: SHIPPED | OUTPATIENT
Start: 2021-07-15 | End: 2021-08-06

## 2021-07-15 NOTE — TELEPHONE ENCOUNTER
Next Visit Date:  No future appointments. Health Maintenance   Topic Date Due    Low dose CT lung screening  Never done    Diabetic retinal exam  10/30/2018    Annual Wellness Visit (AWV)  Never done    DTaP/Tdap/Td vaccine (1 - Tdap) 01/24/2027 (Originally 9/17/2016)    A1C test (Diabetic or Prediabetic)  08/03/2021    Flu vaccine (1) 09/01/2021    Diabetic foot exam  09/08/2021    Diabetic microalbuminuria test  04/22/2022    Lipid screen  05/03/2022    Potassium monitoring  05/03/2022    Creatinine monitoring  05/03/2022    Breast cancer screen  07/09/2022    Pneumococcal 0-64 years Vaccine (2 of 2 - PPSV23) 04/27/2026    Colon cancer screen colonoscopy  05/18/2027    Shingles Vaccine  Completed    COVID-19 Vaccine  Completed    Hepatitis C screen  Completed    HIV screen  Completed    Hepatitis A vaccine  Aged Out    Hib vaccine  Aged Out    Meningococcal (ACWY) vaccine  Aged Out       Hemoglobin A1C (%)   Date Value   05/03/2021 9.0 (H)   12/08/2020 7.8   09/08/2020 9.8             ( goal A1C is < 7)   Microalb/Crt.  Ratio (mcg/mg creat)   Date Value   01/23/2020 53 (H)     LDL Cholesterol (mg/dL)   Date Value   05/03/2021 80   09/17/2020 83       (goal LDL is <100)   AST (U/L)   Date Value   05/03/2021 24     ALT (U/L)   Date Value   05/03/2021 19     BUN (mg/dL)   Date Value   05/03/2021 11     BP Readings from Last 3 Encounters:   05/07/21 130/74   04/22/21 138/82   12/08/20 128/80          (goal 120/80)    All Future Testing planned in [facility]  Lab Frequency Next Occurrence   Rapid Influenza A/B Antigens Once 07/28/2021   Vitamin B12 & Folate Once 06/08/2021   TSH With Reflex Ft4 Once 06/08/2021   CT LUNG SCREENING Once 06/15/2021               Patient Active Problem List:     HLD (hyperlipidemia)     HTN (hypertension)     Neuropathy due to secondary diabetes ([facility] [address]     Depression     Mild persistent asthma without complication     Type 2 diabetes mellitus with diabetic polyneuropathy, with long-term current use of insulin (HCC)     TIFFANI positive     Chronic obstructive pulmonary disease (HCC)     Fibromyalgia     Dental abscess     Stage 3a chronic kidney disease      Tobacco use     Anxiety     Idiopathic chronic gout of multiple sites with tophus     Microalbuminuric diabetic nephropathy (HCC)     Vitamin D deficiency     BMI 40.0-44.9, adult ([facility]

## 2021-07-30 DIAGNOSIS — M1A.09X0 CHRONIC GOUT OF MULTIPLE SITES, UNSPECIFIED CAUSE: ICD-10-CM

## 2021-07-30 RX ORDER — OMEPRAZOLE 20 MG/1
CAPSULE, DELAYED RELEASE ORAL
Qty: 90 CAPSULE | Refills: 0 | Status: SHIPPED | OUTPATIENT
Start: 2021-07-30 | End: 2021-10-15

## 2021-07-30 RX ORDER — COLCHICINE 0.6 MG/1
TABLET ORAL
Qty: 30 TABLET | Refills: 0 | Status: SHIPPED | OUTPATIENT
Start: 2021-07-30 | End: 2021-10-11

## 2021-07-30 NOTE — TELEPHONE ENCOUNTER
Next Visit Date:  No future appointments. Health Maintenance   Topic Date Due    Low dose CT lung screening  Never done    Diabetic retinal exam  10/30/2018    Annual Wellness Visit (AWV)  Never done    A1C test (Diabetic or Prediabetic)  08/03/2021    DTaP/Tdap/Td vaccine (1 - Tdap) 01/24/2027 (Originally 9/17/2016)    Flu vaccine (1) 09/01/2021    Diabetic foot exam  09/08/2021    Diabetic microalbuminuria test  04/22/2022    Lipid screen  05/03/2022    Potassium monitoring  05/03/2022    Creatinine monitoring  05/03/2022    Breast cancer screen  07/09/2022    Pneumococcal 0-64 years Vaccine (2 of 2 - PPSV23) 04/27/2026    Colon cancer screen colonoscopy  05/18/2027    Shingles Vaccine  Completed    COVID-19 Vaccine  Completed    Hepatitis C screen  Completed    HIV screen  Completed    Hepatitis A vaccine  Aged Out    Hib vaccine  Aged Out    Meningococcal (ACWY) vaccine  Aged Out       Hemoglobin A1C (%)   Date Value   05/03/2021 9.0 (H)   12/08/2020 7.8   09/08/2020 9.8             ( goal A1C is < 7)   Microalb/Crt.  Ratio (mcg/mg creat)   Date Value   01/23/2020 53 (H)     LDL Cholesterol (mg/dL)   Date Value   05/03/2021 80   09/17/2020 83       (goal LDL is <100)   AST (U/L)   Date Value   05/03/2021 24     ALT (U/L)   Date Value   05/03/2021 19     BUN (mg/dL)   Date Value   05/03/2021 11     BP Readings from Last 3 Encounters:   05/07/21 130/74   04/22/21 138/82   12/08/20 128/80          (goal 120/80)    All Future Testing planned in [facility]  Lab Frequency Next Occurrence   Rapid Influenza A/B Antigens Once 07/28/2021   Vitamin B12 & Folate Once 06/08/2021   TSH With Reflex Ft4 Once 06/08/2021   CT LUNG SCREENING Once 06/15/2021               Patient Active Problem List:     HLD (hyperlipidemia)     HTN (hypertension)     Neuropathy due to secondary diabetes ([address]     Depression     Mild persistent asthma without complication     Type 2 diabetes mellitus with diabetic polyneuropathy, with long-term current use of insulin (HCC)     TIFFANI positive     Chronic obstructive pulmonary disease (HCC)     Fibromyalgia     Dental abscess     Stage 3a chronic kidney disease      Tobacco use     Anxiety     Idiopathic chronic gout of multiple sites with tophus     Microalbuminuric diabetic nephropathy (HCC)     Vitamin D deficiency     BMI 40.0-44.9, adult ([facility]

## 2021-07-31 DIAGNOSIS — E11.42 TYPE 2 DIABETES MELLITUS WITH DIABETIC POLYNEUROPATHY, WITH LONG-TERM CURRENT USE OF INSULIN (HCC): ICD-10-CM

## 2021-07-31 DIAGNOSIS — Z79.4 TYPE 2 DIABETES MELLITUS WITH DIABETIC POLYNEUROPATHY, WITH LONG-TERM CURRENT USE OF INSULIN (HCC): ICD-10-CM

## 2021-08-02 DIAGNOSIS — F41.9 ANXIETY: ICD-10-CM

## 2021-08-02 RX ORDER — BUPROPION HYDROCHLORIDE 150 MG/1
TABLET ORAL
Qty: 90 TABLET | Refills: 3 | Status: SHIPPED | OUTPATIENT
Start: 2021-08-02 | End: 2021-10-11 | Stop reason: SDUPTHER

## 2021-08-02 RX ORDER — DULAGLUTIDE 1.5 MG/.5ML
INJECTION, SOLUTION SUBCUTANEOUS
Qty: 3 PEN | Refills: 1 | Status: SHIPPED | OUTPATIENT
Start: 2021-08-02 | End: 2021-09-22

## 2021-08-02 NOTE — TELEPHONE ENCOUNTER
Next Visit Date:  No future appointments. Health Maintenance   Topic Date Due    Low dose CT lung screening  Never done    Diabetic retinal exam  10/30/2018    Annual Wellness Visit (AWV)  Never done    A1C test (Diabetic or Prediabetic)  08/03/2021    DTaP/Tdap/Td vaccine (1 - Tdap) 01/24/2027 (Originally 9/17/2016)    Flu vaccine (1) 09/01/2021    Diabetic foot exam  09/08/2021    Diabetic microalbuminuria test  04/22/2022    Lipid screen  05/03/2022    Potassium monitoring  05/03/2022    Creatinine monitoring  05/03/2022    Breast cancer screen  07/09/2022    Pneumococcal 0-64 years Vaccine (2 of 2 - PPSV23) 04/27/2026    Colon cancer screen colonoscopy  05/18/2027    Shingles Vaccine  Completed    COVID-19 Vaccine  Completed    Hepatitis C screen  Completed    HIV screen  Completed    Hepatitis A vaccine  Aged Out    Hib vaccine  Aged Out    Meningococcal (ACWY) vaccine  Aged Out       Hemoglobin A1C (%)   Date Value   05/03/2021 9.0 (H)   12/08/2020 7.8   09/08/2020 9.8             ( goal A1C is < 7)   Microalb/Crt.  Ratio (mcg/mg creat)   Date Value   01/23/2020 53 (H)     LDL Cholesterol (mg/dL)   Date Value   05/03/2021 80   09/17/2020 83       (goal LDL is <100)   AST (U/L)   Date Value   05/03/2021 24     ALT (U/L)   Date Value   05/03/2021 19     BUN (mg/dL)   Date Value   05/03/2021 11     BP Readings from Last 3 Encounters:   05/07/21 130/74   04/22/21 138/82   12/08/20 128/80          (goal 120/80)    All Future Testing planned in [facility]  Lab Frequency Next Occurrence   Rapid Influenza A/B Antigens Once 07/28/2021   Vitamin B12 & Folate Once 06/08/2021   TSH With Reflex Ft4 Once 06/08/2021   CT LUNG SCREENING Once 06/15/2021               Patient Active Problem List:     HLD (hyperlipidemia)     HTN (hypertension)     Neuropathy due to secondary diabetes ([address]     Depression     Mild persistent asthma without complication     Type 2 diabetes mellitus with diabetic polyneuropathy, with long-term current use of insulin (HCC)     TIFFANI positive     Chronic obstructive pulmonary disease (HCC)     Fibromyalgia     Dental abscess     Stage 3a chronic kidney disease      Tobacco use     Anxiety     Idiopathic chronic gout of multiple sites with tophus     Microalbuminuric diabetic nephropathy (HCC)     Vitamin D deficiency     [facility] 40.0-44.9, [facility]

## 2021-08-02 NOTE — TELEPHONE ENCOUNTER
Next Visit Date:  No future appointments. Health Maintenance   Topic Date Due    Low dose CT lung screening  Never done    Diabetic retinal exam  10/30/2018    Annual Wellness Visit (AWV)  Never done    A1C test (Diabetic or Prediabetic)  08/03/2021    DTaP/Tdap/Td vaccine (1 - Tdap) 01/24/2027 (Originally 9/17/2016)    Flu vaccine (1) 09/01/2021    Diabetic foot exam  09/08/2021    Diabetic microalbuminuria test  04/22/2022    Lipid screen  05/03/2022    Potassium monitoring  05/03/2022    Creatinine monitoring  05/03/2022    Breast cancer screen  07/09/2022    Pneumococcal 0-64 years Vaccine (2 of 2 - PPSV23) 04/27/2026    Colon cancer screen colonoscopy  05/18/2027    Shingles Vaccine  Completed    COVID-19 Vaccine  Completed    Hepatitis C screen  Completed    HIV screen  Completed    Hepatitis A vaccine  Aged Out    Hib vaccine  Aged Out    Meningococcal (ACWY) vaccine  Aged Out       Hemoglobin A1C (%)   Date Value   05/03/2021 9.0 (H)   12/08/2020 7.8   09/08/2020 9.8             ( goal A1C is < 7)   Microalb/Crt.  Ratio (mcg/mg creat)   Date Value   01/23/2020 53 (H)     LDL Cholesterol (mg/dL)   Date Value   05/03/2021 80   09/17/2020 83       (goal LDL is <100)   AST (U/L)   Date Value   05/03/2021 24     ALT (U/L)   Date Value   05/03/2021 19     BUN (mg/dL)   Date Value   05/03/2021 11     BP Readings from Last 3 Encounters:   05/07/21 130/74   04/22/21 138/82   12/08/20 128/80          (goal 120/80)    All Future Testing planned in [facility]  Lab Frequency Next Occurrence   Rapid Influenza A/B Antigens Once 07/28/2021   Vitamin B12 & Folate Once 06/08/2021   TSH With Reflex Ft4 Once 06/08/2021   CT LUNG SCREENING Once 06/15/2021               Patient Active Problem List:     HLD (hyperlipidemia)     HTN (hypertension)     Neuropathy due to secondary diabetes ([address]     Depression     Mild persistent asthma without complication     Type 2 diabetes mellitus with diabetic polyneuropathy, with long-term current use of insulin (HCC)     TIFFANI positive     Chronic obstructive pulmonary disease (HCC)     Fibromyalgia     Dental abscess     Stage 3a chronic kidney disease      Tobacco use     Anxiety     Idiopathic chronic gout of multiple sites with tophus     Microalbuminuric diabetic nephropathy (HCC)     Vitamin D deficiency     BMI 40.0-44.9, adult ([facility]

## 2021-08-04 ENCOUNTER — TELEPHONE (OUTPATIENT)
Dept: FAMILY MEDICINE CLINIC | Age: 61
End: 2021-08-04

## 2021-08-04 DIAGNOSIS — M1A.09X1 IDIOPATHIC CHRONIC GOUT OF MULTIPLE SITES WITH TOPHUS: Primary | ICD-10-CM

## 2021-08-04 RX ORDER — COLCHICINE 0.6 MG/1
0.6 CAPSULE ORAL DAILY
Qty: 30 CAPSULE | Refills: 0 | Status: SHIPPED | OUTPATIENT
Start: 2021-08-04 | End: 2021-10-11

## 2021-08-04 NOTE — TELEPHONE ENCOUNTER
Received fax stating that patient's insurance does not cover colchicine tablets. Preferred alternative  Is Mitigare. Please advise, thank you!

## 2021-08-05 DIAGNOSIS — F41.9 ANXIETY: ICD-10-CM

## 2021-08-06 RX ORDER — HYDROXYZINE HYDROCHLORIDE 25 MG/1
25 TABLET, FILM COATED ORAL EVERY 8 HOURS PRN
Qty: 30 TABLET | Refills: 1 | Status: SHIPPED | OUTPATIENT
Start: 2021-08-06 | End: 2021-08-09

## 2021-08-06 NOTE — TELEPHONE ENCOUNTER
Next Visit Date:  No future appointments. Health Maintenance   Topic Date Due    Low dose CT lung screening  Never done    Diabetic retinal exam  10/30/2018    Annual Wellness Visit (AWV)  Never done    A1C test (Diabetic or Prediabetic)  08/03/2021    DTaP/Tdap/Td vaccine (1 - Tdap) 01/24/2027 (Originally 9/17/2016)    Flu vaccine (1) 09/01/2021    Diabetic foot exam  09/08/2021    Diabetic microalbuminuria test  04/22/2022    Lipid screen  05/03/2022    Potassium monitoring  05/03/2022    Creatinine monitoring  05/03/2022    Breast cancer screen  07/09/2022    Pneumococcal 0-64 years Vaccine (2 of 2 - PPSV23) 04/27/2026    Colon cancer screen colonoscopy  05/18/2027    Shingles Vaccine  Completed    COVID-19 Vaccine  Completed    Hepatitis C screen  Completed    HIV screen  Completed    Hepatitis A vaccine  Aged Out    Hib vaccine  Aged Out    Meningococcal (ACWY) vaccine  Aged Out       Hemoglobin A1C (%)   Date Value   05/03/2021 9.0 (H)   12/08/2020 7.8   09/08/2020 9.8             ( goal A1C is < 7)   Microalb/Crt.  Ratio (mcg/mg creat)   Date Value   01/23/2020 53 (H)     LDL Cholesterol (mg/dL)   Date Value   05/03/2021 80   09/17/2020 83       (goal LDL is <100)   AST (U/L)   Date Value   05/03/2021 24     ALT (U/L)   Date Value   05/03/2021 19     BUN (mg/dL)   Date Value   05/03/2021 11     BP Readings from Last 3 Encounters:   05/07/21 130/74   04/22/21 138/82   12/08/20 128/80          (goal 120/80)    All Future Testing planned in [facility]  Lab Frequency Next Occurrence   Rapid Influenza A/B Antigens Once 07/28/2021   Vitamin B12 & Folate Once 06/08/2021   TSH With Reflex Ft4 Once 06/08/2021   CT LUNG SCREENING Once 06/15/2021               Patient Active Problem List:     HLD (hyperlipidemia)     HTN (hypertension)     Neuropathy due to secondary diabetes ([address]     Depression     Mild persistent asthma without complication     Type 2 diabetes mellitus with diabetic polyneuropathy, with long-term current use of insulin (HCC)     TIFFANI positive     Chronic obstructive pulmonary disease (HCC)     Fibromyalgia     Dental abscess     Stage 3a chronic kidney disease      Tobacco use     Anxiety     Idiopathic chronic gout of multiple sites with tophus     Microalbuminuric diabetic nephropathy (HCC)     Vitamin D deficiency     BMI 40.0-44.9, adult ([facility]

## 2021-08-23 DIAGNOSIS — G25.81 RLS (RESTLESS LEGS SYNDROME): ICD-10-CM

## 2021-08-23 DIAGNOSIS — I10 ESSENTIAL HYPERTENSION: ICD-10-CM

## 2021-08-24 RX ORDER — CARVEDILOL 25 MG/1
TABLET ORAL
Qty: 180 TABLET | Refills: 1 | Status: SHIPPED | OUTPATIENT
Start: 2021-08-24 | End: 2022-01-31 | Stop reason: SDUPTHER

## 2021-08-24 RX ORDER — ROPINIROLE 1 MG/1
TABLET, FILM COATED ORAL
Qty: 90 TABLET | Refills: 1 | Status: SHIPPED | OUTPATIENT
Start: 2021-08-24 | End: 2022-01-10 | Stop reason: ALTCHOICE

## 2021-08-24 NOTE — TELEPHONE ENCOUNTER
Next Visit Date:  No future appointments. Health Maintenance   Topic Date Due    Low dose CT lung screening  Never done    Diabetic retinal exam  10/30/2018    Annual Wellness Visit (AWV)  Never done    A1C test (Diabetic or Prediabetic)  08/03/2021    DTaP/Tdap/Td vaccine (1 - Tdap) 01/24/2027 (Originally 9/17/2016)    Flu vaccine (1) 09/01/2021    Diabetic foot exam  09/08/2021    Diabetic microalbuminuria test  04/22/2022    Lipid screen  05/03/2022    Potassium monitoring  05/03/2022    Creatinine monitoring  05/03/2022    Breast cancer screen  07/09/2022    Pneumococcal 0-64 years Vaccine (2 of 2 - PPSV23) 04/27/2026    Colon cancer screen colonoscopy  05/18/2027    Shingles Vaccine  Completed    COVID-19 Vaccine  Completed    Hepatitis C screen  Completed    HIV screen  Completed    Hepatitis A vaccine  Aged Out    Hib vaccine  Aged Out    Meningococcal (ACWY) vaccine  Aged Out       Hemoglobin A1C (%)   Date Value   05/03/2021 9.0 (H)   12/08/2020 7.8   09/08/2020 9.8             ( goal A1C is < 7)   Microalb/Crt.  Ratio (mcg/mg creat)   Date Value   01/23/2020 53 (H)     LDL Cholesterol (mg/dL)   Date Value   05/03/2021 80   09/17/2020 83       (goal LDL is <100)   AST (U/L)   Date Value   05/03/2021 24     ALT (U/L)   Date Value   05/03/2021 19     BUN (mg/dL)   Date Value   05/03/2021 11     BP Readings from Last 3 Encounters:   05/07/21 130/74   04/22/21 138/82   12/08/20 128/80          (goal 120/80)    All Future Testing planned in [facility]  Lab Frequency Next Occurrence   Rapid Influenza A/B Antigens Once 07/28/2021   Vitamin B12 & Folate Once 06/08/2021   TSH With Reflex Ft4 Once 06/08/2021               Patient Active Problem List:     HLD (hyperlipidemia)     HTN (hypertension)     Neuropathy due to secondary diabetes (HCC)     Depression     Mild persistent asthma without complication     Type 2 diabetes mellitus with diabetic polyneuropathy, with long-term current use of insulin (HCC)     TIFFANI positive     Chronic obstructive pulmonary disease (HCC)     Fibromyalgia     Dental abscess     Stage 3a chronic kidney disease      Tobacco use     Anxiety     Idiopathic chronic gout of multiple sites with tophus     Microalbuminuric diabetic nephropathy (HCC)     Vitamin D deficiency     BMI 40.0-44.9, adult ([facility]

## 2021-09-13 DIAGNOSIS — M10.9 GOUT, UNSPECIFIED CAUSE, UNSPECIFIED CHRONICITY, UNSPECIFIED SITE: ICD-10-CM

## 2021-09-13 RX ORDER — ALLOPURINOL 300 MG/1
TABLET ORAL
Qty: 90 TABLET | Refills: 1 | Status: SHIPPED | OUTPATIENT
Start: 2021-09-13 | End: 2022-01-31 | Stop reason: SDUPTHER

## 2021-09-13 NOTE — TELEPHONE ENCOUNTER
Electronic medication refill request. Pharmacy on file. Please advise. Next Visit Date:  No future appointments. Health Maintenance   Topic Date Due    Low dose CT lung screening  Never done    Diabetic retinal exam  10/30/2018    Annual Wellness Visit (AWV)  Never done    A1C test (Diabetic or Prediabetic)  08/03/2021    Flu vaccine (1) 09/01/2021    Diabetic foot exam  09/08/2021    DTaP/Tdap/Td vaccine (1 - Tdap) 01/24/2027 (Originally 9/17/2016)    Diabetic microalbuminuria test  04/22/2022    Lipid screen  05/03/2022    Potassium monitoring  05/03/2022    Creatinine monitoring  05/03/2022    Breast cancer screen  07/09/2022    Pneumococcal 0-64 years Vaccine (2 of 2 - PPSV23) 04/27/2026    Colon cancer screen colonoscopy  05/18/2027    Shingles Vaccine  Completed    COVID-19 Vaccine  Completed    Hepatitis C screen  Completed    HIV screen  Completed    Hepatitis A vaccine  Aged Out    Hib vaccine  Aged Out    Meningococcal (ACWY) vaccine  Aged Out       Hemoglobin A1C (%)   Date Value   05/03/2021 9.0 (H)   12/08/2020 7.8   09/08/2020 9.8             ( goal A1C is < 7)   Microalb/Crt.  Ratio (mcg/mg creat)   Date Value   01/23/2020 53 (H)     LDL Cholesterol (mg/dL)   Date Value   05/03/2021 80   09/17/2020 83       (goal LDL is <100)   AST (U/L)   Date Value   05/03/2021 24     ALT (U/L)   Date Value   05/03/2021 19     BUN (mg/dL)   Date Value   05/03/2021 11     BP Readings from Last 3 Encounters:   05/07/21 130/74   04/22/21 138/82   12/08/20 128/80          (goal 120/80)    All Future Testing planned in [facility]  Lab Frequency Next Occurrence   Rapid Influenza A/B Antigens Once 07/28/2021   Vitamin B12 & Folate Once 06/08/2021   TSH With Reflex Ft4 Once 06/08/2021               Patient Active Problem List:     HLD (hyperlipidemia)     HTN (hypertension)     Neuropathy due to secondary diabetes (HCC)     Depression     Mild persistent asthma without complication     Type 2 diabetes mellitus with diabetic polyneuropathy, with long-term current use of insulin (HCC)     TIFFANI positive     Chronic obstructive pulmonary disease (HCC)     Fibromyalgia     Dental abscess     Stage 3a chronic kidney disease      Tobacco use     Anxiety     Idiopathic chronic gout of multiple sites with tophus     Microalbuminuric diabetic nephropathy (HCC)     Vitamin D deficiency     BMI 40.0-44.9, adult ([facility]

## 2021-09-17 RX ORDER — LANCETS 30 GAUGE
1 EACH MISCELLANEOUS 3 TIMES DAILY
Qty: 600 EACH | Refills: 1 | Status: SHIPPED | OUTPATIENT
Start: 2021-09-17

## 2021-09-17 RX ORDER — GLUCOSAMINE HCL/CHONDROITIN SU 500-400 MG
CAPSULE ORAL
Qty: 100 STRIP | Refills: 0 | Status: SHIPPED | OUTPATIENT
Start: 2021-09-17 | End: 2022-10-19

## 2021-09-17 RX ORDER — MILNACIPRAN HYDROCHLORIDE 50 MG/1
TABLET, FILM COATED ORAL
Qty: 180 TABLET | Refills: 0 | Status: SHIPPED | OUTPATIENT
Start: 2021-09-17 | End: 2021-09-22 | Stop reason: SDUPTHER

## 2021-09-17 RX ORDER — INSULIN GLARGINE 300 U/ML
52 INJECTION, SOLUTION SUBCUTANEOUS DAILY
Qty: 24 ML | Refills: 2 | Status: SHIPPED | OUTPATIENT
Start: 2021-09-17 | End: 2022-01-31 | Stop reason: SDUPTHER

## 2021-09-17 NOTE — TELEPHONE ENCOUNTER
Received faxed medication refill request, prescription pended. Request also includes \"True Metrix Level 1 Ctrl Soln\" and \"True Metrix Blood Glucose Mtr\". Please advise, thank you! Next Visit Date:  No future appointments. Health Maintenance   Topic Date Due    Low dose CT lung screening  Never done    Diabetic retinal exam  10/30/2018    Annual Wellness Visit (AWV)  Never done    A1C test (Diabetic or Prediabetic)  08/03/2021    Flu vaccine (1) 09/01/2021    Diabetic foot exam  09/08/2021    DTaP/Tdap/Td vaccine (1 - Tdap) 01/24/2027 (Originally 9/17/2016)    Diabetic microalbuminuria test  04/22/2022    Lipid screen  05/03/2022    Potassium monitoring  05/03/2022    Creatinine monitoring  05/03/2022    Breast cancer screen  07/09/2022    Pneumococcal 0-64 years Vaccine (2 of 2 - PPSV23) 04/27/2026    Colon cancer screen colonoscopy  05/18/2027    Shingles Vaccine  Completed    COVID-19 Vaccine  Completed    Hepatitis C screen  Completed    HIV screen  Completed    Hepatitis A vaccine  Aged Out    Hib vaccine  Aged Out    Meningococcal (ACWY) vaccine  Aged Out       Hemoglobin A1C (%)   Date Value   05/03/2021 9.0 (H)   12/08/2020 7.8   09/08/2020 9.8             ( goal A1C is < 7)   Microalb/Crt.  Ratio (mcg/mg creat)   Date Value   01/23/2020 53 (H)     LDL Cholesterol (mg/dL)   Date Value   05/03/2021 80   09/17/2020 83       (goal LDL is <100)   AST (U/L)   Date Value   05/03/2021 24     ALT (U/L)   Date Value   05/03/2021 19     BUN (mg/dL)   Date Value   05/03/2021 11     BP Readings from Last 3 Encounters:   05/07/21 130/74   04/22/21 138/82   12/08/20 128/80          (goal 120/80)    All Future Testing planned in [facility]  Lab Frequency Next Occurrence   Rapid Influenza A/B Antigens Once 07/28/2021   Vitamin B12 & Folate Once 06/08/2021   TSH With Reflex Ft4 Once 06/08/2021               Patient Active Problem List:     HLD (hyperlipidemia)     HTN (hypertension) Neuropathy due to secondary diabetes ([facility]     Depression     Mild persistent asthma without complication     Type 2 diabetes mellitus with diabetic polyneuropathy, with long-term current use of insulin (HCC)     TIFFANI positive     Chronic obstructive pulmonary disease (HCC)     Fibromyalgia     Dental abscess     Stage 3a chronic kidney disease      Tobacco use     Anxiety     Idiopathic chronic gout of multiple sites with tophus     Microalbuminuric diabetic nephropathy (HCC)     Vitamin D deficiency     BMI 40.0-44.9, adult ([facility]

## 2021-09-21 ENCOUNTER — OFFICE VISIT (OUTPATIENT)
Dept: PRIMARY CARE CLINIC | Age: 61
End: 2021-09-21
Payer: MEDICARE

## 2021-09-21 VITALS
OXYGEN SATURATION: 97 % | HEART RATE: 89 BPM | WEIGHT: 293 LBS | BODY MASS INDEX: 41.95 KG/M2 | HEIGHT: 70 IN | TEMPERATURE: 98.4 F

## 2021-09-21 DIAGNOSIS — B37.31 VAGINAL CANDIDIASIS: ICD-10-CM

## 2021-09-21 DIAGNOSIS — S91.111A LACERATION OF RIGHT GREAT TOE WITHOUT FOREIGN BODY PRESENT OR DAMAGE TO NAIL, INITIAL ENCOUNTER: Primary | ICD-10-CM

## 2021-09-21 PROCEDURE — 4004F PT TOBACCO SCREEN RCVD TLK: CPT | Performed by: NURSE PRACTITIONER

## 2021-09-21 PROCEDURE — G8427 DOCREV CUR MEDS BY ELIG CLIN: HCPCS | Performed by: NURSE PRACTITIONER

## 2021-09-21 PROCEDURE — 99213 OFFICE O/P EST LOW 20 MIN: CPT | Performed by: NURSE PRACTITIONER

## 2021-09-21 PROCEDURE — 3017F COLORECTAL CA SCREEN DOC REV: CPT | Performed by: NURSE PRACTITIONER

## 2021-09-21 PROCEDURE — G8417 CALC BMI ABV UP PARAM F/U: HCPCS | Performed by: NURSE PRACTITIONER

## 2021-09-21 RX ORDER — FLUCONAZOLE 150 MG/1
150 TABLET ORAL ONCE
Qty: 1 TABLET | Refills: 0 | Status: SHIPPED | OUTPATIENT
Start: 2021-09-21 | End: 2021-09-21

## 2021-09-21 ASSESSMENT — ENCOUNTER SYMPTOMS
WHEEZING: 0
COUGH: 0
CHEST TIGHTNESS: 0
RESPIRATORY NEGATIVE: 1
SHORTNESS OF BREATH: 0

## 2021-09-21 NOTE — TELEPHONE ENCOUNTER
Received faxed medication refill request, prescription pended. Please advise, thank you! Next Visit Date:  No future appointments. Health Maintenance   Topic Date Due    Low dose CT lung screening  Never done    Diabetic retinal exam  10/30/2018    Annual Wellness Visit (AWV)  Never done    A1C test (Diabetic or Prediabetic)  08/03/2021    Flu vaccine (1) 09/01/2021    Diabetic foot exam  09/08/2021    DTaP/Tdap/Td vaccine (1 - Tdap) 01/24/2027 (Originally 9/17/2016)    Diabetic microalbuminuria test  04/22/2022    Lipid screen  05/03/2022    Potassium monitoring  05/03/2022    Creatinine monitoring  05/03/2022    Breast cancer screen  07/09/2022    Pneumococcal 0-64 years Vaccine (2 of 2 - PPSV23) 04/27/2026    Colon cancer screen colonoscopy  05/18/2027    Shingles Vaccine  Completed    COVID-19 Vaccine  Completed    Hepatitis C screen  Completed    HIV screen  Completed    Hepatitis A vaccine  Aged Out    Hib vaccine  Aged Out    Meningococcal (ACWY) vaccine  Aged Out       Hemoglobin A1C (%)   Date Value   05/03/2021 9.0 (H)   12/08/2020 7.8   09/08/2020 9.8             ( goal A1C is < 7)   Microalb/Crt.  Ratio (mcg/mg creat)   Date Value   01/23/2020 53 (H)     LDL Cholesterol (mg/dL)   Date Value   05/03/2021 80   09/17/2020 83       (goal LDL is <100)   AST (U/L)   Date Value   05/03/2021 24     ALT (U/L)   Date Value   05/03/2021 19     BUN (mg/dL)   Date Value   05/03/2021 11     BP Readings from Last 3 Encounters:   05/07/21 130/74   04/22/21 138/82   12/08/20 128/80          (goal 120/80)    All Future Testing planned in [facility]  Lab Frequency Next Occurrence   Rapid Influenza A/B Antigens Once 07/28/2021   Vitamin B12 & Folate Once 06/08/2021   TSH With Reflex Ft4 Once 06/08/2021               Patient Active Problem List:     HLD (hyperlipidemia)     HTN (hypertension)     Neuropathy due to secondary diabetes (HCC)     Depression     Mild persistent asthma without complication     Type 2 diabetes mellitus with diabetic polyneuropathy, with long-term current use of insulin (HCC)     TIFFANI positive     Chronic obstructive pulmonary disease (HCC)     Fibromyalgia     Dental abscess     Stage 3a chronic kidney disease      Tobacco use     Anxiety     Idiopathic chronic gout of multiple sites with tophus     Microalbuminuric diabetic nephropathy (HCC)     Vitamin D deficiency     BMI 40.0-44.9, adult ([facility]

## 2021-09-21 NOTE — PATIENT INSTRUCTIONS
Patient Education        Cuts: Care Instructions  Your Care Instructions  A cut can happen anywhere on your body. Stitches, staples, skin adhesives, or pieces of tape called Steri-Strips are sometimes used to keep the edges of a cut together and help it heal. Steri-Strips can be used by themselves or with stitches or staples. Sometimes cuts are left open. If the cut went deep and through the skin, the doctor may have closed the cut in two layers. A deeper layer of stitches brings the deep part of the cut together. These stitches will dissolve and don't need to be removed. The upper layer closure, which could be stitches, staples, Steri-Strips, or adhesive, is what you see on the cut. A cut is often covered by a bandage. The doctor has checked you carefully, but problems can develop later. If you notice any problems or new symptoms, get medical treatment right away. Follow-up care is a key part of your treatment and safety. Be sure to make and go to all appointments, and call your doctor if you are having problems. It's also a good idea to know your test results and keep a list of the medicines you take. How can you care for yourself at home? If a cut is open or closed  · Prop up the sore area on a pillow anytime you sit or lie down during the next 3 days. Try to keep it above the level of your heart. This will help reduce swelling. · Keep the cut dry for the first 24 to 48 hours. After this, you can shower if your doctor okays it. Pat the cut dry. · Don't soak the cut, such as in a bathtub. Your doctor will tell you when it's safe to get the cut wet. · After the first 24 to 48 hours, clean the cut with soap and water 2 times a day unless your doctor gives you different instructions. ? Don't use hydrogen peroxide or alcohol, which can slow healing. ? You may cover the cut with a thin layer of petroleum jelly and a nonstick bandage.   ? If the doctor put a bandage over the cut, put on a new bandage after cleaning the cut or if the bandage gets wet or dirty. · Avoid any activity that could cause your cut to reopen. · Be safe with medicines. Read and follow all instructions on the label. ? If the doctor gave you a prescription medicine for pain, take it as prescribed. ? If you are not taking a prescription pain medicine, ask your doctor if you can take an over-the-counter medicine. If the cut is closed with stitches, staples, or Steri-Strips  · Follow the above instructions for open or closed cuts. · Do not remove the stitches or staples on your own. Your doctor will tell you when to come back to have the stitches or staples removed. · Leave Steri-Strips on until they fall off. If the cut is closed with a skin adhesive  · Follow the above instructions for open or closed cuts. · Leave the skin adhesive on your skin until it falls off on its own. This may take 5 to 10 days. · Do not scratch, rub, or pick at the adhesive. · Do not put the sticky part of a bandage directly on the adhesive. · Do not put any kind of ointment, cream, or lotion over the area. This can make the adhesive fall off too soon. Do not use hydrogen peroxide or alcohol, which can slow healing. When should you call for help? Call your doctor now or seek immediate medical care if:    · You have new pain, or your pain gets worse.     · The skin near the cut is cold or pale or changes color.     · You have tingling, weakness, or numbness near the cut.     · The cut starts to bleed, and blood soaks through the bandage. Oozing small amounts of blood is normal.     · You have trouble moving the area near the cut.     · You have symptoms of infection, such as:  ? Increased pain, swelling, warmth, or redness around the cut.  ? Red streaks leading from the cut.  ? Pus draining from the cut.  ? A fever. Watch closely for changes in your health, and be sure to contact your doctor if:    · The cut reopens.     · You do not get better as expected. Where can you learn more? Go to [email or web address] and sign in to your [employer] account. Enter M735 in the [employer] box to learn more about \"Cuts: Care Instructions. \"     If you do not have an account, please click on the \"Sign Up Now\" link. Current as of: October 19, 2020               Content Version: 12.9  © 2006-2021 [employer]. Care instructions adapted under license by [facility] [employer] [facility] ([facility]). If you have questions about a medical condition or this instruction, always ask your healthcare professional. [address] any warranty or liability for your use of this information. Patient Education        Wound Check: Care Instructions  Your Care Instructions  People have wounds that need care for many reasons. You may have a cut that needs care after surgery. You may have a cut or puncture wound from an accident. Or you may have a wound because of a condition like diabetes. Whatever the cause of your wound, there are things you can do to care for it at home. Your doctor may also want you to come back for a wound check. The wound check lets the doctor know how your wound is healing and if you need more treatment. Follow-up care is a key part of your treatment and safety. Be sure to make and go to all appointments, and call your doctor if you are having problems. It's also a good idea to know your test results and keep a list of the medicines you take. How can you care for yourself at home? · If your doctor told you how to care for your wound, follow your doctor's instructions. If you did not get instructions, follow this general advice:  ? You may cover the wound with a thin layer of petroleum jelly, such as Vaseline, and a nonstick bandage. ? Apply more petroleum jelly and replace the bandage as needed. · Keep the wound dry for the first 24 to 48 hours. After this, you can shower if your doctor okays it. Pat the wound dry.   · Be safe with medicines. Read and follow all instructions on the label. ? If the doctor gave you a prescription medicine for pain, take it as prescribed. ? If you are not taking a prescription pain medicine, ask your doctor if you can take an over-the-counter medicine. · If your doctor prescribed antibiotics, take them as directed. Do not stop taking them just because you feel better. You need to take the full course of antibiotics. · If you have stitches, do not remove them on your own. Your doctor will tell you when to come back to have them removed. · If you have Steri-Strips, leave them on until they fall off. · If possible, prop up the injured area on a pillow anytime you sit or lie down during the next 3 days. Try to keep it above the level of your heart. This will help reduce swelling. When should you call for help? Call your doctor now or seek immediate medical care if:    · You have new pain, or the pain gets worse.     · The skin near the wound is cold or pale or changes color.     · You have tingling, weakness, or numbness near the wound.     · The wound starts to bleed, and blood soaks through the bandage. Oozing small amounts of blood is normal.     · You have symptoms of infection, such as:  ? Increased pain, swelling, warmth, or redness. ? Red streaks leading from the wound. ? Pus draining from the wound. ? A fever. Watch closely for changes in your health, and be sure to contact your doctor if:    · You do not get better as expected. Where can you learn more? Go to [email or web address] org and sign in to your [employer] account. Enter  in the [facility] box to learn more about \"Wound Check: Care Instructions. \"     If you do not have an account, please click on the \"Sign Up Now\" link. Current as of: October 19, 2020               Content Version: 12.9  © 5019-5932 [employer]. Care instructions adapted under license by [facility] ([facility]).  If you have questions about a medical condition or this instruction, always ask your healthcare professional. [place] [address] any warranty or liability for your use of this information.

## 2021-09-21 NOTE — PROGRESS NOTES
[facility] [id number] [facility] [place] [facility]  [address] [address] [address] [address]  Dept: [phone]  Dept Fax: [phone]     Ann Stafford is a 62 y.o. female who presents to the urgent care today for her medicalconditions/complaints as noted below. Ann Stafford is c/o of Toe Injury (pt has cut on her big toe X 10 days ago )    HPI:      Laceration   Incident onset: 10 days ago. Pain location: right great toe. The laceration is 2 cm in size. The laceration mechanism is unknown. The patient is experiencing no pain. She reports no foreign bodies present. Her tetanus status is UTD (2016). Past Medical History:   Diagnosis Date    Anxiety     Asthma     BMI 29.0-29.9,adult 4/22/2021    Bronchitis     Chronic obstructive pulmonary disease ([facility] [address] 9/11/2018    Depression     Fibromyalgia     Gout     HLD (hyperlipidemia) 7/1/2015    Hyperglycemia 10/8/2020    Hypertension     Neuropathy     Obesity     Obstructive sleep apnea     Osteoarthritis     Stage 3a chronic kidney disease  4/22/2021    Type 2 diabetes mellitus with diabetic polyneuropathy, with long-term current use of insulin ([facility] [address] 9/7/2017    Type II or unspecified type diabetes mellitus without mention of complication, not stated as uncontrolled     Urinary incontinence     Yeast infection 10/8/2020       Current Outpatient Medications   Medication Sig Dispense Refill    mupirocin (BACTROBAN) 2 % ointment Apply 3 times daily. 30 g 0    fluconazole (DIFLUCAN) 150 MG tablet Take 1 tablet by mouth once for 1 dose 1 tablet 0    blood glucose monitor kit and supplies Please fill with True Metrix Meter and Control Solution. 1 kit 0    blood glucose monitor strips Test 3 times a day & as needed for symptoms of irregular blood glucose. Dispense sufficient amount for indicated testing frequency plus additional to accommodate PRN testing needs.  100 strip 0    Lancets MISC 1 each by Does not apply route 3 times daily 600 each 1    milnacipran HCl (SAVELLA) 50 MG TABS TAKE 1 TABLET TWICE DAILY 180 tablet 0    Insulin Glargine, 1 Unit Dial, (TOUJEO SOLOSTAR) 300 UNIT/ML SOPN Inject 52 Units into the skin daily 24 mL 2    allopurinol (ZYLOPRIM) 300 MG tablet TAKE 1 TABLET EVERY DAY 90 tablet 1    rOPINIRole (REQUIP) 1 MG tablet TAKE 1 TABLET AT BEDTIME 90 tablet 1    carvedilol (COREG) 25 MG tablet TAKE 1 TABLET TWICE DAILY 180 tablet 1    colchicine (MITIGARE) 0.6 MG capsule Take 1 capsule by mouth daily 30 capsule 0    TRULICITY 1.5 ZI/3.4IO SOPN INJECT 1.5MG (1 PEN) SUBCUTANEOUSLY EVERY WEEK 3 pen 1    buPROPion (WELLBUTRIN XL) 150 MG extended release tablet TAKE 1 TABLET EVERY MORNING 90 tablet 3    omeprazole (PRILOSEC) 20 MG delayed release capsule TAKE 1 CAPSULE EVERY DAY 90 capsule 0    colchicine (COLCRYS) 0.6 MG tablet TAKE 1 TABLET BY MOUTH DAILY 30 tablet 0    aluminum chloride (DRYSOL) 20 % external solution Apply topically nightly.  60 mL 1    estradiol (ESTRACE VAGINAL) 0.1 MG/GM vaginal cream Place 1 g vaginally daily 1 Tube 3    folic acid (FOLVITE) 1 MG tablet Take 1 mg by mouth daily      Cholecalciferol (VITAMIN D) 125 MCG (5000 UT) CAPS Take 1 tablet by mouth daily 30 capsule 2    albuterol sulfate HFA (VENTOLIN HFA) 108 (90 Base) MCG/ACT inhaler Inhale 2 puffs into the lungs every 6 hours as needed for Wheezing 1 Inhaler 3    Handicap Placard MISC by Does not apply route Expires 4/22/2022 1 each 0    ondansetron (ZOFRAN) 4 MG tablet Take 1 tablet by mouth 3 times daily as needed for Nausea or Vomiting 15 tablet 0    fenofibrate (TRIGLIDE) 160 MG tablet Take 1 tablet by mouth daily 90 tablet 1    Insulin Pen Needle (PEN NEEDLES 3/16\") 31G X 5 MM MISC 1 each by Does not apply route 3 times daily 100 each 3    NOVOLOG FLEXPEN 100 UNIT/ML injection pen       hydroCHLOROthiazide (HYDRODIURIL) 25 MG tablet TAKE 1 TABLET EVERY MORNING 90 tablet 3    insulin lispro, 1 Unit Dial, (HUMALOG KWIKPEN) 100 UNIT/ML SOPN 0-150 = 0 units  151-200 = 2 units  201-250 = 4 units  251-300 = 6 units  301-350 = 8 units  351-400+ = 10 units 3 pen 1    tiZANidine (ZANAFLEX) 4 MG tablet TAKE 1 TABLET TWICE DAILY 180 tablet 2    b complex vitamins capsule Take 1 capsule by mouth daily      Multiple Vitamins-Minerals (WOMENS DAILY FORMULA PO) Take by mouth      vitamin C (ASCORBIC ACID) 500 MG tablet Take 500 mg by mouth daily      Biotin 5 MG CAPS Take by mouth      tiotropium (SPIRIVA HANDIHALER) 18 MCG inhalation capsule INHALE THE CONTENTS OF 1 CAPSULE INTO THE LUNGS DAILY 90 capsule 1     Current Facility-Administered Medications   Medication Dose Route Frequency Provider Last Rate Last Admin    ipratropium (ATROVENT) 0.02 % nebulizer solution 0.5 mg  0.5 mg Nebulization Once Ej Bunch MD        albuterol (PROVENTIL) nebulizer solution 2.5 mg  2.5 mg Nebulization Once Poncho Corbett MD         Allergies   Allergen Reactions    Cymbalta [Duloxetine Hcl] Anaphylaxis and Other (See Comments)    Januvia [Sitagliptin] Shortness Of Breath    Cephalexin Other (See Comments)    Ciprofloxacin Other (See Comments)     muscle spasms occur  muscle spasms occur  muscle spasms occur  cramping    Duloxetine     Lyrica [Pregabalin] Other (See Comments)     Does not help after 2 weeks     Metformin Diarrhea    Metformin And Related Diarrhea    Statins Other (See Comments)     myalgias      Morphine Diarrhea, Nausea And Vomiting, Swelling and Nausea Only     Reviewed PMH, SH, and FH with the patient and updated. Subjective:      Review of Systems   Constitutional: Negative for chills, fatigue and fever. Respiratory: Negative. Negative for cough, chest tightness, shortness of breath and wheezing. Cardiovascular: Negative. Negative for chest pain. Skin: Positive for wound (laceration right great toe). Negative for rash. All other systems reviewed and are negative.     Objective: Physical Exam  Vitals and nursing note reviewed. Constitutional:       General: She is not in acute distress. Appearance: She is well-developed. She is not diaphoretic. HENT:      Head: Normocephalic and atraumatic. Cardiovascular:      Rate and Rhythm: Normal rate and regular rhythm. Heart sounds: Normal heart sounds. No murmur heard. Pulmonary:      Effort: Pulmonary effort is normal. No respiratory distress. Breath sounds: Normal breath sounds. No wheezing. Musculoskeletal:      Right foot: Laceration (laceration noted to the lateral aspect of the right great toe) present. No swelling. Skin:     General: Skin is warm and dry. Findings: Laceration (2 cm open laceration noted to the lateral aspect of the right great toe with callusing surrounding the area) present. Neurological:      Mental Status: She is alert. Pulse 89   Temp 98.4 °F (36.9 °C) (Tympanic)   Ht 5' 10\" (1.778 m)   Wt (!) 307 lb (139.3 kg)   SpO2 97%   BMI 44.05 kg/m²     Assessment:       Diagnosis Orders   1. Laceration of right great toe without foreign body present or damage to nail, initial encounter  mupirocin (BACTROBAN) 2 % ointment     Plan:      Warm epsom salt soaks TID encouraged as well. Would use a pumice stone to file the callused area. Bactroban application encouraged. Watch for signs of infection and report them. Patient is agreeable to treatment plan. Educational materials provided on AVS.  Follow up if symptoms do not improve/worsen. Orders Placed This Encounter   Medications    mupirocin (BACTROBAN) 2 % ointment     Sig: Apply 3 times daily. Dispense:  30 g     Refill:  0        Patient given educational materials - see patient instructions. Discussed use, benefit, and side effects of prescribed medications. All patientquestions answered. Pt voiced understanding.     Electronically signed by AMARILYS Govea CNP on 9/21/2021at 3:06 PM

## 2021-09-22 DIAGNOSIS — E11.42 TYPE 2 DIABETES MELLITUS WITH DIABETIC POLYNEUROPATHY, WITH LONG-TERM CURRENT USE OF INSULIN (HCC): ICD-10-CM

## 2021-09-22 DIAGNOSIS — Z79.4 TYPE 2 DIABETES MELLITUS WITH DIABETIC POLYNEUROPATHY, WITH LONG-TERM CURRENT USE OF INSULIN (HCC): ICD-10-CM

## 2021-09-22 DIAGNOSIS — F41.9 ANXIETY: ICD-10-CM

## 2021-09-22 RX ORDER — MILNACIPRAN HYDROCHLORIDE 50 MG/1
TABLET, FILM COATED ORAL
Qty: 180 TABLET | Refills: 2 | Status: SHIPPED | OUTPATIENT
Start: 2021-09-22 | End: 2021-10-11 | Stop reason: ALTCHOICE

## 2021-09-22 RX ORDER — DULAGLUTIDE 1.5 MG/.5ML
INJECTION, SOLUTION SUBCUTANEOUS
Qty: 3 PEN | Refills: 3 | Status: SHIPPED | OUTPATIENT
Start: 2021-09-22 | End: 2021-10-11

## 2021-09-22 RX ORDER — HYDROXYZINE HYDROCHLORIDE 25 MG/1
25 TABLET, FILM COATED ORAL EVERY 8 HOURS PRN
Qty: 90 TABLET | Refills: 1 | Status: SHIPPED | OUTPATIENT
Start: 2021-09-22 | End: 2021-11-08

## 2021-09-22 NOTE — TELEPHONE ENCOUNTER
Next Visit Date:  No future appointments. Health Maintenance   Topic Date Due    Low dose CT lung screening  Never done    Diabetic retinal exam  10/30/2018    Annual Wellness Visit (AWV)  Never done    A1C test (Diabetic or Prediabetic)  08/03/2021    Flu vaccine (1) 09/01/2021    Diabetic foot exam  09/08/2021    DTaP/Tdap/Td vaccine (1 - Tdap) 01/24/2027 (Originally 9/17/2016)    Diabetic microalbuminuria test  04/22/2022    Lipid screen  05/03/2022    Potassium monitoring  05/03/2022    Creatinine monitoring  05/03/2022    Breast cancer screen  07/09/2022    Pneumococcal 0-64 years Vaccine (2 of 2 - PPSV23) 04/27/2026    Colon cancer screen colonoscopy  05/18/2027    Shingles Vaccine  Completed    COVID-19 Vaccine  Completed    Hepatitis C screen  Completed    HIV screen  Completed    Hepatitis A vaccine  Aged Out    Hib vaccine  Aged Out    Meningococcal (ACWY) vaccine  Aged Out       Hemoglobin A1C (%)   Date Value   05/03/2021 9.0 (H)   12/08/2020 7.8   09/08/2020 9.8             ( goal A1C is < 7)   Microalb/Crt.  Ratio (mcg/mg creat)   Date Value   01/23/2020 53 (H)     LDL Cholesterol (mg/dL)   Date Value   05/03/2021 80   09/17/2020 83       (goal LDL is <100)   AST (U/L)   Date Value   05/03/2021 24     ALT (U/L)   Date Value   05/03/2021 19     BUN (mg/dL)   Date Value   05/03/2021 11     BP Readings from Last 3 Encounters:   05/07/21 130/74   04/22/21 138/82   12/08/20 128/80          (goal 120/80)    All Future Testing planned in [facility]  Lab Frequency Next Occurrence   Rapid Influenza A/B Antigens Once 07/28/2021   Vitamin B12 & Folate Once 06/08/2021   TSH With Reflex Ft4 Once 06/08/2021               Patient Active Problem List:     HLD (hyperlipidemia)     HTN (hypertension)     Neuropathy due to secondary diabetes (HCC)     Depression     Mild persistent asthma without complication     Type 2 diabetes mellitus with diabetic polyneuropathy, with long-term current use of insulin (HCC)     TIFFANI positive     Chronic obstructive pulmonary disease (HCC)     Fibromyalgia     Dental abscess     Stage 3a chronic kidney disease      Tobacco use     Anxiety     Idiopathic chronic gout of multiple sites with tophus     Microalbuminuric diabetic nephropathy (HCC)     Vitamin D deficiency     BMI 40.0-44.9, adult ([facility] [address]

## 2021-09-22 NOTE — TELEPHONE ENCOUNTER
Next Visit Date:  No future appointments. Health Maintenance   Topic Date Due    Low dose CT lung screening  Never done    Diabetic retinal exam  10/30/2018    Annual Wellness Visit (AWV)  Never done    A1C test (Diabetic or Prediabetic)  08/03/2021    Flu vaccine (1) 09/01/2021    Diabetic foot exam  09/08/2021    DTaP/Tdap/Td vaccine (1 - Tdap) 01/24/2027 (Originally 9/17/2016)    Diabetic microalbuminuria test  04/22/2022    Lipid screen  05/03/2022    Potassium monitoring  05/03/2022    Creatinine monitoring  05/03/2022    Breast cancer screen  07/09/2022    Pneumococcal 0-64 years Vaccine (2 of 2 - PPSV23) 04/27/2026    Colon cancer screen colonoscopy  05/18/2027    Shingles Vaccine  Completed    COVID-19 Vaccine  Completed    Hepatitis C screen  Completed    HIV screen  Completed    Hepatitis A vaccine  Aged Out    Hib vaccine  Aged Out    Meningococcal (ACWY) vaccine  Aged Out       Hemoglobin A1C (%)   Date Value   05/03/2021 9.0 (H)   12/08/2020 7.8   09/08/2020 9.8             ( goal A1C is < 7)   Microalb/Crt.  Ratio (mcg/mg creat)   Date Value   01/23/2020 53 (H)     LDL Cholesterol (mg/dL)   Date Value   05/03/2021 80   09/17/2020 83       (goal LDL is <100)   AST (U/L)   Date Value   05/03/2021 24     ALT (U/L)   Date Value   05/03/2021 19     BUN (mg/dL)   Date Value   05/03/2021 11     BP Readings from Last 3 Encounters:   05/07/21 130/74   04/22/21 138/82   12/08/20 128/80          (goal 120/80)    All Future Testing planned in [facility]  Lab Frequency Next Occurrence   Rapid Influenza A/B Antigens Once 07/28/2021   Vitamin B12 & Folate Once 06/08/2021   TSH With Reflex Ft4 Once 06/08/2021               Patient Active Problem List:     HLD (hyperlipidemia)     HTN (hypertension)     Neuropathy due to secondary diabetes (HCC)     Depression     Mild persistent asthma without complication     Type 2 diabetes mellitus with diabetic polyneuropathy, with long-term current use of insulin (HCC)     TIFFANI positive     Chronic obstructive pulmonary disease (HCC)     Fibromyalgia     Dental abscess     Stage 3a chronic kidney disease      Tobacco use     Anxiety     Idiopathic chronic gout of multiple sites with tophus     Microalbuminuric diabetic nephropathy (HCC)     Vitamin D deficiency     BMI 40.0-44.9, adult ([facility] [address]

## 2021-10-11 ENCOUNTER — OFFICE VISIT (OUTPATIENT)
Dept: FAMILY MEDICINE CLINIC | Age: 61
End: 2021-10-11
Payer: MEDICARE

## 2021-10-11 VITALS
OXYGEN SATURATION: 100 % | WEIGHT: 293 LBS | HEART RATE: 78 BPM | DIASTOLIC BLOOD PRESSURE: 86 MMHG | HEIGHT: 70 IN | BODY MASS INDEX: 41.95 KG/M2 | TEMPERATURE: 97.3 F | SYSTOLIC BLOOD PRESSURE: 119 MMHG

## 2021-10-11 DIAGNOSIS — Z12.31 SCREENING MAMMOGRAM FOR BREAST CANCER: ICD-10-CM

## 2021-10-11 DIAGNOSIS — Z71.89 ACP (ADVANCE CARE PLANNING): ICD-10-CM

## 2021-10-11 DIAGNOSIS — Z79.4 TYPE 2 DIABETES MELLITUS WITH DIABETIC POLYNEUROPATHY, WITH LONG-TERM CURRENT USE OF INSULIN (HCC): ICD-10-CM

## 2021-10-11 DIAGNOSIS — E11.42 TYPE 2 DIABETES MELLITUS WITH DIABETIC POLYNEUROPATHY, WITH LONG-TERM CURRENT USE OF INSULIN (HCC): ICD-10-CM

## 2021-10-11 DIAGNOSIS — E13.40 NEUROPATHY DUE TO SECONDARY DIABETES (HCC): ICD-10-CM

## 2021-10-11 DIAGNOSIS — G56.03 BILATERAL CARPAL TUNNEL SYNDROME: ICD-10-CM

## 2021-10-11 DIAGNOSIS — Z87.891 PERSONAL HISTORY OF TOBACCO USE: ICD-10-CM

## 2021-10-11 DIAGNOSIS — G25.81 RLS (RESTLESS LEGS SYNDROME): ICD-10-CM

## 2021-10-11 DIAGNOSIS — Z00.00 ROUTINE GENERAL MEDICAL EXAMINATION AT A HEALTH CARE FACILITY: Primary | ICD-10-CM

## 2021-10-11 DIAGNOSIS — Z23 NEEDS FLU SHOT: ICD-10-CM

## 2021-10-11 DIAGNOSIS — F41.9 ANXIETY: ICD-10-CM

## 2021-10-11 LAB — HBA1C MFR BLD: 9.1 %

## 2021-10-11 PROCEDURE — 83036 HEMOGLOBIN GLYCOSYLATED A1C: CPT | Performed by: STUDENT IN AN ORGANIZED HEALTH CARE EDUCATION/TRAINING PROGRAM

## 2021-10-11 PROCEDURE — 3017F COLORECTAL CA SCREEN DOC REV: CPT | Performed by: STUDENT IN AN ORGANIZED HEALTH CARE EDUCATION/TRAINING PROGRAM

## 2021-10-11 PROCEDURE — 3046F HEMOGLOBIN A1C LEVEL >9.0%: CPT | Performed by: STUDENT IN AN ORGANIZED HEALTH CARE EDUCATION/TRAINING PROGRAM

## 2021-10-11 PROCEDURE — G8482 FLU IMMUNIZE ORDER/ADMIN: HCPCS | Performed by: STUDENT IN AN ORGANIZED HEALTH CARE EDUCATION/TRAINING PROGRAM

## 2021-10-11 PROCEDURE — G0296 VISIT TO DETERM LDCT ELIG: HCPCS | Performed by: STUDENT IN AN ORGANIZED HEALTH CARE EDUCATION/TRAINING PROGRAM

## 2021-10-11 PROCEDURE — G0008 ADMIN INFLUENZA VIRUS VAC: HCPCS | Performed by: STUDENT IN AN ORGANIZED HEALTH CARE EDUCATION/TRAINING PROGRAM

## 2021-10-11 PROCEDURE — 90674 CCIIV4 VAC NO PRSV 0.5 ML IM: CPT | Performed by: STUDENT IN AN ORGANIZED HEALTH CARE EDUCATION/TRAINING PROGRAM

## 2021-10-11 PROCEDURE — G0439 PPPS, SUBSEQ VISIT: HCPCS | Performed by: STUDENT IN AN ORGANIZED HEALTH CARE EDUCATION/TRAINING PROGRAM

## 2021-10-11 RX ORDER — BLOOD-GLUCOSE CONTROL, LOW
EACH MISCELLANEOUS
COMMUNITY
Start: 2021-09-22

## 2021-10-11 RX ORDER — BUPROPION HYDROCHLORIDE 150 MG/1
300 TABLET ORAL EVERY MORNING
Qty: 180 TABLET | Refills: 2 | Status: SHIPPED | OUTPATIENT
Start: 2021-10-11 | End: 2021-12-01

## 2021-10-11 RX ORDER — ACETAMINOPHEN AND CODEINE PHOSPHATE 300; 30 MG/1; MG/1
TABLET ORAL
COMMUNITY
Start: 2021-09-30 | End: 2022-02-14 | Stop reason: ALTCHOICE

## 2021-10-11 RX ORDER — BLOOD SUGAR DIAGNOSTIC
STRIP MISCELLANEOUS
COMMUNITY
End: 2022-03-08 | Stop reason: SDUPTHER

## 2021-10-11 RX ORDER — DULAGLUTIDE 3 MG/.5ML
3 INJECTION, SOLUTION SUBCUTANEOUS WEEKLY
Qty: 4 PEN | Refills: 2 | Status: SHIPPED | OUTPATIENT
Start: 2021-10-11 | End: 2021-12-20

## 2021-10-11 RX ORDER — AMOXICILLIN 500 MG/1
TABLET, FILM COATED ORAL
COMMUNITY
Start: 2021-09-30 | End: 2022-01-10

## 2021-10-11 RX ORDER — PRAMIPEXOLE DIHYDROCHLORIDE 1 MG/1
1 TABLET ORAL NIGHTLY
Qty: 90 TABLET | Refills: 3 | Status: SHIPPED | OUTPATIENT
Start: 2021-10-11 | End: 2022-01-31 | Stop reason: SDUPTHER

## 2021-10-11 SDOH — ECONOMIC STABILITY: FOOD INSECURITY: WITHIN THE PAST 12 MONTHS, THE FOOD YOU BOUGHT JUST DIDN'T LAST AND YOU DIDN'T HAVE MONEY TO GET MORE.: OFTEN TRUE

## 2021-10-11 SDOH — ECONOMIC STABILITY: FOOD INSECURITY: WITHIN THE PAST 12 MONTHS, YOU WORRIED THAT YOUR FOOD WOULD RUN OUT BEFORE YOU GOT MONEY TO BUY MORE.: OFTEN TRUE

## 2021-10-11 ASSESSMENT — PATIENT HEALTH QUESTIONNAIRE - PHQ9
4. FEELING TIRED OR HAVING LITTLE ENERGY: 3
SUM OF ALL RESPONSES TO PHQ QUESTIONS 1-9: 12
9. THOUGHTS THAT YOU WOULD BE BETTER OFF DEAD, OR OF HURTING YOURSELF: 0
7. TROUBLE CONCENTRATING ON THINGS, SUCH AS READING THE NEWSPAPER OR WATCHING TELEVISION: 0
2. FEELING DOWN, DEPRESSED OR HOPELESS: 2
SUM OF ALL RESPONSES TO PHQ9 QUESTIONS 1 & 2: 4
1. LITTLE INTEREST OR PLEASURE IN DOING THINGS: 2
5. POOR APPETITE OR OVEREATING: 0
8. MOVING OR SPEAKING SO SLOWLY THAT OTHER PEOPLE COULD HAVE NOTICED. OR THE OPPOSITE, BEING SO FIGETY OR RESTLESS THAT YOU HAVE BEEN MOVING AROUND A LOT MORE THAN USUAL: 0
6. FEELING BAD ABOUT YOURSELF - OR THAT YOU ARE A FAILURE OR HAVE LET YOURSELF OR YOUR FAMILY DOWN: 2
10. IF YOU CHECKED OFF ANY PROBLEMS, HOW DIFFICULT HAVE THESE PROBLEMS MADE IT FOR YOU TO DO YOUR WORK, TAKE CARE OF THINGS AT HOME, OR GET ALONG WITH OTHER PEOPLE: 1
SUM OF ALL RESPONSES TO PHQ QUESTIONS 1-9: 12
SUM OF ALL RESPONSES TO PHQ QUESTIONS 1-9: 12
3. TROUBLE FALLING OR STAYING ASLEEP: 3

## 2021-10-11 ASSESSMENT — LIFESTYLE VARIABLES
HOW OFTEN DO YOU HAVE A DRINK CONTAINING ALCOHOL: 0
HOW OFTEN DO YOU HAVE A DRINK CONTAINING ALCOHOL: NEVER
AUDIT-C TOTAL SCORE: INCOMPLETE
AUDIT TOTAL SCORE: INCOMPLETE

## 2021-10-11 ASSESSMENT — SOCIAL DETERMINANTS OF HEALTH (SDOH): HOW HARD IS IT FOR YOU TO PAY FOR THE VERY BASICS LIKE FOOD, HOUSING, MEDICAL CARE, AND HEATING?: HARD

## 2021-10-11 NOTE — PROGRESS NOTES
Medicare Annual Wellness Visit  Name: Antonio Busby Date: 10/11/2021   MRN: [id number] Sex: Female   Age: 62 y.o. Ethnicity: Non- / Non    : 1960 Race: White (non-)      Nela Tinoco is here for Medicare AWV    Screenings for behavioral, psychosocial and functional/safety risks, and cognitive dysfunction are all negative except as indicated below. These results, as well as other patient data from the [address] form, are documented in Flowsheets linked to this Encounter. Allergies   Allergen Reactions    Cymbalta [Duloxetine Hcl] Anaphylaxis and Other (See Comments)    Januvia [Sitagliptin] Shortness Of Breath    Cephalexin Other (See Comments)    Ciprofloxacin Other (See Comments)     muscle spasms occur  muscle spasms occur  muscle spasms occur  cramping    Duloxetine     Lyrica [Pregabalin] Other (See Comments)     Does not help after 2 weeks     Metformin Diarrhea    Metformin And Related Diarrhea    Statins Other (See Comments)     myalgias      Morphine Diarrhea, Nausea And Vomiting, Swelling and Nausea Only         Prior to Visit Medications    Medication Sig Taking? Authorizing Provider   milnacipran HCl (SAVELLA) 100 MG TABS Take 1 tablet by mouth 2 times daily Yes Savanna Angulo MD   pramipexole (MIRAPEX) 1 MG tablet Take 1 tablet by mouth nightly Yes Savanna Angulo MD   buPROPion (WELLBUTRIN XL) 150 MG extended release tablet Take 2 tablets by mouth every morning Yes Savanna Angulo MD   hydrOXYzine (ATARAX) 25 MG tablet TAKE 1 TABLET BY MOUTH EVERY 8 HOURS AS NEEDED FOR ANXIETY Yes Savanna Angulo MD   TRULICITY 1.5 YJ/9.3LN SOPN INJECT 1.5MG (1 PEN) SUBCUTANEOUSLY EVERY WEEK Yes Savanna Angulo MD   blood glucose monitor kit and supplies Please fill with True Metrix Meter and Control Solution. Yes Savanna Angulo MD   blood glucose monitor strips Test 3 times a day & as needed for symptoms of irregular blood glucose.  Dispense sufficient amount for indicated testing frequency plus additional to accommodate PRN testing needs. Yes Myrna Mittal MD   Lancets MISC 1 each by Does not apply route 3 times daily Yes Myrna Mittal MD   Insulin Glargine, 1 Unit Dial, (TOUJEO SOLOSTAR) 300 UNIT/ML SOPN Inject 52 Units into the skin daily Yes Myrna Mittal MD   allopurinol (ZYLOPRIM) 300 MG tablet TAKE 1 TABLET EVERY DAY Yes Myrna Mittal MD   rOPINIRole (REQUIP) 1 MG tablet TAKE 1 TABLET AT BEDTIME Yes Myrna Mittal MD   carvedilol (COREG) 25 MG tablet TAKE 1 TABLET TWICE DAILY Yes Myrna Mittal MD   omeprazole (PRILOSEC) 20 MG delayed release capsule TAKE 1 CAPSULE EVERY DAY Yes Myrna Mittal MD   aluminum chloride (DRYSOL) 20 % external solution Apply topically nightly.  Yes Myrna Mittal MD   estradiol (ESTRACE VAGINAL) 0.1 MG/GM vaginal cream Place 1 g vaginally daily Yes Myrna Mittal MD   folic acid (FOLVITE) 1 MG tablet Take 1 mg by mouth daily Yes Historical Provider, MD   Cholecalciferol (VITAMIN D) 125 MCG (5000 UT) CAPS Take 1 tablet by mouth daily Yes Myrna Mittal MD   albuterol sulfate HFA (VENTOLIN HFA) 108 (90 Base) MCG/ACT inhaler Inhale 2 puffs into the lungs every 6 hours as needed for Wheezing Yes Myrna Mittal MD   Handicap Placard MISC by Does not apply route Expires 4/22/2022 Yes Myrna Mittal MD   ondansetron (ZOFRAN) 4 MG tablet Take 1 tablet by mouth 3 times daily as needed for Nausea or Vomiting Yes Myrna Mittal MD   fenofibrate (TRIGLIDE) 160 MG tablet Take 1 tablet by mouth daily Yes Myrna Mittal MD   NOVOLOG FLEXPEN 100 UNIT/ML injection pen  Yes Historical Provider, MD   insulin lispro, 1 Unit Dial, (HUMALOG KWIKPEN) 100 UNIT/ML SOPN 0-150 = 0 units  151-200 = 2 units  201-250 = 4 units  251-300 = 6 units  301-350 = 8 units  351-400+ = 10 units Yes Mike Baird PA-C   tiZANidine (ZANAFLEX) 4 MG tablet TAKE 1 TABLET TWICE DAILY Yes Mike Baird PA-C   b complex vitamins capsule Take 1 capsule by mouth daily Yes Historical diverticulosis, internal hemorrhoids    FINGER SURGERY      re-attachment of left middle finger    FOOT SURGERY Right 11/11/2015    bunionectomy, 2-3-4th pinning, 5th arthroplasty    HERNIA REPAIR      UMBILICAL    HYSTERECTOMY, TOTAL ABDOMINAL  2001         Family History   Problem Relation Age of Onset    Cancer Mother 61        breast     High Blood Pressure Mother     Stroke Father 62    Depression Father 64        comitted sucide     Diabetes Sister 36    High Blood Pressure Sister     Depression Sister     Cancer Brother 64        prostate    Diabetes Brother     High Blood Pressure Brother     Cancer Paternal Aunt 71        colon, rectal     Cancer Paternal Grandfather 79        lung    Heart Disease Neg Hx        CareTeam (Including outside providers/suppliers regularly involved in providing care):   Patient Care Team:  Davis Mejia MD as PCP - General (Family Medicine)  Davis Mejia MD as PCP - [facility] Empaneled Provider    Wt Readings from Last 3 Encounters:   10/11/21 297 lb (134.7 kg)   09/21/21 (!) 307 lb (139.3 kg)   05/07/21 (!) 307 lb (139.3 kg)     Vitals:    10/11/21 1034   BP: 119/86   Pulse: 78   Temp: 97.3 °F (36.3 °C)   SpO2: 100%   Weight: 297 lb (134.7 kg)   Height: 5' 10\" (1.778 m)     Body mass index is 42.62 kg/m². Based upon direct observation of the patient, evaluation of cognition reveals recent and remote memory intact.     General Appearance: alert and oriented to person, place and time, well developed and well- nourished, in no acute distress  Skin: warm and dry, no rash or erythema  Head: normocephalic and atraumatic  Eyes: pupils equal, round, and reactive to light, extraocular eye movements intact, conjunctivae normal  ENT: tympanic membrane, external ear and ear canal normal bilaterally, nose without deformity, nasal mucosa and turbinates normal without polyps  Neck: supple and non-tender without mass, no thyromegaly or thyroid nodules, no cervical lymphadenopathy  Pulmonary/Chest: clear to auscultation bilaterally- no wheezes, rales or rhonchi, normal air movement, no respiratory distress  Cardiovascular: normal rate, regular rhythm, normal S1 and S2, no murmurs, rubs, clicks, or gallops, distal pulses intact, no carotid bruits  Abdomen: soft, non-tender, non-distended, normal bowel sounds, no masses or organomegaly  Extremities: no cyanosis, clubbing or edema  Musculoskeletal: normal range of motion, no joint swelling, deformity or tenderness  Neurologic: reflexes normal and symmetric, no cranial nerve deficit, gait, coordination and speech normal    Patient's complete Health Risk Assessment and screening values have been reviewed and are found in Flowsheets. The following problems were reviewed today and where indicated follow up appointments were made and/or referrals ordered. Positive Risk Factor Screenings with Interventions:     Fall Risk:  Timed Up and Go Test > 12 seconds?  (Complete if either Fall Risk answers are Yes): (!) yes  2 or more falls in past year?: (!) yes  Fall with injury in past year?: no  Fall Risk Interventions:    · Patient declines any further evaluation/treatment for this issue     Depression:  PHQ-2 Score: 4  PHQ-9 Total Score: 12    Severity:1-4 = minimal depression, 5-9 = mild depression, 10-14 = moderate depression, 15-19 = moderately severe depression, 20-27 = severe depression  Depression Interventions:  · Medication adjusted- Increased Savella to 100 mg twice daily     Substance History:  Social History     Tobacco History     Smoking Status  Current Every Day Smoker Smoking Frequency  0.5 packs/day for 42 years (21 pk yrs) Smoking Tobacco Type  Cigarettes    Smokeless Tobacco Use  Never Used          Alcohol History     Alcohol Use Status  Not Currently Drinks/Week  0 Standard drinks or equivalent per week Amount  0.0 standard drinks of alcohol/wk Comment  occassional           Drug Use     Drug Use Status  No Sexual Activity     Sexually Active  Not Asked               Alcohol Screening:       A score of 8 or more is associated with harmful or hazardous drinking. A score of 13 or more in women, and 15 or more in men, is likely to indicate alcohol dependence. Substance Abuse Interventions:  · Tobacco abuse:  patient agrees to avoid triggers and negative influences    General Health and ACP:  General  In general, how would you say your health is?: (!) Poor  In the past 7 days, have you experienced any of the following?  New or Increased Pain, New or Increased Fatigue, Loneliness, Social Isolation, Stress or Anger?: (!) New or Increased Pain, New or Increased Fatigue, Stress  Do you get the social and emotional support that you need?: Yes  Do you have a Living Will?: (!) No  Advance Directives     Power of  Living Will ACP-Advance Directive ACP-Power of     Not on File Not on File Not on File Not on File      General Health Risk Interventions:  · No Living Will: Patient referred to [place] Habits/Nutrition:  Health Habits/Nutrition  Do you exercise for at least 20 minutes 2-3 times per week?: (!) No  Have you lost any weight without trying in the past 3 months?: (!) Yes  Do you eat only one meal per day?: No  Have you seen the dentist within the past year?: Yes  Body mass index: (!) 42.61  Health Habits/Nutrition Interventions:  · Inadequate physical activity:  patient is not ready to increase his/her physical activity level at this time    Hearing/Vision:  No exam data present  Hearing/Vision  Do you or your family notice any trouble with your hearing that hasn't been managed with hearing aids?: No  Do you have difficulty driving, watching TV, or doing any of your daily activities because of your eyesight?: (!) Yes  Have you had an eye exam within the past year?: (!) No  Hearing/Vision Interventions:  · Vision concerns:  patient encouraged to make appointment with his/her eye specialist    Safety:  Safety  Do you have working smoke detectors?: (!) No  Have all throw rugs been removed or fastened?: Yes  Do you have non-slip mats or surfaces in all bathtubs/showers?: Yes  Do all of your stairways have a railing or banister?: Yes  Are your doorways, halls and stairs free of clutter?: Yes  Do you always fasten your seatbelt when you are in a car?: Yes  Safety Interventions:  · Patient declines any further evaluation/treatment for this issue     Personalized Preventive Plan   Current Health Maintenance Status  Immunization History   Administered Date(s) Administered    COVID-19, J&J, PF, 0.5 mL 05/01/2021    Influenza Virus Vaccine 10/27/2015, 12/01/2017    Influenza, MDCK Quadv, IM, PF (Flucelvax 2 yrs and older) 10/11/2021    Influenza, Anthony Canadian, IM, (6 mo and older Fluzone, Flulaval, Fluarix and 3 yrs and older Afluria) 11/11/2016    Influenza, Quadv, IM, PF (6 mo and older Fluzone, Flulaval, Fluarix, and 3 yrs and older Afluria) 09/11/2018, 10/07/2019, 09/08/2020    Pneumococcal Polysaccharide ([id number]) 01/23/2017    Td, unspecified formulation 09/16/2016    Zoster Recombinant (Shingrix) 11/29/2018, 02/07/2019, 04/01/2019        Health Maintenance   Topic Date Due    Low dose CT lung screening  Never done    Diabetic retinal exam  10/30/2018    Annual Wellness Visit (AWV)  06/06/2021    DTaP/Tdap/Td vaccine (1 - Tdap) 01/24/2027 (Originally 9/17/2016)    A1C test (Diabetic or Prediabetic)  01/11/2022    Diabetic microalbuminuria test  04/22/2022    Lipid screen  05/03/2022    Potassium monitoring  05/03/2022    Creatinine monitoring  05/03/2022    Breast cancer screen  07/09/2022    Diabetic foot exam  10/11/2022    Pneumococcal 0-64 years Vaccine (2 of 2 - PPSV23) 04/27/2026    Colon cancer screen colonoscopy  05/18/2027    Flu vaccine  Completed    Shingles Vaccine  Completed    COVID-19 Vaccine  Completed    Hepatitis C screen  Completed    HIV screen Completed    Hepatitis A vaccine  Aged Out    Hib vaccine  Aged Out    Meningococcal (ACWY) vaccine  Aged Out     Recommendations for [employer] Due: see orders and patient instructions/AVS.  . Recommended screening schedule for the next 5-10 years is provided to the patient in written form: see Patient Swetha Rivas was seen today for medicare awv. Diagnoses and all orders for this visit:    Routine general medical examination at a health care facility    ACP (advance care planning)  -     [facility] Referral to ACP Clinical Specialist    Personal history of tobacco use  -     AR VISIT TO DISCUSS LUNG CA SCREEN W LDCT  -     CT Lung Screen (Annual); Future    Screening mammogram for breast cancer  -     [facility] DIGITAL SCREEN W OR WO CAD BILATERAL; Future    Needs flu shot  -     INFLUENZA, MDCK QUADV, 2 YRS AND OLDER, IM, PF, PREFILL SYR OR SDV, 0.5ML (FLUCELVAX QUADV, PF)    Type 2 diabetes mellitus with diabetic polyneuropathy, with long-term current use of insulin (HCC)  -     POCT glycosylated hemoglobin (Hb A1C)    Anxiety  -     milnacipran HCl (SAVELLA) 100 MG TABS; Take 1 tablet by mouth 2 times daily  -     buPROPion (WELLBUTRIN XL) 150 MG extended release tablet; Take 2 tablets by mouth every morning    RLS (restless legs syndrome)  -     pramipexole (MIRAPEX) 1 MG tablet; Take 1 tablet by mouth nightly    Neuropathy due to secondary diabetes [facility])  -     Laurence - Lyn Rose DPM, Podiatry, [place]  -      DIABETES FOOT EXAM    Bilateral carpal tunnel syndrome  -     External Referral To Orthopedic Surgery             POCT A1c today 9.1. Increase to Trulicity 3 mg/week and continue Toujeo 52 units at bedtime. Abnormal diabetic foot exam, referral placed for podiatry. Switched from ropinirole to pramipexole for restless leg syndrome. She stated that the ropinirole was not working at all.   Has bilateral carpal tunnel syndrome and would like to follow-up with Dr. Ashley Hayden at [address]. Anxiety and fibromyalgia-worsening symptoms. Increased Savella to 100 mg twice daily. Was previously on 75 mg twice daily. Low Dose CT (LDCT) Lung Screening criteria met:     Age 55-77(Medicare) or 50-80 ([facility])   Pack year smoking >30 (Medicare) or >20 ([facility])   Still smoking or less than 15 year since quit   No sign or symptoms of lung cancer   > 11 months since last LDCT     Risks and benefits of lung cancer screening with LDCT scans discussed:    Significance of positive screen - False-positive LDCT results often occur. 95% of all positive results do not lead to a diagnosis of cancer. Usually further imaging can resolve most false-positive results; however, some patients may require invasive procedures. Over diagnosis risk - 10% to 12% of screen-detected lung cancer cases are over diagnosed--that is, the cancer would not have been detected in the patient's lifetime without the screening. Need for follow up screens annually to continue lung cancer screening effectiveness     Risks associated with radiation from annual LDCT- Radiation exposure is about the same as for a mammogram, which is about 1/3 of the annual background radiation exposure from everyday life. Starting screening at age 54 is not likely to increase cancer risk from radiation exposure. Patients with comorbidities resulting in life expectancy of < 10 years, or that would preclude treatment of an abnormality identified on CT, should not be screened due to lack of benefit.     To obtain maximal benefit from this screening, smoking cessation and long-term abstinence from smoking is critical

## 2021-10-11 NOTE — PATIENT INSTRUCTIONS
Personalized Preventive Plan for [facility] - 10/11/2021  Medicare offers a range of preventive health benefits. Some of the tests and screenings are paid in full while other may be subject to a deductible, co-insurance, and/or copay. Some of these benefits include a comprehensive review of your medical history including lifestyle, illnesses that may run in your family, and various assessments and screenings as appropriate. After reviewing your medical record and screening and assessments performed today your provider may have ordered immunizations, labs, imaging, and/or referrals for you. A list of these orders (if applicable) as well as your Preventive Care list are included within your After Visit Summary for your review. Other Preventive Recommendations:    · A preventive eye exam performed by an eye specialist is recommended every 1-2 years to screen for glaucoma; cataracts, macular degeneration, and other eye disorders. · A preventive dental visit is recommended every 6 months. · Try to get at least 150 minutes of exercise per week or 10,000 steps per day on a pedometer . · Order or download the FREE \"Exercise & Physical Activity: Your Everyday Guide\" from [employer]. Call 6-245.614.1618 or search [employer] online. · You need 4645-5637 mg of calcium and 1265-0614 IU of vitamin D per day. It is possible to meet your calcium requirement with diet alone, but a vitamin D supplement is usually necessary to meet this goal.  · When exposed to the sun, use a sunscreen that protects against both UVA and UVB radiation with an SPF of 30 or greater. Reapply every 2 to 3 hours or after sweating, drying off with a towel, or swimming. · Always wear a seat belt when traveling in a car. Always wear a helmet when riding a bicycle or motorcycle. What is lung cancer screening?   Lung cancer screening is a way in which doctors check the lungs for early signs of

## 2021-10-15 ENCOUNTER — CLINICAL DOCUMENTATION (OUTPATIENT)
Dept: SPIRITUAL SERVICES | Age: 61
End: 2021-10-15

## 2021-10-15 RX ORDER — OMEPRAZOLE 20 MG/1
CAPSULE, DELAYED RELEASE ORAL
Qty: 90 CAPSULE | Refills: 0 | Status: SHIPPED | OUTPATIENT
Start: 2021-10-15 | End: 2021-12-27

## 2021-10-15 NOTE — PROGRESS NOTES
Advance Care Planning   Ambulatory ACP Specialist Patient Outreach    Date:  10/15/2021  ACP Specialist:  Madyson Lopez    Outreach call to patient in follow-up to ACP Specialist referral from: Sanchez Rivas MD    [x] PCP  [] Provider   [] Ambulatory Care Management [] Other for Reason:    [x] Advance Directive Assistance  [] Code Status Discussion  [] Complete Portable DNR Order  [] Discuss Goals of Care  [] Complete POST/MOST  [] Early ACP Decision-Making  [] Other    Date Referral Received:10/11/21    Today's Outreach:  [x] First   [] Second  [] Third                               Third outreach made by [x]  phone  [] email []   Tomorrowisht     Intervention:  [x] Spoke with Patient  [] Left VM requesting return call      Outcome: Scheduled ACP conversation call for October 29th between 2pm and 4pm. ACP packet mailed out today. Next Step:   [x] ACP scheduled conversation  [] Outreach again in one week               [x] Email / Mail ACP Info Sheets  [x] Email / Mail Advance Directive            [] Close Referral. Routing closure to referring provider/staff and to ACP Specialist .      Thank you for this referral.

## 2021-10-29 ENCOUNTER — TELEPHONE (OUTPATIENT)
Dept: FAMILY MEDICINE CLINIC | Age: 61
End: 2021-10-29

## 2021-10-29 ENCOUNTER — CLINICAL DOCUMENTATION (OUTPATIENT)
Dept: SPIRITUAL SERVICES | Age: 61
End: 2021-10-29

## 2021-10-29 ENCOUNTER — OFFICE VISIT (OUTPATIENT)
Dept: PODIATRY | Age: 61
End: 2021-10-29
Payer: MEDICARE

## 2021-10-29 VITALS — BODY MASS INDEX: 41.95 KG/M2 | WEIGHT: 293 LBS | HEIGHT: 70 IN

## 2021-10-29 DIAGNOSIS — L60.0 INGROWN NAIL: ICD-10-CM

## 2021-10-29 DIAGNOSIS — E11.42 DIABETIC POLYNEUROPATHY ASSOCIATED WITH TYPE 2 DIABETES MELLITUS (HCC): ICD-10-CM

## 2021-10-29 DIAGNOSIS — I73.9 PVD (PERIPHERAL VASCULAR DISEASE) (HCC): ICD-10-CM

## 2021-10-29 DIAGNOSIS — E11.51 TYPE II DIABETES MELLITUS WITH PERIPHERAL CIRCULATORY DISORDER (HCC): Primary | ICD-10-CM

## 2021-10-29 PROCEDURE — 3017F COLORECTAL CA SCREEN DOC REV: CPT | Performed by: PODIATRIST

## 2021-10-29 PROCEDURE — 4004F PT TOBACCO SCREEN RCVD TLK: CPT | Performed by: PODIATRIST

## 2021-10-29 PROCEDURE — G8417 CALC BMI ABV UP PARAM F/U: HCPCS | Performed by: PODIATRIST

## 2021-10-29 PROCEDURE — 99203 OFFICE O/P NEW LOW 30 MIN: CPT | Performed by: PODIATRIST

## 2021-10-29 PROCEDURE — 3046F HEMOGLOBIN A1C LEVEL >9.0%: CPT | Performed by: PODIATRIST

## 2021-10-29 PROCEDURE — G8482 FLU IMMUNIZE ORDER/ADMIN: HCPCS | Performed by: PODIATRIST

## 2021-10-29 PROCEDURE — 2022F DILAT RTA XM EVC RTNOPTHY: CPT | Performed by: PODIATRIST

## 2021-10-29 PROCEDURE — G8427 DOCREV CUR MEDS BY ELIG CLIN: HCPCS | Performed by: PODIATRIST

## 2021-10-29 NOTE — TELEPHONE ENCOUNTER
----- Message from Jazzy Jordan sent at 10/28/2021  1:00 PM EDT -----  Subject: Message to Provider    QUESTIONS  Information for Provider? Patient states Dr. Tyrone Rausch was supposed to send a   new Rx to replace her HCTZ. Nothing came in the mail. [phone]   ---------------------------------------------------------------------------  --------------  Teddy COPELAND  What is the best way for the office to contact you? OK to leave message on   voicemail  Preferred Call Back Phone Number? [phone]  ---------------------------------------------------------------------------  --------------  SCRIPT ANSWERS  Relationship to Patient?  Self

## 2021-10-29 NOTE — TELEPHONE ENCOUNTER
I called and discussed the issue with her. She is supposed to only take carvedilol and not hydrochlorothiazide. Her blood pressures have been well controlled and she does not need additional medication at this time. She verbalized understanding.

## 2021-10-29 NOTE — PROGRESS NOTES
[facility] PHYSICIANS  [facility]  [address] [place] [address]  Dept: [phone]  Dept Fax: [phone]    NEW PATIENT PROGRESS NOTE  Date of patient's visit: 10/29/2021  Patient's Name:  Avel George YOB: 1960            Patient Care Team:  Raz Anderson MD as PCP - General (Family Medicine)  Raz Anderson MD as PCP - [facility]        Chief Complaint   Patient presents with    New Patient    Diabetes    Peripheral Neuropathy         HPI:   Avel Client is a 62 y.o. female who presents to the office today complaining of diabetic foot care and neuropathy. Symptoms began several year(s) ago. Patient relates pain is Absent . Pain is rated 0 out of 10 and is described as none. Treatments prior to today's visit include: none. Currently denies F/C/N/V. Pt's primary care physician is Raz Anderson MD last seen October 11 2021. Patient states she has been diabetic for 20 years but her neuropathy has been present for 18 years.      Allergies   Allergen Reactions    Cymbalta [Duloxetine Hcl] Anaphylaxis and Other (See Comments)    Januvia [Sitagliptin] Shortness Of Breath    Cephalexin Other (See Comments)    Ciprofloxacin Other (See Comments)     muscle spasms occur  muscle spasms occur  muscle spasms occur  cramping    Duloxetine     Lyrica [Pregabalin] Other (See Comments)     Does not help after 2 weeks     Metformin Diarrhea    Metformin And Related Diarrhea    Statins Other (See Comments)     myalgias      Morphine Diarrhea, Nausea And Vomiting, Swelling and Nausea Only       Past Medical History:   Diagnosis Date    Anxiety     Asthma     BMI 29.0-29.9,adult 4/22/2021    Bronchitis     Chronic obstructive pulmonary disease ([facility] [address] 9/11/2018    Depression     Fibromyalgia     Gout     HLD (hyperlipidemia) 7/1/2015    Hyperglycemia 10/8/2020    Hypertension     Neuropathy     Obesity     Obstructive sleep apnea  Osteoarthritis     Stage 3a chronic kidney disease  4/22/2021    Type 2 diabetes mellitus with diabetic polyneuropathy, with long-term current use of insulin ([facility] [address] 9/7/2017    Type II or unspecified type diabetes mellitus without mention of complication, not stated as uncontrolled     Urinary incontinence     Yeast infection 10/8/2020       Prior to Admission medications    Medication Sig Start Date End Date Taking? Authorizing Provider   omeprazole (PRILOSEC) 20 MG delayed release capsule TAKE 1 CAPSULE EVERY DAY 10/15/21  Yes Larissa Contreras MD   acetaminophen-codeine (TYLENOL #3) 300-30 MG per tablet TAKE 1 TABLET BY MOUTH FOUR TIMES DAILY AS NEEDED FOR PAIN 9/30/21  Yes Historical Provider, MD   Amoxicillin 500 MG TABS TAKE 1 TABLET BY MOUTH THREE TIMES DAILY 9/30/21  Yes Historical Provider, MD   Blood Glucose Calibration (TRUE METRIX LEVEL 2) Normal SOLN  9/22/21  Yes Historical Provider, MD   Insulin Pen Needle (PEN NEEDLES) 32G X 5 MM MISC Droplet Pen Needle 32 gauge x 5/32\"   USE TO Imaging3   Yes Historical Provider, MD   Blood Glucose Monitoring Suppl (TRUE METRIX METER) w/Device KIT  9/22/21  Yes Historical Provider, MD   milnacipran HCl (SAVELLA) 100 MG TABS Take 1 tablet by mouth 2 times daily 10/11/21  Yes Larissa Contreras MD   pramipexole (MIRAPEX) 1 MG tablet Take 1 tablet by mouth nightly 10/11/21  Yes Larissa Contreras MD   buPROPion (WELLBUTRIN XL) 150 MG extended release tablet Take 2 tablets by mouth every morning 10/11/21  Yes Larissa Contreras MD   Dulaglutide (TRULICITY) 3 LY/7.6IC SOPN Inject 3 mg into the skin once a week 10/11/21  Yes Larissa Contreras MD   blood glucose monitor kit and supplies Please fill with True Metrix Meter and Control Solution. 9/21/21  Yes Larissa Contreras MD   blood glucose monitor strips Test 3 times a day & as needed for symptoms of irregular blood glucose.  Dispense sufficient amount for indicated testing frequency plus additional to accommodate PRN testing needs. 9/17/21  Yes Lara Garner MD   Lancets MISC 1 each by Does not apply route 3 times daily 9/17/21  Yes Lara Garner MD   Insulin Glargine, 1 Unit Dial, (TOUJEO SOLOSTAR) 300 UNIT/ML SOPN Inject 52 Units into the skin daily 9/17/21  Yes Lara Garner MD   allopurinol (ZYLOPRIM) 300 MG tablet TAKE 1 TABLET EVERY DAY 9/13/21  Yes Lara Garner MD   rOPINIRole (REQUIP) 1 MG tablet TAKE 1 TABLET AT BEDTIME 8/24/21  Yes Lara Garner MD   carvedilol (COREG) 25 MG tablet TAKE 1 TABLET TWICE DAILY 8/24/21  Yes Lara Garner MD   aluminum chloride (DRYSOL) 20 % external solution Apply topically nightly.  6/8/21  Yes Lara Garner MD   estradiol (ESTRACE VAGINAL) 0.1 MG/GM vaginal cream Place 1 g vaginally daily 6/8/21  Yes Lara Garner MD   folic acid (FOLVITE) 1 MG tablet Take 1 mg by mouth daily   Yes Historical Provider, MD   Cholecalciferol (VITAMIN D) 125 MCG (5000 UT) CAPS Take 1 tablet by mouth daily 5/4/21  Yes Lara Garner MD   albuterol sulfate HFA (VENTOLIN HFA) 108 (90 Base) MCG/ACT inhaler Inhale 2 puffs into the lungs every 6 hours as needed for Wheezing 4/22/21  Yes Lara Garner MD   Handicap Placard MISC by Does not apply route Expires 4/22/2022 4/22/21  Yes Lara Garner MD   ondansetron (ZOFRAN) 4 MG tablet Take 1 tablet by mouth 3 times daily as needed for Nausea or Vomiting 3/17/21  Yes Lara Garner MD   fenofibrate (TRIGLIDE) 160 MG tablet Take 1 tablet by mouth daily 3/5/21  Yes Lara Garner MD   NOVOLOG FLEXPEN 100 UNIT/ML injection pen  10/27/20  Yes Historical Provider, MD   insulin lispro, 1 Unit Dial, (HUMALOG KWIKPEN) 100 UNIT/ML SOPN 0-150 = 0 units  151-200 = 2 units  201-250 = 4 units  251-300 = 6 units  301-350 = 8 units  351-400+ = 10 units 10/14/20  Yes Amol Boland PA-C   tiZANidine (ZANAFLEX) 4 MG tablet TAKE 1 TABLET TWICE DAILY 9/8/20  Yes Amol Boland PA-C   b complex vitamins capsule Take 1 capsule by mouth daily   Yes Historical Provider, MD   Multiple Vitamins-Minerals (WOMENS DAILY FORMULA PO) Take by mouth   Yes Historical Provider, MD   vitamin C (ASCORBIC ACID) 500 MG tablet Take 500 mg by mouth daily   Yes Historical Provider, MD   Biotin 5 MG CAPS Take by mouth   Yes Historical Provider, MD   tiotropium (Deborra Rilruddy) 18 MCG inhalation capsule INHALE THE CONTENTS OF 1 CAPSULE INTO THE LUNGS DAILY 1/8/19  Yes Micah Mahmood MD       Past Surgical History:   Procedure Laterality Date    APPENDECTOMY      TAKEN WITH HYSTERECTOMY    BUNIONECTOMY Left 02-22-16    with arthrodesis and 2 through 4 with hallux valgus arthroplasty 5th toe on left    COLONOSCOPY  8/17/2015    2 polyps removed, diverticulosis, internal hemorrhoids    FINGER SURGERY      re-attachment of left middle finger    FOOT SURGERY Right 11/11/2015    bunionectomy, 2-3-4th pinning, 5th arthroplasty    HERNIA REPAIR      UMBILICAL    HYSTERECTOMY, TOTAL ABDOMINAL  2001       Family History   Problem Relation Age of Onset    Cancer Mother 61        breast     High Blood Pressure Mother     Stroke Father 62    Depression Father 64        comitted sucide     Diabetes Sister 36    High Blood Pressure Sister     Depression Sister     Cancer Brother 64        prostate    Diabetes Brother     High Blood Pressure Brother     Cancer Paternal Aunt 71        colon, rectal     Cancer Paternal Grandfather 79        lung    Heart Disease Neg Hx        Social History     Tobacco Use    Smoking status: Current Every Day Smoker     Packs/day: 0.50     Years: 42.00     Pack years: 21.00     Types: Cigarettes    Smokeless tobacco: Never Used   Substance Use Topics    Alcohol use: Not Currently     Alcohol/week: 0.0 standard drinks     Comment: occassional        Review of Systems    Review of Systems:   History obtained from chart review and the patient  General ROS: negative for - chills, fatigue, fever, night sweats or weight gain  Constitutional: Negative for chills, diaphoresis, fatigue, fever and unexpected weight change. Musculoskeletal: Positive for arthralgias, gait problem and joint swelling. Neurological ROS: negative for - behavioral changes, confusion, headaches or seizures. Negative for weakness and numbness. Dermatological ROS: negative for - mole changes, rash  Cardiovascular: Negative for leg swelling. Gastrointestinal: Negative for constipation, diarrhea, nausea and vomiting. Lower Extremity Physical Examination:   Vitals: There were no vitals filed for this visit. General: AAO x 3 in NAD. Dermatologic Exam:  Skin lesion/ulceration Absent . Skin No rashes or nodules noted. .   Skin is thin, with flaky sloughing skin as well as decreased hair growth to the lower leg  Small red hemosiderin deposits seen dorsal foot   Musculoskeletal:     1st MPJ ROM decreased, Bilateral.  Muscle strength 5/5, Bilateral.  Pain present upon palpation of toenails 1-5, Bilateral. decreased medial longitudinal arch, Bilateral.  Ankle ROM decreased,Bilateral.    Dorsally contracted digits present digits 2, Bilateral.     Vascular: DP pulses 1/4 bilateral.  PT pulses 0/4 bilateral.   CFT <5 seconds, Bilateral.  Hair growth absent to the level of the digits, Bilateral.  Edema present, Bilateral.  Varicosities absent, Bilateral. Erythema absent, Bilateral    Neurological: Sensation diminshed to light touch to level of digits, Bilateral.  Protective sensation intact 6/10 sites via 5.07/10g Juliette-Leticia Monofilament, Bilateral.  negative Tinel's, Bilateral.  negative Valleix sign, Bilateral.      Integument: Warm, dry, supple, Bilateral.  Open lesion absent, Bilateral.  Interdigital maceration absent to web spaces 4, Bilateral.  Nails 1-5 left and 1-5 right thickened > 3.0 mm, dystrophic and crumbly, discolored with yellow subungual debris. Fissures absent, Bilateral.     Asessment: Patient is a 62 y.o. female with:    Diagnosis Orders   1.  Type II diabetes mellitus with peripheral circulatory disorder (HCC)   DIABETES FOOT EXAM    Misc. Devices MISC   2. Diabetic polyneuropathy associated with type 2 diabetes mellitus ([facility])   DIABETES FOOT EXAM    Misc. Devices MISC   3. PVD (peripheral vascular disease) ([facility])  VL LOWER EXTREMITY ARTERIAL SEGMENTAL PRESSURES W PPG   4. Ingrown nail           Plan: Patient examined and evaluated. Current condition and treatment options discussed in detail. Discussed conservative and surgical options with the patient. To address increased numbness and neuropathy pain, advised pt to closely monitor blood glucose. Recommend pt to discuss with PCP regarding oral medication treatment of neuropathy if needed. Recommend Metanx, one tab po BID. Advised pt to wear DM shoes with accomodative inserts. All labs were reviewed and all imagining including the above findings were reviewed PRIOR to the patients arrival and with the patient today. Previous patient encounter was reviewed. Encounters from the patients other medical providers were reviewed and noted. Time was spent educating the patient on proper care of the feet and ankles. All the above diagnosis were addressed at todays visit and all questions were answered. A total of 30 minutes was spent with this patients encounter which included charting after the patients visit    . Verbal and written instructions given to patient. Contact office with any questions/problems/concerns. RTC in 2month(s).     10/29/2021    Electronically signed by Britney Yu DPM on 10/29/2021 at 10:05 AM  10/29/2021

## 2021-10-29 NOTE — TELEPHONE ENCOUNTER
I do not remember changing her blood pressure medications. I did discuss switching from Requip to pramipexole for her restless legs. Please check if she has gotten prescription for pramipexole.

## 2021-10-29 NOTE — TELEPHONE ENCOUNTER
She is not on hydrochlorothiazide per her med list.  She is only on carvedilol for her blood pressures.

## 2021-11-07 DIAGNOSIS — F41.9 ANXIETY: ICD-10-CM

## 2021-11-08 RX ORDER — HYDROXYZINE HYDROCHLORIDE 25 MG/1
25 TABLET, FILM COATED ORAL EVERY 8 HOURS PRN
Qty: 90 TABLET | Refills: 1 | Status: SHIPPED | OUTPATIENT
Start: 2021-11-08 | End: 2021-12-23

## 2021-11-08 NOTE — TELEPHONE ENCOUNTER
Next Visit Date:  Future Appointments   Date Time Provider Abbe Vasquez   11/30/2021  2:00 PM STA CT SCAN RM 1 STAZ CT SCAN STA Radiolog   11/30/2021  2:45 PM STA DIAG MAMMO RM 3 STAZ MAMMO STA Radiolog   1/10/2022 11:00 AM MD Shantel Mcbride [facility]   1/12/2022 11:00 AM BETTYE Estrada [facility] [address] Maintenance   Topic Date Due    Low dose CT lung screening  Never done    Diabetic retinal exam  10/30/2018    DTaP/Tdap/Td vaccine (1 - Tdap) 01/24/2027 (Originally 9/17/2016)    A1C test (Diabetic or Prediabetic)  01/11/2022    Diabetic microalbuminuria test  04/22/2022    Lipid screen  05/03/2022    Potassium monitoring  05/03/2022    Creatinine monitoring  05/03/2022    Breast cancer screen  07/09/2022    Annual Wellness Visit (AWV)  10/12/2022    Diabetic foot exam  10/29/2022    Pneumococcal 0-64 years Vaccine (2 of 2 - PPSV23) 04/27/2026    Colon cancer screen colonoscopy  05/18/2027    Flu vaccine  Completed    Shingles Vaccine  Completed    COVID-19 Vaccine  Completed    Hepatitis C screen  Completed    HIV screen  Completed    Hepatitis A vaccine  Aged Out    Hib vaccine  Aged Out    Meningococcal (ACWY) vaccine  Aged Out       Hemoglobin A1C (%)   Date Value   10/11/2021 9.1   05/03/2021 9.0 (H)   12/08/2020 7.8             ( goal A1C is < 7)   Microalb/Crt.  Ratio (mcg/mg creat)   Date Value   01/23/2020 53 (H)     LDL Cholesterol (mg/dL)   Date Value   05/03/2021 80   09/17/2020 83       (goal LDL is <100)   AST (U/L)   Date Value   05/03/2021 24     ALT (U/L)   Date Value   05/03/2021 19     BUN (mg/dL)   Date Value   05/03/2021 11     BP Readings from Last 3 Encounters:   10/11/21 119/86   05/07/21 130/74   04/22/21 138/82          (goal 120/80)    All Future Testing planned in [facility]  Lab Frequency Next Occurrence   Rapid Influenza A/B Antigens Once 07/28/2021   Vitamin B12 & Folate Once 06/08/2021   TSH With Reflex Ft4 Once 06/08/2021   [facility] DIGITAL SCREEN W OR WO CAD BILATERAL Once 10/25/2021   CT Lung Screen (Annual) Once 10/11/2021   VL LOWER EXTREMITY ARTERIAL SEGMENTAL PRESSURES W PPG Once 10/29/2021               Patient Active Problem List:     HLD (hyperlipidemia)     HTN (hypertension)     Neuropathy due to secondary diabetes ([facility] [address]     Depression     Mild persistent asthma without complication     Type 2 diabetes mellitus with diabetic polyneuropathy, with long-term current use of insulin (HCC)     TIFFANI positive     Chronic obstructive pulmonary disease (HCC)     Fibromyalgia     Dental abscess     Stage 3a chronic kidney disease      Tobacco use     Anxiety     Idiopathic chronic gout of multiple sites with tophus     Microalbuminuric diabetic nephropathy (HCC)     Vitamin D deficiency     BMI 40.0-44.9, adult (HCC)     Bilateral carpal tunnel syndrome     RLS (restless legs syndrome) Jeannette Saucedo Jeannette Lynn

## 2021-11-11 ENCOUNTER — CLINICAL DOCUMENTATION (OUTPATIENT)
Dept: SPIRITUAL SERVICES | Age: 61
End: 2021-11-11

## 2021-11-11 NOTE — PROGRESS NOTES
Advance Care Planning   Ambulatory ACP Specialist Patient Outreach    Date:  11/11/2021  ACP Specialist:  Ajay Larson    Outreach call to patient in follow-up to ACP Specialist referral from: Frantz Rutherford MD    [x] PCP  [] Provider   [] Ambulatory Care Management [] Other for Reason:    [x] Advance Directive Assistance  [] Code Status Discussion  [] Complete Portable DNR Order  [] Discuss Goals of Care  [] Complete POST/MOST  [] Early ACP Decision-Making  [] Other    Date Referral Received: 10/11/21    Today's Outreach:  [] First   [x] Second  [] Third                               Third outreach made by []  phone  [] email []   [employer]     Intervention:  [x] Spoke with Patient  [] Left VM requesting return call      Outcome: Pt has not yet received packet in the mail. Writer will send today from [facility], and will also send through email. Scheduled with pt to call back next week to discuss materials. Next Step:   [] ACP scheduled conversation  [x] Outreach again in one week               [] Email / Mail ACP Info Sheets  [] Email / Mail Advance Directive            [] Close Referral. Routing closure to referring provider/staff and to ACP Specialist .      Thank you for this referral.      Electronically signed by Brayan Gonzalez on 11/11/2021 at [facility]  [phone]

## 2021-11-17 ENCOUNTER — TELEPHONE (OUTPATIENT)
Dept: ONCOLOGY | Age: 61
End: 2021-11-17

## 2021-11-17 NOTE — LETTER
11/17/2021        [address] [facility].    Dear Garcia Martinez:    Your healthcare provider has ordered a low dose CT scan of the chest for lung cancer screening. You will find enclosed, information about CT lung screening. Please review the statement of understanding, you will be asked to sign a copy of this at the time of your CT scan    If you have not already been contacted to make the appointment for your scan, please call our scheduling department at [phone]    Keep in mind that CT lung screening does not take the place of smoking cessation. If you are a current smoker, you will find enclosed smoking cessation resources. Please do not hesitate to contact me if you have any questions or concerns.     [address] [facility],      [facility] Lung Screening Program  [phone]

## 2021-11-30 ENCOUNTER — TELEPHONE (OUTPATIENT)
Dept: FAMILY MEDICINE CLINIC | Age: 61
End: 2021-11-30

## 2021-11-30 NOTE — TELEPHONE ENCOUNTER
Patient called and stated that she has think she has  bronchitis. Her symptoms are tightness in chest, heavy coughing and a funny taste in her mouth.  Please advise

## 2021-12-01 ENCOUNTER — TELEMEDICINE (OUTPATIENT)
Dept: FAMILY MEDICINE CLINIC | Age: 61
End: 2021-12-01
Payer: MEDICARE

## 2021-12-01 DIAGNOSIS — R11.0 NAUSEA: ICD-10-CM

## 2021-12-01 DIAGNOSIS — M1A.09X1 IDIOPATHIC CHRONIC GOUT OF MULTIPLE SITES WITH TOPHUS: ICD-10-CM

## 2021-12-01 DIAGNOSIS — J40 BRONCHITIS: Primary | ICD-10-CM

## 2021-12-01 PROCEDURE — 99442 PR PHYS/QHP TELEPHONE EVALUATION 11-20 MIN: CPT | Performed by: STUDENT IN AN ORGANIZED HEALTH CARE EDUCATION/TRAINING PROGRAM

## 2021-12-01 RX ORDER — COLCHICINE 0.6 MG/1
0.6 CAPSULE ORAL DAILY
Qty: 30 CAPSULE | Refills: 2 | Status: SHIPPED | OUTPATIENT
Start: 2021-12-01 | End: 2022-01-31 | Stop reason: SDUPTHER

## 2021-12-01 RX ORDER — BUPROPION HYDROCHLORIDE 200 MG/1
TABLET, EXTENDED RELEASE ORAL
COMMUNITY
Start: 2021-11-18 | End: 2022-01-31 | Stop reason: SDUPTHER

## 2021-12-01 RX ORDER — ONDANSETRON 4 MG/1
4 TABLET, FILM COATED ORAL 3 TIMES DAILY PRN
Qty: 15 TABLET | Refills: 0 | Status: SHIPPED | OUTPATIENT
Start: 2021-12-01 | End: 2022-01-31 | Stop reason: SDUPTHER

## 2021-12-01 RX ORDER — AZITHROMYCIN 250 MG/1
250 TABLET, FILM COATED ORAL SEE ADMIN INSTRUCTIONS
Qty: 6 TABLET | Refills: 0 | Status: SHIPPED | OUTPATIENT
Start: 2021-12-01 | End: 2021-12-06

## 2021-12-02 NOTE — PROGRESS NOTES
Nela Tinoco is a 62 y.o. female evaluated via telephone on 12/1/2021. Consent:  She and/or health care decision maker is aware that that she may receive a bill for this telephone service, depending on her insurance coverage, and has provided verbal consent to proceed: Yes      Documentation:  I communicated with the patient and/or health care decision maker about cough. Details of this discussion including any medical advice provided:     She is complaining of hacking cough for past week. She says that she brings up some clear phlegm with it. Denies any fevers, chills or shortness of breath. Says that the coughing spells make her nauseous and she throws up sometimes. She has been using her inhalers regularly and has been using rescue inhaler as well. She took a home Covid test that was negative. She is also requesting refills on colchicine, she is aware that due to her hypertension she should only be taking it when absolutely needed. She says she just wants to keep a prescription in case she gets a flare up. States that her blood sugars have been doing well. Diagnoses and all orders for this visit:    Bronchitis  -     azithromycin (ZITHROMAX) 250 MG tablet; Take 1 tablet by mouth See Admin Instructions for 5 days 500mg on day 1 followed by 250mg on days 2 - 5    Nausea  -     ondansetron (ZOFRAN) 4 MG tablet; Take 1 tablet by mouth 3 times daily as needed for Nausea or Vomiting    Idiopathic chronic gout of multiple sites with tophus  -     colchicine (MITIGARE) 0.6 MG capsule; Take 1 capsule by mouth daily        I affirm this is a Patient Initiated Episode with a Patient who has not had a related appointment within my department in the past 7 days or scheduled within the next 24 hours.     Patient identification was verified at the start of the visit: Yes    Total Time: minutes: 11-20 minutes    The visit was conducted pursuant to the emergency declaration under the [address] and the National Emergencies Act, [address] [facility] waiver authority and the [place] and [employer] General Act. Patient identification was verified, and a caregiver was present when appropriate. The patient was located in a state where the provider was credentialed to provide care.     Note: not billable if this call serves to triage the patient into an appointment for the relevant concern      Brayan Forrest MD

## 2021-12-20 DIAGNOSIS — E11.42 TYPE 2 DIABETES MELLITUS WITH DIABETIC POLYNEUROPATHY, WITH LONG-TERM CURRENT USE OF INSULIN (HCC): ICD-10-CM

## 2021-12-20 DIAGNOSIS — Z79.4 TYPE 2 DIABETES MELLITUS WITH DIABETIC POLYNEUROPATHY, WITH LONG-TERM CURRENT USE OF INSULIN (HCC): ICD-10-CM

## 2021-12-20 RX ORDER — DULAGLUTIDE 3 MG/.5ML
INJECTION, SOLUTION SUBCUTANEOUS
Qty: 3 PEN | Refills: 1 | Status: SHIPPED | OUTPATIENT
Start: 2021-12-20 | End: 2022-01-31 | Stop reason: SDUPTHER

## 2021-12-20 NOTE — TELEPHONE ENCOUNTER
Garcia Martinez is calling to request a refill on the following medication(s):    Medication Request:  Requested Prescriptions     Pending Prescriptions Disp Refills    TRULICITY 3 ET/0.5CH SOPN [Pharmacy Med Name: Jeimy Coughlin 3 YD/7.8JE Solution Pen-injector]       Sig: INJECT 3MG (1 PEN) SUBCUTANEOUSLY EVERY WEEK       Last Visit Date (If Applicable):  35/5/6966    Next Visit Date:    1/10/2022

## 2021-12-23 DIAGNOSIS — M79.7 FIBROMYALGIA: ICD-10-CM

## 2021-12-23 DIAGNOSIS — F41.9 ANXIETY: ICD-10-CM

## 2021-12-23 RX ORDER — TIZANIDINE 4 MG/1
TABLET ORAL
Qty: 180 TABLET | Refills: 2 | Status: SHIPPED | OUTPATIENT
Start: 2021-12-23 | End: 2022-01-31 | Stop reason: SDUPTHER

## 2021-12-23 RX ORDER — HYDROXYZINE HYDROCHLORIDE 25 MG/1
TABLET, FILM COATED ORAL
Qty: 90 TABLET | Refills: 1 | Status: SHIPPED | OUTPATIENT
Start: 2021-12-23 | End: 2022-01-31 | Stop reason: SDUPTHER

## 2021-12-23 NOTE — TELEPHONE ENCOUNTER
Paula Tucker is calling to request a refill on the following medication(s):    Medication Request:  Requested Prescriptions     Pending Prescriptions Disp Refills    hydrOXYzine (ATARAX) 25 MG tablet [Pharmacy Med Name: HYDROXYZINE HYDROCHLORIDE 25 MG Tablet] 90 tablet 1     Sig: TAKE 1 TABLET EVERY 8 HOURS AS NEEDED FOR ANXIETY       Last Visit Date (If Applicable):  73/2/3647    Next Visit Date:    1/10/2022

## 2022-01-06 ENCOUNTER — HOSPITAL ENCOUNTER (OUTPATIENT)
Dept: MAMMOGRAPHY | Age: 62
Discharge: HOME | End: 2022-01-08
Payer: MEDICARE

## 2022-01-06 ENCOUNTER — HOSPITAL ENCOUNTER (OUTPATIENT)
Dept: CT IMAGING | Age: 62
Discharge: HOME | End: 2022-01-08
Payer: MEDICARE

## 2022-01-06 ENCOUNTER — HOSPITAL ENCOUNTER (OUTPATIENT)
Dept: VASCULAR LAB | Age: 62
Discharge: HOME | End: 2022-01-06
Payer: MEDICARE

## 2022-01-06 DIAGNOSIS — Z87.891 PERSONAL HISTORY OF TOBACCO USE: ICD-10-CM

## 2022-01-06 DIAGNOSIS — Z12.31 SCREENING MAMMOGRAM FOR BREAST CANCER: ICD-10-CM

## 2022-01-06 DIAGNOSIS — I73.9 PVD (PERIPHERAL VASCULAR DISEASE) (HCC): ICD-10-CM

## 2022-01-06 PROCEDURE — 71271 CT THORAX LUNG CANCER SCR C-: CPT

## 2022-01-06 PROCEDURE — 77063 BREAST TOMOSYNTHESIS BI: CPT

## 2022-01-06 PROCEDURE — 93923 UPR/LXTR ART STDY 3+ LVLS: CPT

## 2022-01-10 ENCOUNTER — OFFICE VISIT (OUTPATIENT)
Dept: FAMILY MEDICINE CLINIC | Age: 62
End: 2022-01-10
Payer: MEDICARE

## 2022-01-10 VITALS
OXYGEN SATURATION: 99 % | SYSTOLIC BLOOD PRESSURE: 161 MMHG | HEIGHT: 70 IN | WEIGHT: 293 LBS | HEART RATE: 88 BPM | BODY MASS INDEX: 41.95 KG/M2 | TEMPERATURE: 97.3 F | DIASTOLIC BLOOD PRESSURE: 97 MMHG

## 2022-01-10 DIAGNOSIS — E11.42 TYPE 2 DIABETES MELLITUS WITH DIABETIC POLYNEUROPATHY, WITH LONG-TERM CURRENT USE OF INSULIN (HCC): Primary | ICD-10-CM

## 2022-01-10 DIAGNOSIS — L98.9 SKIN LESION OF SCALP: ICD-10-CM

## 2022-01-10 DIAGNOSIS — I10 PRIMARY HYPERTENSION: ICD-10-CM

## 2022-01-10 DIAGNOSIS — Z79.4 TYPE 2 DIABETES MELLITUS WITH DIABETIC POLYNEUROPATHY, WITH LONG-TERM CURRENT USE OF INSULIN (HCC): Primary | ICD-10-CM

## 2022-01-10 DIAGNOSIS — E78.1 HYPERTRIGLYCERIDEMIA: ICD-10-CM

## 2022-01-10 DIAGNOSIS — M79.7 FIBROMYALGIA: ICD-10-CM

## 2022-01-10 DIAGNOSIS — Z72.0 TOBACCO USE: ICD-10-CM

## 2022-01-10 PROBLEM — E66.813 OBESITY, CLASS III, BMI 40-49.9 (MORBID OBESITY): Status: ACTIVE | Noted: 2021-05-08

## 2022-01-10 PROBLEM — E66.01 OBESITY, CLASS III, BMI 40-49.9 (MORBID OBESITY) (HCC): Status: ACTIVE | Noted: 2021-05-08

## 2022-01-10 LAB — HBA1C MFR BLD: 7.2 %

## 2022-01-10 PROCEDURE — 3051F HG A1C>EQUAL 7.0%<8.0%: CPT | Performed by: STUDENT IN AN ORGANIZED HEALTH CARE EDUCATION/TRAINING PROGRAM

## 2022-01-10 PROCEDURE — 2022F DILAT RTA XM EVC RTNOPTHY: CPT | Performed by: STUDENT IN AN ORGANIZED HEALTH CARE EDUCATION/TRAINING PROGRAM

## 2022-01-10 PROCEDURE — 99214 OFFICE O/P EST MOD 30 MIN: CPT | Performed by: STUDENT IN AN ORGANIZED HEALTH CARE EDUCATION/TRAINING PROGRAM

## 2022-01-10 PROCEDURE — 3017F COLORECTAL CA SCREEN DOC REV: CPT | Performed by: STUDENT IN AN ORGANIZED HEALTH CARE EDUCATION/TRAINING PROGRAM

## 2022-01-10 PROCEDURE — G8427 DOCREV CUR MEDS BY ELIG CLIN: HCPCS | Performed by: STUDENT IN AN ORGANIZED HEALTH CARE EDUCATION/TRAINING PROGRAM

## 2022-01-10 PROCEDURE — G8417 CALC BMI ABV UP PARAM F/U: HCPCS | Performed by: STUDENT IN AN ORGANIZED HEALTH CARE EDUCATION/TRAINING PROGRAM

## 2022-01-10 PROCEDURE — 99406 BEHAV CHNG SMOKING 3-10 MIN: CPT | Performed by: STUDENT IN AN ORGANIZED HEALTH CARE EDUCATION/TRAINING PROGRAM

## 2022-01-10 PROCEDURE — 4004F PT TOBACCO SCREEN RCVD TLK: CPT | Performed by: STUDENT IN AN ORGANIZED HEALTH CARE EDUCATION/TRAINING PROGRAM

## 2022-01-10 PROCEDURE — 83036 HEMOGLOBIN GLYCOSYLATED A1C: CPT | Performed by: STUDENT IN AN ORGANIZED HEALTH CARE EDUCATION/TRAINING PROGRAM

## 2022-01-10 PROCEDURE — G8482 FLU IMMUNIZE ORDER/ADMIN: HCPCS | Performed by: STUDENT IN AN ORGANIZED HEALTH CARE EDUCATION/TRAINING PROGRAM

## 2022-01-10 RX ORDER — FENOFIBRATE 160 MG/1
160 TABLET ORAL DAILY
Qty: 90 TABLET | Refills: 1 | Status: SHIPPED | OUTPATIENT
Start: 2022-01-10 | End: 2022-01-31 | Stop reason: SDUPTHER

## 2022-01-10 RX ORDER — IBUPROFEN 600 MG/1
TABLET ORAL
COMMUNITY
End: 2022-03-14

## 2022-01-10 RX ORDER — FENOFIBRATE 160 MG/1
160 TABLET ORAL DAILY
Qty: 30 TABLET | Refills: 0 | Status: SHIPPED | OUTPATIENT
Start: 2022-01-10 | End: 2022-01-10

## 2022-01-10 RX ORDER — LOSARTAN POTASSIUM 25 MG/1
25 TABLET ORAL DAILY
Qty: 30 TABLET | Refills: 0 | Status: SHIPPED | OUTPATIENT
Start: 2022-01-10 | End: 2022-01-10

## 2022-01-10 RX ORDER — LOSARTAN POTASSIUM 25 MG/1
25 TABLET ORAL DAILY
Qty: 90 TABLET | Refills: 1 | Status: SHIPPED | OUTPATIENT
Start: 2022-01-10 | End: 2022-01-31 | Stop reason: SDUPTHER

## 2022-01-10 RX ORDER — FENOFIBRATE 160 MG/1
160 TABLET ORAL DAILY
Qty: 90 TABLET | Refills: 1 | Status: CANCELLED | OUTPATIENT
Start: 2022-01-10

## 2022-01-10 ASSESSMENT — ENCOUNTER SYMPTOMS
VOMITING: 0
DIARRHEA: 0
COUGH: 0
ABDOMINAL PAIN: 0
NAUSEA: 0
EYE DISCHARGE: 0
SHORTNESS OF BREATH: 0
CONSTIPATION: 0
WHEEZING: 0
SORE THROAT: 0
CHEST TIGHTNESS: 0

## 2022-01-10 NOTE — PROGRESS NOTES
[address] PRIMARY CARE  [address] [place] [address] [address] [place] [address]  Dept: [phone]  Dept Fax: [phone]    Yogesh Byrd is a 62 y.o. female who is a Established patient, who presents today for her medical conditions/complaints as noted below:  Chief Complaint   Patient presents with    3 Month Follow-Up     labs    Diabetes    Medication Refill    Nausea    Hernia         HPI:     She is here today to follow-up on diabetes and hypertension. She has been taking Toujeo 52 units daily along with Trulicity 3 mg/week. She says that the pharmacy gave her 2 pens per week of 1.5 mg dose and she has been taking them twice a week. States that she did not know she needed to take 3 mg as a single dose. She recently switched insurance and has changed her pharmacy and is requesting refills on her medications. She says that her fibromyalgia pain has improved after increasing Savella dose to 100 mg twice daily. She has been cutting down on her smoking as well. Hemoglobin A1C (%)   Date Value   01/10/2022 7.2   10/11/2021 9.1   05/03/2021 9.0 (H)             ( goal A1Cis < 7)   Microalb/Crt.  Ratio (mcg/mg creat)   Date Value   01/23/2020 53 (H)     LDL Cholesterol (mg/dL)   Date Value   05/03/2021 80   09/17/2020 83   01/23/2020            (goal LDL is <100)   AST (U/L)   Date Value   05/03/2021 24     ALT (U/L)   Date Value   05/03/2021 19     BUN (mg/dL)   Date Value   05/03/2021 11     BP Readings from Last 3 Encounters:   01/10/22 (!) 161/97   10/11/21 119/86   05/07/21 130/74          (goal 120/80)    Past Medical History:   Diagnosis Date    Anxiety     Asthma     BMI 29.0-29.9,adult 4/22/2021    Bronchitis     Chronic obstructive pulmonary disease (HCC) 9/11/2018    Depression     Fibromyalgia     Gout     HLD (hyperlipidemia) 7/1/2015    Hyperglycemia 10/8/2020    Hypertension     Neuropathy     Obesity     Obstructive sleep apnea     Osteoarthritis  Stage 3a chronic kidney disease  4/22/2021    Type 2 diabetes mellitus with diabetic polyneuropathy, with long-term current use of insulin ([facility] [address] 9/7/2017    Type II or unspecified type diabetes mellitus without mention of complication, not stated as uncontrolled     Urinary incontinence     Yeast infection 10/8/2020      Past Surgical History:   Procedure Laterality Date    APPENDECTOMY      TAKEN WITH HYSTERECTOMY    BUNIONECTOMY Left 02-22-16    with arthrodesis and 2 through 4 with hallux valgus arthroplasty 5th toe on left    COLONOSCOPY  8/17/2015    2 polyps removed, diverticulosis, internal hemorrhoids    FINGER SURGERY      re-attachment of left middle finger    FOOT SURGERY Right 11/11/2015    bunionectomy, 2-3-4th pinning, 5th arthroplasty    HERNIA REPAIR      UMBILICAL    HYSTERECTOMY, TOTAL ABDOMINAL  2001       Family History   Problem Relation Age of Onset    Cancer Mother 61        breast     High Blood Pressure Mother     Stroke Father 62    Depression Father 64        comitted sucide     Diabetes Sister 36    High Blood Pressure Sister     Depression Sister     Cancer Brother 64        prostate    Diabetes Brother     High Blood Pressure Brother     Cancer Paternal Aunt 71        colon, rectal     Cancer Paternal Grandfather 79        lung    Heart Disease Neg Hx        Social History     Tobacco Use    Smoking status: Current Every Day Smoker     Packs/day: 0.50     Years: 42.00     Pack years: 21.00     Types: Cigarettes    Smokeless tobacco: Never Used   Substance Use Topics    Alcohol use: Not Currently     Alcohol/week: 0.0 standard drinks     Comment: occassional       Current Outpatient Medications   Medication Sig Dispense Refill    ibuprofen (ADVIL;MOTRIN) 600 MG tablet ibuprofen 600 mg tablet   TAKE 1 TABLET BY MOUTH EVERY 4 TO 6 HOURS AS NEEDED FOR PAIN      omeprazole (PRILOSEC) 20 MG delayed release capsule TAKE 1 CAPSULE EVERY DAY 90 capsule 3    hydrOXYzine (ATARAX) 25 MG tablet TAKE 1 TABLET EVERY 8 HOURS AS NEEDED FOR ANXIETY 90 tablet 1    tiZANidine (ZANAFLEX) 4 MG tablet TAKE 1 TABLET TWICE DAILY 180 tablet 2    TRULICITY 3 MZ/5.0DX SOPN INJECT 3MG (1 PEN) SUBCUTANEOUSLY EVERY WEEK 3 pen 1    buPROPion (WELLBUTRIN SR) 200 MG extended release tablet TAKE 1 TABLET BY MOUTH TWICE DAILY      ondansetron (ZOFRAN) 4 MG tablet Take 1 tablet by mouth 3 times daily as needed for Nausea or Vomiting 15 tablet 0    colchicine (MITIGARE) 0.6 MG capsule Take 1 capsule by mouth daily 30 capsule 2    Misc. Devices MISC 1 PAIR OF DIABETIC SHOES (1 LEFT/ 1 RIGHT)  1-3 PAIRS OF INSERTS (LEFT/ RIGHT) 2 each 0    acetaminophen-codeine (TYLENOL #3) 300-30 MG per tablet TAKE 1 TABLET BY MOUTH FOUR TIMES DAILY AS NEEDED FOR PAIN      Blood Glucose Calibration (TRUE METRIX LEVEL 2) Normal SOLN       Insulin Pen Needle (PEN NEEDLES) 32G X 5 MM MISC Droplet Pen Needle 32 gauge x 5/32\"   USE TO INJECT VICTOZA DAILY      Blood Glucose Monitoring Suppl (TRUE METRIX METER) w/Device KIT       milnacipran HCl (SAVELLA) 100 MG TABS Take 1 tablet by mouth 2 times daily 180 tablet 1    pramipexole (MIRAPEX) 1 MG tablet Take 1 tablet by mouth nightly 90 tablet 3    blood glucose monitor kit and supplies Please fill with True Metrix Meter and Control Solution. 1 kit 0    blood glucose monitor strips Test 3 times a day & as needed for symptoms of irregular blood glucose. Dispense sufficient amount for indicated testing frequency plus additional to accommodate PRN testing needs.  100 strip 0    Lancets MISC 1 each by Does not apply route 3 times daily 600 each 1    Insulin Glargine, 1 Unit Dial, (TOUJEO SOLOSTAR) 300 UNIT/ML SOPN Inject 52 Units into the skin daily 24 mL 2    allopurinol (ZYLOPRIM) 300 MG tablet TAKE 1 TABLET EVERY DAY 90 tablet 1    carvedilol (COREG) 25 MG tablet TAKE 1 TABLET TWICE DAILY 336 tablet 1    folic acid (FOLVITE) 1 MG tablet Take 1 mg by mouth daily      Cholecalciferol (VITAMIN D) 125 MCG (5000 UT) CAPS Take 1 tablet by mouth daily 30 capsule 2    albuterol sulfate HFA (VENTOLIN HFA) 108 (90 Base) MCG/ACT inhaler Inhale 2 puffs into the lungs every 6 hours as needed for Wheezing 1 Inhaler 3    Handicap Placard MISC by Does not apply route Expires 4/22/2022 1 each 0    NOVOLOG FLEXPEN 100 UNIT/ML injection pen       insulin lispro, 1 Unit Dial, (HUMALOG KWIKPEN) 100 UNIT/ML SOPN 0-150 = 0 units  151-200 = 2 units  201-250 = 4 units  251-300 = 6 units  301-350 = 8 units  351-400+ = 10 units 3 pen 1    b complex vitamins capsule Take 1 capsule by mouth daily      Multiple Vitamins-Minerals (WOMENS DAILY FORMULA PO) Take by mouth      vitamin C (ASCORBIC ACID) 500 MG tablet Take 500 mg by mouth daily      Biotin 5 MG CAPS Take by mouth      tiotropium (SPIRIVA HANDIHALER) 18 MCG inhalation capsule INHALE THE CONTENTS OF 1 CAPSULE INTO THE LUNGS DAILY 90 capsule 1    fenofibrate (TRIGLIDE) 160 MG tablet TAKE 1 TABLET BY MOUTH DAILY 90 tablet 1    losartan (COZAAR) 25 MG tablet TAKE 1 TABLET BY MOUTH DAILY 90 tablet 1     Current Facility-Administered Medications   Medication Dose Route Frequency Provider Last Rate Last Admin    ipratropium (ATROVENT) 0.02 % nebulizer solution 0.5 mg  0.5 mg Nebulization Once Poncho Lewis MD        albuterol (PROVENTIL) nebulizer solution 2.5 mg  2.5 mg Nebulization Once Juan Scott MD         Allergies   Allergen Reactions    Cymbalta [Duloxetine Hcl] Anaphylaxis and Other (See Comments)    Januvia [Sitagliptin] Shortness Of Breath    Cephalexin Other (See Comments)    Ciprofloxacin Other (See Comments)     muscle spasms occur  muscle spasms occur  muscle spasms occur  cramping    Duloxetine     Lyrica [Pregabalin] Other (See Comments)     Does not help after 2 weeks     Metformin Diarrhea    Metformin And Related Diarrhea    Statins Other (See Comments)     myalgias      Morphine Diarrhea, Nausea And Vomiting, Swelling and Nausea Only       Health Maintenance   Topic Date Due    Diabetic retinal exam  10/30/2018    COVID-19 Vaccine (2 - Booster for [employer] series) 06/26/2021    DTaP/Tdap/Td vaccine (1 - Tdap) 01/24/2027 (Originally 9/17/2016)    Lipid screen  05/03/2022    Potassium monitoring  05/03/2022    Creatinine monitoring  05/03/2022    Depression Monitoring  10/11/2022    Annual Wellness Visit (AWV)  10/12/2022    Diabetic foot exam  10/29/2022    Low dose CT lung screening  01/06/2023    A1C test (Diabetic or Prediabetic)  01/10/2023    Breast cancer screen  01/06/2024    Pneumococcal 0-64 years Vaccine (2 of 2 - PPSV23) 04/27/2026    Colon cancer screen colonoscopy  05/18/2027    Flu vaccine  Completed    Shingles Vaccine  Completed    Hepatitis C screen  Completed    HIV screen  Completed    Hepatitis A vaccine  Aged Out    Hib vaccine  Aged Out    Meningococcal (ACWY) vaccine  Aged Out       Subjective:     Review of Systems   Constitutional: Negative for appetite change, fatigue and fever. HENT: Negative for congestion, ear pain, hearing loss and sore throat. Eyes: Negative for discharge and visual disturbance. Respiratory: Negative for cough, chest tightness, shortness of breath and wheezing. Cardiovascular: Negative for chest pain, palpitations and leg swelling. Gastrointestinal: Negative for abdominal pain, constipation, diarrhea, nausea and vomiting. Genitourinary: Negative for flank pain, frequency, hematuria and urgency. Musculoskeletal: Positive for myalgias. Negative for arthralgias, gait problem and joint swelling. Skin: Negative. Neurological: Negative for dizziness, weakness, numbness and headaches. Psychiatric/Behavioral: Negative. Negative for dysphoric mood. The patient is not nervous/anxious. Objective:     Physical Exam  Vitals reviewed. Constitutional:       Appearance: Normal appearance.  She is normal weight. HENT:      Head: Normocephalic and atraumatic. Nose: Nose normal.      Mouth/Throat:      Mouth: Mucous membranes are moist.      Pharynx: Oropharynx is clear. Eyes:      Extraocular Movements: Extraocular movements intact. Conjunctiva/sclera: Conjunctivae normal.      Pupils: Pupils are equal, round, and reactive to light. Cardiovascular:      Rate and Rhythm: Normal rate and regular rhythm. Heart sounds: Normal heart sounds. No murmur heard. No gallop. Pulmonary:      Effort: Pulmonary effort is normal. No respiratory distress. Breath sounds: Normal breath sounds. No stridor. No wheezing. Abdominal:      General: Bowel sounds are normal. There is no distension. Palpations: Abdomen is soft. Tenderness: There is no abdominal tenderness. There is no guarding or rebound. Musculoskeletal:         General: No swelling or tenderness. Normal range of motion. Cervical back: Normal range of motion and neck supple. Skin:     General: Skin is warm and dry. Coloration: Skin is not jaundiced. Findings: No rash. Comments: 2 small, palpable, raised, firm, immobile, nontender lesions on scalp. No overlying skin erythema. Neurological:      General: No focal deficit present. Mental Status: She is alert and oriented to person, place, and time. Psychiatric:         Mood and Affect: Mood normal.         Behavior: Behavior normal.         Thought Content: Thought content normal.         Judgment: Judgment normal.       BP (!) 161/97   Pulse 88   Temp 97.3 °F (36.3 °C)   Ht 5' 10\" (1.778 m)   Wt (!) 307 lb (139.3 kg)   SpO2 99%   BMI 44.05 kg/m²     Assessment/Plan:   1. Type 2 diabetes mellitus with diabetic polyneuropathy, with long-term current use of insulin ([facility])  -     POCT glycosylated hemoglobin (Hb A1C)  2. Primary hypertension  3. Skin lesion of scalp  -     Walt Suarez MD, Dermatology, [facility]  4. Fibromyalgia  5.  Tobacco use    Diabetes-POCT A1c today 7.2. Continue Toujeo 52 units daily, Trulicity 3 mg/week. Not a candidate for SGLT2 inhibitors due to recurrent UTIs and vaginitis. Plan to increase Trulicity to 4.5 mg/week and start titrating down insulin, will discuss at next visit, with repeat A1c. Hypertension-poorly controlled, on losartan 25 mg daily and carvedilol 25 mg twice daily    Fibromyalgia-symptoms improving with Savella 100 mg twice daily    Patient was counseled on tobacco cessation. Based upon patient's motivation to change her behavior, the following plan was agreed upon: patient will avoid triggers and negative influences. She was provided with a list of local tobacco cessation resources. Provider spent 3 minutes counseling patient. Return in about 3 months (around 4/10/2022) for Follow up DM/HTN. Orders Placed This Encounter   Procedures   Jenny Fuller MD, Dermatology, [facility]     Referral Priority:   Routine     Referral Type:   Eval and Treat     Referral Reason:   Specialty Services Required     Referred to Provider:   Demond Pagan MD     Requested Specialty:   Dermatology     Number of Visits Requested:   1    POCT glycosylated hemoglobin (Hb A1C)     Orders Placed This Encounter   Medications    DISCONTD: losartan (COZAAR) 25 MG tablet     Sig: Take 1 tablet by mouth daily     Dispense:  30 tablet     Refill:  0    DISCONTD: fenofibrate (TRIGLIDE) 160 MG tablet     Sig: Take 1 tablet by mouth daily     Dispense:  30 tablet     Refill:  0       Patient given educational materials - see patient instructions. Discussed use, benefit, and side effects of prescribed medications. All patientquestions answered. Pt voiced understanding. Reviewed health maintenance. Instructedto continue current medications, diet and exercise. Patient agreed with treatmentplan. Follow up as directed.      Electronically signed by Bonifacio Coughlin MD on 1/10/2022 at 6:52 PM

## 2022-01-10 NOTE — TELEPHONE ENCOUNTER
Jessica Tatum is calling to request a refill on the following medication(s):    Medication Request:  Requested Prescriptions     Pending Prescriptions Disp Refills    fenofibrate (TRIGLIDE) 160 MG tablet [Pharmacy Med Name: FENOFIBRATE 160MG TABLETS] 90 tablet 1     Sig: TAKE 1 TABLET BY MOUTH DAILY    losartan (COZAAR) 25 MG tablet [Pharmacy Med Name: LOSARTAN 25MG TABLETS] 90 tablet 1     Sig: TAKE 1 TABLET BY MOUTH DAILY       Last Visit Date (If Applicable):  2/51/2799    Next Visit Date:    4/13/2022

## 2022-01-12 ENCOUNTER — OFFICE VISIT (OUTPATIENT)
Dept: PODIATRY | Age: 62
End: 2022-01-12
Payer: MEDICARE

## 2022-01-12 VITALS — RESPIRATION RATE: 16 BRPM | WEIGHT: 293 LBS | HEIGHT: 70 IN | BODY MASS INDEX: 41.95 KG/M2

## 2022-01-12 DIAGNOSIS — L60.0 INGROWN NAIL: ICD-10-CM

## 2022-01-12 DIAGNOSIS — L85.3 XEROSIS CUTIS: ICD-10-CM

## 2022-01-12 DIAGNOSIS — M79.605 PAIN IN BOTH LOWER EXTREMITIES: ICD-10-CM

## 2022-01-12 DIAGNOSIS — M79.604 PAIN IN BOTH LOWER EXTREMITIES: ICD-10-CM

## 2022-01-12 DIAGNOSIS — E11.51 TYPE II DIABETES MELLITUS WITH PERIPHERAL CIRCULATORY DISORDER (HCC): Primary | ICD-10-CM

## 2022-01-12 PROCEDURE — 2022F DILAT RTA XM EVC RTNOPTHY: CPT | Performed by: PODIATRIST

## 2022-01-12 PROCEDURE — G8427 DOCREV CUR MEDS BY ELIG CLIN: HCPCS | Performed by: PODIATRIST

## 2022-01-12 PROCEDURE — 3051F HG A1C>EQUAL 7.0%<8.0%: CPT | Performed by: PODIATRIST

## 2022-01-12 PROCEDURE — G8482 FLU IMMUNIZE ORDER/ADMIN: HCPCS | Performed by: PODIATRIST

## 2022-01-12 PROCEDURE — 11721 DEBRIDE NAIL 6 OR MORE: CPT | Performed by: PODIATRIST

## 2022-01-12 PROCEDURE — G8417 CALC BMI ABV UP PARAM F/U: HCPCS | Performed by: PODIATRIST

## 2022-01-12 PROCEDURE — 3017F COLORECTAL CA SCREEN DOC REV: CPT | Performed by: PODIATRIST

## 2022-01-12 PROCEDURE — 4004F PT TOBACCO SCREEN RCVD TLK: CPT | Performed by: PODIATRIST

## 2022-01-12 PROCEDURE — 99213 OFFICE O/P EST LOW 20 MIN: CPT | Performed by: PODIATRIST

## 2022-01-12 NOTE — PROGRESS NOTES
[address] [facility] PODIATRY [facility]  [address] [address] [facility] [address]  Dept: [phone]  Dept Fax: [phone]    DIABETIC PROGRESS NOTE  Date of patient's visit: 1/12/2022  Patient's Name:  Anne-Marie Xavier YOB: 1960            Patient Care Team:  Mario Lew MD as PCP - General (Family Medicine)  Mario Lew MD as PCP - [facility] Empaneled Provider          Chief Complaint   Patient presents with    Diabetes    Nail Problem       Subjective:   Anne-Marie Xavier comes to clinic for Diabetes and Nail Problem    she is a diabetic and states that she is doing well. Pt currently has complaint of thickened, elongated nails that they cannot manage by themselves. Pt's primary care physician is Mario Lew MD last seen 01/10/2022   Pt's last blood sugar was unknown. Pt has a new complaint of increased dry skin to thomas feet. Lab Results   Component Value Date    LABA1C 7.2 01/10/2022      Complains of numbness in the feet bilat.   Past Medical History:   Diagnosis Date    Anxiety     Asthma     BMI 29.0-29.9,adult 4/22/2021    Bronchitis     Chronic obstructive pulmonary disease ([address] 9/11/2018    Depression     Fibromyalgia     Gout     HLD (hyperlipidemia) 7/1/2015    Hyperglycemia 10/8/2020    Hypertension     Neuropathy     Obesity     Obstructive sleep apnea     Osteoarthritis     Stage 3a chronic kidney disease  4/22/2021    Type 2 diabetes mellitus with diabetic polyneuropathy, with long-term current use of insulin ([address] 9/7/2017    Type II or unspecified type diabetes mellitus without mention of complication, not stated as uncontrolled     Urinary incontinence     Yeast infection 10/8/2020       Allergies   Allergen Reactions    Cymbalta [Duloxetine Hcl] Anaphylaxis and Other (See Comments)    Januvia [Sitagliptin] Shortness Of Breath    Cephalexin Other (See Comments)    Ciprofloxacin Other (See Comments)     muscle spasms occur  muscle spasms occur  muscle spasms occur  cramping    Duloxetine     Lyrica [Pregabalin] Other (See Comments)     Does not help after 2 weeks     Metformin Diarrhea    Metformin And Related Diarrhea    Statins Other (See Comments)     myalgias      Morphine Diarrhea, Nausea And Vomiting, Swelling and Nausea Only     Current Outpatient Medications on File Prior to Visit   Medication Sig Dispense Refill    ibuprofen (ADVIL;MOTRIN) 600 MG tablet ibuprofen 600 mg tablet   TAKE 1 TABLET BY MOUTH EVERY 4 TO 6 HOURS AS NEEDED FOR PAIN      fenofibrate (TRIGLIDE) 160 MG tablet TAKE 1 TABLET BY MOUTH DAILY 90 tablet 1    losartan (COZAAR) 25 MG tablet TAKE 1 TABLET BY MOUTH DAILY 90 tablet 1    omeprazole (PRILOSEC) 20 MG delayed release capsule TAKE 1 CAPSULE EVERY DAY 90 capsule 3    hydrOXYzine (ATARAX) 25 MG tablet TAKE 1 TABLET EVERY 8 HOURS AS NEEDED FOR ANXIETY 90 tablet 1    tiZANidine (ZANAFLEX) 4 MG tablet TAKE 1 TABLET TWICE DAILY 180 tablet 2    TRULICITY 3 SF/2.7JG SOPN INJECT 3MG (1 PEN) SUBCUTANEOUSLY EVERY WEEK 3 pen 1    buPROPion (WELLBUTRIN SR) 200 MG extended release tablet TAKE 1 TABLET BY MOUTH TWICE DAILY      ondansetron (ZOFRAN) 4 MG tablet Take 1 tablet by mouth 3 times daily as needed for Nausea or Vomiting 15 tablet 0    colchicine (MITIGARE) 0.6 MG capsule Take 1 capsule by mouth daily 30 capsule 2    Misc.  Devices MISC 1 PAIR OF DIABETIC SHOES (1 LEFT/ 1 RIGHT)  1-3 PAIRS OF INSERTS (LEFT/ RIGHT) 2 each 0    acetaminophen-codeine (TYLENOL #3) 300-30 MG per tablet TAKE 1 TABLET BY MOUTH FOUR TIMES DAILY AS NEEDED FOR PAIN      Blood Glucose Calibration (TRUE METRIX LEVEL 2) Normal SOLN       Insulin Pen Needle (PEN NEEDLES) 32G X 5 MM MISC Droplet Pen Needle 32 gauge x 5/32\"   USE TO INJECT VICTOZA DAILY      Blood Glucose Monitoring Suppl (TRUE METRIX METER) w/Device KIT       milnacipran HCl (SAVELLA) 100 MG TABS Take 1 tablet by mouth 2 times daily 180 tablet 1    pramipexole (MIRAPEX) 1 MG tablet Take 1 tablet by mouth nightly 90 tablet 3    blood glucose monitor kit and supplies Please fill with True Metrix Meter and Control Solution. 1 kit 0    blood glucose monitor strips Test 3 times a day & as needed for symptoms of irregular blood glucose. Dispense sufficient amount for indicated testing frequency plus additional to accommodate PRN testing needs.  100 strip 0    Lancets MISC 1 each by Does not apply route 3 times daily 600 each 1    Insulin Glargine, 1 Unit Dial, (TOUJEO SOLOSTAR) 300 UNIT/ML SOPN Inject 52 Units into the skin daily 24 mL 2    allopurinol (ZYLOPRIM) 300 MG tablet TAKE 1 TABLET EVERY DAY 90 tablet 1    carvedilol (COREG) 25 MG tablet TAKE 1 TABLET TWICE DAILY 978 tablet 1    folic acid (FOLVITE) 1 MG tablet Take 1 mg by mouth daily      Cholecalciferol (VITAMIN D) 125 MCG (5000 UT) CAPS Take 1 tablet by mouth daily 30 capsule 2    albuterol sulfate HFA (VENTOLIN HFA) 108 (90 Base) MCG/ACT inhaler Inhale 2 puffs into the lungs every 6 hours as needed for Wheezing 1 Inhaler 3    Handicap Placard MISC by Does not apply route Expires 4/22/2022 1 each 0    NOVOLOG FLEXPEN 100 UNIT/ML injection pen       insulin lispro, 1 Unit Dial, (HUMALOG KWIKPEN) 100 UNIT/ML SOPN 0-150 = 0 units  151-200 = 2 units  201-250 = 4 units  251-300 = 6 units  301-350 = 8 units  351-400+ = 10 units 3 pen 1    b complex vitamins capsule Take 1 capsule by mouth daily      Multiple Vitamins-Minerals (WOMENS DAILY FORMULA PO) Take by mouth      vitamin C (ASCORBIC ACID) 500 MG tablet Take 500 mg by mouth daily      Biotin 5 MG CAPS Take by mouth      tiotropium (SPIRIVA HANDIHALER) 18 MCG inhalation capsule INHALE THE CONTENTS OF 1 CAPSULE INTO THE LUNGS DAILY 90 capsule 1     Current Facility-Administered Medications on File Prior to Visit   Medication Dose Route Frequency Provider Last Rate Last Admin    ipratropium (ATROVENT) 0.02 % nebulizer solution 0.5 mg  0.5 mg Nebulization Once Suresh Galvez MD        albuterol (PROVENTIL) nebulizer solution 2.5 mg  2.5 mg Nebulization Once Suresh Galvez MD           Review of Systems    Review of Systems:   History obtained from chart review and the patient  General ROS: negative for - chills, fatigue, fever, night sweats or weight gain  Constitutional: Negative for chills, diaphoresis, fatigue, fever and unexpected weight change. Musculoskeletal: Positive for arthralgias, gait problem and joint swelling. Neurological ROS: negative for - behavioral changes, confusion, headaches or seizures. Negative for weakness and numbness. Dermatological ROS: negative for - mole changes, rash  Cardiovascular: Negative for leg swelling. Gastrointestinal: Negative for constipation, diarrhea, nausea and vomiting. Objective:  Dermatologic Exam:  Skin lesion/ulceration Absent . Skin No rashes or nodules noted. .   Skin is thin, with flaky sloughing skin as well as decreased hair growth to the lower leg  Small red hemosiderin deposits seen dorsal foot   Musculoskeletal:     1st MPJ ROM decreased, Bilateral.  Muscle strength 5/5, Bilateral.  Pain present upon palpation of toenails 1-5, Bilateral. decreased medial longitudinal arch, Bilateral.  Ankle ROM decreased,Bilateral.    Dorsally contracted digits present digits 2, Bilateral.     Vascular: DP pulses 1/4 bilateral.  PT pulses 0/4 bilateral.   CFT <5 seconds, Bilateral.  Hair growth absent to the level of the digits, Bilateral.  Edema present, Bilateral.  Varicosities absent, Bilateral. Erythema absent, Bilateral    Neurological: Sensation diminshed to light touch to level of digits, Bilateral.  Protective sensation intact 6/10 sites via 5.07/10g [place] Monofilament, Bilateral.  negative Tinel's, Bilateral.  negative Valleix sign, Bilateral.      Integument: Warm, dry, supple, Bilateral.  Open lesion absent, Bilateral. Interdigital maceration absent to web spaces 4, Bilateral.  Nails 1-5 left and 1-5 right thickened > 3.0 mm, dystrophic and crumbly, discolored with subungual debris. Fissures absent, Bilateral.   General: AAO x 3 in NAD. Derm  Toenail Description  Sites of Onychomycosis Involvement (Check affected area)  [x] [x] [x] [x] [x] [x] [x] [x] [x] [x]  5 4 3 2 1 1 2 3 4 5                          Right                                        Left    Thickness  [x] [x] [x] [x] [x] [x] [x] [x] [x] [x]  5 4 3 2 1 1 2 3 4 5                         Right                                        Left    Dystrophic Changes   [x] [x] [x] [x] [x] [x] [x] [x] [x] [x]  5 4 3 2 1 1 2 3 4 5                         Right                                        Left    Color   [x] [x] [x] [x] [x] [x] [x] [x] [x] [x]  5 4 3 2 1 1 2 3 4 5                          Right                                        Left    Incurvation/Ingrowin   [] [] [] [] [] [] [] [] [] []  5 4 3 2 1 1 2 3 4 5                         Right                                        Left    Inflammation/Pain   [x] [x] [x] [x] [x] [x] [x] [x] [x] [x]  5 4 3 2 1 1 2 3 4 5                         Right                                        Left        Visual inspection:  Deformity: hammertoe deformity thomas feet  amputation: absent  Skin lesions: absent  Edema: right- 2+ pitting edema, left- 2+ pitting edema    Sensory exam:  Monofilament sensation: abnormal - 6/10 via SW 5.07/10g monofilament to the plantar foot bilateral feet    Pulses: abnormal - 1/4 dorsalis pedis pulse and 0/4 Posterior tibial pulse,   Pinprick: Impaired  Proprioception: Impaired  Vibration (128 Hz): Impaired       DM with PVD       [x]Yes    []No      Assessment:  62 y.o. female with:   Diagnosis Orders   1. Type II diabetes mellitus with peripheral circulatory disorder (HCC)   DIABETES FOOT EXAM    72707 - IN DEBRIDEMENT OF NAILS, 6 OR MORE   2.  Ingrown nail   DIABETES FOOT EXAM    73748 - IN DEBRIDEMENT OF NAILS, 6 OR MORE   3. Pain in both lower extremities   DIABETES FOOT EXAM    04847 - [place] DEBRIDEMENT OF NAILS, 6 OR MORE   4. Xerosis cutis   DIABETES FOOT EXAM         Q7   []Yes    []No                Q8   [x]Yes    []No                     Q9   []Yes    []No    Plan:   Pt was evaluated and examined. Patient was given personalized discharge instructions. To address xerosis, patient to apply lachydrin cream to feet daily. Pt to monitor for fissures due to dryness. Advised pt to contact office is there are any open lesions. Nails 1-10 were debrided sharply in length and thickness with a nipper and , without incident. Pt will follow up in 9 weeks or sooner if any problems arise. Diagnosis was discussed with the pt and all of their questions were answered in detail. Proper foot hygiene and care was discussed with the pt. Informed patient on proper diabetic foot care and importance of tight glycemic control. Patient to check feet daily and contact the office with any questions/problems/concerns.    Other comorbidity noted and will be managed by PCP.  1/12/2022    Electronically signed by Krystal Avina DPM on 1/12/2022 at 10:42 AM  1/12/2022

## 2022-01-31 DIAGNOSIS — E78.1 HYPERTRIGLYCERIDEMIA: ICD-10-CM

## 2022-01-31 DIAGNOSIS — G25.81 RLS (RESTLESS LEGS SYNDROME): ICD-10-CM

## 2022-01-31 DIAGNOSIS — E11.42 TYPE 2 DIABETES MELLITUS WITH DIABETIC POLYNEUROPATHY, WITH LONG-TERM CURRENT USE OF INSULIN (HCC): ICD-10-CM

## 2022-01-31 DIAGNOSIS — M1A.09X1 IDIOPATHIC CHRONIC GOUT OF MULTIPLE SITES WITH TOPHUS: ICD-10-CM

## 2022-01-31 DIAGNOSIS — M79.7 FIBROMYALGIA: ICD-10-CM

## 2022-01-31 DIAGNOSIS — R11.0 NAUSEA: ICD-10-CM

## 2022-01-31 DIAGNOSIS — I10 PRIMARY HYPERTENSION: ICD-10-CM

## 2022-01-31 DIAGNOSIS — M10.9 GOUT, UNSPECIFIED CAUSE, UNSPECIFIED CHRONICITY, UNSPECIFIED SITE: ICD-10-CM

## 2022-01-31 DIAGNOSIS — F41.9 ANXIETY: ICD-10-CM

## 2022-01-31 DIAGNOSIS — I10 ESSENTIAL HYPERTENSION: ICD-10-CM

## 2022-01-31 DIAGNOSIS — Z79.4 TYPE 2 DIABETES MELLITUS WITH DIABETIC POLYNEUROPATHY, WITH LONG-TERM CURRENT USE OF INSULIN (HCC): ICD-10-CM

## 2022-01-31 RX ORDER — DULAGLUTIDE 3 MG/.5ML
INJECTION, SOLUTION SUBCUTANEOUS
Qty: 3 PEN | Refills: 1 | Status: SHIPPED | OUTPATIENT
Start: 2022-01-31 | End: 2022-03-14 | Stop reason: DRUGHIGH

## 2022-01-31 RX ORDER — HYDROXYZINE HYDROCHLORIDE 25 MG/1
TABLET, FILM COATED ORAL
Qty: 90 TABLET | Refills: 1 | Status: SHIPPED | OUTPATIENT
Start: 2022-01-31 | End: 2022-04-01

## 2022-01-31 RX ORDER — PRAMIPEXOLE DIHYDROCHLORIDE 1 MG/1
1 TABLET ORAL NIGHTLY
Qty: 90 TABLET | Refills: 3 | Status: SHIPPED | OUTPATIENT
Start: 2022-01-31

## 2022-01-31 RX ORDER — OMEPRAZOLE 20 MG/1
CAPSULE, DELAYED RELEASE ORAL
Qty: 90 CAPSULE | Refills: 3 | Status: SHIPPED | OUTPATIENT
Start: 2022-01-31

## 2022-01-31 RX ORDER — FENOFIBRATE 160 MG/1
160 TABLET ORAL DAILY
Qty: 90 TABLET | Refills: 1 | Status: SHIPPED | OUTPATIENT
Start: 2022-01-31 | End: 2022-05-31

## 2022-01-31 RX ORDER — TIZANIDINE 4 MG/1
TABLET ORAL
Qty: 180 TABLET | Refills: 2 | Status: SHIPPED | OUTPATIENT
Start: 2022-01-31 | End: 2022-08-17 | Stop reason: SDUPTHER

## 2022-01-31 RX ORDER — ONDANSETRON 4 MG/1
4 TABLET, FILM COATED ORAL 3 TIMES DAILY PRN
Qty: 15 TABLET | Refills: 0 | Status: SHIPPED | OUTPATIENT
Start: 2022-01-31 | End: 2022-05-11 | Stop reason: SDUPTHER

## 2022-01-31 RX ORDER — LOSARTAN POTASSIUM 25 MG/1
25 TABLET ORAL DAILY
Qty: 90 TABLET | Refills: 1 | Status: SHIPPED | OUTPATIENT
Start: 2022-01-31 | End: 2022-02-24 | Stop reason: SDUPTHER

## 2022-01-31 RX ORDER — COLCHICINE 0.6 MG/1
0.6 CAPSULE ORAL DAILY
Qty: 30 CAPSULE | Refills: 2 | Status: SHIPPED | OUTPATIENT
Start: 2022-01-31 | End: 2022-06-29 | Stop reason: SDUPTHER

## 2022-01-31 RX ORDER — INSULIN GLARGINE 300 U/ML
52 INJECTION, SOLUTION SUBCUTANEOUS DAILY
Qty: 24 ML | Refills: 2 | Status: SHIPPED | OUTPATIENT
Start: 2022-01-31 | End: 2022-09-28

## 2022-01-31 RX ORDER — BUPROPION HYDROCHLORIDE 200 MG/1
TABLET, EXTENDED RELEASE ORAL
Qty: 180 TABLET | Refills: 1 | Status: SHIPPED | OUTPATIENT
Start: 2022-01-31 | End: 2022-05-31

## 2022-01-31 RX ORDER — CARVEDILOL 25 MG/1
TABLET ORAL
Qty: 180 TABLET | Refills: 1 | Status: SHIPPED | OUTPATIENT
Start: 2022-01-31 | End: 2022-05-31

## 2022-01-31 RX ORDER — ALLOPURINOL 300 MG/1
TABLET ORAL
Qty: 90 TABLET | Refills: 1 | Status: SHIPPED | OUTPATIENT
Start: 2022-01-31 | End: 2022-05-31

## 2022-01-31 NOTE — TELEPHONE ENCOUNTER
Jane Cardenas is calling to request a refill on the following medication(s):    Medication Request:  Requested Prescriptions     Pending Prescriptions Disp Refills    allopurinol (ZYLOPRIM) 300 MG tablet 90 tablet 1    carvedilol (COREG) 25 MG tablet 180 tablet 1     Sig: TAKE 1 TABLET TWICE DAILY    fenofibrate (TRIGLIDE) 160 MG tablet 90 tablet 1     Sig: Take 1 tablet by mouth daily    losartan (COZAAR) 25 MG tablet 90 tablet 1     Sig: Take 1 tablet by mouth daily    omeprazole (PRILOSEC) 20 MG delayed release capsule 90 capsule 3     Sig: TAKE 1 CAPSULE EVERY DAY    pramipexole (MIRAPEX) 1 MG tablet 90 tablet 3     Sig: Take 1 tablet by mouth nightly    milnacipran HCl (SAVELLA) 100 MG TABS 180 tablet 1     Sig: Take 1 tablet by mouth 2 times daily    tiZANidine (ZANAFLEX) 4 MG tablet 180 tablet 2     Sig: TAKE 1 TABLET TWICE DAILY    hydrOXYzine (ATARAX) 25 MG tablet 90 tablet 1     Sig: TAKE 1 TABLET EVERY 8 HOURS AS NEEDED FOR ANXIETY    buPROPion (WELLBUTRIN SR) 200 MG extended release tablet 60 tablet      Sig: TAKE 1 TABLET BY MOUTH TWICE DAILY    Dulaglutide (TRULICITY) 3 QR/1.9ZA SOPN 3 pen 1     Sig: INJECT 3MG (1 PEN) SUBCUTANEOUSLY EVERY WEEK    Insulin Glargine, 1 Unit Dial, (TOUJEO SOLOSTAR) 300 UNIT/ML SOPN 24 mL 2     Sig: Inject 52 Units into the skin daily    ondansetron (ZOFRAN) 4 MG tablet 15 tablet 0     Sig: Take 1 tablet by mouth 3 times daily as needed for Nausea or Vomiting    colchicine (MITIGARE) 0.6 MG capsule 30 capsule 2     Sig: Take 1 capsule by mouth daily       Last Visit Date (If Applicable):  2/08/5629    Next Visit Date:    4/13/2022

## 2022-02-14 ENCOUNTER — HOSPITAL ENCOUNTER (OUTPATIENT)
Age: 62
Setting detail: SPECIMEN
Discharge: HOME | End: 2022-02-14

## 2022-02-14 ENCOUNTER — OFFICE VISIT (OUTPATIENT)
Dept: DERMATOLOGY | Age: 62
End: 2022-02-14
Payer: MEDICARE

## 2022-02-14 VITALS
WEIGHT: 293 LBS | OXYGEN SATURATION: 100 % | DIASTOLIC BLOOD PRESSURE: 94 MMHG | HEART RATE: 80 BPM | TEMPERATURE: 96.6 F | SYSTOLIC BLOOD PRESSURE: 156 MMHG | HEIGHT: 70 IN | BODY MASS INDEX: 41.95 KG/M2

## 2022-02-14 DIAGNOSIS — L72.11 PILAR CYSTS: Primary | ICD-10-CM

## 2022-02-14 PROCEDURE — 11421 EXC H-F-NK-SP B9+MARG 0.6-1: CPT | Performed by: DERMATOLOGY

## 2022-02-14 RX ORDER — LIDOCAINE HYDROCHLORIDE AND EPINEPHRINE 10; 10 MG/ML; UG/ML
5 INJECTION, SOLUTION INFILTRATION; PERINEURAL ONCE
Status: COMPLETED | OUTPATIENT
Start: 2022-02-14 | End: 2022-02-15

## 2022-02-14 NOTE — PROGRESS NOTES
Chief Complaint   Patient presents with    Other     pt has two skin lesions on her head that itch. she states that when she bumps them they hurt for a week. she has had them for years. Pilar cysts that are symptomatic and painful. Excise with simple closure today    Dermatology Procedure Note   [address]  [facility] [place] [address]  Dept: [phone]  Dept Fax: [phone]      Procedure Date: 2/14/2022  Procedure Time: 4:45 PM    Procedure Practitioner: Carlyle Gonzalez MD    Procedure: Excision    Pre-Procedure Diagnosis: pilar cysts    Post-Procedure Diagnosis: Same as Pre-Procedure Diagnosis    Informed Consent: The procedure and its risks were explained including but not limited to pain, bleeding, infection, permanent scar, permanent pigment alteration and recurrence. Consent to proceed with the procedure was obtained from the patient or the parent by the practitioner    Time Out:  A time out was conducted immediately before starting the procedure that confirmed a final verification of the correct patient, correct procedure, and correct site. Procedure Details:  Left vertex  Excision and simple closure: The 8 mm lesion on the left vertex of scalp was cleaned with chlorhexidine and anesthetized with 1% lidocaine with epinephrine. The lesion was then dissected out from a punch incision down to subcutaneous fat with a 0 mm margin for a total excised diameter of 8 mm. Hemostasis was then achieved with electrocautery. Due to tension on the defect, undermining was performed to allow for closure. The epidermis was approximated with 3-0 Prolene cross stitch sutures. Vaseline and a bulky wound dressing was applied. Right occipital  Excision and simple closure: The 8 mm lesion on the left vertex of scalp was cleaned with chlorhexidine and anesthetized with 1% lidocaine with epinephrine.  The lesion was then dissected out from a punch incision down to subcutaneous fat with a 0 mm margin for a total excised diameter of 8 mm. Hemostasis was then achieved with electrocautery. Due to tension on the defect, undermining was performed to allow for closure. The epidermis was approximated with 3-0 Prolene cross stitch sutures. Vaseline and a bulky wound dressing was applied.   Procedure Performed By: Stephen Tony MD    Estimated Blood Loss: Minimal    Pathologic Specimen: H&E    Procedure Tolerance: Good    Complication(s): None    Electronically signed by Stephen Tony MD on 2/14/22 at 4:45 PM EST

## 2022-02-14 NOTE — PROGRESS NOTES
Dermatology Patient Note  [address]  [facility]  Dept: [phone]  Dept Fax: [phone]      VISITDATE: 2/14/2022   REFERRING PROVIDER: Guille Sarkar MD      Shawn Deluca is a 62 y.o. female  who presents today in the office for:    Other (pt has two skin lesions on her head that itch. she states that when she bumps them they hurt for a week. she has had them for years. )      HISTORY OF PRESENT ILLNESS:  As above.      MEDICAL PROBLEMS:  Patient Active Problem List    Diagnosis Date Noted    Bilateral carpal tunnel syndrome 10/11/2021    RLS (restless legs syndrome) 10/11/2021    Obesity, Class III, BMI 40-49.9 (morbid obesity) ([facility] 05/08/2021    Vitamin D deficiency 05/04/2021    Stage 3a chronic kidney disease  04/22/2021    Tobacco use 04/22/2021    Anxiety 04/22/2021    Idiopathic chronic gout of multiple sites with tophus 04/22/2021    Microalbuminuric diabetic nephropathy ([facility] 04/22/2021    Dental abscess 10/08/2020    Fibromyalgia 04/09/2019    Chronic obstructive pulmonary disease ([facility] 09/11/2018    TIFFANI positive 02/08/2018    Type 2 diabetes mellitus with diabetic polyneuropathy, with long-term current use of insulin ([facility] 09/07/2017    Mild persistent asthma without complication 12/99/6549    Depression 08/21/2015    HLD (hyperlipidemia) 07/01/2015    HTN (hypertension) 07/01/2015    Neuropathy due to secondary diabetes ([facility] 07/01/2015       CURRENT MEDICATIONS:   Current Outpatient Medications   Medication Sig Dispense Refill    allopurinol (ZYLOPRIM) 300 MG tablet TAKE 1 TABLET EVERY DAY 90 tablet 1    carvedilol (COREG) 25 MG tablet TAKE 1 TABLET TWICE DAILY 180 tablet 1    fenofibrate (TRIGLIDE) 160 MG tablet Take 1 tablet by mouth daily 90 tablet 1    losartan (COZAAR) 25 MG tablet Take 1 tablet by mouth daily 90 tablet 1    omeprazole (PRILOSEC) 20 MG delayed release capsule TAKE 1 CAPSULE EVERY DAY 90 capsule 3    pramipexole (MIRAPEX) 1 MG tablet Take 1 tablet by mouth nightly 90 tablet 3    milnacipran HCl (SAVELLA) 100 MG TABS Take 1 tablet by mouth 2 times daily 180 tablet 1    tiZANidine (ZANAFLEX) 4 MG tablet TAKE 1 TABLET TWICE DAILY 180 tablet 2    hydrOXYzine (ATARAX) 25 MG tablet TAKE 1 TABLET EVERY 8 HOURS AS NEEDED FOR ANXIETY 90 tablet 1    buPROPion (WELLBUTRIN SR) 200 MG extended release tablet TAKE 1 TABLET BY MOUTH TWICE DAILY 180 tablet 1    Dulaglutide (TRULICITY) 3 YW/9.5UE SOPN INJECT 3MG (1 PEN) SUBCUTANEOUSLY EVERY WEEK 3 pen 1    Insulin Glargine, 1 Unit Dial, (TOUJEO SOLOSTAR) 300 UNIT/ML SOPN Inject 52 Units into the skin daily 24 mL 2    ondansetron (ZOFRAN) 4 MG tablet Take 1 tablet by mouth 3 times daily as needed for Nausea or Vomiting 15 tablet 0    colchicine (MITIGARE) 0.6 MG capsule Take 1 capsule by mouth daily 30 capsule 2    ibuprofen (ADVIL;MOTRIN) 600 MG tablet ibuprofen 600 mg tablet   TAKE 1 TABLET BY MOUTH EVERY 4 TO 6 HOURS AS NEEDED FOR PAIN      Misc. Devices MISC 1 PAIR OF DIABETIC SHOES (1 LEFT/ 1 RIGHT)  1-3 PAIRS OF INSERTS (LEFT/ RIGHT) 2 each 0    Blood Glucose Calibration (TRUE METRIX LEVEL 2) Normal SOLN       Insulin Pen Needle (PEN NEEDLES) 32G X 5 MM MISC Droplet Pen Needle 32 gauge x 5/32\"   USE TO INJECT VICTOZA DAILY      Blood Glucose Monitoring Suppl (TRUE METRIX METER) w/Device KIT       blood glucose monitor kit and supplies Please fill with True Metrix Meter and Control Solution. 1 kit 0    blood glucose monitor strips Test 3 times a day & as needed for symptoms of irregular blood glucose. Dispense sufficient amount for indicated testing frequency plus additional to accommodate PRN testing needs.  100 strip 0    Lancets MISC 1 each by Does not apply route 3 times daily 360 each 1    folic acid (FOLVITE) 1 MG tablet Take 1 mg by mouth daily      Cholecalciferol (VITAMIN D) 125 MCG (5000 UT) CAPS Take 1 tablet by mouth daily 30 capsule 2    albuterol sulfate HFA (VENTOLIN HFA) 108 (90 Base) MCG/ACT inhaler Inhale 2 puffs into the lungs every 6 hours as needed for Wheezing 1 Inhaler 3    Handicap Placard MISC by Does not apply route Expires 4/22/2022 1 each 0    NOVOLOG FLEXPEN 100 UNIT/ML injection pen       b complex vitamins capsule Take 1 capsule by mouth daily      Multiple Vitamins-Minerals (WOMENS DAILY FORMULA PO) Take by mouth      vitamin C (ASCORBIC ACID) 500 MG tablet Take 500 mg by mouth daily      Biotin 5 MG CAPS Take by mouth      tiotropium (SPIRIVA HANDIHALER) 18 MCG inhalation capsule INHALE THE CONTENTS OF 1 CAPSULE INTO THE LUNGS DAILY 90 capsule 1    insulin lispro, 1 Unit Dial, (HUMALOG KWIKPEN) 100 UNIT/ML SOPN 0-150 = 0 units  151-200 = 2 units  201-250 = 4 units  251-300 = 6 units  301-350 = 8 units  351-400+ = 10 units (Patient not taking: Reported on 2/14/2022) 3 pen 1     Current Facility-Administered Medications   Medication Dose Route Frequency Provider Last Rate Last Admin    ipratropium (ATROVENT) 0.02 % nebulizer solution 0.5 mg  0.5 mg Nebulization Once Poncho Burk MD        albuterol (PROVENTIL) nebulizer solution 2.5 mg  2.5 mg Nebulization Once Poncho Burk MD           ALLERGIES:   Allergies   Allergen Reactions    Cymbalta [Duloxetine Hcl] Anaphylaxis and Other (See Comments)    Januvia [Sitagliptin] Shortness Of Breath    Cephalexin Other (See Comments)    Ciprofloxacin Other (See Comments)     muscle spasms occur  muscle spasms occur  muscle spasms occur  cramping    Duloxetine     Lyrica [Pregabalin] Other (See Comments)     Does not help after 2 weeks     Metformin Diarrhea    Metformin And Related Diarrhea    Statins Other (See Comments)     myalgias      Morphine Diarrhea, Nausea And Vomiting, Swelling and Nausea Only       SOCIAL HISTORY:  Social History     Tobacco Use    Smoking status: Current Every Day Smoker Packs/day: 0.50     Years: 42.00     Pack years: 21.00     Types: Cigarettes    Smokeless tobacco: Never Used   Substance Use Topics    Alcohol use: Not Currently     Alcohol/week: 0.0 standard drinks     Comment: occassional        Pertinent ROS:  Review of Systems  Skin: Denies any new changing, growing or bleeding lesions or rashes except as described in the HPI   Constitutional: Denies fevers, chills, and malaise. PHYSICAL EXAM:   BP (!) 156/94 (Site: Right Lower Arm, Position: Sitting, Cuff Size: Medium Adult)   Pulse 80   Temp 96.6 °F (35.9 °C) (Temporal)   Ht 5' 10\" (1.778 m)   Wt (!) 315 lb 6.4 oz (143.1 kg)   SpO2 100%   BMI 45.26 kg/m²     The patient is generally well appearing, well nourished, alert and conversational. Affect is normal.    Cutaneous Exam:  Physical Exam  Focused exam of scalp was performed    Facial covering was not removed during examination. Diagnoses/exam findings/medical history pertinent to this visit are listed below:    Assessment:   Diagnosis Orders   1. Hidradenitis suppurativa          Plan:  Pilar cyst of scalp x2  - removal ***    RTC ***    Future Appointments   Date Time Provider [facility]   3/16/2022 11:00 AM Antoine May DPM [facility]   4/13/2022 10:00 AM Lopez Velazquez MD [facility]         Patient Instructions   For HS  - continue benzoyl peroxide wash to acne prone areas each morning  - continue doxycycline hyclate (VIBRAMYCIN) 100 MG capsule; Take 1 pill twice daily with food    - continue clindamycin (CLEOCIN T) 1 % lotion; Apply to affected skin twice daily        This note was created with the assistance of a speech-recognition program.  Although the intention is to generate a document that actually reflects the content of the visit, no guarantees can be provided that every mistake has been identified and corrected by editing.     I, Dr. Kofi Villa, personally performed the services described in this documentation, as scribed by Sydni Cough in my presence, and it is both accurate and complete.      Electronically signed by Ramo Chavez MD on 2/14/22 at 10:13 AM EST

## 2022-02-14 NOTE — PATIENT INSTRUCTIONS
Post-operative Instructions for Excision Surgery    IF YOU HAVE SUTURES ON TOP OF THE SKIN  1. Wash hands with antibacterial soap before beginning wound care. Remove the pressure bandage the 24-48 hours after surgery. Apply Polysporin or Vaseline ointment over the counter with a Q-tip and/or gauze; cover with a clean band-aid 2-3 times daily UNTIL SUTURES OR STAPLES ARE REMOVED. The surgical area can get wet, unless otherwise directed. We recommend that you avoid direct water pressure on the surgical site during the healing process. Important: Keep the area moist with the ointment and covered at all times. Keeping the surgical site moist and covered will help to prevent scab formation. 2.  Wipe area with hydrogen peroxide on a Q-tip twice a day before applying ointment. IF YOU DO NOT HAVE SUTURES ON TOP OF THE SKIN    1. Leave bandage in place for 24-48 hours then remove bandage. 2. Leave Steri-strips in place and slowly trim back as the peel off  3. Gently wash area with soap and water do not scrub              ALL SURGERIES    1. For 2 days following surgery: No smoking, no ingestion of alcohol, no aspirin (unless prescribed as part of a cardiac or stroke regimen). Take only Tylenol (acetaminophen) or NSAIDS (Advil, Motrin, Aleve, Ibuprofen) for discomfort. Any blood-thinner or anticoagulation medication you regularly take should be continued at your prescribing doctors discretion. If you are on blood thinners I recommend buying over the counter wound seal powder in case of bleeding. If you have any bleeding please apply pressure for 15 minutes to the site and if still bleeding apply wound seal powder and call the office. 2. You may shower after removing the bandage applied the day of surgery (leave bandage in place for 24-48 hrs) - do not scrub the surgical site - gentle soap and water is ok  3.  For facial and scalp surgery: Do not bend over for 2 days and use 2 pillows to sleep for 2 nights. 4. Please note: Swelling and/or bruising may be visible below the surgical site due to gravitys pull. This may be more evident after facial procedures. Some very mild drainage onto the band-aid may be a normal part of wound healing a few days after surgery. 5. Do not do any heavy lifting or workout exercises UNTIL SUTURES OR STAPLES ARE REMOVED. No sit-ups, jogging, running, free-weight lifting, swimming, racquet ball, tennis, aerobics, golf, bowling, or speed-walking. You may walk at a normal pace. You may resume these activities after your suture/staple removal appointment. 6.  If the procedure is in an area where you shave, do not shave within 2 inches of the surgical site. 7. SEJAL/Compression stocking instructions: If a SEJAL/Compression stocking is applied, leave it in place until the morning. Following the dressing change, reapply the stocking and leave it on during the day. Remove the stocking at bedtime. Continue to wear the stocking until the sutures are removed. 8. During your surgery, your surgeon may have used an underlying layer of sutures, which are normally absorbed by the body. In some instances, the suture material may not be absorbed and you may need to come back for a follow-up visit. If you notice an acne-like bump or pimple forming along or in the suture line, as late as 2-8 weeks after your surgery, it may be the absorbable suture not being absorbed, and we would like you to call the office and make an appointment. An additional result of the procedure may be a slight raising or puckering at the edges of the surgical area due to overzealous healing at the surgery site. This may occur several weeks after the sutures have been removed. If you notice this, please call our office and make an appointment. 9. 2 weeks after excision (after sutures removed if external sutures) can use silicone gel or silicone tape to reduce scar appearance.  For silicone gel massage into

## 2022-02-14 NOTE — PROGRESS NOTES
Dermatology Surgery Pre-Visit Checklist    (Please complete with  Y (yes) / N (no) or explain)    Pathologic Diagnosis: Pilar cyst    Photo available of surgery site in media: no    Competent to give informed consent? yes   If not, who is authorized to do so: na    Need for help for wound care: no   If so, who will do so: na    Are you diagnosed:   Diabetes: yea   If yes, last A1C: 7.2       HIV: no       Hepatitis: no       Current smoker: yes  If yes, how much: 15 cig.     So you have any of the following: Pacemaker: no       Defibrillator: no       Artificial Heart valve: no                Artificial Joints: no       Other Implantable Device: no       Organ Transplant: no       Other: no    Are you on blood thinners such as: Asprin: no       Warfarin/Coumadin: no       Other: no    Any allergies to the following:  Lidocaine: no       Iodine: no       Adhesive: no       Other: Januvia, cephalexin, ciprofloxacin, duloxetine, lyrica, metformin, statins, morphine

## 2022-02-15 ENCOUNTER — TELEPHONE (OUTPATIENT)
Dept: FAMILY MEDICINE CLINIC | Age: 62
End: 2022-02-15

## 2022-02-15 RX ADMIN — LIDOCAINE HYDROCHLORIDE AND EPINEPHRINE 5 ML: 10; 10 INJECTION, SOLUTION INFILTRATION; PERINEURAL at 07:42

## 2022-02-15 NOTE — TELEPHONE ENCOUNTER
Pt called in stating the last two days her BP has been really high its been 190/100.  Pt denies any symptoms

## 2022-02-15 NOTE — TELEPHONE ENCOUNTER
Pt notified to increase losartan to 50 mg due to bp being elevated at 190/100 the last couple days. Pt had cysts removed from head yesterday and cannot drive. Pt notified if devolop any symptoms to call 911.  Pt verbally understood

## 2022-02-17 LAB — DERMATOLOGY PATHOLOGY REPORT: NORMAL

## 2022-02-18 NOTE — RESULT ENCOUNTER NOTE
Please inform patient that her excision showed cysts as expected. Let me know if he/she has any questions or concerns about healing.

## 2022-02-24 ENCOUNTER — PATIENT MESSAGE (OUTPATIENT)
Dept: FAMILY MEDICINE CLINIC | Age: 62
End: 2022-02-24

## 2022-02-24 DIAGNOSIS — I10 PRIMARY HYPERTENSION: ICD-10-CM

## 2022-02-24 NOTE — TELEPHONE ENCOUNTER
From: Yesenia Holcomb  To: Dr. Hurman Klinefelter: 2/24/2022 9:11 AM EST  Subject: Losartan 25mg    Need the prescription doubled for my high blood pressure. Running low at home.

## 2022-02-24 NOTE — TELEPHONE ENCOUNTER
Please check if she is taking losartan 25 mg daily or 50 mg daily and how her blood pressure readings are at home.

## 2022-02-24 NOTE — TELEPHONE ENCOUNTER
Checo Saenz is calling to request a refill on the following medication(s):    Medication Request:  Requested Prescriptions     Pending Prescriptions Disp Refills    losartan (COZAAR) 25 MG tablet 90 tablet 1     Sig: Take 1 tablet by mouth daily       Last Visit Date (If Applicable):  8/00/9564    Next Visit Date:    4/13/2022

## 2022-02-25 RX ORDER — LOSARTAN POTASSIUM 25 MG/1
25 TABLET ORAL DAILY
Qty: 90 TABLET | Refills: 1 | Status: SHIPPED | OUTPATIENT
Start: 2022-02-25 | End: 2022-03-14

## 2022-02-28 ENCOUNTER — NURSE ONLY (OUTPATIENT)
Dept: DERMATOLOGY | Age: 62
End: 2022-02-28

## 2022-02-28 DIAGNOSIS — Z48.02 VISIT FOR SUTURE REMOVAL: ICD-10-CM

## 2022-02-28 PROCEDURE — 99024 POSTOP FOLLOW-UP VISIT: CPT | Performed by: DERMATOLOGY

## 2022-03-08 DIAGNOSIS — Z79.4 TYPE 2 DIABETES MELLITUS WITH DIABETIC POLYNEUROPATHY, WITH LONG-TERM CURRENT USE OF INSULIN (HCC): Primary | ICD-10-CM

## 2022-03-08 DIAGNOSIS — E11.42 TYPE 2 DIABETES MELLITUS WITH DIABETIC POLYNEUROPATHY, WITH LONG-TERM CURRENT USE OF INSULIN (HCC): Primary | ICD-10-CM

## 2022-03-08 RX ORDER — BLOOD SUGAR DIAGNOSTIC
STRIP MISCELLANEOUS
Qty: 100 EACH | Refills: 1 | Status: SHIPPED | OUTPATIENT
Start: 2022-03-08 | End: 2022-03-14 | Stop reason: SDUPTHER

## 2022-03-14 ENCOUNTER — OFFICE VISIT (OUTPATIENT)
Dept: FAMILY MEDICINE CLINIC | Age: 62
End: 2022-03-14
Payer: MEDICARE

## 2022-03-14 VITALS
TEMPERATURE: 97 F | SYSTOLIC BLOOD PRESSURE: 127 MMHG | BODY MASS INDEX: 41.95 KG/M2 | WEIGHT: 293 LBS | HEART RATE: 78 BPM | DIASTOLIC BLOOD PRESSURE: 86 MMHG | OXYGEN SATURATION: 98 % | HEIGHT: 70 IN

## 2022-03-14 DIAGNOSIS — E11.42 TYPE 2 DIABETES MELLITUS WITH DIABETIC POLYNEUROPATHY, WITH LONG-TERM CURRENT USE OF INSULIN (HCC): ICD-10-CM

## 2022-03-14 DIAGNOSIS — E78.00 PURE HYPERCHOLESTEROLEMIA: ICD-10-CM

## 2022-03-14 DIAGNOSIS — Z79.4 TYPE 2 DIABETES MELLITUS WITH DIABETIC POLYNEUROPATHY, WITH LONG-TERM CURRENT USE OF INSULIN (HCC): ICD-10-CM

## 2022-03-14 DIAGNOSIS — E55.9 VITAMIN D DEFICIENCY: ICD-10-CM

## 2022-03-14 DIAGNOSIS — I10 PRIMARY HYPERTENSION: Primary | ICD-10-CM

## 2022-03-14 DIAGNOSIS — R53.83 FATIGUE, UNSPECIFIED TYPE: ICD-10-CM

## 2022-03-14 DIAGNOSIS — E13.40 NEUROPATHY DUE TO SECONDARY DIABETES (HCC): ICD-10-CM

## 2022-03-14 PROCEDURE — 99214 OFFICE O/P EST MOD 30 MIN: CPT | Performed by: STUDENT IN AN ORGANIZED HEALTH CARE EDUCATION/TRAINING PROGRAM

## 2022-03-14 PROCEDURE — G8417 CALC BMI ABV UP PARAM F/U: HCPCS | Performed by: STUDENT IN AN ORGANIZED HEALTH CARE EDUCATION/TRAINING PROGRAM

## 2022-03-14 PROCEDURE — G8482 FLU IMMUNIZE ORDER/ADMIN: HCPCS | Performed by: STUDENT IN AN ORGANIZED HEALTH CARE EDUCATION/TRAINING PROGRAM

## 2022-03-14 PROCEDURE — 4004F PT TOBACCO SCREEN RCVD TLK: CPT | Performed by: STUDENT IN AN ORGANIZED HEALTH CARE EDUCATION/TRAINING PROGRAM

## 2022-03-14 PROCEDURE — 3017F COLORECTAL CA SCREEN DOC REV: CPT | Performed by: STUDENT IN AN ORGANIZED HEALTH CARE EDUCATION/TRAINING PROGRAM

## 2022-03-14 PROCEDURE — 3051F HG A1C>EQUAL 7.0%<8.0%: CPT | Performed by: STUDENT IN AN ORGANIZED HEALTH CARE EDUCATION/TRAINING PROGRAM

## 2022-03-14 PROCEDURE — 2022F DILAT RTA XM EVC RTNOPTHY: CPT | Performed by: STUDENT IN AN ORGANIZED HEALTH CARE EDUCATION/TRAINING PROGRAM

## 2022-03-14 PROCEDURE — G8427 DOCREV CUR MEDS BY ELIG CLIN: HCPCS | Performed by: STUDENT IN AN ORGANIZED HEALTH CARE EDUCATION/TRAINING PROGRAM

## 2022-03-14 RX ORDER — BLOOD SUGAR DIAGNOSTIC
STRIP MISCELLANEOUS
Qty: 100 EACH | Refills: 1 | Status: SHIPPED | OUTPATIENT
Start: 2022-03-14 | End: 2022-03-15 | Stop reason: SDUPTHER

## 2022-03-14 RX ORDER — EMPAGLIFLOZIN 10 MG/1
1 TABLET, FILM COATED ORAL DAILY
Qty: 30 TABLET | Refills: 5 | Status: SHIPPED | OUTPATIENT
Start: 2022-03-14 | End: 2022-07-19

## 2022-03-14 RX ORDER — IBUPROFEN 800 MG/1
TABLET ORAL
COMMUNITY
Start: 2022-02-21 | End: 2022-07-19 | Stop reason: ALTCHOICE

## 2022-03-14 RX ORDER — VITS A,C,E/LUTEIN/MINERALS 300MCG-200
1 TABLET ORAL NIGHTLY
Qty: 30 TABLET | Refills: 1 | Status: SHIPPED | OUTPATIENT
Start: 2022-03-14

## 2022-03-14 ASSESSMENT — ENCOUNTER SYMPTOMS
VOMITING: 0
CONSTIPATION: 0
WHEEZING: 0
CHEST TIGHTNESS: 0
DIARRHEA: 0
ABDOMINAL PAIN: 0
NAUSEA: 0
EYE DISCHARGE: 0
SHORTNESS OF BREATH: 0
COUGH: 0
SORE THROAT: 0

## 2022-03-14 NOTE — PROGRESS NOTES
[address] [facility]  [address]  Dept: [phone]  Dept Fax: [phone]    Padmini Bryant is a 62 y.o. female who is a Established patient, who presents today for her medical conditions/complaints as noted below:  Chief Complaint   Patient presents with    Diabetes     A1c not due     Medication Check         HPI:     She is here today to follow-up on hypertension. Her blood pressures were running high at home for past few weeks. She says they were in the range of 180s/100s. She was previously on losartan but says that she had to stop because she was having hair fall from it. She says that for past few days she took some hydrochlorothiazide that she had at home and her blood pressure was better today. She says that it was around 255 systolic at home when she checked this morning. She has previously tried lisinopril, losartan, hydrochlorothiazide, amlodipine and says that all the medications made her hair fall. She does note that her hair fall has been continuous despite stopping the blood pressure medications. She has been taking over-the-counter skin and hair supplements. She says that her blood sugars have been doing okay and her highest reading was 266 in past few weeks. She says that her blood sugars does tend to drop sometimes to around 60s and then she needs to eat something. She is taking Toujeo 56 units at bedtime. She is also on Trulicity and has had no side effects. She has microalbuminuria and cannot tolerate either ACEIs or ARB's. She is agreeable to trying Jardiance. She says that she used to get urinary and yeast infections for several years ago but has not had any in a while. She agrees to call back if she experiences any urinary or vaginal issues. She also complains of chronic fatigue and sleep issues. She feels that she might have sleep apnea but does not want to get sleep study at this time.   She is requesting handicap placard due to her difficulty with prolonged ambulation secondary to neuropathy. Hemoglobin A1C (%)   Date Value   01/10/2022 7.2   10/11/2021 9.1   05/03/2021 9.0 (H)             ( goal A1Cis < 7)   Microalb/Crt.  Ratio (mcg/mg creat)   Date Value   01/23/2020 53 (H)     LDL Cholesterol (mg/dL)   Date Value   05/03/2021 80   09/17/2020 83   01/23/2020            (goal LDL is <100)   AST (U/L)   Date Value   05/03/2021 24     ALT (U/L)   Date Value   05/03/2021 19     BUN (mg/dL)   Date Value   05/03/2021 11     BP Readings from Last 3 Encounters:   03/14/22 127/86   02/14/22 (!) 156/94   01/10/22 (!) 161/97          (goal 120/80)    Past Medical History:   Diagnosis Date    Anxiety     Asthma     BMI 29.0-29.9,adult 4/22/2021    Bronchitis     Chronic obstructive pulmonary disease ([facility] [address] 9/11/2018    Depression     Fibromyalgia     Gout     HLD (hyperlipidemia) 7/1/2015    Hyperglycemia 10/8/2020    Hypertension     Neuropathy     Obesity     Obstructive sleep apnea     Osteoarthritis     Stage 3a chronic kidney disease  4/22/2021    Type 2 diabetes mellitus with diabetic polyneuropathy, with long-term current use of insulin ([facility] [address] 9/7/2017    Type II or unspecified type diabetes mellitus without mention of complication, not stated as uncontrolled     Urinary incontinence     Yeast infection 10/8/2020      Past Surgical History:   Procedure Laterality Date    APPENDECTOMY      TAKEN WITH HYSTERECTOMY    BUNIONECTOMY Left 02-22-16    with arthrodesis and 2 through 4 with hallux valgus arthroplasty 5th toe on left    COLONOSCOPY  8/17/2015    2 polyps removed, diverticulosis, internal hemorrhoids    FINGER SURGERY      re-attachment of left middle finger    FOOT SURGERY Right 11/11/2015    bunionectomy, 2-3-4th pinning, 5th arthroplasty    HERNIA REPAIR      UMBILICAL    HYSTERECTOMY, TOTAL ABDOMINAL  2001       Family History   Problem Relation Age of Onset    Cancer Mother 61        breast     High Blood Pressure Mother     Stroke Father 62    Depression Father 64        comitted sucide     Diabetes Sister 36    High Blood Pressure Sister     Depression Sister     Cancer Brother 64        prostate    Diabetes Brother     High Blood Pressure Brother     Cancer Paternal Aunt 71        colon, rectal     Cancer Paternal Grandfather 79        lung    Heart Disease Neg Hx        Social History     Tobacco Use    Smoking status: Current Every Day Smoker     Packs/day: 0.50     Years: 42.00     Pack years: 21.00     Types: Cigarettes    Smokeless tobacco: Never Used   Substance Use Topics    Alcohol use: Not Currently     Alcohol/week: 0.0 standard drinks     Comment: occassional       Current Outpatient Medications   Medication Sig Dispense Refill    ibuprofen (ADVIL;MOTRIN) 800 MG tablet TAKE ONE TABLET BY MOUTH THREE TIMES DAILY AS NEEDED      Insulin Pen Needle (PEN NEEDLES) 32G X 5 MM MISC Droplet Pen Needle 32 gauge x 5/32\" 100 each 1    empagliflozin (JARDIANCE) 10 MG tablet Take 1 tablet by mouth daily 30 tablet 5    Magnesium Oxide (MAG-OXIDE) 200 MG TABS Take 1 tablet by mouth at bedtime 30 tablet 1    Dulaglutide 4.5 MG/0.5ML SOPN Inject 4.5 mg into the skin once a week 4 pen 1    Handicap Placard MISC by Does not apply route Exp: 3/2027 1 each 0    allopurinol (ZYLOPRIM) 300 MG tablet TAKE 1 TABLET EVERY DAY 90 tablet 1    carvedilol (COREG) 25 MG tablet TAKE 1 TABLET TWICE DAILY 180 tablet 1    fenofibrate (TRIGLIDE) 160 MG tablet Take 1 tablet by mouth daily 90 tablet 1    omeprazole (PRILOSEC) 20 MG delayed release capsule TAKE 1 CAPSULE EVERY DAY 90 capsule 3    pramipexole (MIRAPEX) 1 MG tablet Take 1 tablet by mouth nightly 90 tablet 3    milnacipran HCl (SAVELLA) 100 MG TABS Take 1 tablet by mouth 2 times daily 180 tablet 1    tiZANidine (ZANAFLEX) 4 MG tablet TAKE 1 TABLET TWICE DAILY 180 tablet 2    hydrOXYzine (ATARAX) 25 MG tablet TAKE 1 TABLET EVERY 8 HOURS AS NEEDED FOR ANXIETY 90 tablet 1    buPROPion (WELLBUTRIN SR) 200 MG extended release tablet TAKE 1 TABLET BY MOUTH TWICE DAILY 180 tablet 1    Insulin Glargine, 1 Unit Dial, (TOUJEO SOLOSTAR) 300 UNIT/ML SOPN Inject 52 Units into the skin daily 24 mL 2    ondansetron (ZOFRAN) 4 MG tablet Take 1 tablet by mouth 3 times daily as needed for Nausea or Vomiting 15 tablet 0    colchicine (MITIGARE) 0.6 MG capsule Take 1 capsule by mouth daily 30 capsule 2    Misc. Devices MISC 1 PAIR OF DIABETIC SHOES (1 LEFT/ 1 RIGHT)  1-3 PAIRS OF INSERTS (LEFT/ RIGHT) 2 each 0    Blood Glucose Calibration (TRUE METRIX LEVEL 2) Normal SOLN       Blood Glucose Monitoring Suppl (TRUE METRIX METER) w/Device KIT       blood glucose monitor kit and supplies Please fill with True Metrix Meter and Control Solution. 1 kit 0    blood glucose monitor strips Test 3 times a day & as needed for symptoms of irregular blood glucose. Dispense sufficient amount for indicated testing frequency plus additional to accommodate PRN testing needs.  100 strip 0    Lancets MISC 1 each by Does not apply route 3 times daily 235 each 1    folic acid (FOLVITE) 1 MG tablet Take 1 mg by mouth daily      Cholecalciferol (VITAMIN D) 125 MCG (5000 UT) CAPS Take 1 tablet by mouth daily 30 capsule 2    albuterol sulfate HFA (VENTOLIN HFA) 108 (90 Base) MCG/ACT inhaler Inhale 2 puffs into the lungs every 6 hours as needed for Wheezing 1 Inhaler 3    NOVOLOG FLEXPEN 100 UNIT/ML injection pen       insulin lispro, 1 Unit Dial, (HUMALOG KWIKPEN) 100 UNIT/ML SOPN 0-150 = 0 units  151-200 = 2 units  201-250 = 4 units  251-300 = 6 units  301-350 = 8 units  351-400+ = 10 units 3 pen 1    b complex vitamins capsule Take 1 capsule by mouth daily      Multiple Vitamins-Minerals (WOMENS DAILY FORMULA PO) Take by mouth      vitamin C (ASCORBIC ACID) 500 MG tablet Take 500 mg by mouth daily      Biotin 5 MG CAPS Take by mouth      tiotropium (SPIRIVA HANDIHALER) 18 MCG inhalation capsule INHALE THE CONTENTS OF 1 CAPSULE INTO THE LUNGS DAILY 90 capsule 1     Current Facility-Administered Medications   Medication Dose Route Frequency Provider Last Rate Last Admin    ipratropium (ATROVENT) 0.02 % nebulizer solution 0.5 mg  0.5 mg Nebulization Once Juan Scott MD        albuterol (PROVENTIL) nebulizer solution 2.5 mg  2.5 mg Nebulization Once Poncho Lewis MD         Allergies   Allergen Reactions    Cymbalta [Duloxetine Hcl] Anaphylaxis and Other (See Comments)    Januvia [Sitagliptin] Shortness Of Breath    Cephalexin Other (See Comments)    Ciprofloxacin Other (See Comments)     muscle spasms occur  muscle spasms occur  muscle spasms occur  cramping    Duloxetine     Lyrica [Pregabalin] Other (See Comments)     Does not help after 2 weeks     Metformin Diarrhea    Metformin And Related Diarrhea    Statins Other (See Comments)     myalgias      Morphine Diarrhea, Nausea And Vomiting, Swelling and Nausea Only       Health Maintenance   Topic Date Due    Diabetic retinal exam  10/30/2018    DTaP/Tdap/Td vaccine (1 - Tdap) 01/24/2027 (Originally 9/17/2016)    Diabetic microalbuminuria test  04/22/2022    Lipid screen  05/03/2022    Potassium monitoring  05/03/2022    Creatinine monitoring  05/03/2022    Depression Monitoring  10/11/2022    Annual Wellness Visit (AWV)  10/12/2022    Low dose CT lung screening  01/06/2023    A1C test (Diabetic or Prediabetic)  01/10/2023    Diabetic foot exam  01/18/2023    Breast cancer screen  01/06/2024    Pneumococcal 0-64 years Vaccine (2 of 2 - PPSV23) 04/27/2026    Colorectal Cancer Screen  05/18/2027    Flu vaccine  Completed    Shingles Vaccine  Completed    COVID-19 Vaccine  Completed    Hepatitis C screen  Completed    HIV screen  Completed    Hepatitis A vaccine  Aged Out    Hib vaccine  Aged Out    Meningococcal (ACWY) vaccine  Aged Out       Subjective: General: Skin is warm and dry. Coloration: Skin is not jaundiced. Findings: No rash. Neurological:      General: No focal deficit present. Mental Status: She is alert and oriented to person, place, and time. Psychiatric:         Mood and Affect: Mood normal.         Behavior: Behavior normal.         Thought Content: Thought content normal.         Judgment: Judgment normal.       /86   Pulse 78   Temp 97 °F (36.1 °C)   Ht 5' 10\" (1.778 m)   Wt (!) 314 lb 3.2 oz (142.5 kg)   SpO2 98%   BMI 45.08 kg/m²     Assessment/Plan:   1. Primary hypertension  -     CBC with Auto Differential; Future  -     Comprehensive Metabolic Panel, Fasting; Future  -     TSH with Reflex; Future  -     Magnesium Oxide (MAG-OXIDE) 200 MG TABS; Take 1 tablet by mouth at bedtime, Disp-30 tablet, R-1Normal  2. Type 2 diabetes mellitus with diabetic polyneuropathy, with long-term current use of insulin ([facility])  -     Insulin Pen Needle (PEN NEEDLES) 32G X 5 MM MISC; Droplet Pen Needle 32 gauge x 5/32\", Disp-100 each, R-1Normal  -     empagliflozin (JARDIANCE) 10 MG tablet; Take 1 tablet by mouth daily, Disp-30 tablet, R-5Normal  -     Microalbumin, Ur; Future  -     Dulaglutide 4.5 MG/0.5ML SOPN; Inject 4.5 mg into the skin once a week, Disp-4 pen, R-1Normal  3. Pure hypercholesterolemia  -     Lipid, Fasting; Future  4. Fatigue, unspecified type  -     Vitamin B12 & Folate; Future  5. Neuropathy due to secondary diabetes ([facility] [address]  -     Handicap Placard MISC; Starting Mon 3/14/2022, Disp-1 each, R-0, PrintExp: 3/2027  6. Vitamin D deficiency  -     Vitamin D 25 Hydroxy; Future      Hypertension-blood pressures controlled today. On carvedilol 25 mg twice daily. Has previously tried several antihypertensives and had to stop because of side effects. Advised to start taking magnesium 100 mg daily at bedtime. Started on Jardiance to help with both blood sugar and blood pressure control.   Advised to bring her blood pressure monitor next visit for calibration. Type 2 diabetes-last A1c 7.2. Increased Trulicity to 4.5 mg/week and started on Jardiance 10 mg daily. Has history of microalbuminuria and cannot tolerate ACEs or ARB. Advised to watch for any signs of urinary tract infection or vaginal infection. Recommended continuing with good hydration to avoid these issues. On Toujeo 52 units at bedtime along with sliding scale. Advised to monitor blood sugars for any signs of hypoglycemia, plan to titrate down on insulin her blood sugars are well controlled. Hyperlipidemia - on fenofibrate 160 mg daily, cannot tolerate statins due to myalgias    Fatigue-could be secondary to undiagnosed sleep apnea, declined to get sleep study. Labs ordered for evaluation. No follow-ups on file.     Orders Placed This Encounter   Procedures    CBC with Auto Differential     Standing Status:   Future     Standing Expiration Date:   9/14/2022    Comprehensive Metabolic Panel, Fasting     Standing Status:   Future     Standing Expiration Date:   9/14/2022    Lipid, Fasting     Standing Status:   Future     Standing Expiration Date:   9/14/2022    Vitamin D 25 Hydroxy     Standing Status:   Future     Standing Expiration Date:   9/14/2022    TSH with Reflex     Standing Status:   Future     Standing Expiration Date:   9/14/2022   Ghada Oms, Ur     Standing Status:   Future     Standing Expiration Date:   6/14/2022    Vitamin B12 & Folate     Standing Status:   Future     Standing Expiration Date:   3/14/2023     Orders Placed This Encounter   Medications    Insulin Pen Needle (PEN NEEDLES) 32G X 5 MM MISC     Sig: Droplet Pen Needle 32 gauge x 5/32\"     Dispense:  100 each     Refill:  1    empagliflozin (JARDIANCE) 10 MG tablet     Sig: Take 1 tablet by mouth daily     Dispense:  30 tablet     Refill:  5    Magnesium Oxide (MAG-OXIDE) 200 MG TABS     Sig: Take 1 tablet by mouth at bedtime     Dispense:  30 tablet Refill:  1    Dulaglutide 4.5 MG/0.5ML SOPN     Sig: Inject 4.5 mg into the skin once a week     Dispense:  4 pen     Refill:  1    Handicap Placard MISC     Sig: by Does not apply route Exp: 3/2027     Dispense:  1 each     Refill:  0       Patient given educational materials - see patient instructions. Discussed use, benefit, and side effects of prescribed medications. All patientquestions answered. Pt voiced understanding. Reviewed health maintenance. Instructedto continue current medications, diet and exercise. Patient agreed with treatmentplan. Follow up as directed.      Electronically signed by Leighton Burgos MD on 3/14/2022 at 12:37 PM

## 2022-03-15 DIAGNOSIS — E11.42 TYPE 2 DIABETES MELLITUS WITH DIABETIC POLYNEUROPATHY, WITH LONG-TERM CURRENT USE OF INSULIN (HCC): ICD-10-CM

## 2022-03-15 DIAGNOSIS — Z79.4 TYPE 2 DIABETES MELLITUS WITH DIABETIC POLYNEUROPATHY, WITH LONG-TERM CURRENT USE OF INSULIN (HCC): ICD-10-CM

## 2022-03-15 RX ORDER — BLOOD SUGAR DIAGNOSTIC
STRIP MISCELLANEOUS
Qty: 100 EACH | Refills: 1 | Status: SHIPPED | OUTPATIENT
Start: 2022-03-15 | End: 2022-07-19

## 2022-03-15 NOTE — TELEPHONE ENCOUNTER
----- Message from Benedict Keith sent at 3/15/2022 12:32 PM EDT -----  Subject: Medication Problem    QUESTIONS  Name of Medication? Insulin Pen Needle (PEN NEEDLES) 32G X 5 MM MISC  Patient-reported dosage and instructions? daily  What question or problem do you have with the medication? Needs directions   for this script. Preferred Pharmacy? [address] [place] [address], [address] [facility] [place] [phone]  Pharmacy phone number (if available)? [phone]  Additional Information for Provider? Caroline Suárezms w/ [facility] is requesting   directions on the 32 gauge needles. Please call [phone]. REF#   [id number]  ---------------------------------------------------------------------------  --------------  Sofia COPELAND  What is the best way for the office to contact you? Do not leave any   message, patient will call back for answer  Preferred Call Back Phone Number? [phone]  ---------------------------------------------------------------------------  --------------  SCRIPT ANSWERS  Relationship to Patient? Third Party  Representative Name?  Caroline Ohms w/ [facility]

## 2022-03-16 ENCOUNTER — OFFICE VISIT (OUTPATIENT)
Dept: PODIATRY | Age: 62
End: 2022-03-16
Payer: MEDICARE

## 2022-03-16 VITALS — HEIGHT: 70 IN | WEIGHT: 293 LBS | BODY MASS INDEX: 41.95 KG/M2

## 2022-03-16 DIAGNOSIS — L60.0 INGROWN NAIL: ICD-10-CM

## 2022-03-16 DIAGNOSIS — M79.605 PAIN IN BOTH LOWER EXTREMITIES: ICD-10-CM

## 2022-03-16 DIAGNOSIS — L85.3 XEROSIS CUTIS: ICD-10-CM

## 2022-03-16 DIAGNOSIS — I73.9 PVD (PERIPHERAL VASCULAR DISEASE) (HCC): ICD-10-CM

## 2022-03-16 DIAGNOSIS — M79.604 PAIN IN BOTH LOWER EXTREMITIES: ICD-10-CM

## 2022-03-16 DIAGNOSIS — E11.51 TYPE II DIABETES MELLITUS WITH PERIPHERAL CIRCULATORY DISORDER (HCC): Primary | ICD-10-CM

## 2022-03-16 DIAGNOSIS — E11.42 DIABETIC POLYNEUROPATHY ASSOCIATED WITH TYPE 2 DIABETES MELLITUS (HCC): ICD-10-CM

## 2022-03-16 PROCEDURE — G8417 CALC BMI ABV UP PARAM F/U: HCPCS | Performed by: PODIATRIST

## 2022-03-16 PROCEDURE — 11721 DEBRIDE NAIL 6 OR MORE: CPT | Performed by: PODIATRIST

## 2022-03-16 PROCEDURE — 99213 OFFICE O/P EST LOW 20 MIN: CPT | Performed by: PODIATRIST

## 2022-03-16 PROCEDURE — G8427 DOCREV CUR MEDS BY ELIG CLIN: HCPCS | Performed by: PODIATRIST

## 2022-03-16 PROCEDURE — 3017F COLORECTAL CA SCREEN DOC REV: CPT | Performed by: PODIATRIST

## 2022-03-16 PROCEDURE — 3051F HG A1C>EQUAL 7.0%<8.0%: CPT | Performed by: PODIATRIST

## 2022-03-16 PROCEDURE — 4004F PT TOBACCO SCREEN RCVD TLK: CPT | Performed by: PODIATRIST

## 2022-03-16 PROCEDURE — G8482 FLU IMMUNIZE ORDER/ADMIN: HCPCS | Performed by: PODIATRIST

## 2022-03-16 PROCEDURE — 2022F DILAT RTA XM EVC RTNOPTHY: CPT | Performed by: PODIATRIST

## 2022-03-16 NOTE — PATIENT INSTRUCTIONS
Schedule a Vaccine  When you qualify to receive the vaccine, call the [facility]) COVID-19 Vaccination Hotline to schedule your appointment or to get additional information about the [facility]) locations which are offering the COVID-19 vaccine. To be 94% effective, it's important that you receive two doses of one of the COVID-19 vaccines. -If you are receiving the Vyas Peter vaccine, your second shot will be scheduled as close to 21 days after the first shot as possible. -If you are receiving the Moderna vaccine, your second shot will be scheduled as close to 28 days after the first shot as possible. [facility]) COVID-19 Vaccination Hotline: [phone]    Links to [facility]) website and [facility] website:    [employer]/[facility]-monitoring-[email or web address]-covid-[email or web address]/[email or web address]    [email or web address]

## 2022-03-16 NOTE — PROGRESS NOTES
[address] [facility] PODIATRY [facility]  [address] [address] [address]  Dept: [phone]  Dept Fax: [phone]    DIABETIC PROGRESS NOTE  Date of patient's visit: 3/16/2022  Patient's Name:  Marina Jennings YOB: 1960            Patient Care Team:  Yessy Graham MD as PCP - General (Family Medicine)  Yessy Graham MD as PCP - [facility]          Chief Complaint   Patient presents with    Diabetes     [facility] [place]     Peripheral Neuropathy       Subjective:   Marina Jennings comes to clinic for Diabetes [facility] [place] ) and Peripheral Neuropathy    she is a diabetic and states that needs toenails trimmed today. Pt currently has complaint of thickened, elongated nails that they cannot manage by themselves. Pt's primary care physician is Yessy Graham MD last seen 3/14/22   Pt's last blood sugar/a1c was 7.2-1/10/22. Pt has a new complaint of increased dry skin to thomas feet. Lab Results   Component Value Date    LABA1C 7.2 01/10/2022      Complains of numbness in the feet bilat.   Past Medical History:   Diagnosis Date    Anxiety     Asthma     BMI 29.0-29.9,adult 4/22/2021    Bronchitis     Chronic obstructive pulmonary disease ([address] 9/11/2018    Depression     Fibromyalgia     Gout     HLD (hyperlipidemia) 7/1/2015    Hyperglycemia 10/8/2020    Hypertension     Neuropathy     Obesity     Obstructive sleep apnea     Osteoarthritis     Stage 3a chronic kidney disease  4/22/2021    Type 2 diabetes mellitus with diabetic polyneuropathy, with long-term current use of insulin ([address] 9/7/2017    Type II or unspecified type diabetes mellitus without mention of complication, not stated as uncontrolled     Urinary incontinence     Yeast infection 10/8/2020       Allergies   Allergen Reactions    Cymbalta [Duloxetine Hcl] Anaphylaxis and Other (See Comments)    Januvia [Sitagliptin] Shortness Of Breath  Cephalexin Other (See Comments)    Ciprofloxacin Other (See Comments)     muscle spasms occur  muscle spasms occur  muscle spasms occur  cramping    Duloxetine     Lyrica [Pregabalin] Other (See Comments)     Does not help after 2 weeks     Metformin Diarrhea    Metformin And Related Diarrhea    Statins Other (See Comments)     myalgias      Morphine Diarrhea, Nausea And Vomiting, Swelling and Nausea Only     Current Outpatient Medications on File Prior to Visit   Medication Sig Dispense Refill    Insulin Pen Needle (PEN NEEDLES) 32G X 5 MM MISC Droplet Pen Needle 32 gauge x 5/32\" 100 each 1    ibuprofen (ADVIL;MOTRIN) 800 MG tablet TAKE ONE TABLET BY MOUTH THREE TIMES DAILY AS NEEDED      empagliflozin (JARDIANCE) 10 MG tablet Take 1 tablet by mouth daily 30 tablet 5    Magnesium Oxide (MAG-OXIDE) 200 MG TABS Take 1 tablet by mouth at bedtime 30 tablet 1    Dulaglutide 4.5 MG/0.5ML SOPN Inject 4.5 mg into the skin once a week 4 pen 1    Handicap Placard MISC by Does not apply route Exp: 3/2027 1 each 0    allopurinol (ZYLOPRIM) 300 MG tablet TAKE 1 TABLET EVERY DAY 90 tablet 1    carvedilol (COREG) 25 MG tablet TAKE 1 TABLET TWICE DAILY 180 tablet 1    fenofibrate (TRIGLIDE) 160 MG tablet Take 1 tablet by mouth daily 90 tablet 1    omeprazole (PRILOSEC) 20 MG delayed release capsule TAKE 1 CAPSULE EVERY DAY 90 capsule 3    pramipexole (MIRAPEX) 1 MG tablet Take 1 tablet by mouth nightly 90 tablet 3    milnacipran HCl (SAVELLA) 100 MG TABS Take 1 tablet by mouth 2 times daily 180 tablet 1    tiZANidine (ZANAFLEX) 4 MG tablet TAKE 1 TABLET TWICE DAILY 180 tablet 2    hydrOXYzine (ATARAX) 25 MG tablet TAKE 1 TABLET EVERY 8 HOURS AS NEEDED FOR ANXIETY 90 tablet 1    buPROPion (WELLBUTRIN SR) 200 MG extended release tablet TAKE 1 TABLET BY MOUTH TWICE DAILY 180 tablet 1    Insulin Glargine, 1 Unit Dial, (TOUJEO SOLOSTAR) 300 UNIT/ML SOPN Inject 52 Units into the skin daily 24 mL 2    ondansetron (ZOFRAN) 4 MG tablet Take 1 tablet by mouth 3 times daily as needed for Nausea or Vomiting 15 tablet 0    colchicine (MITIGARE) 0.6 MG capsule Take 1 capsule by mouth daily 30 capsule 2    Misc. Devices MISC 1 PAIR OF DIABETIC SHOES (1 LEFT/ 1 RIGHT)  1-3 PAIRS OF INSERTS (LEFT/ RIGHT) 2 each 0    Blood Glucose Calibration (TRUE METRIX LEVEL 2) Normal SOLN       Blood Glucose Monitoring Suppl (TRUE METRIX METER) w/Device KIT       blood glucose monitor kit and supplies Please fill with True Metrix Meter and Control Solution. 1 kit 0    blood glucose monitor strips Test 3 times a day & as needed for symptoms of irregular blood glucose. Dispense sufficient amount for indicated testing frequency plus additional to accommodate PRN testing needs.  100 strip 0    Lancets MISC 1 each by Does not apply route 3 times daily 340 each 1    folic acid (FOLVITE) 1 MG tablet Take 1 mg by mouth daily      Cholecalciferol (VITAMIN D) 125 MCG (5000 UT) CAPS Take 1 tablet by mouth daily 30 capsule 2    albuterol sulfate HFA (VENTOLIN HFA) 108 (90 Base) MCG/ACT inhaler Inhale 2 puffs into the lungs every 6 hours as needed for Wheezing 1 Inhaler 3    NOVOLOG FLEXPEN 100 UNIT/ML injection pen       insulin lispro, 1 Unit Dial, (HUMALOG KWIKPEN) 100 UNIT/ML SOPN 0-150 = 0 units  151-200 = 2 units  201-250 = 4 units  251-300 = 6 units  301-350 = 8 units  351-400+ = 10 units 3 pen 1    b complex vitamins capsule Take 1 capsule by mouth daily      Multiple Vitamins-Minerals (WOMENS DAILY FORMULA PO) Take by mouth      vitamin C (ASCORBIC ACID) 500 MG tablet Take 500 mg by mouth daily      Biotin 5 MG CAPS Take by mouth      tiotropium (SPIRIVA HANDIHALER) 18 MCG inhalation capsule INHALE THE CONTENTS OF 1 CAPSULE INTO THE LUNGS DAILY 90 capsule 1     Current Facility-Administered Medications on File Prior to Visit   Medication Dose Route Frequency Provider Last Rate Last Admin    ipratropium (ATROVENT) 0.02 % nebulizer solution 0.5 mg  0.5 mg Nebulization Once Castro Mendoza MD        albuterol (PROVENTIL) nebulizer solution 2.5 mg  2.5 mg Nebulization Once Castro Mendoza MD           Review of Systems    Review of Systems:   History obtained from chart review and the patient  General ROS: negative for - chills, fatigue, fever, night sweats or weight gain  Constitutional: Negative for chills, diaphoresis, fatigue, fever and unexpected weight change. Musculoskeletal: Positive for arthralgias, gait problem and joint swelling. Neurological ROS: negative for - behavioral changes, confusion, headaches or seizures. Negative for weakness and numbness. Dermatological ROS: negative for - mole changes, rash  Cardiovascular: Negative for leg swelling. Gastrointestinal: Negative for constipation, diarrhea, nausea and vomiting. Objective:  Dermatologic Exam:  Skin lesion/ulceration Absent . Skin No rashes or nodules noted. .   Skin is thin, with flaky sloughing skin as well as decreased hair growth to the lower leg  Small red hemosiderin deposits seen dorsal foot   Musculoskeletal:     1st MPJ ROM decreased, Bilateral.  Muscle strength 5/5, Bilateral.  Pain present upon palpation of toenails 1-5, Bilateral. decreased medial longitudinal arch, Bilateral.  Ankle ROM decreased,Bilateral.    Dorsally contracted digits present digits 2, Bilateral.     Vascular: DP pulses 1/4 bilateral.  PT pulses 0/4 bilateral.   CFT <5 seconds, Bilateral.  Hair growth absent to the level of the digits, Bilateral.  Edema present, Bilateral.  Varicosities absent, Bilateral. Erythema absent, Bilateral    Neurological: Sensation diminshed to light touch to level of digits, Bilateral.  Protective sensation intact 6/10 sites via 5.07/10g [place] Monofilament, Bilateral.  negative Tinel's, Bilateral.  negative Valleix sign, Bilateral.      Integument: Warm, dry, supple, Bilateral.  Open lesion absent, Bilateral.  Interdigital maceration absent to web spaces 4, Bilateral.  Nails 1-5 left and 1-5 right thickened > 3.0 mm, dystrophic and crumbly, discolored with subungual debris. Fissures absent, Bilateral.   General: AAO x 3 in NAD. Derm  Toenail Description  Sites of Onychomycosis Involvement (Check affected area)  [x] [x] [x] [x] [x] [x] [x] [x] [x] [x]  5 4 3 2 1 1 2 3 4 5                          Right                                        Left    Thickness  [x] [x] [x] [x] [x] [x] [x] [x] [x] [x]  5 4 3 2 1 1 2 3 4 5                         Right                                        Left    Dystrophic Changes   [x] [x] [x] [x] [x] [x] [x] [x] [x] [x]  5 4 3 2 1 1 2 3 4 5                         Right                                        Left    Color   [x] [x] [x] [x] [x] [x] [x] [x] [x] [x]  5 4 3 2 1 1 2 3 4 5                          Right                                        Left    Incurvation/Ingrowin   [] [] [] [] [] [] [] [] [] []  5 4 3 2 1 1 2 3 4 5                         Right                                        Left    Inflammation/Pain   [x] [x] [x] [x] [x] [x] [x] [x] [x] [x]  5 4 3 2 1 1 2 3 4 5                         Right                                        Left        Visual inspection:  Deformity: hammertoe deformity thomas feet  amputation: absent  Skin lesions: absent  Edema: right- 2+ pitting edema, left- 2+ pitting edema    Sensory exam:  Monofilament sensation: abnormal - 6/10 via SW 5.07/10g monofilament to the plantar foot bilateral feet    Pulses: abnormal - 1/4 dorsalis pedis pulse and 0/4 Posterior tibial pulse,   Pinprick: Impaired  Proprioception: Impaired  Vibration (128 Hz): Impaired       DM with PVD       [x]Yes    []No      Assessment:  62 y.o. female with:   Diagnosis Orders   1. Type II diabetes mellitus with peripheral circulatory disorder (HCC)  27111 - AR DEBRIDEMENT OF NAILS, 6 OR MORE    HM DIABETES FOOT EXAM   2.  Ingrown nail  13250 - AR DEBRIDEMENT OF NAILS, 6 OR MORE    HM DIABETES FOOT EXAM   3. Pain in both lower extremities  41753 - ME DEBRIDEMENT OF NAILS, 6 OR MORE    HM DIABETES FOOT EXAM   4. Xerosis cutis  HM DIABETES FOOT EXAM   5. Diabetic polyneuropathy associated with type 2 diabetes mellitus (HCC)  40571 - ME DEBRIDEMENT OF NAILS, 6 OR MORE    HM DIABETES FOOT EXAM   6. PVD (peripheral vascular disease) ([facility] [address] [facility]  [address] - ME DEBRIDEMENT OF NAILS, 6 OR MORE    HM DIABETES FOOT EXAM         Q7   []Yes    []No                Q8   [x]Yes    []No                     Q9   []Yes    []No    Plan:   Pt was evaluated and examined. Patient was given personalized discharge instructions. To address xerosis, patient to apply urea cream to feet daily. Pt to monitor for fissures due to dryness. Advised pt to contact office is there are any open lesions. Nails 1-10 were debrided sharply in length and thickness with a nipper and , without incident. Pt will follow up in 9 weeks or sooner if any problems arise. Diagnosis was discussed with the pt and all of their questions were answered in detail. Proper foot hygiene and care was discussed with the pt. Informed patient on proper diabetic foot care and importance of tight glycemic control. Patient to check feet daily and contact the office with any questions/problems/concerns.    Other comorbidity noted and will be managed by PCP.  3/16/2022    Electronically signed by Morgan Eli DPM on 3/16/2022 at 10:49 AM  3/16/2022

## 2022-03-21 ENCOUNTER — HOSPITAL ENCOUNTER (OUTPATIENT)
Age: 62
Setting detail: SPECIMEN
Discharge: HOME | End: 2022-03-21

## 2022-03-21 DIAGNOSIS — E78.00 PURE HYPERCHOLESTEROLEMIA: ICD-10-CM

## 2022-03-21 DIAGNOSIS — E11.42 TYPE 2 DIABETES MELLITUS WITH DIABETIC POLYNEUROPATHY, WITH LONG-TERM CURRENT USE OF INSULIN (HCC): ICD-10-CM

## 2022-03-21 DIAGNOSIS — I10 PRIMARY HYPERTENSION: ICD-10-CM

## 2022-03-21 DIAGNOSIS — Z79.4 TYPE 2 DIABETES MELLITUS WITH DIABETIC POLYNEUROPATHY, WITH LONG-TERM CURRENT USE OF INSULIN (HCC): ICD-10-CM

## 2022-03-21 DIAGNOSIS — E55.9 VITAMIN D DEFICIENCY: ICD-10-CM

## 2022-03-21 DIAGNOSIS — R53.83 FATIGUE, UNSPECIFIED TYPE: ICD-10-CM

## 2022-03-21 LAB
ABSOLUTE EOS #: 0.19 K/UL (ref 0–0.44)
ABSOLUTE IMMATURE GRANULOCYTE: 0.06 K/UL (ref 0–0.3)
ABSOLUTE LYMPH #: 1.04 K/UL (ref 1.1–3.7)
ABSOLUTE MONO #: 0.55 K/UL (ref 0.1–1.2)
ALBUMIN SERPL-MCNC: 4.3 G/DL (ref 3.5–5.2)
ALBUMIN/GLOBULIN RATIO: 1.3 (ref 1–2.5)
ALP BLD-CCNC: 79 U/L (ref 35–104)
ALT SERPL-CCNC: 22 U/L (ref 5–33)
ANION GAP SERPL CALCULATED.3IONS-SCNC: 16 MMOL/L (ref 9–17)
AST SERPL-CCNC: 20 U/L
BASOPHILS # BLD: 1 % (ref 0–2)
BASOPHILS ABSOLUTE: 0.06 K/UL (ref 0–0.2)
BILIRUB SERPL-MCNC: 0.46 MG/DL (ref 0.3–1.2)
BUN BLDV-MCNC: 22 MG/DL (ref 8–23)
CALCIUM SERPL-MCNC: 9.8 MG/DL (ref 8.6–10.4)
CHLORIDE BLD-SCNC: 98 MMOL/L (ref 98–107)
CHOLESTEROL, FASTING: 190 MG/DL
CHOLESTEROL/HDL RATIO: 4.4
CO2: 25 MMOL/L (ref 20–31)
CREAT SERPL-MCNC: 1.02 MG/DL (ref 0.5–0.9)
CREATININE URINE: 80.1 MG/DL (ref 28–217)
EOSINOPHILS RELATIVE PERCENT: 2 % (ref 1–4)
FOLATE: >20 NG/ML
GFR AFRICAN AMERICAN: >60 ML/MIN
GFR NON-AFRICAN AMERICAN: 55 ML/MIN
GFR SERPL CREATININE-BSD FRML MDRD: ABNORMAL ML/MIN/{1.73_M2}
GLUCOSE FASTING: 183 MG/DL (ref 70–99)
HCT VFR BLD CALC: 40.1 % (ref 36.3–47.1)
HDLC SERPL-MCNC: 43 MG/DL
HEMOGLOBIN: 13.6 G/DL (ref 11.9–15.1)
IMMATURE GRANULOCYTES: 1 %
LDL CHOLESTEROL: 108 MG/DL (ref 0–130)
LYMPHOCYTES # BLD: 12 % (ref 24–43)
MCH RBC QN AUTO: 30.7 PG (ref 25.2–33.5)
MCHC RBC AUTO-ENTMCNC: 33.9 G/DL (ref 28.4–34.8)
MCV RBC AUTO: 90.5 FL (ref 82.6–102.9)
MICROALBUMIN/CREAT 24H UR: 96 MG/L
MICROALBUMIN/CREAT UR-RTO: 120 MCG/MG CREAT
MONOCYTES # BLD: 6 % (ref 3–12)
NRBC AUTOMATED: 0 PER 100 WBC
PDW BLD-RTO: 12.9 % (ref 11.8–14.4)
PLATELET # BLD: 303 K/UL (ref 138–453)
PMV BLD AUTO: 11.3 FL (ref 8.1–13.5)
POTASSIUM SERPL-SCNC: 4.1 MMOL/L (ref 3.7–5.3)
RBC # BLD: 4.43 M/UL (ref 3.95–5.11)
SEG NEUTROPHILS: 78 % (ref 36–65)
SEGMENTED NEUTROPHILS ABSOLUTE COUNT: 6.89 K/UL (ref 1.5–8.1)
SODIUM BLD-SCNC: 139 MMOL/L (ref 135–144)
TOTAL PROTEIN: 7.6 G/DL (ref 6.4–8.3)
TRIGLYCERIDE, FASTING: 195 MG/DL
TSH SERPL DL<=0.05 MIU/L-ACNC: 2.18 MIU/L (ref 0.3–5)
VITAMIN B-12: 1216 PG/ML (ref 232–1245)
VITAMIN D 25-HYDROXY: 30.1 NG/ML
WBC # BLD: 8.8 K/UL (ref 3.5–11.3)

## 2022-03-31 DIAGNOSIS — F41.9 ANXIETY: ICD-10-CM

## 2022-04-01 RX ORDER — PEN NEEDLE, DIABETIC 31 GX5/16"
1 NEEDLE, DISPOSABLE MISCELLANEOUS 3 TIMES DAILY
Qty: 100 EACH | Refills: 3 | Status: SHIPPED | OUTPATIENT
Start: 2022-04-01

## 2022-04-01 RX ORDER — HYDROXYZINE HYDROCHLORIDE 25 MG/1
TABLET, FILM COATED ORAL
Qty: 180 TABLET | Refills: 0 | Status: SHIPPED | OUTPATIENT
Start: 2022-04-01 | End: 2022-04-07

## 2022-04-01 NOTE — TELEPHONE ENCOUNTER
Dave Lew is calling to request a refill on the following medication(s):    Medication Request:  Requested Prescriptions     Pending Prescriptions Disp Refills    hydrOXYzine (ATARAX) 25 MG tablet [Pharmacy Med Name: HYDROXYZINE HYDROCHLORIDE  25MG  TAB] 180 tablet      Sig: TAKE 1 TABLET BY MOUTH  EVERY 8 HOURS AS NEEDED FOR ANXIETY       Last Visit Date (If Applicable):  7/59/6732    Next Visit Date:    4/13/2022

## 2022-04-01 NOTE — TELEPHONE ENCOUNTER
Requested Prescriptions     Pending Prescriptions Disp Refills    Insulin Pen Needle (B-D UF III MINI PEN NEEDLES) 31G X 5 MM MISC 100 each 3     Si each by Does not apply route 3 times daily

## 2022-04-07 DIAGNOSIS — F41.9 ANXIETY: ICD-10-CM

## 2022-04-07 RX ORDER — HYDROXYZINE HYDROCHLORIDE 25 MG/1
TABLET, FILM COATED ORAL
Qty: 180 TABLET | Refills: 0 | Status: SHIPPED | OUTPATIENT
Start: 2022-04-07 | End: 2022-06-29 | Stop reason: SDUPTHER

## 2022-04-07 NOTE — TELEPHONE ENCOUNTER
Asael Holley is calling to request a refill on the following medication(s):    Medication Request:  Requested Prescriptions     Pending Prescriptions Disp Refills    hydrOXYzine (ATARAX) 25 MG tablet [Pharmacy Med Name: HYDROXYZINE HYDROCHLORIDE  25MG  TAB] 180 tablet 0     Sig: TAKE 1 TABLET BY MOUTH  EVERY 8 HOURS AS NEEDED FOR ANXIETY       Last Visit Date (If Applicable):  7/11/4216    Next Visit Date:    4/13/2022

## 2022-04-13 ENCOUNTER — OFFICE VISIT (OUTPATIENT)
Dept: FAMILY MEDICINE CLINIC | Age: 62
End: 2022-04-13
Payer: MEDICARE

## 2022-04-13 VITALS
BODY MASS INDEX: 41.95 KG/M2 | SYSTOLIC BLOOD PRESSURE: 130 MMHG | DIASTOLIC BLOOD PRESSURE: 89 MMHG | HEIGHT: 70 IN | WEIGHT: 293 LBS | TEMPERATURE: 97 F

## 2022-04-13 DIAGNOSIS — J30.2 SEASONAL ALLERGIES: ICD-10-CM

## 2022-04-13 DIAGNOSIS — J01.00 ACUTE NON-RECURRENT MAXILLARY SINUSITIS: Primary | ICD-10-CM

## 2022-04-13 DIAGNOSIS — J44.9 CHRONIC OBSTRUCTIVE PULMONARY DISEASE, UNSPECIFIED COPD TYPE (HCC): ICD-10-CM

## 2022-04-13 DIAGNOSIS — N18.31 STAGE 3A CHRONIC KIDNEY DISEASE (HCC): ICD-10-CM

## 2022-04-13 DIAGNOSIS — R76.8 ANA POSITIVE: ICD-10-CM

## 2022-04-13 DIAGNOSIS — E66.01 OBESITY, CLASS III, BMI 40-49.9 (MORBID OBESITY) (HCC): ICD-10-CM

## 2022-04-13 DIAGNOSIS — Z23 NEED FOR VACCINATION FOR PNEUMOCOCCUS: ICD-10-CM

## 2022-04-13 DIAGNOSIS — Z72.0 TOBACCO USE: ICD-10-CM

## 2022-04-13 DIAGNOSIS — R33.9 INCOMPLETE EMPTYING OF BLADDER: ICD-10-CM

## 2022-04-13 PROCEDURE — G8427 DOCREV CUR MEDS BY ELIG CLIN: HCPCS | Performed by: STUDENT IN AN ORGANIZED HEALTH CARE EDUCATION/TRAINING PROGRAM

## 2022-04-13 PROCEDURE — 99214 OFFICE O/P EST MOD 30 MIN: CPT | Performed by: STUDENT IN AN ORGANIZED HEALTH CARE EDUCATION/TRAINING PROGRAM

## 2022-04-13 PROCEDURE — G8417 CALC BMI ABV UP PARAM F/U: HCPCS | Performed by: STUDENT IN AN ORGANIZED HEALTH CARE EDUCATION/TRAINING PROGRAM

## 2022-04-13 PROCEDURE — 90677 PCV20 VACCINE IM: CPT | Performed by: STUDENT IN AN ORGANIZED HEALTH CARE EDUCATION/TRAINING PROGRAM

## 2022-04-13 PROCEDURE — 3017F COLORECTAL CA SCREEN DOC REV: CPT | Performed by: STUDENT IN AN ORGANIZED HEALTH CARE EDUCATION/TRAINING PROGRAM

## 2022-04-13 PROCEDURE — 4004F PT TOBACCO SCREEN RCVD TLK: CPT | Performed by: STUDENT IN AN ORGANIZED HEALTH CARE EDUCATION/TRAINING PROGRAM

## 2022-04-13 PROCEDURE — 3023F SPIROM DOC REV: CPT | Performed by: STUDENT IN AN ORGANIZED HEALTH CARE EDUCATION/TRAINING PROGRAM

## 2022-04-13 RX ORDER — DOXYCYCLINE HYCLATE 100 MG
100 TABLET ORAL 2 TIMES DAILY
Qty: 14 TABLET | Refills: 0 | Status: SHIPPED | OUTPATIENT
Start: 2022-04-13 | End: 2022-04-20

## 2022-04-13 RX ORDER — IBUPROFEN 800 MG/1
TABLET ORAL
Qty: 120 TABLET | Refills: 0 | Status: CANCELLED | OUTPATIENT
Start: 2022-04-13

## 2022-04-13 RX ORDER — LORATADINE 10 MG/1
10 TABLET ORAL DAILY
Qty: 30 TABLET | Refills: 3 | Status: SHIPPED | OUTPATIENT
Start: 2022-04-13

## 2022-04-13 RX ORDER — FLUTICASONE PROPIONATE 50 MCG
1 SPRAY, SUSPENSION (ML) NASAL DAILY
Qty: 32 G | Refills: 1 | Status: SHIPPED | OUTPATIENT
Start: 2022-04-13

## 2022-04-13 ASSESSMENT — PATIENT HEALTH QUESTIONNAIRE - PHQ9
10. IF YOU CHECKED OFF ANY PROBLEMS, HOW DIFFICULT HAVE THESE PROBLEMS MADE IT FOR YOU TO DO YOUR WORK, TAKE CARE OF THINGS AT HOME, OR GET ALONG WITH OTHER PEOPLE: 0
SUM OF ALL RESPONSES TO PHQ QUESTIONS 1-9: 0
SUM OF ALL RESPONSES TO PHQ QUESTIONS 1-9: 7
SUM OF ALL RESPONSES TO PHQ QUESTIONS 1-9: 7
6. FEELING BAD ABOUT YOURSELF - OR THAT YOU ARE A FAILURE OR HAVE LET YOURSELF OR YOUR FAMILY DOWN: 0
SUM OF ALL RESPONSES TO PHQ QUESTIONS 1-9: 0
5. POOR APPETITE OR OVEREATING: 3
SUM OF ALL RESPONSES TO PHQ9 QUESTIONS 1 & 2: 0
1. LITTLE INTEREST OR PLEASURE IN DOING THINGS: 0
SUM OF ALL RESPONSES TO PHQ QUESTIONS 1-9: 0
3. TROUBLE FALLING OR STAYING ASLEEP: 1
SUM OF ALL RESPONSES TO PHQ9 QUESTIONS 1 & 2: 0
9. THOUGHTS THAT YOU WOULD BE BETTER OFF DEAD, OR OF HURTING YOURSELF: 0
2. FEELING DOWN, DEPRESSED OR HOPELESS: 0
SUM OF ALL RESPONSES TO PHQ QUESTIONS 1-9: 0
SUM OF ALL RESPONSES TO PHQ QUESTIONS 1-9: 7
1. LITTLE INTEREST OR PLEASURE IN DOING THINGS: 0
SUM OF ALL RESPONSES TO PHQ QUESTIONS 1-9: 7
4. FEELING TIRED OR HAVING LITTLE ENERGY: 3
8. MOVING OR SPEAKING SO SLOWLY THAT OTHER PEOPLE COULD HAVE NOTICED. OR THE OPPOSITE, BEING SO FIGETY OR RESTLESS THAT YOU HAVE BEEN MOVING AROUND A LOT MORE THAN USUAL: 0
2. FEELING DOWN, DEPRESSED OR HOPELESS: 0
7. TROUBLE CONCENTRATING ON THINGS, SUCH AS READING THE NEWSPAPER OR WATCHING TELEVISION: 0

## 2022-04-13 ASSESSMENT — ENCOUNTER SYMPTOMS
DIARRHEA: 0
WHEEZING: 0
SORE THROAT: 0
CONSTIPATION: 0
ABDOMINAL PAIN: 0
SINUS PAIN: 1
VOMITING: 0
SHORTNESS OF BREATH: 0
CHEST TIGHTNESS: 0
COUGH: 0
NAUSEA: 0
SINUS PRESSURE: 1
EYE DISCHARGE: 0

## 2022-04-13 NOTE — PROGRESS NOTES
valeria  [address] PRIMARY CARE  [address] [facility] [address]  Dept: [phone]  Dept Fax: [phone]    Naheed Jaquez is a 62 y.o. female who is a Established patient, who presents today for her medical conditions/complaints as noted below:  Chief Complaint   Patient presents with    Diabetes    Hypertension    Nausea    Other     seasonal allergies         HPI:     She is here today to follow-up on hypertension to discuss her sinus issues. She says that she has been having significant allergies and has been taking Mucinex to relieve the cough and congestion but that has not been helping much. She complains of having sinus pressure and pain, tooth pain and right ear pain along with colored phlegm. She says that sometimes the mucus is so much that it makes her throw up. She is also been having chronic multiple joint pain for which she has been taking ibuprofen daily. She has history of CKD and she agreed to cut down on ibuprofen and switch to Tylenol instead. She has been diagnosed with fibromyalgia since age 26 and has been on [place]. Her TIFFANI was positive few years ago and she has not seen a rheumatologist since. She also complains of having incomplete bladder emptying, says that she tries to urinate but urine just dribbles out. She says that her blood sugars have been better since starting the Jardiance and increasing the Trulicity dose. She had an A1c done by her home health service last month and it was 6.5. She is also been trying to cut down on her smoking and smokes about half a pack a day. She says that she cannot tolerate nicotine gums or lozenges as they make her nauseous. She has never tried nicotine inhaler. Hemoglobin A1C (%)   Date Value   01/10/2022 7.2   10/11/2021 9.1   05/03/2021 9.0 (H)             ( goal A1Cis < 7)   Microalb/Crt.  Ratio (mcg/mg creat)   Date Value   03/21/2022 120 (H)     LDL Cholesterol (mg/dL)   Date Value   03/21/2022 108   05/03/2021 80   09/17/2020 83       (goal LDL is <100)   AST (U/L)   Date Value   03/21/2022 20     ALT (U/L)   Date Value   03/21/2022 22     BUN (mg/dL)   Date Value   03/21/2022 22     BP Readings from Last 3 Encounters:   04/13/22 130/89   03/14/22 127/86   02/14/22 (!) 156/94          (goal 120/80)    Past Medical History:   Diagnosis Date    Anxiety     Asthma     BMI 29.0-29.9,adult 4/22/2021    Bronchitis     Chronic obstructive pulmonary disease ([facility] [address] 9/11/2018    Depression     Fibromyalgia     Gout     HLD (hyperlipidemia) 7/1/2015    Hyperglycemia 10/8/2020    Hypertension     Neuropathy     Obesity     Obstructive sleep apnea     Osteoarthritis     Stage 3a chronic kidney disease  4/22/2021    Type 2 diabetes mellitus with diabetic polyneuropathy, with long-term current use of insulin ([facility] [address] 9/7/2017    Type II or unspecified type diabetes mellitus without mention of complication, not stated as uncontrolled     Urinary incontinence     Yeast infection 10/8/2020      Past Surgical History:   Procedure Laterality Date    APPENDECTOMY      TAKEN WITH HYSTERECTOMY    BUNIONECTOMY Left 02-22-16    with arthrodesis and 2 through 4 with hallux valgus arthroplasty 5th toe on left    COLONOSCOPY  8/17/2015    2 polyps removed, diverticulosis, internal hemorrhoids    FINGER SURGERY      re-attachment of left middle finger    FOOT SURGERY Right 11/11/2015    bunionectomy, 2-3-4th pinning, 5th arthroplasty    HERNIA REPAIR      UMBILICAL    HYSTERECTOMY, TOTAL ABDOMINAL  2001       Family History   Problem Relation Age of Onset    Cancer Mother 61        breast     High Blood Pressure Mother     Stroke Father 62    Depression Father 64        comitted sucide     Diabetes Sister 36    High Blood Pressure Sister     Depression Sister     Cancer Brother 64        prostate    Diabetes Brother     High Blood Pressure Brother     Cancer Paternal Aunt 71 colon, rectal     Cancer Paternal Grandfather 79        lung    Heart Disease Neg Hx        Social History     Tobacco Use    Smoking status: Current Every Day Smoker     Packs/day: 0.50     Years: 42.00     Pack years: 21.00     Types: Cigarettes    Smokeless tobacco: Never Used   Substance Use Topics    Alcohol use: Not Currently     Alcohol/week: 0.0 standard drinks     Comment: occassional       Current Outpatient Medications   Medication Sig Dispense Refill    loratadine (CLARITIN) 10 MG tablet Take 1 tablet by mouth daily 30 tablet 3    nicotine (NICOTROL) 10 MG inhaler Inhale 1 puff into the lungs as needed for Smoking cessation Do not use more than 6 cartridges in 24 hrs 1 each 3    doxycycline hyclate (VIBRA-TABS) 100 MG tablet Take 1 tablet by mouth 2 times daily for 7 days 14 tablet 0    fluticasone (FLONASE) 50 MCG/ACT nasal spray 1 spray by Each Nostril route daily 32 g 1    hydrOXYzine (ATARAX) 25 MG tablet TAKE 1 TABLET BY MOUTH  EVERY 8 HOURS AS NEEDED FOR ANXIETY 180 tablet 0    ibuprofen (ADVIL;MOTRIN) 800 MG tablet TAKE ONE TABLET BY MOUTH THREE TIMES DAILY AS NEEDED      empagliflozin (JARDIANCE) 10 MG tablet Take 1 tablet by mouth daily 30 tablet 5    Dulaglutide 4.5 MG/0.5ML SOPN Inject 4.5 mg into the skin once a week 4 pen 1    allopurinol (ZYLOPRIM) 300 MG tablet TAKE 1 TABLET EVERY DAY 90 tablet 1    carvedilol (COREG) 25 MG tablet TAKE 1 TABLET TWICE DAILY 180 tablet 1    fenofibrate (TRIGLIDE) 160 MG tablet Take 1 tablet by mouth daily 90 tablet 1    omeprazole (PRILOSEC) 20 MG delayed release capsule TAKE 1 CAPSULE EVERY DAY 90 capsule 3    pramipexole (MIRAPEX) 1 MG tablet Take 1 tablet by mouth nightly 90 tablet 3    milnacipran HCl (SAVELLA) 100 MG TABS Take 1 tablet by mouth 2 times daily 180 tablet 1    tiZANidine (ZANAFLEX) 4 MG tablet TAKE 1 TABLET TWICE DAILY 180 tablet 2    buPROPion (WELLBUTRIN SR) 200 MG extended release tablet TAKE 1 TABLET BY MOUTH TWICE DAILY 180 tablet 1    Insulin Glargine, 1 Unit Dial, (TOUJEO SOLOSTAR) 300 UNIT/ML SOPN Inject 52 Units into the skin daily 24 mL 2    ondansetron (ZOFRAN) 4 MG tablet Take 1 tablet by mouth 3 times daily as needed for Nausea or Vomiting 15 tablet 0    colchicine (MITIGARE) 0.6 MG capsule Take 1 capsule by mouth daily 30 capsule 2    folic acid (FOLVITE) 1 MG tablet Take 1 mg by mouth daily      Cholecalciferol (VITAMIN D) 125 MCG (5000 UT) CAPS Take 1 tablet by mouth daily 30 capsule 2    albuterol sulfate HFA (VENTOLIN HFA) 108 (90 Base) MCG/ACT inhaler Inhale 2 puffs into the lungs every 6 hours as needed for Wheezing 1 Inhaler 3    NOVOLOG FLEXPEN 100 UNIT/ML injection pen       insulin lispro, 1 Unit Dial, (HUMALOG KWIKPEN) 100 UNIT/ML SOPN 0-150 = 0 units  151-200 = 2 units  201-250 = 4 units  251-300 = 6 units  301-350 = 8 units  351-400+ = 10 units 3 pen 1    b complex vitamins capsule Take 1 capsule by mouth daily      Multiple Vitamins-Minerals (WOMENS DAILY FORMULA PO) Take by mouth      vitamin C (ASCORBIC ACID) 500 MG tablet Take 500 mg by mouth daily      Biotin 5 MG CAPS Take by mouth      tiotropium (SPIRIVA HANDIHALER) 18 MCG inhalation capsule INHALE THE CONTENTS OF 1 CAPSULE INTO THE LUNGS DAILY 90 capsule 1    Insulin Pen Needle (B-D UF III MINI PEN NEEDLES) 31G X 5 MM MISC 1 each by Does not apply route 3 times daily 100 each 3    Insulin Pen Needle (PEN NEEDLES) 32G X 5 MM MISC Droplet Pen Needle 32 gauge x 5/32\" 100 each 1    Magnesium Oxide (MAG-OXIDE) 200 MG TABS Take 1 tablet by mouth at bedtime 30 tablet 1    Handicap Placard MISC by Does not apply route Exp: 3/2027 1 each 0    Misc.  Devices MISC 1 PAIR OF DIABETIC SHOES (1 LEFT/ 1 RIGHT)  1-3 PAIRS OF INSERTS (LEFT/ RIGHT) 2 each 0    Blood Glucose Calibration (TRUE METRIX LEVEL 2) Normal SOLN       Blood Glucose Monitoring Suppl (TRUE METRIX METER) w/Device KIT       blood glucose monitor kit and supplies Please fill with True Metrix Meter and Control Solution. 1 kit 0    blood glucose monitor strips Test 3 times a day & as needed for symptoms of irregular blood glucose. Dispense sufficient amount for indicated testing frequency plus additional to accommodate PRN testing needs.  100 strip 0    Lancets MISC 1 each by Does not apply route 3 times daily 600 each 1     Current Facility-Administered Medications   Medication Dose Route Frequency Provider Last Rate Last Admin    ipratropium (ATROVENT) 0.02 % nebulizer solution 0.5 mg  0.5 mg Nebulization Once iAcha Graff MD        albuterol (PROVENTIL) nebulizer solution 2.5 mg  2.5 mg Nebulization Once Aicha Graff MD         Allergies   Allergen Reactions    Cymbalta [Duloxetine Hcl] Anaphylaxis and Other (See Comments)    Januvia [Sitagliptin] Shortness Of Breath    Cephalexin Other (See Comments)    Ciprofloxacin Other (See Comments)     muscle spasms occur  muscle spasms occur  muscle spasms occur  cramping    Duloxetine     Lyrica [Pregabalin] Other (See Comments)     Does not help after 2 weeks     Metformin Diarrhea    Metformin And Related Diarrhea    Statins Other (See Comments)     myalgias      Morphine Diarrhea, Nausea And Vomiting, Swelling and Nausea Only       Health Maintenance   Topic Date Due    Diabetic retinal exam  10/30/2018    DTaP/Tdap/Td vaccine (1 - Tdap) 01/24/2027 (Originally 9/17/2016)    Depression Monitoring  10/11/2022    Annual Wellness Visit (AWV)  10/12/2022    Low dose CT lung screening  01/06/2023    A1C test (Diabetic or Prediabetic)  01/10/2023    Diabetic microalbuminuria test  03/21/2023    Lipid screen  03/21/2023    Potassium monitoring  03/21/2023    Creatinine monitoring  03/21/2023    Diabetic foot exam  03/23/2023    Breast cancer screen  01/06/2024    Colorectal Cancer Screen  05/18/2027    Flu vaccine  Completed    Shingles Vaccine  Completed    Pneumococcal 0-64 years Vaccine  Completed    COVID-19 Vaccine  Completed    Hepatitis C screen  Completed    HIV screen  Completed    Hepatitis A vaccine  Aged Out    Hib vaccine  Aged Out    Meningococcal (ACWY) vaccine  Aged Out       Subjective:     Review of Systems   Constitutional: Positive for fatigue. Negative for appetite change and fever. HENT: Positive for congestion, sinus pressure and sinus pain. Negative for ear pain, hearing loss and sore throat. Eyes: Negative for discharge and visual disturbance. Respiratory: Negative for cough, chest tightness, shortness of breath and wheezing. Cardiovascular: Negative for chest pain, palpitations and leg swelling. Gastrointestinal: Negative for abdominal pain, constipation, diarrhea, nausea and vomiting. Genitourinary: Positive for difficulty urinating. Negative for flank pain, frequency, hematuria and urgency. Musculoskeletal: Positive for arthralgias. Negative for gait problem, joint swelling and myalgias. Skin: Negative. Neurological: Positive for headaches. Negative for dizziness, weakness and numbness. Psychiatric/Behavioral: Negative. Negative for dysphoric mood. The patient is not nervous/anxious. Objective:     Physical Exam  Vitals reviewed. Constitutional:       Appearance: Normal appearance. She is normal weight. HENT:      Head: Normocephalic and atraumatic. Right Ear: Tympanic membrane normal.      Left Ear: Tympanic membrane normal.      Ears:      Comments: Right ear canal erythematous     Nose:      Right Sinus: Maxillary sinus tenderness present. Mouth/Throat:      Mouth: Mucous membranes are moist.      Pharynx: Oropharynx is clear. Eyes:      Extraocular Movements: Extraocular movements intact. Conjunctiva/sclera: Conjunctivae normal.      Pupils: Pupils are equal, round, and reactive to light. Cardiovascular:      Rate and Rhythm: Normal rate and regular rhythm. Heart sounds: Normal heart sounds. No murmur heard. No gallop. Pulmonary:      Effort: Pulmonary effort is normal. No respiratory distress. Breath sounds: Normal breath sounds. No stridor. No wheezing. Abdominal:      General: Bowel sounds are normal. There is no distension. Palpations: Abdomen is soft. Tenderness: There is no abdominal tenderness. There is no guarding or rebound. Musculoskeletal:         General: No swelling or tenderness. Normal range of motion. Cervical back: Normal range of motion and neck supple. Skin:     General: Skin is warm and dry. Coloration: Skin is not jaundiced. Findings: No rash. Neurological:      General: No focal deficit present. Mental Status: She is alert and oriented to person, place, and time. Psychiatric:         Mood and Affect: Mood normal.         Behavior: Behavior normal.         Thought Content: Thought content normal.         Judgment: Judgment normal.       /89 (Site: Left Upper Arm, Position: Sitting, Cuff Size: Medium Adult)   Temp 97 °F (36.1 °C) (Temporal)   Ht 5' 10\" (1.778 m)   Wt (!) 308 lb (139.7 kg)   BMI 44.19 kg/m²     Assessment/Plan:   1. Acute non-recurrent maxillary sinusitis  -     doxycycline hyclate (VIBRA-TABS) 100 MG tablet; Take 1 tablet by mouth 2 times daily for 7 days, Disp-14 tablet, R-0Normal  2. Seasonal allergies  -     loratadine (CLARITIN) 10 MG tablet; Take 1 tablet by mouth daily, Disp-30 tablet, R-3Normal  -     fluticasone (FLONASE) 50 MCG/ACT nasal spray; 1 spray by Each Nostril route daily, Disp-32 g, R-1Normal  3. Chronic obstructive pulmonary disease, unspecified COPD type ([address]  4. Incomplete emptying of bladder  -     AFL - Alisson Ricketts MD, Urology, [facility]  5. TIFFANI positive  -     Makeda Gonzalez MD, Rheumatology, [place]  6. Stage 3a chronic kidney disease ([facility] [address]  7. Tobacco use  -     nicotine (NICOTROL) 10 MG inhaler;  Inhale 1 puff into the lungs as needed for Smoking cessation Do not use more than 6 cartridges in 24 hrs, Disp-1 each, R-3Normal  8. Obesity, Class III, BMI 40-49.9 (morbid obesity) ([facility] [address]  9. Need for vaccination for pneumococcus  -     Pneumococcal Conjugate PCV20, PF (Prevnar 20)    Acute sinusitis-advised to start taking daily Claritin and Flonase and if symptoms do not improve in the next 2 to 3 days then start the antibiotic course of doxycycline for 7 days. COPD-stable on Spiriva and albuterol inhaler. Discussed cutting down on smoking to avoid progression of disease. Incomplete bladder emptying-referral placed for urology    TIFFANI positive-history of positive TIFFANI with chronic joint pain. Was diagnosed with fibromyalgia at age 26, referral placed for rheumatology    Stage III CKD-advised to avoid any NSAIDs, discussed taking Tylenol for pain instead of ibuprofen    Patient was counseled on tobacco cessation. Based upon patient's motivation to change her behavior, the following plan was agreed upon: patient will try the following tobacco cessation strategies:  nicotine replacement: Nicotine inhaler, risks and benefits of this medication discussed- patient will call for any significant adverse effects. She was provided with a list of local tobacco cessation resources. Provider spent 3 minutes counseling patient. Return in about 3 months (around 7/13/2022) for Follow up DM/HTN.     Orders Placed This Encounter   Procedures    Pneumococcal Conjugate PCV20, PF (Prevnar 20)    AFL - Madhu Shultz MD, Urology, [place]     Referral Priority:   Routine     Referral Type:   Eval and Treat     Referral Reason:   Specialty Services Required     Referred to Provider:   Cecelia Ponce MD     Requested Specialty:   Urology     Number of Visits Requested:   Cedrick Artis MD, Rheumatology, [place]     Referral Priority:   Routine     Referral Type:   Eval and Treat     Referral Reason:   Specialty Services Required     Referred to Provider:   Aimee Han MD     Requested Specialty:   Rheumatology     Number of Visits Requested:   1     Orders Placed This Encounter   Medications    loratadine (CLARITIN) 10 MG tablet     Sig: Take 1 tablet by mouth daily     Dispense:  30 tablet     Refill:  3    nicotine (NICOTROL) 10 MG inhaler     Sig: Inhale 1 puff into the lungs as needed for Smoking cessation Do not use more than 6 cartridges in 24 hrs     Dispense:  1 each     Refill:  3    doxycycline hyclate (VIBRA-TABS) 100 MG tablet     Sig: Take 1 tablet by mouth 2 times daily for 7 days     Dispense:  14 tablet     Refill:  0    fluticasone (FLONASE) 50 MCG/ACT nasal spray     Si spray by Each Nostril route daily     Dispense:  32 g     Refill:  1       Patient given educational materials - see patient instructions. Discussed use, benefit, and side effects of prescribed medications. All patientquestions answered. Pt voiced understanding. Reviewed health maintenance. Instructedto continue current medications, diet and exercise. Patient agreed with treatmentplan. Follow up as directed.      Electronically signed by Jacqueline Wilkinson MD on 2022 at 10:34 AM

## 2022-04-27 DIAGNOSIS — J01.00 ACUTE NON-RECURRENT MAXILLARY SINUSITIS: Primary | ICD-10-CM

## 2022-04-27 DIAGNOSIS — R33.9 INCOMPLETE EMPTYING OF BLADDER: Primary | ICD-10-CM

## 2022-04-27 DIAGNOSIS — R76.8 ANA POSITIVE: ICD-10-CM

## 2022-05-11 ENCOUNTER — PATIENT MESSAGE (OUTPATIENT)
Dept: FAMILY MEDICINE CLINIC | Age: 62
End: 2022-05-11

## 2022-05-11 DIAGNOSIS — R11.0 NAUSEA: ICD-10-CM

## 2022-05-11 RX ORDER — ONDANSETRON 4 MG/1
4 TABLET, FILM COATED ORAL 3 TIMES DAILY PRN
Qty: 15 TABLET | Refills: 0 | Status: SHIPPED | OUTPATIENT
Start: 2022-05-11 | End: 2022-06-08 | Stop reason: SDUPTHER

## 2022-05-11 NOTE — TELEPHONE ENCOUNTER
From: Karly Banegas  To: Dr. Bernice Habermann: 5/11/2022 9:07 AM EDT  Subject: Mihai Sanchez and Zofran    Hello! Well I have decided to stop Jardiance. It is making me constipated, leg cramps, and a huge appetite! Not good. Still need a refill on Zofran, please!!! Still experiencing extreme nausea and vomiting. Thanks!   Hernesto Resendiz

## 2022-05-12 ENCOUNTER — PATIENT MESSAGE (OUTPATIENT)
Dept: FAMILY MEDICINE CLINIC | Age: 62
End: 2022-05-12

## 2022-05-12 DIAGNOSIS — B37.31 VAGINAL CANDIDIASIS: ICD-10-CM

## 2022-05-12 NOTE — TELEPHONE ENCOUNTER
Bonifacio Villaseñor is calling to request a refill on the following medication(s):    Medication Request:  Requested Prescriptions     Pending Prescriptions Disp Refills    fluconazole (DIFLUCAN) 150 MG tablet [Pharmacy Med Name: FLUCONAZOLE 150MG TABLETS] 1 tablet 0     Sig: TAKE 1 TABLET BY MOUTH 1 TIME FOR 1 DOSE       Last Visit Date (If Applicable):  9/02/9317    Next Visit Date:    7/19/2022

## 2022-05-12 NOTE — TELEPHONE ENCOUNTER
From: Jason Sosa  To: Dr. Hardwick Figures: 5/12/2022 7:51 AM EDT  Subject: Yeast infection    Today woke up to a screaming yeast infection. Would appreciate diflucan sent to [facility]. Thanks!   Rory Cat

## 2022-05-13 RX ORDER — FLUCONAZOLE 150 MG/1
TABLET ORAL
Qty: 1 TABLET | Refills: 0 | OUTPATIENT
Start: 2022-05-13

## 2022-05-13 RX ORDER — FLUCONAZOLE 100 MG/1
100 TABLET ORAL DAILY
Qty: 3 TABLET | Refills: 0 | Status: SHIPPED | OUTPATIENT
Start: 2022-05-13 | End: 2022-05-20

## 2022-05-17 ENCOUNTER — PATIENT MESSAGE (OUTPATIENT)
Dept: FAMILY MEDICINE CLINIC | Age: 62
End: 2022-05-17

## 2022-05-18 NOTE — TELEPHONE ENCOUNTER
Bisi Hull is calling to request a refill on the following medication(s):    Medication Request:  Requested Prescriptions     Pending Prescriptions Disp Refills    Dulaglutide 4.5 MG/0.5ML SOPN 4 pen 3     Sig: Inject 4.5 mg into the skin once a week       Last Visit Date (If Applicable):  5/41/2045    Next Visit Date:    7/19/2022

## 2022-05-18 NOTE — TELEPHONE ENCOUNTER
From: Carolee Fernandez  To: Dr. Crupm Flatten: 5/17/2022 8:44 PM EDT  Subject: Trulicty    Hi on my last 3 syringes of Trulicity 7.5/NUQGKS. Probably [facility] needs a new prescription with refills.   Carolee Fernandez

## 2022-05-29 DIAGNOSIS — F41.9 ANXIETY: ICD-10-CM

## 2022-05-29 DIAGNOSIS — M10.9 GOUT, UNSPECIFIED CAUSE, UNSPECIFIED CHRONICITY, UNSPECIFIED SITE: ICD-10-CM

## 2022-05-29 DIAGNOSIS — I10 ESSENTIAL HYPERTENSION: ICD-10-CM

## 2022-05-29 DIAGNOSIS — E78.1 HYPERTRIGLYCERIDEMIA: ICD-10-CM

## 2022-05-31 RX ORDER — FENOFIBRATE 160 MG/1
160 TABLET ORAL DAILY
Qty: 90 TABLET | Refills: 3 | Status: SHIPPED | OUTPATIENT
Start: 2022-05-31

## 2022-05-31 RX ORDER — MILNACIPRAN HYDROCHLORIDE 100 MG/1
TABLET, FILM COATED ORAL
Qty: 180 TABLET | Refills: 3 | Status: SHIPPED | OUTPATIENT
Start: 2022-05-31 | End: 2022-08-17 | Stop reason: SDUPTHER

## 2022-05-31 RX ORDER — ALLOPURINOL 300 MG/1
TABLET ORAL
Qty: 90 TABLET | Refills: 3 | Status: SHIPPED | OUTPATIENT
Start: 2022-05-31

## 2022-05-31 RX ORDER — BUPROPION HYDROCHLORIDE 200 MG/1
TABLET, EXTENDED RELEASE ORAL
Qty: 180 TABLET | Refills: 3 | Status: SHIPPED | OUTPATIENT
Start: 2022-05-31

## 2022-05-31 RX ORDER — CARVEDILOL 25 MG/1
TABLET ORAL
Qty: 180 TABLET | Refills: 3 | Status: SHIPPED | OUTPATIENT
Start: 2022-05-31

## 2022-05-31 NOTE — TELEPHONE ENCOUNTER
Naheed Jaquez is calling to request a refill on the following medication(s):    Medication Request:  Requested Prescriptions     Pending Prescriptions Disp Refills    buPROPion (WELLBUTRIN SR) 200 MG extended release tablet [Pharmacy Med Name: BUPROPION  200MG  TAB  SR 12HR] 180 tablet 3     Sig: TAKE 1 TABLET BY MOUTH  TWICE DAILY    SAVELLA 100 MG TABS [Pharmacy Med Name: SAVELLA  100MG  TAB] 180 tablet 3     Sig: TAKE 1 TABLET BY MOUTH  TWICE DAILY    allopurinol (ZYLOPRIM) 300 MG tablet [Pharmacy Med Name: Allopurinol 300 MG Oral Tablet] 90 tablet 3     Sig: TAKE 1 TABLET BY MOUTH  DAILY    fenofibrate (TRIGLIDE) 160 MG tablet [Pharmacy Med Name: FENOFIBRATE  160MG  TAB] 90 tablet 3     Sig: TAKE 1 TABLET BY MOUTH  DAILY    carvedilol (COREG) 25 MG tablet [Pharmacy Med Name: Carvedilol 25 MG Oral Tablet] 180 tablet 3     Sig: TAKE 1 TABLET BY MOUTH  TWICE DAILY       Last Visit Date (If Applicable):  5/28/3838    Next Visit Date:    7/19/2022

## 2022-06-01 ENCOUNTER — OFFICE VISIT (OUTPATIENT)
Dept: PODIATRY | Age: 62
End: 2022-06-01
Payer: MEDICARE

## 2022-06-01 VITALS — WEIGHT: 293 LBS | HEIGHT: 70 IN | BODY MASS INDEX: 41.95 KG/M2

## 2022-06-01 DIAGNOSIS — I73.9 PVD (PERIPHERAL VASCULAR DISEASE) (HCC): ICD-10-CM

## 2022-06-01 DIAGNOSIS — L97.522 ULCER OF LEFT FOOT, WITH FAT LAYER EXPOSED (HCC): ICD-10-CM

## 2022-06-01 DIAGNOSIS — M79.604 PAIN IN BOTH LOWER EXTREMITIES: ICD-10-CM

## 2022-06-01 DIAGNOSIS — L60.0 INGROWN NAIL: ICD-10-CM

## 2022-06-01 DIAGNOSIS — M79.605 PAIN IN BOTH LOWER EXTREMITIES: ICD-10-CM

## 2022-06-01 DIAGNOSIS — L85.3 XEROSIS CUTIS: ICD-10-CM

## 2022-06-01 DIAGNOSIS — E11.51 TYPE II DIABETES MELLITUS WITH PERIPHERAL CIRCULATORY DISORDER (HCC): Primary | ICD-10-CM

## 2022-06-01 PROCEDURE — G8417 CALC BMI ABV UP PARAM F/U: HCPCS | Performed by: PODIATRIST

## 2022-06-01 PROCEDURE — 11042 DBRDMT SUBQ TIS 1ST 20SQCM/<: CPT | Performed by: PODIATRIST

## 2022-06-01 PROCEDURE — 4004F PT TOBACCO SCREEN RCVD TLK: CPT | Performed by: PODIATRIST

## 2022-06-01 PROCEDURE — 3051F HG A1C>EQUAL 7.0%<8.0%: CPT | Performed by: PODIATRIST

## 2022-06-01 PROCEDURE — G8427 DOCREV CUR MEDS BY ELIG CLIN: HCPCS | Performed by: PODIATRIST

## 2022-06-01 PROCEDURE — 2022F DILAT RTA XM EVC RTNOPTHY: CPT | Performed by: PODIATRIST

## 2022-06-01 PROCEDURE — 11721 DEBRIDE NAIL 6 OR MORE: CPT | Performed by: PODIATRIST

## 2022-06-01 PROCEDURE — 3017F COLORECTAL CA SCREEN DOC REV: CPT | Performed by: PODIATRIST

## 2022-06-01 PROCEDURE — 99214 OFFICE O/P EST MOD 30 MIN: CPT | Performed by: PODIATRIST

## 2022-06-01 RX ORDER — LOSARTAN POTASSIUM 25 MG/1
TABLET ORAL
COMMUNITY
Start: 2022-04-04 | End: 2022-07-19

## 2022-06-01 NOTE — PROGRESS NOTES
[address] [facility] PODIATRY [facility]  [address] [facility] [address] [facility] [address]  Dept: [phone]  Dept Fax: [phone]    DIABETIC PROGRESS NOTE  Date of patient's visit: 6/1/2022  Patient's Name:  Jarad Ngo YOB: 1960            Patient Care Team:  Jameson Wayne MD as PCP - General (Family Medicine)  Jameson Wayne MD as PCP - [facility] Empaneled Provider          Chief Complaint   Patient presents with    Diabetes     diabetic foot care    Peripheral Neuropathy     bilateral foot        Subjective:   Jarad Ngo comes to clinic for Diabetes (diabetic foot care) and Peripheral Neuropathy (bilateral foot )    she is a diabetic and states that needs toenails trimmed today. Pt currently has complaint of thickened, elongated nails that they cannot manage by themselves. Pt's primary care physician is Jameson Wayne MD last seen 4/13/22. Pt's last blood sugar/a1c was 7.2-1/10/22. Pt has a new complaint of increased size in ulcer, left great toe. Lab Results   Component Value Date    LABA1C 7.2 01/10/2022      Complains of numbness in the feet bilat.   Past Medical History:   Diagnosis Date    Anxiety     Asthma     BMI 29.0-29.9,adult 4/22/2021    Bronchitis     Chronic obstructive pulmonary disease ([address] 9/11/2018    Depression     Fibromyalgia     Gout     HLD (hyperlipidemia) 7/1/2015    Hyperglycemia 10/8/2020    Hypertension     Neuropathy     Obesity     Obstructive sleep apnea     Osteoarthritis     Stage 3a chronic kidney disease  4/22/2021    Type 2 diabetes mellitus with diabetic polyneuropathy, with long-term current use of insulin ([address] 9/7/2017    Type II or unspecified type diabetes mellitus without mention of complication, not stated as uncontrolled     Urinary incontinence     Yeast infection 10/8/2020       Allergies   Allergen Reactions    Cymbalta [Duloxetine Hcl] Anaphylaxis and Other (See Comments)    Januvia [Sitagliptin] Shortness Of Breath    Cephalexin Other (See Comments)    Ciprofloxacin Other (See Comments)     muscle spasms occur  muscle spasms occur  muscle spasms occur  cramping    Duloxetine     Lyrica [Pregabalin] Other (See Comments)     Does not help after 2 weeks     Metformin Diarrhea    Metformin And Related Diarrhea    Statins Other (See Comments)     myalgias      Morphine Diarrhea, Nausea And Vomiting, Swelling and Nausea Only     Current Outpatient Medications on File Prior to Visit   Medication Sig Dispense Refill    losartan (COZAAR) 25 MG tablet       buPROPion (WELLBUTRIN SR) 200 MG extended release tablet TAKE 1 TABLET BY MOUTH  TWICE DAILY 180 tablet 3    SAVELLA 100 MG TABS TAKE 1 TABLET BY MOUTH  TWICE DAILY 180 tablet 3    allopurinol (ZYLOPRIM) 300 MG tablet TAKE 1 TABLET BY MOUTH  DAILY 90 tablet 3    fenofibrate (TRIGLIDE) 160 MG tablet TAKE 1 TABLET BY MOUTH  DAILY 90 tablet 3    carvedilol (COREG) 25 MG tablet TAKE 1 TABLET BY MOUTH  TWICE DAILY 180 tablet 3    Dulaglutide 4.5 MG/0.5ML SOPN Inject 4.5 mg into the skin once a week 4 pen 3    ondansetron (ZOFRAN) 4 MG tablet Take 1 tablet by mouth 3 times daily as needed for Nausea or Vomiting 15 tablet 0    loratadine (CLARITIN) 10 MG tablet Take 1 tablet by mouth daily 30 tablet 3    nicotine (NICOTROL) 10 MG inhaler Inhale 1 puff into the lungs as needed for Smoking cessation Do not use more than 6 cartridges in 24 hrs 1 each 3    fluticasone (FLONASE) 50 MCG/ACT nasal spray 1 spray by Each Nostril route daily 32 g 1    hydrOXYzine (ATARAX) 25 MG tablet TAKE 1 TABLET BY MOUTH  EVERY 8 HOURS AS NEEDED FOR ANXIETY 180 tablet 0    Insulin Pen Needle (B-D UF III MINI PEN NEEDLES) 31G X 5 MM MISC 1 each by Does not apply route 3 times daily 100 each 3    Insulin Pen Needle (PEN NEEDLES) 32G X 5 MM MISC Droplet Pen Needle 32 gauge x 5/32\" 100 each 1    ibuprofen (ADVIL;MOTRIN) 800 MG tablet TAKE ONE TABLET BY MOUTH THREE TIMES DAILY AS NEEDED      empagliflozin (JARDIANCE) 10 MG tablet Take 1 tablet by mouth daily 30 tablet 5    Magnesium Oxide (MAG-OXIDE) 200 MG TABS Take 1 tablet by mouth at bedtime 30 tablet 1    Dulaglutide 4.5 MG/0.5ML SOPN Inject 4.5 mg into the skin once a week 4 pen 1    Handicap Placard MISC by Does not apply route Exp: 3/2027 1 each 0    omeprazole (PRILOSEC) 20 MG delayed release capsule TAKE 1 CAPSULE EVERY DAY 90 capsule 3    pramipexole (MIRAPEX) 1 MG tablet Take 1 tablet by mouth nightly 90 tablet 3    tiZANidine (ZANAFLEX) 4 MG tablet TAKE 1 TABLET TWICE DAILY 180 tablet 2    Insulin Glargine, 1 Unit Dial, (TOUJEO SOLOSTAR) 300 UNIT/ML SOPN Inject 52 Units into the skin daily 24 mL 2    colchicine (MITIGARE) 0.6 MG capsule Take 1 capsule by mouth daily 30 capsule 2    Misc. Devices MISC 1 PAIR OF DIABETIC SHOES (1 LEFT/ 1 RIGHT)  1-3 PAIRS OF INSERTS (LEFT/ RIGHT) 2 each 0    Blood Glucose Calibration (TRUE METRIX LEVEL 2) Normal SOLN       Blood Glucose Monitoring Suppl (TRUE METRIX METER) w/Device KIT       blood glucose monitor kit and supplies Please fill with True Metrix Meter and Control Solution. 1 kit 0    blood glucose monitor strips Test 3 times a day & as needed for symptoms of irregular blood glucose. Dispense sufficient amount for indicated testing frequency plus additional to accommodate PRN testing needs.  100 strip 0    Lancets MISC 1 each by Does not apply route 3 times daily 993 each 1    folic acid (FOLVITE) 1 MG tablet Take 1 mg by mouth daily      Cholecalciferol (VITAMIN D) 125 MCG (5000 UT) CAPS Take 1 tablet by mouth daily 30 capsule 2    albuterol sulfate HFA (VENTOLIN HFA) 108 (90 Base) MCG/ACT inhaler Inhale 2 puffs into the lungs every 6 hours as needed for Wheezing 1 Inhaler 3    NOVOLOG FLEXPEN 100 UNIT/ML injection pen       insulin lispro, 1 Unit Dial, (HUMALOG KWIKPEN) 100 UNIT/ML SOPN 0-150 = 0 units  151-200 = 2 units  201-250 = 4 units  251-300 = 6 units  301-350 = 8 units  351-400+ = 10 units 3 pen 1    b complex vitamins capsule Take 1 capsule by mouth daily      Multiple Vitamins-Minerals (WOMENS DAILY FORMULA PO) Take by mouth      vitamin C (ASCORBIC ACID) 500 MG tablet Take 500 mg by mouth daily      Biotin 5 MG CAPS Take by mouth      tiotropium (SPIRIVA HANDIHALER) 18 MCG inhalation capsule INHALE THE CONTENTS OF 1 CAPSULE INTO THE LUNGS DAILY 90 capsule 1     Current Facility-Administered Medications on File Prior to Visit   Medication Dose Route Frequency Provider Last Rate Last Admin    ipratropium (ATROVENT) 0.02 % nebulizer solution 0.5 mg  0.5 mg Nebulization Once Ruben Cruz MD        albuterol (PROVENTIL) nebulizer solution 2.5 mg  2.5 mg Nebulization Once Ruben Cruz MD           Review of Systems    Review of Systems:   History obtained from chart review and the patient  General ROS: negative for - chills, fatigue, fever, night sweats or weight gain  Constitutional: Negative for chills, diaphoresis, fatigue, fever and unexpected weight change. Musculoskeletal: Positive for arthralgias, gait problem and joint swelling. Neurological ROS: negative for - behavioral changes, confusion, headaches or seizures. Negative for weakness and numbness. Dermatological ROS: negative for - mole changes, rash  Cardiovascular: Negative for leg swelling. Gastrointestinal: Negative for constipation, diarrhea, nausea and vomiting. Objective:  Dermatologic Exam:  Full thickness ulcer noted to left distal plantar hallux measures 1.0 cm x 1.0 cm x 0.3 cm with 100%granular base. Skin No rashes or nodules noted. .   Skin is thin, with flaky sloughing skin as well as decreased hair growth to the lower leg  Small red hemosiderin deposits seen dorsal foot   Musculoskeletal:     1st MPJ ROM decreased, Bilateral.  Muscle strength 5/5, Bilateral.  Pain present upon palpation of toenails 1-5, Bilateral. decreased medial longitudinal arch, Bilateral.  Ankle ROM decreased,Bilateral.    Dorsally contracted digits present digits 2, Bilateral.     Vascular: DP pulses 1/4 bilateral.  PT pulses 0/4 bilateral.   CFT <5 seconds, Bilateral.  Hair growth absent to the level of the digits, Bilateral.  Edema present, Bilateral.  Varicosities absent, Bilateral. Erythema absent, Bilateral    Neurological: Sensation diminshed to light touch to level of digits, Bilateral.  Protective sensation intact 6/10 sites via 5.07/10g [place] Monofilament, Bilateral.  negative Tinel's, Bilateral.  negative Valleix sign, Bilateral.      Integument: Warm, dry, supple, Bilateral.  Interdigital maceration absent to web spaces 4, Bilateral.  Nails 1-5 left and 1-5 right thickened > 3.0 mm, dystrophic and crumbly, discolored with subungual debris. Fissures absent, Bilateral.   General: AAO x 3 in NAD.     Derm  Toenail Description  Sites of Onychomycosis Involvement (Check affected area)  [x] [x] [x] [x] [x] [x] [x] [x] [x] [x]  5 4 3 2 1 1 2 3 4 5                          Right                                        Left    Thickness  [x] [x] [x] [x] [x] [x] [x] [x] [x] [x]  5 4 3 2 1 1 2 3 4 5                         Right                                        Left    Dystrophic Changes   [x] [x] [x] [x] [x] [x] [x] [x] [x] [x]  5 4 3 2 1 1 2 3 4 5                         Right                                        Left    Color   [x] [x] [x] [x] [x] [x] [x] [x] [x] [x]  5 4 3 2 1 1 2 3 4 5                          Right                                        Left    Incurvation/Ingrowin   [] [] [] [] [] [] [] [] [] []  5 4 3 2 1 1 2 3 4 5                         Right                                        Left    Inflammation/Pain   [x] [x] [x] [x] [x] [x] [x] [x] [x] [x]  5 4 3 2 1 1 2 3 4 5                         Right                                        Left        Visual inspection:  Deformity: hammertoe deformity thomas feet  amputation: absent  Skin lesions: as above  Edema: right- 2+ pitting edema, left- 2+ pitting edema    Sensory exam:  Monofilament sensation: abnormal - 6/10 via SW 5.07/10g monofilament to the plantar foot bilateral feet    Pulses: abnormal - 1/4 dorsalis pedis pulse and 0/4 Posterior tibial pulse,   Pinprick: Impaired  Proprioception: Impaired  Vibration (128 Hz): Impaired       DM with PVD       [x]Yes    []No    Xrays, 3 views, left foot:     No evidence of osteolytic lesions. Left hallux plantarflexion noted at hallux IPJ. Assessment:  65 y.o. female with:   Diagnosis Orders   1. Type II diabetes mellitus with peripheral circulatory disorder ([facility])  45082 - MT DEBRIDEMENT OF NAILS, 6 OR MORE    HM DIABETES FOOT EXAM    MT DEBRIDEMENT, SKIN, SUB-Q TISSUE,=<20 SQ CM   2. Ingrown nail  32610 - MT DEBRIDEMENT OF NAILS, 6 OR MORE    HM DIABETES FOOT EXAM   3. Pain in both lower extremities  30071 - MT DEBRIDEMENT OF NAILS, 6 OR MORE    HM DIABETES FOOT EXAM   4. Xerosis cutis  79289 - MT DEBRIDEMENT OF NAILS, 6 OR MORE    HM DIABETES FOOT EXAM   5. PVD (peripheral vascular disease) ([facility])  17397 - MT DEBRIDEMENT OF NAILS, 6 OR MORE    HM DIABETES FOOT EXAM   6. Ulcer of left foot, with fat layer exposed ([facility])  XR FOOT LEFT (MIN 3 VIEWS)    MT DEBRIDEMENT, SKIN, SUB-Q TISSUE,=<20 SQ CM           Q7   []Yes    []No                Q8   [x]Yes    []No                     Q9   []Yes    []No    Plan:   Pt was evaluated and examined. Patient was given personalized discharge instructions. Nails 1-10 were debrided sharply in length and thickness with a nipper and , without incident. Sharp excisional debridement down thru and including subcutaneous tissue was done after topical EMLA cream was applied with a currett and tissue nippers. .  All necrotic tissue was removed. Hemostasis was achieved via direct pressure. Sterile dressings were placed on the patient. 0/10 post procedural pain.     Discussed with patient that surgical intervention is recommend to decreased pressure to left distal hallux. Recommend flexor tenotomy of left hallux at next visit. Pt agrees and would like to proceed with surgery at this time. Discussed that toe purchase will be decreased with tenotomy. She understands all risks and benefits. All labs were reviewed and all imagining including the above findings were reviewed PRIOR to the patients arrival and with the patient today. Previous patient encounter was reviewed. Encounters from the patients other medical providers were reviewed and noted. Time was spent educating the patient on proper care of the feet and ankles. All the above diagnosis were addressed at todays visit and all questions were answered. A total of 30 minutes was spent with this patients encounter which included charting after the patients visit     Pt will follow up in 1 months or sooner if any problems arise. Diagnosis was discussed with the pt and all of their questions were answered in detail. Proper foot hygiene and care was discussed with the pt. Informed patient on proper diabetic foot care and importance of tight glycemic control. Patient to check feet daily and contact the office with any questions/problems/concerns.    Other comorbidity noted and will be managed by PCP.  6/1/2022    Electronically signed by Guy Reyes DPM on 6/1/2022 at 10:08 AM  6/1/2022

## 2022-06-08 DIAGNOSIS — R11.0 NAUSEA: Primary | ICD-10-CM

## 2022-06-08 RX ORDER — ONDANSETRON 4 MG/1
4 TABLET, FILM COATED ORAL DAILY PRN
Qty: 30 TABLET | Refills: 1 | Status: SHIPPED | OUTPATIENT
Start: 2022-06-08 | End: 2022-06-29 | Stop reason: SDUPTHER

## 2022-06-29 DIAGNOSIS — R11.0 NAUSEA: ICD-10-CM

## 2022-06-29 DIAGNOSIS — M1A.09X1 IDIOPATHIC CHRONIC GOUT OF MULTIPLE SITES WITH TOPHUS: ICD-10-CM

## 2022-06-29 DIAGNOSIS — F41.9 ANXIETY: ICD-10-CM

## 2022-06-29 RX ORDER — ONDANSETRON 4 MG/1
TABLET, FILM COATED ORAL
Qty: 15 TABLET | Refills: 0 | Status: SHIPPED | OUTPATIENT
Start: 2022-06-29 | End: 2022-07-26 | Stop reason: SDUPTHER

## 2022-06-29 RX ORDER — HYDROXYZINE HYDROCHLORIDE 25 MG/1
TABLET, FILM COATED ORAL
Qty: 180 TABLET | Refills: 0 | Status: SHIPPED | OUTPATIENT
Start: 2022-06-29 | End: 2022-08-17 | Stop reason: SDUPTHER

## 2022-06-29 RX ORDER — COLCHICINE 0.6 MG/1
0.6 CAPSULE ORAL DAILY
Qty: 90 CAPSULE | Refills: 3 | Status: SHIPPED | OUTPATIENT
Start: 2022-06-29

## 2022-06-29 NOTE — TELEPHONE ENCOUNTER
Katerin Collazo is calling to request a refill on the following medication(s):    Medication Request:  Requested Prescriptions     Pending Prescriptions Disp Refills    ondansetron (ZOFRAN) 4 MG tablet [Pharmacy Med Name: ONDANSETRON  4MG  TAB] 15 tablet      Sig: TAKE 1 TABLET BY MOUTH 3  TIMES DAILY AS NEEDED FOR  NAUSEA OR VOMITING    hydrOXYzine HCl (ATARAX) 25 MG tablet [Pharmacy Med Name: HYDROXYZINE HYDROCHLORIDE  25MG  TAB] 180 tablet 0     Sig: TAKE 1 TABLET BY MOUTH  EVERY 8 HOURS AS NEEDED FOR ANXIETY    colchicine (MITIGARE) 0.6 MG capsule [Pharmacy Med Name: COLCHICINE  0.6MG  CAP] 90 capsule 3     Sig: TAKE 1 CAPSULE BY MOUTH  DAILY       Last Visit Date (If Applicable):  4/33/9580    Next Visit Date:    7/19/2022

## 2022-07-05 RX ORDER — DULAGLUTIDE 4.5 MG/.5ML
INJECTION, SOLUTION SUBCUTANEOUS
Qty: 6 ML | Refills: 3 | Status: SHIPPED | OUTPATIENT
Start: 2022-07-05

## 2022-07-05 NOTE — TELEPHONE ENCOUNTER
Regla Torrez is calling to request a refill on the following medication(s):    Medication Request:  Requested Prescriptions     Pending Prescriptions Disp Refills    [employer] 4.5 LG/3.3WA SOPN [Pharmacy Med Name: [employer]  2.0JH 4.0WD SOLUTION PEN-INJECTOR  PEN] 6 mL 3     Sig: INJECT THE CONTENTS OF ONE  PEN SUBCUTANEOUSLY WEEKLY  AS DIRECTED       Last Visit Date (If Applicable):  9/84/7507    Next Visit Date:    7/19/2022

## 2022-07-11 ENCOUNTER — PROCEDURE VISIT (OUTPATIENT)
Dept: PODIATRY | Age: 62
End: 2022-07-11
Payer: MEDICARE

## 2022-07-11 VITALS — BODY MASS INDEX: 44.41 KG/M2 | WEIGHT: 293 LBS | HEIGHT: 68 IN

## 2022-07-11 DIAGNOSIS — L97.522 ULCER OF LEFT FOOT, WITH FAT LAYER EXPOSED (HCC): ICD-10-CM

## 2022-07-11 DIAGNOSIS — M20.42 HAMMER TOE OF LEFT FOOT: ICD-10-CM

## 2022-07-11 DIAGNOSIS — E11.51 TYPE II DIABETES MELLITUS WITH PERIPHERAL CIRCULATORY DISORDER (HCC): Primary | ICD-10-CM

## 2022-07-11 PROCEDURE — G8427 DOCREV CUR MEDS BY ELIG CLIN: HCPCS | Performed by: PODIATRIST

## 2022-07-11 PROCEDURE — 2022F DILAT RTA XM EVC RTNOPTHY: CPT | Performed by: PODIATRIST

## 2022-07-11 PROCEDURE — G8417 CALC BMI ABV UP PARAM F/U: HCPCS | Performed by: PODIATRIST

## 2022-07-11 PROCEDURE — 4004F PT TOBACCO SCREEN RCVD TLK: CPT | Performed by: PODIATRIST

## 2022-07-11 PROCEDURE — 3051F HG A1C>EQUAL 7.0%<8.0%: CPT | Performed by: PODIATRIST

## 2022-07-11 PROCEDURE — 28232 INCISION OF TOE TENDON: CPT | Performed by: PODIATRIST

## 2022-07-11 PROCEDURE — 3017F COLORECTAL CA SCREEN DOC REV: CPT | Performed by: PODIATRIST

## 2022-07-11 PROCEDURE — 99214 OFFICE O/P EST MOD 30 MIN: CPT | Performed by: PODIATRIST

## 2022-07-11 NOTE — PROGRESS NOTES
[address] [facility] PODIATRY [facility]  [address] [address] [place] [id number]  Dept: [phone]  Dept Fax: [phone]    RETURN PATIENT PROGRESS NOTE  Date of patient's visit: 7/11/2022  Patient's Name:  Meenu Wells YOB: 1960            Patient Care Team:  Octavio Begum MD as PCP - General (Family Medicine)  Octavio Begum MD as PCP - [address] [place] [address] [facility]       Meenu Wells 65 y.o. female that presents for follow-up of   Chief Complaint   Patient presents with    Toe Pain     left great toe T&C procedure & right great toe     Pt's primary care physician is Octavio Begum MD last seen 04/13/2022  Symptoms began 5 month(s) ago and are unchanged . Patient relates pain is Present to left great toe. Pain is rated 3 out of 10 and is described as intermittent, mild. Treatments prior to today's visit include: debridement. Currently denies F/C/N/V.      Allergies   Allergen Reactions    Cymbalta [Duloxetine Hcl] Anaphylaxis and Other (See Comments)    Januvia [Sitagliptin] Shortness Of Breath    Cephalexin Other (See Comments)    Ciprofloxacin Other (See Comments)     muscle spasms occur  muscle spasms occur  muscle spasms occur  cramping    Duloxetine     Lyrica [Pregabalin] Other (See Comments)     Does not help after 2 weeks     Metformin Diarrhea    Metformin And Related Diarrhea    Statins Other (See Comments)     myalgias      Morphine Diarrhea, Nausea And Vomiting, Swelling and Nausea Only       Past Medical History:   Diagnosis Date    Anxiety     Asthma     BMI 29.0-29.9,adult 4/22/2021    Bronchitis     Chronic obstructive pulmonary disease ([facility] [address] 9/11/2018    Depression     Fibromyalgia     Gout     HLD (hyperlipidemia) 7/1/2015    Hyperglycemia 10/8/2020    Hypertension     Neuropathy     Obesity     Obstructive sleep apnea     Osteoarthritis     Stage 3a chronic kidney disease  4/22/2021    Type 2 diabetes mellitus with diabetic polyneuropathy, with long-term current use of insulin ([facility] [address] 9/7/2017    Type II or unspecified type diabetes mellitus without mention of complication, not stated as uncontrolled     Urinary incontinence     Yeast infection 10/8/2020       Prior to Admission medications    Medication Sig Start Date End Date Taking?  Authorizing Provider   TRULICITY 4.5 PT/0.4MR SOPN INJECT THE CONTENTS OF ONE  PEN SUBCUTANEOUSLY WEEKLY  AS DIRECTED 7/5/22  Yes Myrna Mittal MD   ondansetron (ZOFRAN) 4 MG tablet TAKE 1 TABLET BY MOUTH 3  TIMES DAILY AS NEEDED FOR  NAUSEA OR VOMITING 6/29/22 7/29/22 Yes AMARILYS Hudson CNP   hydrOXYzine HCl (ATARAX) 25 MG tablet TAKE 1 TABLET BY MOUTH  EVERY 8 HOURS AS NEEDED FOR ANXIETY 6/29/22  Yes AMARILYS Hudson CNP   colchicine (MITIGARE) 0.6 MG capsule TAKE 1 CAPSULE BY MOUTH  DAILY 6/29/22  Yes AMARILYS Hudson CNP   losartan (COZAAR) 25 MG tablet  4/4/22  Yes Historical Provider, MD   buPROPion (WELLBUTRIN SR) 200 MG extended release tablet TAKE 1 TABLET BY MOUTH  TWICE DAILY 5/31/22  Yes Myrna Mittal MD   SAVELLA 100 MG TABS TAKE 1 TABLET BY MOUTH  TWICE DAILY 5/31/22  Yes Myrna Mittal MD   allopurinol (ZYLOPRIM) 300 MG tablet TAKE 1 TABLET BY MOUTH  DAILY 5/31/22  Yes Myrna Mittal MD   fenofibrate (TRIGLIDE) 160 MG tablet TAKE 1 TABLET BY MOUTH  DAILY 5/31/22  Yes Myrna Mittal MD   carvedilol (COREG) 25 MG tablet TAKE 1 TABLET BY MOUTH  TWICE DAILY 5/31/22  Yes Myrna Mittal MD   loratadine (CLARITIN) 10 MG tablet Take 1 tablet by mouth daily 4/13/22  Yes Myrna Mittal MD   nicotine (NICOTROL) 10 MG inhaler Inhale 1 puff into the lungs as needed for Smoking cessation Do not use more than 6 cartridges in 24 hrs 4/13/22  Yes Myrna Mittal MD   fluticasone (FLONASE) 50 MCG/ACT nasal spray 1 spray by Each Nostril route daily 4/13/22  Yes Myrna Mittal MD   Insulin Pen Needle (B-D UF III MINI PEN NEEDLES) 31G X 5 MM MISC 1 each by Does not apply route 3 times daily 4/1/22  Yes Indy Galvez MD   Insulin Pen Needle (PEN NEEDLES) 32G X 5 MM MISC Droplet Pen Needle 32 gauge x 5/32\" 3/15/22  Yes Indy Galvez MD   ibuprofen (ADVIL;MOTRIN) 800 MG tablet TAKE ONE TABLET BY MOUTH THREE TIMES DAILY AS NEEDED 2/21/22  Yes Historical Provider, MD   empagliflozin (JARDIANCE) 10 MG tablet Take 1 tablet by mouth daily 3/14/22  Yes Indy Galvez MD   Magnesium Oxide (MAG-OXIDE) 200 MG TABS Take 1 tablet by mouth at bedtime 3/14/22  Yes Indy Galvez MD   Dulaglutide 4.5 MG/0.5ML SOPN Inject 4.5 mg into the skin once a week 3/14/22  Yes Indy Galvez MD   Handicap Placard MISC by Does not apply route Exp: 3/2027 3/14/22  Yes Indy Galvez MD   omeprazole (PRILOSEC) 20 MG delayed release capsule TAKE 1 CAPSULE EVERY DAY 1/31/22  Yes Indy Galvez MD   pramipexole (MIRAPEX) 1 MG tablet Take 1 tablet by mouth nightly 1/31/22  Yes Indy Galvez MD   tiZANidine (ZANAFLEX) 4 MG tablet TAKE 1 TABLET TWICE DAILY 1/31/22  Yes Indy Galvez MD   Insulin Glargine, 1 Unit Dial, (TOUJEO SOLOSTAR) 300 UNIT/ML SOPN Inject 52 Units into the skin daily 1/31/22  Yes Indy Galvez MD   Misc. Devices MISC 1 PAIR OF DIABETIC SHOES (1 LEFT/ 1 RIGHT)  1-3 PAIRS OF INSERTS (LEFT/ RIGHT) 10/29/21  Yes Denny Harkins DPM   Blood Glucose Calibration (TRUE METRIX LEVEL 2) Normal SOLN  9/22/21  Yes Historical Provider, MD   Blood Glucose Monitoring Suppl (TRUE METRIX METER) w/Device KIT  9/22/21  Yes Historical Provider, MD   blood glucose monitor kit and supplies Please fill with True Metrix Meter and Control Solution. 9/21/21  Yes Indy Galvez MD   blood glucose monitor strips Test 3 times a day & as needed for symptoms of irregular blood glucose. Dispense sufficient amount for indicated testing frequency plus additional to accommodate PRN testing needs.  9/17/21  Yes Indy Galvez MD   Lancets MISC 1 each by Does not apply route 3 times daily 9/17/21  Yes Indy Galvez MD   folic acid (Gabbie Wilson) 1 MG tablet Take 1 mg by mouth daily   Yes Historical Provider, MD   Cholecalciferol (VITAMIN D) 125 MCG (5000 UT) CAPS Take 1 tablet by mouth daily 5/4/21  Yes Emma Patel MD   albuterol sulfate HFA (VENTOLIN HFA) 108 (90 Base) MCG/ACT inhaler Inhale 2 puffs into the lungs every 6 hours as needed for Wheezing 4/22/21  Yes Emma Patel MD   NOVOLOG FLEXPEN 100 UNIT/ML injection pen  10/27/20  Yes Historical Provider, MD   insulin lispro, 1 Unit Dial, (HUMALOG KWIKPEN) 100 UNIT/ML SOPN 0-150 = 0 units  151-200 = 2 units  201-250 = 4 units  251-300 = 6 units  301-350 = 8 units  351-400+ = 10 units 10/14/20  Yes RAY Serrano-ALFREDO   b complex vitamins capsule Take 1 capsule by mouth daily   Yes Historical Provider, MD   Multiple Vitamins-Minerals (WOMENS DAILY FORMULA PO) Take by mouth   Yes Historical Provider, MD   vitamin C (ASCORBIC ACID) 500 MG tablet Take 500 mg by mouth daily   Yes Historical Provider, MD   Biotin 5 MG CAPS Take by mouth   Yes Historical Provider, MD   tiotropium (Miguelangel Polka) 18 MCG inhalation capsule INHALE THE CONTENTS OF 1 CAPSULE INTO THE LUNGS DAILY 1/8/19  Yes Sam Patel MD       Review of Systems    Review of Systems:  History obtained from chart review and the patient  General ROS: negative for - chills, fatigue, fever, night sweats or weight gain  Constitutional: Negative for chills, diaphoresis, fatigue, fever and unexpected weight change. Musculoskeletal: Positive for arthralgias, gait problem and joint swelling. Neurological ROS: negative for - behavioral changes, confusion, headaches or seizures. Negative for weakness and numbness. Dermatological ROS: negative for - mole changes, rash  Cardiovascular: Negative for leg swelling. Gastrointestinal: Negative for constipation, diarrhea, nausea and vomiting. Lower Extremity Physical Examination:     Vitals: There were no vitals filed for this visit. General: AAO x 3 in NAD.    Dermatologic Exam:  Full thickness ulcer noted to left distal plantar hallux measures 1.0 cm x 1.0 cm x 0.3 cm with 100%granular base.     Skin No rashes or nodules noted. .   Skin is thin, with flaky sloughing skin as well as decreased hair growth to the lower leg  Small red hemosiderin deposits seen dorsal foot   Musculoskeletal:     1st MPJ ROM decreased, Bilateral.  Muscle strength 5/5, Bilateral.  Pain present upon palpation of toenails 1-5, Bilateral. decreased medial longitudinal arch, Bilateral.  Ankle ROM decreased,Bilateral.    Dorsally contracted digits present digits left hallux.      Vascular: DP pulses 1/4 bilateral.  PT pulses 0/4 bilateral.   CFT <5 seconds, Bilateral.  Hair growth absent to the level of the digits, Bilateral.  Edema present, Bilateral.  Varicosities absent, Bilateral. Erythema absent, Bilateral     Neurological: Sensation diminshed to light touch to level of digits, Bilateral.  Protective sensation intact 6/10 sites via 5.07/10g [place]-Leticia Monofilament, Bilateral.  negative Tinel's, Bilateral.  negative Valleix sign, Bilateral.       Integument: Warm, dry, supple, Bilateral.  Interdigital maceration absent to web spaces 4, Bilateral.  Nails 1-5 left and 1-5 right thickened > 3.0 mm, dystrophic and crumbly, discolored with subungual debris. Fissures absent, Bilateral.       Asessment: Patient is a 65 y.o. female with:    Diagnosis Orders   1. Type II diabetes mellitus with peripheral circulatory disorder (HCC)     2. Ulcer of left foot, with fat layer exposed ([address]  KS INCISION FLEX TOE TENDON   3. Hammer toe of left foot  KS INCISION FLEX TOE TENDON         Plan: Patient examined and evaluated. Current condition and treatment options discussed in detail. Procedure:  Flexor Tenotomy  Anatomic Location: left hallux    Verbal and written consent obtained from patient for Flexor tenotomy of the left hallux after all questions answered.  This area was prepped with an alcohol swab and 3 cc total of 2% lidocaine plain was injected to the left hallux. The toe was prepped and draped in the usual sterile manner. A small stab incision was made plantar to the toe in the sulcus near the PIPJ. The Flexor tendon was transected and the toe immediately released tension. The toes was dorsiflexed to break up tightness at the PIPJ. The area was flushed with copious amount of saline. The hyperkeratosis and ulcer on the tip was lightly debrided and the hyperkeratotic rim was removed. The toe was dressed with adaptic and DSD. The patient will leave this intact for 2 days, keeping it dry. Then he can remove and apply a band aid and antibiotic ointment. Advised pt to remain in post op shoe for 4 days. Monitor daily for infection. .  Verbal and written instructions given to patient. Contact office with any questions/problems/concerns. No orders of the defined types were placed in this encounter. No orders of the defined types were placed in this encounter. RTC in 2week(s).     7/11/2022      Electronically signed by Cristhian Krishna DPM on 7/11/2022 at 10:44 AM  7/11/2022

## 2022-07-19 ENCOUNTER — OFFICE VISIT (OUTPATIENT)
Dept: FAMILY MEDICINE CLINIC | Age: 62
End: 2022-07-19
Payer: MEDICARE

## 2022-07-19 VITALS
SYSTOLIC BLOOD PRESSURE: 136 MMHG | HEIGHT: 70 IN | TEMPERATURE: 97.2 F | HEART RATE: 78 BPM | OXYGEN SATURATION: 99 % | WEIGHT: 293 LBS | BODY MASS INDEX: 41.95 KG/M2 | DIASTOLIC BLOOD PRESSURE: 82 MMHG

## 2022-07-19 DIAGNOSIS — I10 PRIMARY HYPERTENSION: ICD-10-CM

## 2022-07-19 DIAGNOSIS — Z72.0 TOBACCO USE: ICD-10-CM

## 2022-07-19 DIAGNOSIS — Z79.4 TYPE 2 DIABETES MELLITUS WITH DIABETIC POLYNEUROPATHY, WITH LONG-TERM CURRENT USE OF INSULIN (HCC): Primary | ICD-10-CM

## 2022-07-19 DIAGNOSIS — E11.42 TYPE 2 DIABETES MELLITUS WITH DIABETIC POLYNEUROPATHY, WITH LONG-TERM CURRENT USE OF INSULIN (HCC): Primary | ICD-10-CM

## 2022-07-19 LAB — HBA1C MFR BLD: 7.3 %

## 2022-07-19 PROCEDURE — 99406 BEHAV CHNG SMOKING 3-10 MIN: CPT | Performed by: STUDENT IN AN ORGANIZED HEALTH CARE EDUCATION/TRAINING PROGRAM

## 2022-07-19 PROCEDURE — 3051F HG A1C>EQUAL 7.0%<8.0%: CPT | Performed by: STUDENT IN AN ORGANIZED HEALTH CARE EDUCATION/TRAINING PROGRAM

## 2022-07-19 PROCEDURE — 83036 HEMOGLOBIN GLYCOSYLATED A1C: CPT | Performed by: STUDENT IN AN ORGANIZED HEALTH CARE EDUCATION/TRAINING PROGRAM

## 2022-07-19 PROCEDURE — 99214 OFFICE O/P EST MOD 30 MIN: CPT | Performed by: STUDENT IN AN ORGANIZED HEALTH CARE EDUCATION/TRAINING PROGRAM

## 2022-07-19 ASSESSMENT — ENCOUNTER SYMPTOMS
ABDOMINAL PAIN: 0
COUGH: 0
NAUSEA: 0
CHEST TIGHTNESS: 0
SHORTNESS OF BREATH: 0
CONSTIPATION: 0
EYE DISCHARGE: 0
DIARRHEA: 0
VOMITING: 0
WHEEZING: 0
SORE THROAT: 0

## 2022-07-19 NOTE — PROGRESS NOTES
[address] PRIMARY CARE  [address] [place] [address]  Dept: [phone]  Dept Fax: [phone]    Garcia Martinez is a 65 y.o. female who is a Established patient, who presents today for her medical conditions/complaints as noted below:  Chief Complaint   Patient presents with    Diabetes    Hypertension         HPI:     She is here today to follow-up on diabetes and hypertension. She says that since increasing the dose of Trulicity, she has slowly been cutting down on Toujeo, she is now down to 42 units daily. Her blood sugars are mostly below 160. She does not want to get CGM because she feels her data is no longer private. She tried both [place] but had severe nausea from both of them so had to stop. She has previously tried both lisinopril and losartan and had a side effect of hair loss so she had to stop them. She is only been trying to cut down on her smoking and is now down to 12 cigarettes a day. Hemoglobin A1C (%)   Date Value   07/19/2022 7.3   01/10/2022 7.2   10/11/2021 9.1             ( goal A1Cis < 7)   Microalb/Crt.  Ratio (mcg/mg creat)   Date Value   03/21/2022 120 (H)     LDL Cholesterol (mg/dL)   Date Value   03/21/2022 108   05/03/2021 80   09/17/2020 83       (goal LDL is <100)   AST (U/L)   Date Value   03/21/2022 20     ALT (U/L)   Date Value   03/21/2022 22     BUN (mg/dL)   Date Value   03/21/2022 22     BP Readings from Last 3 Encounters:   07/19/22 136/82   04/13/22 130/89   03/14/22 127/86          (goal 120/80)    Past Medical History:   Diagnosis Date    Anxiety     Asthma     BMI 29.0-29.9,adult 4/22/2021    Bronchitis     Chronic obstructive pulmonary disease ([address] 9/11/2018    Depression     Fibromyalgia     Gout     HLD (hyperlipidemia) 7/1/2015    Hyperglycemia 10/8/2020    Hypertension     Neuropathy     Obesity     Obstructive sleep apnea     Osteoarthritis     Stage 3a chronic kidney disease  4/22/2021 Type 2 diabetes mellitus with diabetic polyneuropathy, with long-term current use of insulin ([facility] [address] 9/7/2017    Type II or unspecified type diabetes mellitus without mention of complication, not stated as uncontrolled     Urinary incontinence     Yeast infection 10/8/2020      Past Surgical History:   Procedure Laterality Date    APPENDECTOMY      TAKEN WITH HYSTERECTOMY    BUNIONECTOMY Left 02-22-16    with arthrodesis and 2 through 4 with hallux valgus arthroplasty 5th toe on left    COLONOSCOPY  8/17/2015    2 polyps removed, diverticulosis, internal hemorrhoids    FINGER SURGERY      re-attachment of left middle finger    FOOT SURGERY Right 11/11/2015    bunionectomy, 2-3-4th pinning, 5th arthroplasty    HERNIA REPAIR      UMBILICAL    HYSTERECTOMY, TOTAL ABDOMINAL (CERVIX REMOVED)  2001       Family History   Problem Relation Age of Onset    Cancer Mother 61        breast     High Blood Pressure Mother     Stroke Father 62    Depression Father 64        comitted sucide     Diabetes Sister 36    High Blood Pressure Sister     Depression Sister     Cancer Brother 64        prostate    Diabetes Brother     High Blood Pressure Brother     Cancer Paternal Aunt 71        colon, rectal     Cancer Paternal Grandfather 79        lung    Heart Disease Neg Hx        Social History     Tobacco Use    Smoking status: Every Day     Packs/day: 0.50     Years: 42.00     Pack years: 21.00     Types: Cigarettes    Smokeless tobacco: Never   Substance Use Topics    Alcohol use: Not Currently     Alcohol/week: 0.0 standard drinks     Comment: occassional       Current Outpatient Medications   Medication Sig Dispense Refill    TRULICITY 4.5 XO/0.8IV SOPN INJECT THE CONTENTS OF ONE  PEN SUBCUTANEOUSLY WEEKLY  AS DIRECTED 6 mL 3    ondansetron (ZOFRAN) 4 MG tablet TAKE 1 TABLET BY MOUTH 3  TIMES DAILY AS NEEDED FOR  NAUSEA OR VOMITING 15 tablet 0    hydrOXYzine HCl (ATARAX) 25 MG tablet TAKE 1 TABLET BY MOUTH  EVERY 8 HOURS AS NEEDED FOR ANXIETY 180 tablet 0    colchicine (MITIGARE) 0.6 MG capsule TAKE 1 CAPSULE BY MOUTH  DAILY 90 capsule 3    buPROPion (WELLBUTRIN SR) 200 MG extended release tablet TAKE 1 TABLET BY MOUTH  TWICE DAILY 180 tablet 3    SAVELLA 100 MG TABS TAKE 1 TABLET BY MOUTH  TWICE DAILY 180 tablet 3    allopurinol (ZYLOPRIM) 300 MG tablet TAKE 1 TABLET BY MOUTH  DAILY 90 tablet 3    fenofibrate (TRIGLIDE) 160 MG tablet TAKE 1 TABLET BY MOUTH  DAILY 90 tablet 3    carvedilol (COREG) 25 MG tablet TAKE 1 TABLET BY MOUTH  TWICE DAILY 180 tablet 3    loratadine (CLARITIN) 10 MG tablet Take 1 tablet by mouth daily 30 tablet 3    fluticasone (FLONASE) 50 MCG/ACT nasal spray 1 spray by Each Nostril route daily 32 g 1    Insulin Pen Needle (B-D UF III MINI PEN NEEDLES) 31G X 5 MM MISC 1 each by Does not apply route 3 times daily 100 each 3    Magnesium Oxide (MAG-OXIDE) 200 MG TABS Take 1 tablet by mouth at bedtime 30 tablet 1    Dulaglutide 4.5 MG/0.5ML SOPN Inject 4.5 mg into the skin once a week 4 pen 1    Handicap Placard MISC by Does not apply route Exp: 3/2027 1 each 0    omeprazole (PRILOSEC) 20 MG delayed release capsule TAKE 1 CAPSULE EVERY DAY 90 capsule 3    pramipexole (MIRAPEX) 1 MG tablet Take 1 tablet by mouth nightly 90 tablet 3    tiZANidine (ZANAFLEX) 4 MG tablet TAKE 1 TABLET TWICE DAILY 180 tablet 2    Insulin Glargine, 1 Unit Dial, (TOUJEO SOLOSTAR) 300 UNIT/ML SOPN Inject 52 Units into the skin daily 24 mL 2    Misc. Devices MISC 1 PAIR OF DIABETIC SHOES (1 LEFT/ 1 RIGHT)  1-3 PAIRS OF INSERTS (LEFT/ RIGHT) 2 each 0    Blood Glucose Calibration (TRUE METRIX LEVEL 2) Normal SOLN       Blood Glucose Monitoring Suppl (TRUE METRIX METER) w/Device KIT       blood glucose monitor kit and supplies Please fill with True Metrix Meter and Control Solution. 1 kit 0    blood glucose monitor strips Test 3 times a day & as needed for symptoms of irregular blood glucose.  Dispense sufficient amount for indicated testing frequency plus additional to accommodate PRN testing needs.  100 strip 0    Lancets MISC 1 each by Does not apply route 3 times daily 017 each 1    folic acid (FOLVITE) 1 MG tablet Take 1 mg by mouth daily      Cholecalciferol (VITAMIN D) 125 MCG (5000 UT) CAPS Take 1 tablet by mouth daily 30 capsule 2    albuterol sulfate HFA (VENTOLIN HFA) 108 (90 Base) MCG/ACT inhaler Inhale 2 puffs into the lungs every 6 hours as needed for Wheezing 1 Inhaler 3    b complex vitamins capsule Take 1 capsule by mouth daily      Multiple Vitamins-Minerals (WOMENS DAILY FORMULA PO) Take by mouth      vitamin C (ASCORBIC ACID) 500 MG tablet Take 500 mg by mouth daily      Biotin 5 MG CAPS Take by mouth       Current Facility-Administered Medications   Medication Dose Route Frequency Provider Last Rate Last Admin    ipratropium (ATROVENT) 0.02 % nebulizer solution 0.5 mg  0.5 mg Nebulization Once Micah Mahmood MD        albuterol (PROVENTIL) nebulizer solution 2.5 mg  2.5 mg Nebulization Once Poncho Zeyad Trimble MD         Allergies   Allergen Reactions    Cymbalta [Duloxetine Hcl] Anaphylaxis and Other (See Comments)    Januvia [Sitagliptin] Shortness Of Breath    Cephalexin Other (See Comments)    Ciprofloxacin Other (See Comments)     muscle spasms occur  muscle spasms occur  muscle spasms occur  cramping    Duloxetine     Lyrica [Pregabalin] Other (See Comments)     Does not help after 2 weeks     Metformin Diarrhea    Metformin And Related Diarrhea    Statins Other (See Comments)     myalgias      Morphine Diarrhea, Nausea And Vomiting, Swelling and Nausea Only       Health Maintenance   Topic Date Due    Diabetic retinal exam  10/30/2018    COVID-19 Vaccine (3 - Booster for Librado series) 06/12/2022    DTaP/Tdap/Td vaccine (1 - Tdap) 01/24/2027 (Originally 9/17/2016)    Flu vaccine (1) 09/01/2022    Annual Wellness Visit (AWV)  10/12/2022    Low dose CT lung screening  01/06/2023    Diabetic microalbuminuria test  03/21/2023    Lipids 03/21/2023    Depression Monitoring  04/13/2023    Diabetic foot exam  06/01/2023    A1C test (Diabetic or Prediabetic)  07/19/2023    Breast cancer screen  01/06/2024    Colorectal Cancer Screen  05/18/2027    Shingles vaccine  Completed    Pneumococcal 0-64 years Vaccine  Completed    Hepatitis C screen  Completed    HIV screen  Completed    Hepatitis A vaccine  Aged Out    Hib vaccine  Aged Out    Meningococcal (ACWY) vaccine  Aged Out       Subjective:     Review of Systems   Constitutional:  Negative for appetite change, fatigue and fever. HENT:  Negative for congestion, ear pain, hearing loss and sore throat. Eyes:  Negative for discharge and visual disturbance. Respiratory:  Negative for cough, chest tightness, shortness of breath and wheezing. Cardiovascular:  Negative for chest pain, palpitations and leg swelling. Gastrointestinal:  Negative for abdominal pain, constipation, diarrhea, nausea and vomiting. Genitourinary:  Negative for flank pain, frequency, hematuria and urgency. Musculoskeletal:  Negative for arthralgias, gait problem, joint swelling and myalgias. Skin: Negative. Neurological:  Negative for dizziness, weakness, numbness and headaches. Psychiatric/Behavioral: Negative. Negative for dysphoric mood. The patient is not nervous/anxious. Objective:     Physical Exam  Vitals reviewed. Constitutional:       Appearance: Normal appearance. She is obese. HENT:      Head: Normocephalic and atraumatic. Nose: Nose normal.      Mouth/Throat:      Mouth: Mucous membranes are moist.      Pharynx: Oropharynx is clear. Eyes:      Extraocular Movements: Extraocular movements intact. Conjunctiva/sclera: Conjunctivae normal.      Pupils: Pupils are equal, round, and reactive to light. Cardiovascular:      Rate and Rhythm: Normal rate and regular rhythm. Heart sounds: Normal heart sounds. No murmur heard. No gallop.    Pulmonary:      Effort: Pulmonary effort

## 2022-07-25 ENCOUNTER — TELEPHONE (OUTPATIENT)
Dept: ADMINISTRATIVE | Age: 62
End: 2022-07-25

## 2022-07-25 NOTE — TELEPHONE ENCOUNTER
Pt called asking to reschedule her post op appt today stating she wanted to come in closer to the time of when she would need nail trim. No appts available within a week or so. Pt still asked that I cancel her appt for today stating she has a scheduling conflict. I offered to look at [facility] office schedule and she declined. Please call pt to reschedule as appropriate.

## 2022-07-26 DIAGNOSIS — R11.0 NAUSEA: ICD-10-CM

## 2022-07-26 NOTE — TELEPHONE ENCOUNTER
Nona Eldridge is calling to request a refill on the following medication(s):    Medication Request:  Requested Prescriptions     Pending Prescriptions Disp Refills    ondansetron (ZOFRAN) 4 MG tablet 15 tablet 0       Last Visit Date (If Applicable):  5/89/0739    Next Visit Date:    10/19/2022

## 2022-07-27 RX ORDER — ONDANSETRON 4 MG/1
4 TABLET, FILM COATED ORAL EVERY 8 HOURS PRN
Qty: 45 TABLET | Refills: 0 | Status: SHIPPED | OUTPATIENT
Start: 2022-07-27 | End: 2022-09-19

## 2022-08-03 ENCOUNTER — OFFICE VISIT (OUTPATIENT)
Dept: PODIATRY | Age: 62
End: 2022-08-03
Payer: MEDICARE

## 2022-08-03 VITALS — HEIGHT: 70 IN | BODY MASS INDEX: 41.95 KG/M2 | WEIGHT: 293 LBS

## 2022-08-03 DIAGNOSIS — E11.42 DIABETIC POLYNEUROPATHY ASSOCIATED WITH TYPE 2 DIABETES MELLITUS (HCC): ICD-10-CM

## 2022-08-03 DIAGNOSIS — L97.522 ULCER OF LEFT FOOT, WITH FAT LAYER EXPOSED (HCC): Primary | ICD-10-CM

## 2022-08-03 DIAGNOSIS — E11.51 TYPE II DIABETES MELLITUS WITH PERIPHERAL CIRCULATORY DISORDER (HCC): ICD-10-CM

## 2022-08-03 DIAGNOSIS — D23.72 BENIGN NEOPLASM OF SKIN OF LEFT FOOT: ICD-10-CM

## 2022-08-03 PROCEDURE — 99024 POSTOP FOLLOW-UP VISIT: CPT | Performed by: PODIATRIST

## 2022-08-03 PROCEDURE — 17110 DESTRUCTION B9 LES UP TO 14: CPT | Performed by: PODIATRIST

## 2022-08-03 NOTE — PROGRESS NOTES
[address] [facility] PODIATRY [facility]  [address] [facility] [address] [facility] [address]  Dept: [phone]  Dept Fax: [phone]     PROGRESS NOTE  Date of patient's visit: 8/3/2022  Patient's Name:  Cassia Juarez YOB: 1960            Patient Care Team:  Molly Ambriz MD as PCP - General (Family Medicine)  Molly Ambriz MD as PCP - [facility]        Chief Complaint   Patient presents with    Post-Op Check     3 weeks Post T&C left great toe    Diabetes     Diabetic foot care    Peripheral Neuropathy     Bilateral feet       Pt's primary care physician is Molly Ambriz MD last seen 07/19/2022     Subjective: Cassia Juarez is a 65 y.o. female who presents to the office today 3week(s)  S/P flexor tenotomy of left great toe   Problem List Items Addressed This Visit    None  . Patient relates pain is Absent  and improved. Pain is rated 0 out of 10 and is described as none. Currently denies F/C/N/V. Physical Examination:  Dermatologic Exam:  Ulcer is well healed to left hallux with benign skin neoplasm. Skin No rashes or nodules noted. .  Skin is thin, with flaky sloughing skin as well as decreased hair growth to the lower leg  Small red hemosiderin deposits seen dorsal foot  Musculoskeletal:     1st MPJ ROM decreased, Bilateral.  Muscle strength 5/5, Bilateral.  Pain present upon palpation of toenails 1-5, Bilateral. decreased medial longitudinal arch, Bilateral.  Ankle ROM decreased,Bilateral.    Dorsally contracted digits present digits left hallux.      Vascular: DP pulses 1/4 bilateral.  PT pulses 0/4 bilateral.   CFT <5 seconds, Bilateral.  Hair growth absent to the level of the digits, Bilateral.  Edema present, Bilateral.  Varicosities absent, Bilateral. Erythema absent, Bilateral     Neurological: Sensation diminshed to light touch to level of digits, Bilateral.  Protective sensation intact 6/10 sites via 5.07/10g [place] Monofilament, Bilateral.  negative Tinel's, Bilateral.  negative Valleix sign, Bilateral.       Integument: Warm, dry, supple, Bilateral.  Interdigital maceration absent to web spaces 4, Bilateral.  Nails 1-5 left and 1-5 right thickened > 3.0 mm, dystrophic and crumbly, discolored with subungual debris. Fissures absent, Bilateral.     Assessment:    Diagnosis Orders   1. Ulcer of left foot, with fat layer exposed ([address]   DIABETES FOOT EXAM      2. Type II diabetes mellitus with peripheral circulatory disorder (HCC)   DIABETES FOOT EXAM    75266 - [place] DESTRUCTION BENIGN LESIONS UP TO 14      3. Diabetic polyneuropathy associated with type 2 diabetes mellitus (HCC)   DIABETES FOOT EXAM    80193 - [place] DESTRUCTION BENIGN LESIONS UP TO 14      4. Benign neoplasm of skin of left foot   DIABETES FOOT EXAM    23132 - [place] DESTRUCTION BENIGN LESIONS UP TO 14            Plan:  Patient examined and evaluated. Current condition and treatment options discussed in detail. The lesion was partially excised and Pyrogallic acid was applied under occlusion. The patient will leave in place for 24-48 hours and than remove. The patient tolerated the procedure well and without complication. All labs were reviewed and all imagining including the above findings were reviewed PRIOR to the patients arrival and with the patient today. Previous patient encounter was reviewed. Encounters from the patients other medical providers were reviewed and noted. Time was spent educating the patient on proper care of the feet and ankles. All the above diagnosis were addressed at todays visit and all questions were answered. A total of 20 minutes was spent with this patients encounter which included charting after the patients visit      Advised pt to avoid walking barefoot. Verbal and written instructions given to patient. Orders: No orders of the defined types were placed in this encounter.      Contact office with

## 2022-08-17 DIAGNOSIS — M79.7 FIBROMYALGIA: ICD-10-CM

## 2022-08-17 DIAGNOSIS — F41.9 ANXIETY: ICD-10-CM

## 2022-08-17 NOTE — TELEPHONE ENCOUNTER
Ankit Hardy is calling to request a refill on the following medication(s):    Medication Request:  Requested Prescriptions     Pending Prescriptions Disp Refills    tiZANidine (ZANAFLEX) 4 MG tablet 180 tablet 2     Sig: TAKE 1 TABLET TWICE DAILY    milnacipran HCl (SAVELLA) 100 MG TABS 180 tablet 3    hydrOXYzine HCl (ATARAX) 25 MG tablet 180 tablet 0       Last Visit Date (If Applicable):  4/28/6689    Next Visit Date:    10/19/2022

## 2022-08-18 RX ORDER — TIZANIDINE 4 MG/1
TABLET ORAL
Qty: 180 TABLET | Refills: 1 | Status: SHIPPED | OUTPATIENT
Start: 2022-08-18

## 2022-08-18 RX ORDER — HYDROXYZINE HYDROCHLORIDE 25 MG/1
TABLET, FILM COATED ORAL
Qty: 180 TABLET | Refills: 0 | Status: SHIPPED | OUTPATIENT
Start: 2022-08-18 | End: 2022-09-19

## 2022-08-31 DIAGNOSIS — B37.31 VAGINAL CANDIDIASIS: Primary | ICD-10-CM

## 2022-08-31 RX ORDER — FLUCONAZOLE 150 MG/1
150 TABLET ORAL
Qty: 2 TABLET | Refills: 1 | Status: SHIPPED | OUTPATIENT
Start: 2022-08-31 | End: 2022-09-06

## 2022-09-18 DIAGNOSIS — F41.9 ANXIETY: ICD-10-CM

## 2022-09-18 DIAGNOSIS — R11.0 NAUSEA: ICD-10-CM

## 2022-09-19 RX ORDER — ONDANSETRON 4 MG/1
4 TABLET, FILM COATED ORAL EVERY 8 HOURS PRN
Qty: 45 TABLET | Refills: 0 | Status: SHIPPED | OUTPATIENT
Start: 2022-09-19 | End: 2022-10-19

## 2022-09-19 RX ORDER — HYDROXYZINE HYDROCHLORIDE 25 MG/1
TABLET, FILM COATED ORAL
Qty: 180 TABLET | Refills: 0 | Status: SHIPPED | OUTPATIENT
Start: 2022-09-19

## 2022-09-19 NOTE — TELEPHONE ENCOUNTER
Talita Woodard is calling to request a refill on the following medication(s):    Medication Request:  Requested Prescriptions     Pending Prescriptions Disp Refills    ondansetron (ZOFRAN) 4 MG tablet [Pharmacy Med Name: ONDANSETRON  4MG  TAB] 45 tablet 0     Sig: TAKE 1 TABLET BY MOUTH  EVERY 8 HOURS AS NEEDED FOR NAUSEA OR VOMITING    hydrOXYzine HCl (ATARAX) 25 MG tablet [Pharmacy Med Name: HYDROXYZINE HYDROCHLORIDE  25MG  TAB] 180 tablet 0     Sig: TAKE 1 TABLET BY MOUTH  EVERY 8 HOURS AS NEEDED FOR ANXIETY       Last Visit Date (If Applicable):  7/04/3693    Next Visit Date:    10/19/2022

## 2022-09-28 RX ORDER — INSULIN GLARGINE 300 U/ML
INJECTION, SOLUTION SUBCUTANEOUS
Qty: 18 ML | Refills: 3 | Status: SHIPPED | OUTPATIENT
Start: 2022-09-28

## 2022-09-28 NOTE — TELEPHONE ENCOUNTER
Igor Adams is calling to request a refill on the following medication(s):    Medication Request:  Requested Prescriptions     Pending Prescriptions Disp Refills    TOUJEO SOLOSTAR 300 UNIT/ML SOPN [Pharmacy Med Name: Toujeo SoloStar 300 UNIT/ML Subcutaneous Solution Pen-injector] 18 mL 3     Sig: INJECT SUBCUTANEOUSLY 52  UNITS DAILY       Last Visit Date (If Applicable):  3/76/2927    Next Visit Date:    10/19/2022

## 2022-10-05 ENCOUNTER — OFFICE VISIT (OUTPATIENT)
Dept: PODIATRY | Age: 62
End: 2022-10-05
Payer: MEDICARE

## 2022-10-05 VITALS — WEIGHT: 293 LBS | HEIGHT: 70 IN | BODY MASS INDEX: 41.95 KG/M2

## 2022-10-05 DIAGNOSIS — M79.604 PAIN IN BOTH LOWER EXTREMITIES: ICD-10-CM

## 2022-10-05 DIAGNOSIS — L97.522 ULCER OF LEFT FOOT, WITH FAT LAYER EXPOSED (HCC): ICD-10-CM

## 2022-10-05 DIAGNOSIS — B35.1 DERMATOPHYTOSIS OF NAIL: ICD-10-CM

## 2022-10-05 DIAGNOSIS — M79.605 PAIN IN BOTH LOWER EXTREMITIES: ICD-10-CM

## 2022-10-05 DIAGNOSIS — E11.51 TYPE II DIABETES MELLITUS WITH PERIPHERAL CIRCULATORY DISORDER (HCC): Primary | ICD-10-CM

## 2022-10-05 DIAGNOSIS — M20.42 HAMMER TOE OF LEFT FOOT: ICD-10-CM

## 2022-10-05 DIAGNOSIS — E11.42 DIABETIC POLYNEUROPATHY ASSOCIATED WITH TYPE 2 DIABETES MELLITUS (HCC): ICD-10-CM

## 2022-10-05 PROCEDURE — 99214 OFFICE O/P EST MOD 30 MIN: CPT | Performed by: PODIATRIST

## 2022-10-05 PROCEDURE — G8417 CALC BMI ABV UP PARAM F/U: HCPCS | Performed by: PODIATRIST

## 2022-10-05 PROCEDURE — 28232 INCISION OF TOE TENDON: CPT | Performed by: PODIATRIST

## 2022-10-05 PROCEDURE — G8484 FLU IMMUNIZE NO ADMIN: HCPCS | Performed by: PODIATRIST

## 2022-10-05 PROCEDURE — 11721 DEBRIDE NAIL 6 OR MORE: CPT | Performed by: PODIATRIST

## 2022-10-05 PROCEDURE — 3051F HG A1C>EQUAL 7.0%<8.0%: CPT | Performed by: PODIATRIST

## 2022-10-05 PROCEDURE — 2022F DILAT RTA XM EVC RTNOPTHY: CPT | Performed by: PODIATRIST

## 2022-10-05 PROCEDURE — G8427 DOCREV CUR MEDS BY ELIG CLIN: HCPCS | Performed by: PODIATRIST

## 2022-10-05 PROCEDURE — 3017F COLORECTAL CA SCREEN DOC REV: CPT | Performed by: PODIATRIST

## 2022-10-05 PROCEDURE — 4004F PT TOBACCO SCREEN RCVD TLK: CPT | Performed by: PODIATRIST

## 2022-10-05 NOTE — PROGRESS NOTES
[address] [facility] PODIATRY [facility]  [address] [address] [address]  Dept: [phone]  Dept Fax: [phone]    DIABETIC PROGRESS NOTE  Date of patient's visit: 10/5/2022  Patient's Name:  Ronald Ontiveros YOB: 1960            Patient Care Team:  James Slater MD as PCP - General (Family Medicine)  James Slater MD as PCP - [facility]          Chief Complaint   Patient presents with    Diabetes     Diabetic foot care    Wound Check     Left 3rd toe       Subjective:   Ronald  comes to clinic for Diabetes (Diabetic foot care) and Wound Check (Left 3rd toe)    she is a diabetic and states that she is doing well. Pt currently has complaint of thickened, elongated nails that they cannot manage by themselves. Pt's primary care physician is James Slater MD last seen 07/19/2022   Pt's last blood sugar was 123 this morning. Pt has a new complaint of increased pain to left 3rd toe. Pt has tried changing shoes but it has not helped the pain    Lab Results   Component Value Date    LABA1C 7.3 07/19/2022      Complains of numbness in the feet bilat.   Past Medical History:   Diagnosis Date    Anxiety     Asthma     BMI 29.0-29.9,adult 4/22/2021    Bronchitis     Chronic obstructive pulmonary disease ([address] 9/11/2018    Depression     Fibromyalgia     Gout     HLD (hyperlipidemia) 7/1/2015    Hyperglycemia 10/8/2020    Hypertension     Neuropathy     Obesity     Obstructive sleep apnea     Osteoarthritis     Stage 3a chronic kidney disease  4/22/2021    Type 2 diabetes mellitus with diabetic polyneuropathy, with long-term current use of insulin ([address] 9/7/2017    Type II or unspecified type diabetes mellitus without mention of complication, not stated as uncontrolled     Urinary incontinence     Yeast infection 10/8/2020       Allergies   Allergen Reactions    Cymbalta [Duloxetine Hcl] Anaphylaxis and Other (See Comments)    Januvia [Sitagliptin] Shortness Of Breath    Cephalexin Other (See Comments)    Ciprofloxacin Other (See Comments)     muscle spasms occur  muscle spasms occur  muscle spasms occur  cramping    Duloxetine     Lyrica [Pregabalin] Other (See Comments)     Does not help after 2 weeks     Metformin Diarrhea    Metformin And Related Diarrhea    Statins Other (See Comments)     myalgias      Morphine Diarrhea, Nausea And Vomiting, Swelling and Nausea Only     Current Outpatient Medications on File Prior to Visit   Medication Sig Dispense Refill    TONATANAEL STAPLETONOSTAR 300 UNIT/ML SOPN INJECT SUBCUTANEOUSLY 52  UNITS DAILY 18 mL 3    ondansetron (ZOFRAN) 4 MG tablet TAKE 1 TABLET BY MOUTH  EVERY 8 HOURS AS NEEDED FOR NAUSEA OR VOMITING 45 tablet 0    hydrOXYzine HCl (ATARAX) 25 MG tablet TAKE 1 TABLET BY MOUTH  EVERY 8 HOURS AS NEEDED FOR ANXIETY 180 tablet 0    tiZANidine (ZANAFLEX) 4 MG tablet TAKE 1 TABLET TWICE DAILY 180 tablet 1    milnacipran HCl (SAVELLA) 100 MG TABS Take 1 tablet by mouth 2 times daily 435 tablet 1    TRULICITY 4.5 GO/5.6KZ SOPN INJECT THE CONTENTS OF ONE  PEN SUBCUTANEOUSLY WEEKLY  AS DIRECTED 6 mL 3    colchicine (MITIGARE) 0.6 MG capsule TAKE 1 CAPSULE BY MOUTH  DAILY 90 capsule 3    buPROPion (WELLBUTRIN SR) 200 MG extended release tablet TAKE 1 TABLET BY MOUTH  TWICE DAILY 180 tablet 3    allopurinol (ZYLOPRIM) 300 MG tablet TAKE 1 TABLET BY MOUTH  DAILY 90 tablet 3    fenofibrate (TRIGLIDE) 160 MG tablet TAKE 1 TABLET BY MOUTH  DAILY 90 tablet 3    carvedilol (COREG) 25 MG tablet TAKE 1 TABLET BY MOUTH  TWICE DAILY 180 tablet 3    loratadine (CLARITIN) 10 MG tablet Take 1 tablet by mouth daily 30 tablet 3    fluticasone (FLONASE) 50 MCG/ACT nasal spray 1 spray by Each Nostril route daily 32 g 1    Insulin Pen Needle (B-D UF III MINI PEN NEEDLES) 31G X 5 MM MISC 1 each by Does not apply route 3 times daily 100 each 3    Magnesium Oxide (MAG-OXIDE) 200 MG TABS Take 1 tablet by mouth at obtained from chart review and the patient  General ROS: negative for - chills, fatigue, fever, night sweats or weight gain  Constitutional: Negative for chills, diaphoresis, fatigue, fever and unexpected weight change. Musculoskeletal: Positive for arthralgias, gait problem and joint swelling. Neurological ROS: negative for - behavioral changes, confusion, headaches or seizures. Negative for weakness and numbness. Dermatological ROS: negative for - mole changes, rash  Cardiovascular: Negative for leg swelling. Gastrointestinal: Negative for constipation, diarrhea, nausea and vomiting. Objective:  Dermatologic Exam:  Ulcer noted to distal left 3rd digit with keratotic border. No pus noted. Skin No rashes or nodules noted. .   Skin is thin, with flaky sloughing skin as well as decreased hair growth to the lower leg  Small red hemosiderin deposits seen dorsal foot   Musculoskeletal:     1st MPJ ROM decreased, Bilateral.  Muscle strength 5/5, Bilateral.  Pain present upon palpation of toenails 1-5, Bilateral. decreased medial longitudinal arch, Bilateral.  Ankle ROM decreased,Bilateral.    Dorsally contracted digits present digits 3, left. Vascular: DP pulses 1/4 bilateral.  PT pulses 0/4 bilateral.   CFT <5 seconds, Bilateral.  Hair growth absent to the level of the digits, Bilateral.  Edema present, Bilateral.  Varicosities absent, Bilateral. Erythema absent, Bilateral    Neurological: Sensation diminshed to light touch to level of digits, Bilateral.  Protective sensation intact 6/10 sites via 5.07/10g [place]-Leticia Monofilament, Bilateral.  negative Tinel's, Bilateral.  negative Valleix sign, Bilateral.      Integument: Warm, dry, supple, Bilateral..  Interdigital maceration absent to web spaces 4, Bilateral.  Nails 1-5 left and 1-5 right thickened > 3.0 mm, dystrophic and crumbly, discolored with subungual debris. Fissures absent, Bilateral.   General: AAO x 3 in NAD.     Derm  Toenail Description  Sites of Onychomycosis Involvement (Check affected area)  [x] [x] [x] [x] [x] [x] [x] [x] [x] [x]  5 4 3 2 1 1 2 3 4 5                          Right                                        Left    Thickness  [x] [x] [x] [x] [x] [x] [x] [x] [x] [x]  5 4 3 2 1 1 2 3 4 5                         Right                                        Left    Dystrophic Changes   [x] [x] [x] [x] [x] [x] [x] [x] [x] [x]  5 4 3 2 1 1 2 3 4 5                         Right                                        Left    Color   [x] [x] [x] [x] [x] [x] [x] [x] [x] [x]  5 4 3 2 1 1 2 3 4 5                          Right                                        Left    Incurvation/Ingrowin   [] [] [] [] [] [] [] [] [] []  5 4 3 2 1 1 2 3 4 5                         Right                                        Left    Inflammation/Pain   [x] [x] [x] [x] [x] [x] [x] [x] [x] [x]  5 4 3 2 1 1 2 3 4 5                         Right                                        Left        Visual inspection:  Deformity: hammertoe deformity thomas feet  amputation: absent  Skin lesions: as above  Edema: right- 2+ pitting edema, left- 2+ pitting edema    Sensory exam:  Monofilament sensation: abnormal - 6/10 via SW 5.07/10g monofilament to the plantar foot bilateral feet    Pulses: abnormal - 1/4 dorsalis pedis pulse and 0/4 Posterior tibial pulse,   Pinprick: Impaired  Proprioception: Impaired  Vibration (128 Hz): Impaired       DM with PVD       [x]Yes    []No      Assessment:  65 y.o. female with:   Diagnosis Orders   1. Type II diabetes mellitus with peripheral circulatory disorder (HCC)   DIABETES FOOT EXAM    05061 - [place] DEBRIDEMENT OF NAILS, 6 OR MORE      2. Diabetic polyneuropathy associated with type 2 diabetes mellitus (HCC)  49112 - [place] DEBRIDEMENT OF NAILS, 6 OR MORE      3.  Ulcer of left foot, with fat layer exposed ([address]        4. Pain in both lower extremities  40240 - [place] DEBRIDEMENT OF NAILS, 6 OR MORE    VT INCISION FLEX TOE TENDON      5. Hammer toe of left foot  07474 - NE DEBRIDEMENT OF NAILS, 6 OR MORE    NE INCISION FLEX TOE TENDON      6. Dermatophytosis of nail  14859 - NE DEBRIDEMENT OF NAILS, 6 OR MORE              Q7   []Yes    []No                Q8   [x]Yes    []No                     Q9   []Yes    []No    Plan:   Pt was evaluated and examined. Patient was given personalized discharge instructions. Nails 1-10 were debrided sharply in length and thickness with a nipper and , without incident. Procedure:  Flexor Tenotomy  Anatomic Location: left 3rd digit    Verbal and written consent obtained from patient for Flexor tenotomy of the left 3rd toe after all questions answered. This area was prepped with an alcohol swab and 3 cc total of 2% lidocaine plain was injected to the 3rd toe, left. The toe was prepped and draped in the usual sterile manner. A small stab incision was made plantar to the toe in the sulcus near the PIPJ. The Flexor tendon was transected and the toe immediately released tension. The toes was dorsiflexed to break up tightness at the PIPJ. The area was flushed with copious amount of saline. The hyperkeratosis and ulcer on the tip was lightly debrided and the hyperkeratotic rim was removed. The toe was dressed with adaptic and DSD. The patient will leave this intact for 2 days, keeping it dry. Then he can remove and apply a band aid and antibiotic ointment. Pt will follow up in 1 weeks or sooner if any problems arise. Diagnosis was discussed with the pt and all of their questions were answered in detail. Proper foot hygiene and care was discussed with the pt. Informed patient on proper diabetic foot care and importance of tight glycemic control. Patient to check feet daily and contact the office with any questions/problems/concerns. Other comorbidity noted and will be managed by PCP.   10/5/2022    Electronically signed by Shahzad Villanueva DPM on 10/5/2022 at 10:30 AM  10/5/2022

## 2022-10-12 ENCOUNTER — OFFICE VISIT (OUTPATIENT)
Dept: PODIATRY | Age: 62
End: 2022-10-12
Payer: MEDICARE

## 2022-10-12 VITALS — HEIGHT: 70 IN | WEIGHT: 293 LBS | BODY MASS INDEX: 41.95 KG/M2

## 2022-10-12 DIAGNOSIS — M79.604 PAIN IN BOTH LOWER EXTREMITIES: ICD-10-CM

## 2022-10-12 DIAGNOSIS — M20.31 HALLUX HAMMERTOE, RIGHT: Primary | ICD-10-CM

## 2022-10-12 DIAGNOSIS — M79.605 PAIN IN BOTH LOWER EXTREMITIES: ICD-10-CM

## 2022-10-12 PROCEDURE — G8427 DOCREV CUR MEDS BY ELIG CLIN: HCPCS | Performed by: PODIATRIST

## 2022-10-12 PROCEDURE — G8417 CALC BMI ABV UP PARAM F/U: HCPCS | Performed by: PODIATRIST

## 2022-10-12 PROCEDURE — 4004F PT TOBACCO SCREEN RCVD TLK: CPT | Performed by: PODIATRIST

## 2022-10-12 PROCEDURE — 28232 INCISION OF TOE TENDON: CPT | Performed by: PODIATRIST

## 2022-10-12 PROCEDURE — G8484 FLU IMMUNIZE NO ADMIN: HCPCS | Performed by: PODIATRIST

## 2022-10-12 PROCEDURE — 3017F COLORECTAL CA SCREEN DOC REV: CPT | Performed by: PODIATRIST

## 2022-10-12 NOTE — PROGRESS NOTES
[address] [facility] PODIATRY [facility]  [address] [address] [facility] [address]  Dept: [phone]  Dept Fax: [phone]    POST-OP PROGRESS NOTE  Date of patient's visit: 10/12/2022  Patient's Name:  Shannan Garcia YOB: 1960            Patient Care Team:  Pedro Edward MD as PCP - General (Family Medicine)  Pedro Edward MD as PCP - [facility] Provider        Chief Complaint   Patient presents with    Post-Op Check     1 wk post T&C left 3rd toe       Pt's primary care physician is Pedro Edward MD last seen 07/19/2022     Subjective: Shannan Garcia is a 65 y.o. female who presents to the office today 1week(s)  S/P *** for correction of   Problem List Items Addressed This Visit    None  . Patient relates pain is Present and {IMPROVED/WORSENED/STAYEDTHESAME}. Pain is rated 1 out of 10 and is described as intermittent, mild. Currently denies F/C/N/V. Is patient taking pain medications as prescribed and is controlling pain***    Physical Examination:  Incision is coapted, sutures/steri-strips are intact. Minimal bleeding post operatively. Edema present. No erythema. No Pus. Operative correction is satisfactory. ***    Radiographs: ***      Assessment: Shannan Garcia is status post ***  Normal post operative course. Doing well        No diagnosis found. Plan:  Patient examined and evaluated. Current condition and treatment options discussed in detail. Advised pt to ***. Verbal and written instructions given to patient. Orders: No orders of the defined types were placed in this encounter. Contact office with any questions/problems/concerns. RTC in {Numbers; 1-10:19429}{DAYS/WEEKS/MONTHS (D):99240}.      Electronically signed by Chaparro Whiting DPM on 10/12/2022 at 10:20 AM  10/12/2022

## 2022-10-13 ENCOUNTER — PATIENT MESSAGE (OUTPATIENT)
Dept: FAMILY MEDICINE CLINIC | Age: 62
End: 2022-10-13

## 2022-10-14 RX ORDER — CALCIUM CITRATE/VITAMIN D3 200MG-6.25
1 TABLET ORAL DAILY
Qty: 100 EACH | Refills: 3 | Status: SHIPPED | OUTPATIENT
Start: 2022-10-14 | End: 2022-10-19

## 2022-10-14 NOTE — TELEPHONE ENCOUNTER
From: Ronald   To: Dr. Sudeep Foss: 10/13/2022 6:55 PM EDT  Subject: Test strips    Dr. Liz Sánchez,   I need more test strips for my machine. I use [employer]. I thought I had another box, but I do not! I have enough strips for 6 days.

## 2022-10-18 NOTE — PROGRESS NOTES
[address] [facility] PODIATRY [facility]  [address] [address] [place] [id number]  Dept: [phone]  Dept Fax: [phone]    RETURN PATIENT PROGRESS NOTE  Date of patient's visit: 10/18/2022  Patient's Name:  Marquita Friedman YOB: 1960            Patient Care Team:  Antonia Valle MD as PCP - General (Family Medicine)  Antonia Valle MD as PCP - [facility]       Marquita Friedman 65 y.o. female that presents for follow-up of   Chief Complaint   Patient presents with    Post-Op Check     1 wk post T&C left 3rd toe     Pt's primary care physician is Antonia Valle MD last seen 04/13/2022  Symptoms began 5 month(s) ago and are unchanged . Patient relates pain is Present to right great toe. Pain is rated 3 out of 10 and is described as intermittent, mild. Treatments prior to today's visit include: debridement. Currently denies F/C/N/V.      Allergies   Allergen Reactions    Cymbalta [Duloxetine Hcl] Anaphylaxis and Other (See Comments)    Januvia [Sitagliptin] Shortness Of Breath    Cephalexin Other (See Comments)    Ciprofloxacin Other (See Comments)     muscle spasms occur  muscle spasms occur  muscle spasms occur  cramping    Duloxetine     Lyrica [Pregabalin] Other (See Comments)     Does not help after 2 weeks     Metformin Diarrhea    Metformin And Related Diarrhea    Statins Other (See Comments)     myalgias      Morphine Diarrhea, Nausea And Vomiting, Swelling and Nausea Only       Past Medical History:   Diagnosis Date    Anxiety     Asthma     BMI 29.0-29.9,adult 4/22/2021    Bronchitis     Chronic obstructive pulmonary disease ([address] 9/11/2018    Depression     Fibromyalgia     Gout     HLD (hyperlipidemia) 7/1/2015    Hyperglycemia 10/8/2020    Hypertension     Neuropathy     Obesity     Obstructive sleep apnea     Osteoarthritis     Stage 3a chronic kidney disease  4/22/2021    Type 2 diabetes mellitus with diabetic polyneuropathy, with long-term current use of insulin ([facility] 9/7/2017    Type II or unspecified type diabetes mellitus without mention of complication, not stated as uncontrolled     Urinary incontinence     Yeast infection 10/8/2020       Prior to Admission medications    Medication Sig Start Date End Date Taking? Authorizing Provider   blood glucose test strips (TRUE METRIX BLOOD GLUCOSE TEST) strip 1 each by In Vitro route daily As needed.  10/14/22   Pedro Edward MD   TONATANAEL SOLOSTAR 300 UNIT/ML SOPN INJECT SUBCUTANEOUSLY 52  UNITS DAILY 9/28/22  Yes Pedro Edward MD   ondansetron (ZOFRAN) 4 MG tablet TAKE 1 TABLET BY MOUTH  EVERY 8 HOURS AS NEEDED FOR NAUSEA OR VOMITING 9/19/22 10/19/22 Yes Pedro Edward MD   hydrOXYzine HCl (ATARAX) 25 MG tablet TAKE 1 TABLET BY MOUTH  EVERY 8 HOURS AS NEEDED FOR ANXIETY 9/19/22  Yes Pedro Edward MD   tiZANidine (ZANAFLEX) 4 MG tablet TAKE 1 TABLET TWICE DAILY 8/18/22  Yes Pedro Edward MD   milnacipran HCl (SAVELLA) 100 MG TABS Take 1 tablet by mouth 2 times daily 8/18/22  Yes Pedro Edward MD   TRULICITY 4.5 TK/4.6EM SOPN INJECT THE CONTENTS OF ONE  PEN SUBCUTANEOUSLY WEEKLY  AS DIRECTED 7/5/22  Yes Pedro Edward MD   colchicine (MITIGARE) 0.6 MG capsule TAKE 1 CAPSULE BY MOUTH  DAILY 6/29/22  Yes AMARILYS Avila - CNP   buPROPion (WELLBUTRIN SR) 200 MG extended release tablet TAKE 1 TABLET BY MOUTH  TWICE DAILY 5/31/22  Yes Pedro Edward MD   allopurinol (ZYLOPRIM) 300 MG tablet TAKE 1 TABLET BY MOUTH  DAILY 5/31/22  Yes Pedro Edward MD   fenofibrate (TRIGLIDE) 160 MG tablet TAKE 1 TABLET BY MOUTH  DAILY 5/31/22  Yes Pedro Edward MD   carvedilol (COREG) 25 MG tablet TAKE 1 TABLET BY MOUTH  TWICE DAILY 5/31/22  Yes Pedro Edward MD   loratadine (CLARITIN) 10 MG tablet Take 1 tablet by mouth daily 4/13/22  Yes Pedro Edward MD   fluticasone (FLONASE) 50 MCG/ACT nasal spray 1 spray by Each Nostril route daily 4/13/22  Yes Pedro Edward MD   Insulin Pen Needle (B-D UF III MINI PEN NEEDLES) 31G X 5 MM MISC 1 each by Does not apply route 3 times daily 4/1/22  Yes Tresa Oshea MD   Magnesium Oxide (MAG-OXIDE) 200 MG TABS Take 1 tablet by mouth at bedtime 3/14/22  Yes Tresa Oshea MD   Dulaglutide 4.5 MG/0.5ML SOPN Inject 4.5 mg into the skin once a week 3/14/22  Yes Tresa Oshea MD   Handicap Placard MISC by Does not apply route Exp: 3/2027 3/14/22  Yes Tresa Oshea MD   omeprazole (PRILOSEC) 20 MG delayed release capsule TAKE 1 CAPSULE EVERY DAY 1/31/22  Yes Tresa Oshea MD   pramipexole (MIRAPEX) 1 MG tablet Take 1 tablet by mouth nightly 1/31/22  Yes Tresa Oshea MD   Misc. Devices MISC 1 PAIR OF DIABETIC SHOES (1 LEFT/ 1 RIGHT)  1-3 PAIRS OF INSERTS (LEFT/ RIGHT) 10/29/21  Yes Cece Moctezuma DPM   Blood Glucose Calibration (TRUE METRIX LEVEL 2) Normal SOLN  9/22/21  Yes Historical Provider, MD   Blood Glucose Monitoring Suppl (TRUE METRIX METER) w/Device KIT  9/22/21  Yes Historical Provider, MD   blood glucose monitor kit and supplies Please fill with True Metrix Meter and Control Solution. 9/21/21  Yes Tresa Oshea MD   blood glucose monitor strips Test 3 times a day & as needed for symptoms of irregular blood glucose. Dispense sufficient amount for indicated testing frequency plus additional to accommodate PRN testing needs.  9/17/21  Yes Tresa Oshea MD   Lancets MISC 1 each by Does not apply route 3 times daily 9/17/21  Yes Tresa Oshea MD   folic acid (FOLVITE) 1 MG tablet Take 1 mg by mouth daily   Yes Historical Provider, MD   Cholecalciferol (VITAMIN D) 125 MCG (5000 UT) CAPS Take 1 tablet by mouth daily 5/4/21  Yes Tresa Oshea MD   albuterol sulfate HFA (VENTOLIN HFA) 108 (90 Base) MCG/ACT inhaler Inhale 2 puffs into the lungs every 6 hours as needed for Wheezing 4/22/21  Yes Tresa Oshea MD   b complex vitamins capsule Take 1 capsule by mouth daily   Yes Historical Provider, MD   Multiple Vitamins-Minerals (WOMENS DAILY FORMULA PO) Take by mouth   Yes Historical Provider, MD   vitamin C (ASCORBIC ACID) 500 MG tablet Take 500 mg by mouth daily   Yes Historical Provider, MD   Biotin 5 MG CAPS Take by mouth   Yes Historical Provider, MD       Review of Systems    Review of Systems:  History obtained from chart review and the patient  General ROS: negative for - chills, fatigue, fever, night sweats or weight gain  Constitutional: Negative for chills, diaphoresis, fatigue, fever and unexpected weight change. Musculoskeletal: Positive for arthralgias, gait problem and joint swelling. Neurological ROS: negative for - behavioral changes, confusion, headaches or seizures. Negative for weakness and numbness. Dermatological ROS: negative for - mole changes, rash  Cardiovascular: Negative for leg swelling. Gastrointestinal: Negative for constipation, diarrhea, nausea and vomiting. Lower Extremity Physical Examination:     Vitals: There were no vitals filed for this visit. General: AAO x 3 in NAD. Dermatologic Exam:  Keratotic skin noted to distal right hallux    Skin No rashes or nodules noted. .   Skin is thin, with flaky sloughing skin as well as decreased hair growth to the lower leg  Small red hemosiderin deposits seen dorsal foot   Musculoskeletal:     1st MPJ ROM decreased, Bilateral.  Muscle strength 5/5, Bilateral.  Pain present upon palpation of toenails 1-5, Bilateral. decreased medial longitudinal arch, Bilateral.  Ankle ROM decreased,Bilateral.    Dorsally contracted digits present digits right hallux.       Vascular: DP pulses 1/4 bilateral.  PT pulses 0/4 bilateral.   CFT <5 seconds, Bilateral.  Hair growth absent to the level of the digits, Bilateral.  Edema present, Bilateral.  Varicosities absent, Bilateral. Erythema absent, Bilateral     Neurological: Sensation diminshed to light touch to level of digits, Bilateral.  Protective sensation intact 6/10 sites via 5.07/10g [place] Monofilament, Bilateral.  negative Tinel's, Bilateral.  negative Valleix sign, Bilateral.       Integument: Warm, dry, supple, Bilateral.  Interdigital maceration absent to web spaces 4, Bilateral.  Nails 1-5 left and 1-5 right thickened > 3.0 mm, dystrophic and crumbly, discolored with subungual debris. Fissures absent, Bilateral.       Asessment: Patient is a 65 y.o. female with:    Diagnosis Orders   1. Hallux hammertoe, right  WA INCISION FLEX TOE TENDON      2. Pain in both lower extremities  WA INCISION FLEX TOE TENDON            Plan: Patient examined and evaluated. Current condition and treatment options discussed in detail. Procedure:  Flexor Tenotomy  Anatomic Location: right hallux    Verbal and written consent obtained from patient for Flexor tenotomy of the right hallux after all questions answered. This area was prepped with an alcohol swab and 3 cc total of 2% lidocaine plain was injected to the right hallux. The toe was prepped and draped in the usual sterile manner. A small stab incision was made plantar to the toe in the sulcus near the PIPJ. The Flexor tendon was transected and the toe immediately released tension. The toes was dorsiflexed to break up tightness at the PIPJ. The area was flushed with copious amount of saline. The hyperkeratosis and ulcer on the tip was lightly debrided and the hyperkeratotic rim was removed. The toe was dressed with adaptic and DSD. The patient will leave this intact for 2 days, keeping it dry. Then he can remove and apply a band aid and antibiotic ointment. Advised pt to remain in post op shoe for 4 days. Monitor daily for infection. .  Verbal and written instructions given to patient. Contact office with any questions/problems/concerns. No orders of the defined types were placed in this encounter. No orders of the defined types were placed in this encounter. RTC in 2week(s).     10/12/2022      Electronically signed by Fidencio Larry DPM on 10/18/2022 at 3:49 PM  10/12/2022

## 2022-10-19 ENCOUNTER — OFFICE VISIT (OUTPATIENT)
Dept: FAMILY MEDICINE CLINIC | Age: 62
End: 2022-10-19
Payer: MEDICARE

## 2022-10-19 ENCOUNTER — OFFICE VISIT (OUTPATIENT)
Dept: PODIATRY | Age: 62
End: 2022-10-19

## 2022-10-19 VITALS
HEART RATE: 79 BPM | DIASTOLIC BLOOD PRESSURE: 78 MMHG | HEIGHT: 70 IN | OXYGEN SATURATION: 95 % | TEMPERATURE: 97.2 F | SYSTOLIC BLOOD PRESSURE: 139 MMHG | BODY MASS INDEX: 41.95 KG/M2 | WEIGHT: 293 LBS

## 2022-10-19 VITALS — BODY MASS INDEX: 41.95 KG/M2 | HEIGHT: 70 IN | WEIGHT: 293 LBS

## 2022-10-19 DIAGNOSIS — Z79.4 TYPE 2 DIABETES MELLITUS WITH DIABETIC POLYNEUROPATHY, WITH LONG-TERM CURRENT USE OF INSULIN (HCC): ICD-10-CM

## 2022-10-19 DIAGNOSIS — E11.42 TYPE 2 DIABETES MELLITUS WITH DIABETIC POLYNEUROPATHY, WITH LONG-TERM CURRENT USE OF INSULIN (HCC): ICD-10-CM

## 2022-10-19 DIAGNOSIS — Z00.00 MEDICARE ANNUAL WELLNESS VISIT, SUBSEQUENT: Primary | ICD-10-CM

## 2022-10-19 DIAGNOSIS — Z23 NEEDS FLU SHOT: ICD-10-CM

## 2022-10-19 DIAGNOSIS — J44.9 CHRONIC OBSTRUCTIVE PULMONARY DISEASE, UNSPECIFIED COPD TYPE (HCC): ICD-10-CM

## 2022-10-19 DIAGNOSIS — Z98.890 POST-OPERATIVE STATE: Primary | ICD-10-CM

## 2022-10-19 LAB — HBA1C MFR BLD: 6.6 %

## 2022-10-19 PROCEDURE — G0439 PPPS, SUBSEQ VISIT: HCPCS | Performed by: STUDENT IN AN ORGANIZED HEALTH CARE EDUCATION/TRAINING PROGRAM

## 2022-10-19 PROCEDURE — 90674 CCIIV4 VAC NO PRSV 0.5 ML IM: CPT | Performed by: STUDENT IN AN ORGANIZED HEALTH CARE EDUCATION/TRAINING PROGRAM

## 2022-10-19 PROCEDURE — G8482 FLU IMMUNIZE ORDER/ADMIN: HCPCS | Performed by: STUDENT IN AN ORGANIZED HEALTH CARE EDUCATION/TRAINING PROGRAM

## 2022-10-19 PROCEDURE — 3017F COLORECTAL CA SCREEN DOC REV: CPT | Performed by: STUDENT IN AN ORGANIZED HEALTH CARE EDUCATION/TRAINING PROGRAM

## 2022-10-19 PROCEDURE — G0008 ADMIN INFLUENZA VIRUS VAC: HCPCS | Performed by: STUDENT IN AN ORGANIZED HEALTH CARE EDUCATION/TRAINING PROGRAM

## 2022-10-19 PROCEDURE — 99024 POSTOP FOLLOW-UP VISIT: CPT | Performed by: PODIATRIST

## 2022-10-19 PROCEDURE — 83036 HEMOGLOBIN GLYCOSYLATED A1C: CPT | Performed by: STUDENT IN AN ORGANIZED HEALTH CARE EDUCATION/TRAINING PROGRAM

## 2022-10-19 PROCEDURE — 3044F HG A1C LEVEL LT 7.0%: CPT | Performed by: STUDENT IN AN ORGANIZED HEALTH CARE EDUCATION/TRAINING PROGRAM

## 2022-10-19 RX ORDER — BLOOD-GLUCOSE,RECEIVER,CONT
EACH MISCELLANEOUS
Qty: 1 EACH | Refills: 1 | Status: SHIPPED | OUTPATIENT
Start: 2022-10-19

## 2022-10-19 RX ORDER — ETODOLAC 400 MG/1
TABLET, FILM COATED ORAL
COMMUNITY

## 2022-10-19 RX ORDER — BUDESONIDE AND FORMOTEROL FUMARATE DIHYDRATE 160; 4.5 UG/1; UG/1
2 AEROSOL RESPIRATORY (INHALATION) 2 TIMES DAILY
Qty: 30.6 G | Refills: 1 | Status: SHIPPED | OUTPATIENT
Start: 2022-10-19

## 2022-10-19 RX ORDER — BLOOD-GLUCOSE TRANSMITTER
EACH MISCELLANEOUS
Qty: 1 EACH | Refills: 1 | Status: SHIPPED | OUTPATIENT
Start: 2022-10-19

## 2022-10-19 RX ORDER — BLOOD-GLUCOSE SENSOR
EACH MISCELLANEOUS
Qty: 1 EACH | Refills: 1 | Status: SHIPPED | OUTPATIENT
Start: 2022-10-19

## 2022-10-19 SDOH — ECONOMIC STABILITY: FOOD INSECURITY: WITHIN THE PAST 12 MONTHS, THE FOOD YOU BOUGHT JUST DIDN'T LAST AND YOU DIDN'T HAVE MONEY TO GET MORE.: NEVER TRUE

## 2022-10-19 SDOH — ECONOMIC STABILITY: FOOD INSECURITY: WITHIN THE PAST 12 MONTHS, YOU WORRIED THAT YOUR FOOD WOULD RUN OUT BEFORE YOU GOT MONEY TO BUY MORE.: NEVER TRUE

## 2022-10-19 ASSESSMENT — LIFESTYLE VARIABLES: HOW MANY STANDARD DRINKS CONTAINING ALCOHOL DO YOU HAVE ON A TYPICAL DAY: PATIENT DOES NOT DRINK

## 2022-10-19 ASSESSMENT — PATIENT HEALTH QUESTIONNAIRE - PHQ9
5. POOR APPETITE OR OVEREATING: 2
SUM OF ALL RESPONSES TO PHQ QUESTIONS 1-9: 6
6. FEELING BAD ABOUT YOURSELF - OR THAT YOU ARE A FAILURE OR HAVE LET YOURSELF OR YOUR FAMILY DOWN: 0
SUM OF ALL RESPONSES TO PHQ QUESTIONS 1-9: 6
2. FEELING DOWN, DEPRESSED OR HOPELESS: 0
3. TROUBLE FALLING OR STAYING ASLEEP: 1
10. IF YOU CHECKED OFF ANY PROBLEMS, HOW DIFFICULT HAVE THESE PROBLEMS MADE IT FOR YOU TO DO YOUR WORK, TAKE CARE OF THINGS AT HOME, OR GET ALONG WITH OTHER PEOPLE: 0
SUM OF ALL RESPONSES TO PHQ QUESTIONS 1-9: 6
4. FEELING TIRED OR HAVING LITTLE ENERGY: 3
8. MOVING OR SPEAKING SO SLOWLY THAT OTHER PEOPLE COULD HAVE NOTICED. OR THE OPPOSITE, BEING SO FIGETY OR RESTLESS THAT YOU HAVE BEEN MOVING AROUND A LOT MORE THAN USUAL: 0
SUM OF ALL RESPONSES TO PHQ9 QUESTIONS 1 & 2: 0
1. LITTLE INTEREST OR PLEASURE IN DOING THINGS: 0
SUM OF ALL RESPONSES TO PHQ QUESTIONS 1-9: 6
9. THOUGHTS THAT YOU WOULD BE BETTER OFF DEAD, OR OF HURTING YOURSELF: 0
7. TROUBLE CONCENTRATING ON THINGS, SUCH AS READING THE NEWSPAPER OR WATCHING TELEVISION: 0

## 2022-10-19 ASSESSMENT — SOCIAL DETERMINANTS OF HEALTH (SDOH): HOW HARD IS IT FOR YOU TO PAY FOR THE VERY BASICS LIKE FOOD, HOUSING, MEDICAL CARE, AND HEATING?: NOT HARD AT ALL

## 2022-10-19 NOTE — PROGRESS NOTES
Medicare Annual Wellness Visit    Valerio Tarango is here for Medicare AWV and Diabetes    Assessment & Plan   Medicare annual wellness visit, subsequent  Needs flu shot  -     Influenza, FLUCELVAX, (age 10 mo+), IM, PF, 0.5 mL  Type 2 diabetes mellitus with diabetic polyneuropathy, with long-term current use of insulin (HCC)  -     POCT glycosylated hemoglobin (Hb A1C)  -     Continuous Blood Gluc Sensor (DEXCOM G6 SENSOR) MISC; Use as directed, Disp-1 each, R-1Normal  -     Continuous Blood Gluc  (DEXCOM G6 ) AMADO; Use as directed, Disp-1 each, R-1Normal  -     Continuous Blood Gluc Transmit (DEXCOM G6 TRANSMITTER) MISC; Use as directed, Disp-1 each, R-1Normal  Chronic obstructive pulmonary disease, unspecified COPD type (HCC)  -     budesonide-formoterol (SYMBICORT) 160-4.5 MCG/ACT AERO; Inhale 2 puffs into the lungs 2 times daily, Disp-30.6 g, R-1Normal    Recommendations for Preventive Services Due: see orders and patient instructions/AVS.  Recommended screening schedule for the next 5-10 years is provided to the patient in written form: see Patient Instructions/AVS.     Return in 3 months (on 1/19/2023) for Medicare Annual Wellness Visit in 1 year, Follow up DM/HTN. Subjective   The following acute and/or chronic problems were also addressed today:  Her A1c today 6.6 and she has been taking 42 units of Toujeo nightly along with Trulicity 4.5 mg/week. She still continues to feel nauseous every day. She was advised to take omeprazole first thing in the morning before eating or at nighttime. She also has morning cough with COPD, Spiriva did not work for her. Started on Symbicort twice a day. Patient's complete Health Risk Assessment and screening values have been reviewed and are found in Flowsheets. The following problems were reviewed today and where indicated follow up appointments were made and/or referrals ordered.     Positive Risk Factor Screenings with Interventions:    Fall Risk:  Do you feel unsteady or are you worried about falling? : (!) yes  2 or more falls in past year?: no  Fall with injury in past year?: no   Fall Risk Interventions:    Home safety tips provided     Depression:  PHQ-2 Score: 0  PHQ-9 Total Score: 6    Severity:1-4 = minimal depression, 5-9 = mild depression, 10-14 = moderate depression, 15-19 = moderately severe depression, 20-27 = severe depression  Depression Interventions:  Relaxation techniques discussed    Tobacco Use:  Tobacco Use: High Risk    Smoking Tobacco Use: Every Day    Smokeless Tobacco Use: Never     E-cigarette/Vaping       Questions Responses    E-cigarette/Vaping Use Never User    Start Date     Passive Exposure     Quit Date     Counseling Given     Comments Other (See comment)          Substance Use - Tobacco Interventions:  patient is not ready to work toward tobacco cessation at this time         General Health and ACP:  General  In general, how would you say your health is?: Fair  In the past 7 days, have you experienced any of the following: New or Increased Pain, New or Increased Fatigue, Loneliness, Social Isolation, Stress or Anger?: No  Do you get the social and emotional support that you need?: Yes  Do you have a Living Will?: (!) No    Advance Directives       Power of  Living Will ACP-Advance Directive ACP-Power of     Not on File Not on File Not on File Not on File          General Health Risk Interventions:  No Living Will: Patient declines ACP discussion/assistance    Health Habits/Nutrition:  Physical Activity: Sufficiently Active    Days of Exercise per Week: 3 days    Minutes of Exercise per Session: 60 min     Have you lost any weight without trying in the past 3 months?: No  Body mass index: (!) 45.14  Have you seen the dentist within the past year?: Yes  Health Habits/Nutrition Interventions:  none    Hearing/Vision:  Do you or your family notice any trouble with your hearing that hasn't been managed with hearing aids?: No  Do you have difficulty driving, watching TV, or doing any of your daily activities because of your eyesight?: (!) Yes  Have you had an eye exam within the past year?: Yes  No results found. Hearing/Vision Interventions:  Vision concerns: Following with ophthalmology for cataracts    Safety:  Do you have working smoke detectors?: Yes  Do you have any tripping hazards - loose or unsecured carpets or rugs?: (!) Yes  Do you have any tripping hazards - clutter in doorways, halls, or stairs?: No  Do you have either shower bars, grab bars, non-slip mats or non-slip surfaces in your shower or bathtub?: Yes  Do all of your stairways have a railing or banister?: Not Applicable  Do you always fasten your seatbelt when you are in a car?: Yes  Safety Interventions:  Patient declines any further evaluation/treatment for this issue           Objective   Vitals:    10/19/22 1009   BP: 139/78   Pulse: 79   Temp: 97.2 °F (36.2 °C)   SpO2: 95%   Weight: (!) 314 lb 9.6 oz (142.7 kg)   Height: 5' 10\" (1.778 m)      Body mass index is 45.14 kg/m².       General Appearance: alert and oriented to person, place and time, well developed and well- nourished, in no acute distress  Skin: warm and dry, no rash or erythema  Head: normocephalic and atraumatic  Eyes: pupils equal, round, and reactive to light, extraocular eye movements intact, conjunctivae normal  ENT: tympanic membrane, external ear and ear canal normal bilaterally, nose without deformity, nasal mucosa and turbinates normal without polyps  Neck: supple and non-tender without mass, no thyromegaly or thyroid nodules, no cervical lymphadenopathy  Pulmonary/Chest: clear to auscultation bilaterally- no wheezes, rales or rhonchi, normal air movement, no respiratory distress  Cardiovascular: normal rate, regular rhythm, normal S1 and S2, no murmurs, rubs, clicks, or gallops, distal pulses intact, no carotid bruits  Abdomen: soft, non-tender, non-distended, normal bowel sounds, no masses or organomegaly  Extremities: no cyanosis, clubbing or edema  Musculoskeletal: normal range of motion, no joint swelling, deformity or tenderness  Neurologic: reflexes normal and symmetric, no cranial nerve deficit, gait, coordination and speech normal       Allergies   Allergen Reactions    Duloxetine Hcl Anaphylaxis and Other (See Comments)     Other reaction(s): anaphylaxis    Sitagliptin Shortness Of Breath     Other reaction(s): wheezing    Cephalexin Other (See Comments)    Ciprofloxacin Other (See Comments)     muscle spasms occur  muscle spasms occur  muscle spasms occur  cramping  Other reaction(s): Unknown  Other reaction(s): anaphylaxis    Duloxetine     Metformin Diarrhea    Metformin And Related Diarrhea    Pregabalin Other (See Comments)     Does not help after 2 weeks   Other reaction(s): anaphylaxis    Statins Other (See Comments)     myalgias      Morphine Diarrhea, Nausea And Vomiting, Swelling and Nausea Only     Other reaction(s): anaphylaxis     Prior to Visit Medications    Medication Sig Taking?  Authorizing Provider   Continuous Blood Gluc Sensor (DEXCOM G6 SENSOR) MISC Use as directed Yes Amberly Garvin MD   Continuous Blood Gluc  (DEXCOM G6 ) AMADO Use as directed Yes Amberly Garvin MD   Continuous Blood Gluc Transmit (DEXCOM G6 TRANSMITTER) MISC Use as directed Yes Amberly Garvin MD   budesonide-formoterol (SYMBICORT) 160-4.5 MCG/ACT AERO Inhale 2 puffs into the lungs 2 times daily Yes MD KELLY Temple SOLOSTAR 300 UNIT/ML SOPN INJECT SUBCUTANEOUSLY 52  UNITS DAILY Yes Amberly Garvin MD   ondansetron (ZOFRAN) 4 MG tablet TAKE 1 TABLET BY MOUTH  EVERY 8 HOURS AS NEEDED FOR NAUSEA OR VOMITING Yes Amberly Garvin MD   tiZANidine (ZANAFLEX) 4 MG tablet TAKE 1 TABLET TWICE DAILY Yes Amberly Garvin MD   milnacipran HCl (SAVELLA) 100 MG TABS Take 1 tablet by mouth 2 times daily Yes Amberly Garvin MD   TRULICITY 4.5 NG/1.9QH SOPN INJECT THE CONTENTS OF ONE PEN SUBCUTANEOUSLY WEEKLY  AS DIRECTED Yes Bryson Lobo MD   colchicine (MITIGARE) 0.6 MG capsule TAKE 1 CAPSULE BY MOUTH  DAILY Yes AMARILYS Sears CNP   buPROPion (WELLBUTRIN SR) 200 MG extended release tablet TAKE 1 TABLET BY MOUTH  TWICE DAILY Yes Bryson Lobo MD   allopurinol (ZYLOPRIM) 300 MG tablet TAKE 1 TABLET BY MOUTH  DAILY Yes Bryson oLbo MD   fenofibrate (TRIGLIDE) 160 MG tablet TAKE 1 TABLET BY MOUTH  DAILY Yes Bryson Lobo MD   carvedilol (COREG) 25 MG tablet TAKE 1 TABLET BY MOUTH  TWICE DAILY Yes Bryson Lobo MD   fluticasone (FLONASE) 50 MCG/ACT nasal spray 1 spray by Each Nostril route daily Yes Bryson Lobo MD   Insulin Pen Needle (B-D UF III MINI PEN NEEDLES) 31G X 5 MM MISC 1 each by Does not apply route 3 times daily Yes Bryson Lobo MD   Magnesium Oxide (MAG-OXIDE) 200 MG TABS Take 1 tablet by mouth at bedtime Yes Bryson Lobo MD   Dulaglutide 4.5 MG/0.5ML SOPN Inject 4.5 mg into the skin once a week Yes Bryson Lobo MD   Handicap Placard MISC by Does not apply route Exp: 3/2027 Yes Bryson Lobo MD   omeprazole (PRILOSEC) 20 MG delayed release capsule TAKE 1 CAPSULE EVERY DAY Yes Bryson Lobo MD   pramipexole (MIRAPEX) 1 MG tablet Take 1 tablet by mouth nightly Yes Bryson Lobo MD   Misc.  Devices MISC 1 PAIR OF DIABETIC SHOES (1 LEFT/ 1 RIGHT)  1-3 PAIRS OF INSERTS (LEFT/ RIGHT) Yes Uri Slot, DPM   Lancets MISC 1 each by Does not apply route 3 times daily Yes Bryson Lobo MD   folic acid (FOLVITE) 1 MG tablet Take 1 mg by mouth daily Yes Historical Provider, MD   Cholecalciferol (VITAMIN D) 125 MCG (5000 UT) CAPS Take 1 tablet by mouth daily Yes Bryson Lobo MD   albuterol sulfate HFA (VENTOLIN HFA) 108 (90 Base) MCG/ACT inhaler Inhale 2 puffs into the lungs every 6 hours as needed for Wheezing Yes Bryson Lobo MD   b complex vitamins capsule Take 1 capsule by mouth daily Yes Historical Provider, MD   Multiple Vitamins-Minerals (WOMENS DAILY FORMULA PO) Take by mouth Yes Historical Provider, MD   vitamin C (ASCORBIC ACID) 500 MG tablet Take 500 mg by mouth daily Yes Historical Provider, MD   Biotin 5 MG CAPS Take by mouth Yes Historical Provider, MD   etodolac (LODINE) 400 MG tablet TAKE 1 TABLET BY MOUTH TWICE DAILY WITH FOOD  Historical Provider, MD   hydrOXYzine HCl (ATARAX) 25 MG tablet TAKE 1 TABLET BY MOUTH  EVERY 8 HOURS AS NEEDED FOR ANXIETY  Patient not taking: Reported on 10/19/2022  Shannon Malhotra MD   loratadine (CLARITIN) 10 MG tablet Take 1 tablet by mouth daily  Patient not taking: Reported on 10/19/2022  Shannon Malhotra MD   Blood Glucose Calibration (TRUE METRIX LEVEL 2) Normal SOLN   Historical Provider, MD   Blood Glucose Monitoring Suppl (TRUE METRIX METER) w/Device KIT   Historical Provider, MD [place] (Including outside providers/suppliers regularly involved in providing care):   Patient Care Team:  Shannon Malhotra MD as PCP - General (Family Medicine)  Shannon Malhotra MD as PCP - [facility] Empaneled Provider     Reviewed and updated this visit:  Tobacco  Allergies  Meds  Problems  Med Hx  Surg Hx  Soc Hx  Fam Hx

## 2022-10-19 NOTE — PATIENT INSTRUCTIONS
Schedule a Vaccine  When you qualify to receive the vaccine, call the [facility]) COVID-19 Vaccination Hotline to schedule your appointment or to get additional information about the [facility]) locations which are offering the COVID-19 vaccine. To be 94% effective, it's important that you receive two doses of one of the COVID-19 vaccines. -If you are receiving the Vyas Peter vaccine, your second shot will be scheduled as close to 21 days after the first shot as possible. -If you are receiving the Moderna vaccine, your second shot will be scheduled as close to 28 days after the first shot as possible. [facility]) COVID-19 Vaccination Hotline: [phone]    Links to [facility]) website and [facility] website:    [employer]-monitoring-coronavirus-covid-19/covid-19-vaccine/[place]/[email or web address]-vaccine    [email or web address]

## 2022-10-19 NOTE — PATIENT INSTRUCTIONS
Personalized Preventive Plan for Lokesh Chua - 10/19/2022  Medicare offers a range of preventive health benefits. Some of the tests and screenings are paid in full while other may be subject to a deductible, co-insurance, and/or copay. Some of these benefits include a comprehensive review of your medical history including lifestyle, illnesses that may run in your family, and various assessments and screenings as appropriate. After reviewing your medical record and screening and assessments performed today your provider may have ordered immunizations, labs, imaging, and/or referrals for you. A list of these orders (if applicable) as well as your Preventive Care list are included within your After Visit Summary for your review. Other Preventive Recommendations:    A preventive eye exam performed by an eye specialist is recommended every 1-2 years to screen for glaucoma; cataracts, macular degeneration, and other eye disorders. A preventive dental visit is recommended every 6 months. Try to get at least 150 minutes of exercise per week or 10,000 steps per day on a pedometer . Order or download the FREE \"Exercise & Physical Activity: Your Everyday Guide\" from [employer]. Call 0-597.655.7349 or search [employer] online. You need 1934-4061 mg of calcium and 4864-0726 IU of vitamin D per day. It is possible to meet your calcium requirement with diet alone, but a vitamin D supplement is usually necessary to meet this goal.  When exposed to the sun, use a sunscreen that protects against both UVA and UVB radiation with an SPF of 30 or greater. Reapply every 2 to 3 hours or after sweating, drying off with a towel, or swimming. Always wear a seat belt when traveling in a car. Always wear a helmet when riding a bicycle or motorcycle.

## 2022-10-19 NOTE — PROGRESS NOTES
[address] [facility] PODIATRY [facility]  [address] [facility] [address]  Dept: [phone]  Dept Fax: [phone]    POST-OP PROGRESS NOTE  Date of patient's visit: 10/19/2022  Patient's Name:  Anyi Plata YOB: 1960            Patient Care Team:  Jung Pereyra MD as PCP - General (Family Medicine)  Jung Pereyra MD [facility] P[facility] - [facility]        Chief Complaint   Patient presents with    Post-Op Check     LA INCISION FLEX TOE TENDON-RIGHT, completed 10/12/22        Pt    Subjective: Anyi Plata is a 65 y.o. female who presents to the office today 1week(s)  S/P LA INCISION FLEX TOE TENDON-RIGHT for correction of right toe pain and discomfort. Problem List Items Addressed This Visit    None  Patient relates pain is Absent  and improved. Pain is rated 0 out of 10 and is described as none. Currently denies F/C/N/V. Is patient taking pain medications as prescribed and is controlling pain n/a    Physical Examination:  Incision is coapted. Minimal bleeding post operatively. Edema present. No erythema. No Pus. Operative correction is satisfactory. Assessment: Anyi Plata is status post as above  Normal post operative course. Doing well        No diagnosis found. Plan:  Patient examined and evaluated. Current condition and treatment options discussed in detail. Advised pt to return to normal shoes as tolerated. Verbal and written instructions given to patient. Orders: No orders of the defined types were placed in this encounter. Contact office with any questions/problems/concerns. RTC in 2month(s).      Electronically signed by Saskia Epps DPM on 10/19/2022 at 2:26 PM  10/19/2022

## 2022-10-22 ENCOUNTER — PATIENT MESSAGE (OUTPATIENT)
Dept: FAMILY MEDICINE CLINIC | Age: 62
End: 2022-10-22

## 2022-10-24 RX ORDER — BLOOD-GLUCOSE METER
1 EACH MISCELLANEOUS DAILY
Qty: 1 KIT | Refills: 0 | Status: SHIPPED | OUTPATIENT
Start: 2022-10-24 | End: 2022-10-26

## 2022-10-24 RX ORDER — GLUCOSAMINE HCL/CHONDROITIN SU 500-400 MG
CAPSULE ORAL
Qty: 100 STRIP | Refills: 0 | Status: SHIPPED | OUTPATIENT
Start: 2022-10-24 | End: 2022-10-27 | Stop reason: SDUPTHER

## 2022-10-24 NOTE — TELEPHONE ENCOUNTER
From: Marquita Friedman  To: Dr. Becky Gay: 10/22/2022 10:53 AM EDT  Subject: Need One touch Glucometer. Nigel Adams,  [facility] needs a script from you for BOTH One Touch you come her and test strips before they will send me both items. Been out of testing meter and strips for 12 days now.   Marquita Friedman

## 2022-10-26 RX ORDER — BLOOD-GLUCOSE METER
EACH MISCELLANEOUS
Qty: 1 KIT | Refills: 0 | Status: SHIPPED | OUTPATIENT
Start: 2022-10-26

## 2022-10-26 NOTE — TELEPHONE ENCOUNTER
Karishma Cosme is calling to request a refill on the following medication(s):    Medication Request:  Requested Prescriptions     Pending Prescriptions Disp Refills    Blood Glucose Monitoring Suppl (ONE TOUCH ULTRA 2) w/Device KIT [Pharmacy Med Name: ONE KIT  TOUCH ULTRA 2 KIT]       Sig: USE AS DIRECTED DAILY       Last Visit Date (If Applicable):  01/00/4710    Next Visit Date:    1/19/2023 Worked as a  but recently quite. Endorses occasional smoking. Denies any ETOH or recreational drugs.

## 2022-10-27 DIAGNOSIS — Z79.4 TYPE 2 DIABETES MELLITUS WITH DIABETIC POLYNEUROPATHY, WITH LONG-TERM CURRENT USE OF INSULIN (HCC): Primary | ICD-10-CM

## 2022-10-27 DIAGNOSIS — E11.42 TYPE 2 DIABETES MELLITUS WITH DIABETIC POLYNEUROPATHY, WITH LONG-TERM CURRENT USE OF INSULIN (HCC): Primary | ICD-10-CM

## 2022-10-27 RX ORDER — GLUCOSAMINE HCL/CHONDROITIN SU 500-400 MG
CAPSULE ORAL
Qty: 100 STRIP | Refills: 0 | Status: SHIPPED | OUTPATIENT
Start: 2022-10-27 | End: 2022-11-11

## 2022-11-08 ENCOUNTER — TELEPHONE (OUTPATIENT)
Dept: FAMILY MEDICINE CLINIC | Age: 62
End: 2022-11-08

## 2022-11-08 DIAGNOSIS — Z79.4 TYPE 2 DIABETES MELLITUS WITH DIABETIC POLYNEUROPATHY, WITH LONG-TERM CURRENT USE OF INSULIN (HCC): ICD-10-CM

## 2022-11-08 DIAGNOSIS — E11.42 TYPE 2 DIABETES MELLITUS WITH DIABETIC POLYNEUROPATHY, WITH LONG-TERM CURRENT USE OF INSULIN (HCC): ICD-10-CM

## 2022-11-08 RX ORDER — BLOOD-GLUCOSE TRANSMITTER
EACH MISCELLANEOUS
Qty: 1 EACH | Refills: 0 | Status: SHIPPED | OUTPATIENT
Start: 2022-11-08

## 2022-11-08 RX ORDER — BLOOD-GLUCOSE,RECEIVER,CONT
EACH MISCELLANEOUS
Qty: 1 EACH | Refills: 1 | Status: SHIPPED | OUTPATIENT
Start: 2022-11-08

## 2022-11-08 RX ORDER — BLOOD-GLUCOSE SENSOR
EACH MISCELLANEOUS
Qty: 1 EACH | Refills: 1 | Status: SHIPPED | OUTPATIENT
Start: 2022-11-08

## 2022-11-08 RX ORDER — BLOOD-GLUCOSE SENSOR
EACH MISCELLANEOUS
Qty: 1 EACH | Refills: 1 | Status: CANCELLED | OUTPATIENT
Start: 2022-11-08

## 2022-11-08 NOTE — TELEPHONE ENCOUNTER
Dexcom sensor, , transmitter must be sent to a DME pharmacy. Please do orders so they can be faxed to.  [facility] [phone]

## 2022-11-11 DIAGNOSIS — E11.42 TYPE 2 DIABETES MELLITUS WITH DIABETIC POLYNEUROPATHY, WITH LONG-TERM CURRENT USE OF INSULIN (HCC): ICD-10-CM

## 2022-11-11 DIAGNOSIS — Z79.4 TYPE 2 DIABETES MELLITUS WITH DIABETIC POLYNEUROPATHY, WITH LONG-TERM CURRENT USE OF INSULIN (HCC): ICD-10-CM

## 2022-11-11 DIAGNOSIS — G25.81 RLS (RESTLESS LEGS SYNDROME): ICD-10-CM

## 2022-11-11 DIAGNOSIS — R11.0 NAUSEA: ICD-10-CM

## 2022-11-11 DIAGNOSIS — F41.9 ANXIETY: ICD-10-CM

## 2022-11-11 RX ORDER — BLOOD SUGAR DIAGNOSTIC
STRIP MISCELLANEOUS
Qty: 100 STRIP | Refills: 9 | Status: SHIPPED | OUTPATIENT
Start: 2022-11-11

## 2022-11-11 RX ORDER — HYDROXYZINE HYDROCHLORIDE 25 MG/1
TABLET, FILM COATED ORAL
Qty: 180 TABLET | Refills: 0 | Status: SHIPPED | OUTPATIENT
Start: 2022-11-11

## 2022-11-11 RX ORDER — PRAMIPEXOLE DIHYDROCHLORIDE 1 MG/1
TABLET ORAL
Qty: 90 TABLET | Refills: 3 | Status: SHIPPED | OUTPATIENT
Start: 2022-11-11

## 2022-11-11 RX ORDER — ONDANSETRON 4 MG/1
4 TABLET, FILM COATED ORAL EVERY 8 HOURS PRN
Qty: 45 TABLET | Refills: 0 | Status: SHIPPED | OUTPATIENT
Start: 2022-11-11 | End: 2022-12-11

## 2022-11-11 RX ORDER — OMEPRAZOLE 20 MG/1
CAPSULE, DELAYED RELEASE ORAL
Qty: 90 CAPSULE | Refills: 3 | Status: SHIPPED | OUTPATIENT
Start: 2022-11-11

## 2022-11-11 NOTE — TELEPHONE ENCOUNTER
Oracio Quispe is calling to request a refill on the following medication(s):    Medication Request:  Requested Prescriptions     Pending Prescriptions Disp Refills    hydrOXYzine HCl (ATARAX) 25 MG tablet [Pharmacy Med Name: hydrOXYzine HCl 25 MG Oral Tablet] 180 tablet 0     Sig: TAKE 1 TABLET BY MOUTH  EVERY 8 HOURS AS NEEDED FOR ANXIETY    pramipexole (MIRAPEX) 1 MG tablet [Pharmacy Med Name: PRAMIPEXOLE  1MG  TAB] 90 tablet 3     Sig: TAKE 1 TABLET BY MOUTH AT  NIGHT    omeprazole (PRILOSEC) 20 MG delayed release capsule [Pharmacy Med Name: Omeprazole 20 MG Oral Capsule Delayed Release] 90 capsule 3     Sig: TAKE 1 CAPSULE BY MOUTH  DAILY    blood glucose test strips (ONETOUCH ULTRA) strip [Pharmacy Med Name: Vindi Ultra In Vitro Strip] 100 strip 9     Sig: TEST 3 TIMES DAILY AND AS NEEDED FOR SYMPTOMS OF IRREGULAR BLOOD  GLUCOSE.    ondansetron (ZOFRAN) 4 MG tablet [Pharmacy Med Name: ONDANSETRON  4MG  TAB] 45 tablet 0     Sig: TAKE 1 TABLET BY MOUTH  EVERY 8 HOURS AS NEEDED FOR NAUSEA OR VOMITING       Last Visit Date (If Applicable):  46/48/2843    Next Visit Date:    1/19/2023

## 2022-12-07 ENCOUNTER — TELEPHONE (OUTPATIENT)
Dept: ONCOLOGY | Age: 62
End: 2022-12-07

## 2022-12-07 DIAGNOSIS — Z87.891 PERSONAL HISTORY OF NICOTINE DEPENDENCE: Primary | ICD-10-CM

## 2022-12-07 NOTE — TELEPHONE ENCOUNTER
Our records indicate that your patient is coming due for their annual lung cancer screening follow up testing. For your convenience, we have pended the order for the scan for you. If you do not agree with the need for the test, please cancel the order and let us know. Sincerely,    [address] [place] [address] Screening Program    Auto printed reminder letter sent to patient.

## 2023-01-03 ENCOUNTER — TELEPHONE (OUTPATIENT)
Dept: PODIATRY | Facility: CLINIC | Age: 63
End: 2023-01-03

## 2023-01-04 NOTE — TELEPHONE ENCOUNTER
On 1/3/23 at 8:38pm pm, patient called afterhours to cancel her appointment for tomorrow 1/4/23 at 1:15pm with Dr. Esther Soliz at the [facility] due to sickness. Patient said she has the Flu with coughing. Writer instructed patient to notify the office during normal business hours. Please contact patient at preferred call back #[phone] to reschedule or to further discuss.   EM

## 2023-01-16 ENCOUNTER — OFFICE VISIT (OUTPATIENT)
Dept: PODIATRY | Age: 63
End: 2023-01-16
Payer: MEDICARE

## 2023-01-16 VITALS — BODY MASS INDEX: 41.95 KG/M2 | HEIGHT: 70 IN | WEIGHT: 293 LBS

## 2023-01-16 DIAGNOSIS — S90.425A BLISTER OF SECOND TOE OF LEFT FOOT, INITIAL ENCOUNTER: Primary | ICD-10-CM

## 2023-01-16 DIAGNOSIS — M20.42 HAMMERTOE OF SECOND TOE OF LEFT FOOT: ICD-10-CM

## 2023-01-16 PROCEDURE — 99214 OFFICE O/P EST MOD 30 MIN: CPT | Performed by: PODIATRIST

## 2023-01-16 RX ORDER — DOXYCYCLINE HYCLATE 100 MG
100 TABLET ORAL 2 TIMES DAILY
Qty: 20 TABLET | Refills: 0 | Status: SHIPPED | OUTPATIENT
Start: 2023-01-16 | End: 2023-01-26

## 2023-01-16 NOTE — PROGRESS NOTES
[address] [facility] PODIATRY [facility]  [address] [place] [address]  Dept: [phone]  Dept Fax: [phone]    RETURN PATIENT PROGRESS NOTE  Date of patient's visit: 1/16/2023  Patient's Name:  Jamaica Villegas YOB: 1960            Patient Care Team:  Sam Kovacs MD as PCP - General (Family Medicine)  Sam Kovacs MD as PCP - [address] [place] [address] [facility]       Jamaica Villegas 65 y.o. female that presents for follow-up of   Chief Complaint   Patient presents with    Toe Pain     2nd toe on the left    Blister     2nd toe on the left        Symptoms began 2 - 3 week(s) ago and are unchanged . Patient relates pain is Present. Pain is rated 2 out of 10 and is described as constant, mild. Treatments prior to today's visit include: none. Currently denies F/C/N/V.      Allergies   Allergen Reactions    Duloxetine Hcl Anaphylaxis and Other (See Comments)     Other reaction(s): anaphylaxis    Sitagliptin Shortness Of Breath     Other reaction(s): wheezing    Cephalexin Other (See Comments)    Ciprofloxacin Other (See Comments)     muscle spasms occur  muscle spasms occur  muscle spasms occur  cramping  Other reaction(s): Unknown  Other reaction(s): anaphylaxis    Duloxetine     Metformin Diarrhea    Metformin And Related Diarrhea    Pregabalin Other (See Comments)     Does not help after 2 weeks   Other reaction(s): anaphylaxis    Statins Other (See Comments)     myalgias      Morphine Diarrhea, Nausea And Vomiting, Swelling and Nausea Only     Other reaction(s): anaphylaxis       Past Medical History:   Diagnosis Date    Anxiety     Asthma     BMI 29.0-29.9,adult 4/22/2021    Bronchitis     Chronic obstructive pulmonary disease ([facility] [address] 9/11/2018    Depression     Fibromyalgia     Gout     HLD (hyperlipidemia) 7/1/2015    Hyperglycemia 10/8/2020    Hypertension     Neuropathy     Obesity     Obstructive sleep apnea     Osteoarthritis Stage 3a chronic kidney disease  4/22/2021    Type 2 diabetes mellitus with diabetic polyneuropathy, with long-term current use of insulin ([facility] [address] 9/7/2017    Type II or unspecified type diabetes mellitus without mention of complication, not stated as uncontrolled     Urinary incontinence     Yeast infection 10/8/2020       Prior to Admission medications    Medication Sig Start Date End Date Taking?  Authorizing Provider   doxycycline hyclate (VIBRA-TABS) 100 MG tablet Take 1 tablet by mouth 2 times daily for 10 days 1/16/23 1/26/23 Yes Spaphire Jacobsen DPM   hydrOXYzine HCl (ATARAX) 25 MG tablet TAKE 1 TABLET BY MOUTH  EVERY 8 HOURS AS NEEDED FOR ANXIETY 11/11/22  Yes Patience Perez MD   pramipexole (MIRAPEX) 1 MG tablet TAKE 1 TABLET BY MOUTH AT  NIGHT 11/11/22  Yes Patience Perez MD   omeprazole (PRILOSEC) 20 MG delayed release capsule TAKE 1 CAPSULE BY MOUTH  DAILY 11/11/22  Yes Patience Perez MD   blood glucose test strips (ONETOUCH ULTRA) strip TEST 3 TIMES DAILY AND AS NEEDED FOR SYMPTOMS OF IRREGULAR BLOOD  GLUCOSE. 11/11/22  Yes Patience Perez MD   etodolac (LODINE) 400 MG tablet TAKE 1 TABLET BY MOUTH TWICE DAILY WITH FOOD   Yes Historical Provider, MD   budesonide-formoterol (SYMBICORT) 160-4.5 MCG/ACT AERO Inhale 2 puffs into the lungs 2 times daily 10/19/22  Yes MD KELLY SpicerAR 300 UNIT/ML SOPN INJECT SUBCUTANEOUSLY 52  UNITS DAILY 9/28/22  Yes Patience Perez MD   tiZANidine (ZANAFLEX) 4 MG tablet TAKE 1 TABLET TWICE DAILY 8/18/22  Yes Patience Perez MD   milnacipran HCl (SAVELLA) 100 MG TABS Take 1 tablet by mouth 2 times daily 8/18/22  Yes Patience Perez MD   TRULICITY 4.5 AC/2.3NZ SOPN INJECT THE CONTENTS OF ONE  PEN SUBCUTANEOUSLY WEEKLY  AS DIRECTED 7/5/22  Yes Patience Perez MD   colchicine (MITIGARE) 0.6 MG capsule TAKE 1 CAPSULE BY MOUTH  DAILY 6/29/22  Yes AMARILYS Benites - CNP   buPROPion (WELLBUTRIN SR) 200 MG extended release tablet TAKE 1 TABLET BY MOUTH  TWICE DAILY 5/31/22  Yes Chris See MD   allopurinol (ZYLOPRIM) 300 MG tablet TAKE 1 TABLET BY MOUTH  DAILY 5/31/22  Yes Chris See MD   fenofibrate (TRIGLIDE) 160 MG tablet TAKE 1 TABLET BY MOUTH  DAILY 5/31/22  Yes Chris See MD   carvedilol (COREG) 25 MG tablet TAKE 1 TABLET BY MOUTH  TWICE DAILY 5/31/22  Yes Chris See MD   loratadine (CLARITIN) 10 MG tablet Take 1 tablet by mouth daily 4/13/22  Yes Chris See MD   fluticasone (FLONASE) 50 MCG/ACT nasal spray 1 spray by Each Nostril route daily 4/13/22  Yes Chris See MD   Insulin Pen Needle (B-D UF III MINI PEN NEEDLES) 31G X 5 MM MISC 1 each by Does not apply route 3 times daily 4/1/22  Yes Chris See MD   Magnesium Oxide (MAG-OXIDE) 200 MG TABS Take 1 tablet by mouth at bedtime 3/14/22  Yes Chris See MD   Handicap Placard MISC by Does not apply route Exp: 3/2027 3/14/22  Yes Chris See MD   Blood Glucose Calibration (TRUE METRIX LEVEL 2) Normal SOLN  9/22/21  Yes Historical Provider, MD   Lancets MISC 1 each by Does not apply route 3 times daily 9/17/21  Yes Chris See MD   folic acid (FOLVITE) 1 MG tablet Take 1 mg by mouth daily   Yes Historical Provider, MD   Cholecalciferol (VITAMIN D) 125 MCG (5000 UT) CAPS Take 1 tablet by mouth daily 5/4/21  Yes Chris See MD   albuterol sulfate HFA (VENTOLIN HFA) 108 (90 Base) MCG/ACT inhaler Inhale 2 puffs into the lungs every 6 hours as needed for Wheezing 4/22/21  Yes Chris See MD   b complex vitamins capsule Take 1 capsule by mouth daily   Yes Historical Provider, MD   Multiple Vitamins-Minerals (WOMENS DAILY FORMULA PO) Take by mouth   Yes Historical Provider, MD   vitamin C (ASCORBIC ACID) 500 MG tablet Take 500 mg by mouth daily   Yes Historical Provider, MD   Biotin 5 MG CAPS Take by mouth   Yes Historical Provider, MD       Review of Systems    Review of Systems:  History obtained from chart review and the patient  General ROS: negative for - chills, fatigue, fever, night sweats or weight gain  Constitutional: Negative for chills, diaphoresis, fatigue, fever and unexpected weight change. Musculoskeletal: Positive for arthralgias, gait problem and joint swelling. Neurological ROS: negative for - behavioral changes, confusion, headaches or seizures. Negative for weakness and numbness. Dermatological ROS: negative for - mole changes, rash  Cardiovascular: Negative for leg swelling. Gastrointestinal: Negative for constipation, diarrhea, nausea and vomiting. Lower Extremity Physical Examination:     Vitals: There were no vitals filed for this visit. General: AAO x 3 in NAD. Dermatologic Exam:  Blister noted to left 2nd digit. No purulence noted. Minimal erythema. Musculoskeletal:     1st MPJ ROM decreased, Bilateral.  Muscle strength 5/5, Bilateral.  Pain present upon palpation of left 2nd digit. Medial longitudinal arch, Bilateral WNL. Ankle ROM WNL,Bilateral.    Dorsally contracted digits left 2nd. Vascular: DP and PT pulses palpable 2/4, Bilateral.  CFT <3 seconds, Bilateral.  Hair growth present to the level of the digits, Bilateral.  Edema absent, Bilateral.  Varicosities absent, Bilateral. Erythema absent, Bilateral    Neurological: Sensation intact to light touch to level of digits, Bilateral.  Protective sensation intact 10/10 sites via 5.07/10g [place] Monofilament, Bilateral.  negative Tinel's, Bilateral.  negative Valleix sign, Bilateral.      Integument: Warm, dry, supple, Bilateral.  Open lesion absent, Bilateral.  Interdigital maceration absent to web spaces 1-4, Bilateral.  Nails are normal in length, thickness and color 1-5 bilateral.  Fissures absent, Bilateral.     Asessment: Patient is a 65 y.o. female with:    Diagnosis Orders   1. Blister of second toe of left foot, initial encounter        2. Hammertoe of second toe of left foot              Plan: Patient examined and evaluated. Current condition and treatment options discussed in detail. Advised pt to have flexor tenotomy to straighten 2nd digit and to prevent reulceration. Blister aspirated today. Applied new DSD. Pt to monitor daily for infection. RX: doxycycline 100 mg. All labs were reviewed and all imagining including the above findings were reviewed PRIOR to the patients arrival and with the patient today. Previous patient encounter was reviewed. Encounters from the patients other medical providers were reviewed and noted. Time was spent educating the patient on proper care of the feet and ankles. All the above diagnosis were addressed at todays visit and all questions were answered. A total of 30 minutes was spent with this patients encounter which included charting after the patients visit    . Verbal and written instructions given to patient. Contact office with any questions/problems/concerns. No orders of the defined types were placed in this encounter. Orders Placed This Encounter   Medications    doxycycline hyclate (VIBRA-TABS) 100 MG tablet     Sig: Take 1 tablet by mouth 2 times daily for 10 days     Dispense:  20 tablet     Refill:  0        RTC in 2week(s).     1/16/2023      Electronically signed by Pearl Bunch DPM on 1/16/2023 at 12:08 PM  1/16/2023

## 2023-01-16 NOTE — PATIENT INSTRUCTIONS
Schedule a Vaccine  When you qualify to receive the vaccine, call the [facility]) COVID-19 Vaccination Hotline to schedule your appointment or to get additional information about the [facility]) locations which are offering the COVID-19 vaccine. To be 94% effective, it's important that you receive two doses of one of the COVID-19 vaccines. -If you are receiving the Vyas Peter vaccine, your second shot will be scheduled as close to 21 days after the first shot as possible. -If you are receiving the Moderna vaccine, your second shot will be scheduled as close to 28 days after the first shot as possible. [facility]) COVID-19 Vaccination Hotline: [phone]    Links to [facility]) website and [facility] website:    [employer]-monitoring-[email or web address]-covid-[email or web address]-[email or web address]-vaccine/[email or web address]    [email or web address]

## 2023-01-18 RX ORDER — LANCETS 30 GAUGE
1 EACH MISCELLANEOUS 3 TIMES DAILY
Qty: 600 EACH | Refills: 1 | Status: SHIPPED | OUTPATIENT
Start: 2023-01-18

## 2023-01-18 NOTE — TELEPHONE ENCOUNTER
Garcia Cat is calling to request a refill on the following medication(s):    Medication Request:  Requested Prescriptions     Pending Prescriptions Disp Refills    Lancets MISC 600 each 1     Sig: Inject 1 each into the skin 3 times daily One touch Del PLus       Last Visit Date (If Applicable):  96/87/3447    Next Visit Date:    1/19/2023

## 2023-01-19 ENCOUNTER — OFFICE VISIT (OUTPATIENT)
Dept: FAMILY MEDICINE CLINIC | Age: 63
End: 2023-01-19

## 2023-01-19 VITALS
TEMPERATURE: 97 F | WEIGHT: 293 LBS | HEIGHT: 70 IN | OXYGEN SATURATION: 100 % | HEART RATE: 75 BPM | SYSTOLIC BLOOD PRESSURE: 130 MMHG | BODY MASS INDEX: 41.95 KG/M2 | DIASTOLIC BLOOD PRESSURE: 88 MMHG

## 2023-01-19 DIAGNOSIS — N18.31 STAGE 3A CHRONIC KIDNEY DISEASE (HCC): ICD-10-CM

## 2023-01-19 DIAGNOSIS — E11.42 TYPE 2 DIABETES MELLITUS WITH DIABETIC POLYNEUROPATHY, WITH LONG-TERM CURRENT USE OF INSULIN (HCC): Primary | ICD-10-CM

## 2023-01-19 DIAGNOSIS — J44.9 CHRONIC OBSTRUCTIVE PULMONARY DISEASE, UNSPECIFIED COPD TYPE (HCC): ICD-10-CM

## 2023-01-19 DIAGNOSIS — Z72.0 TOBACCO USE: ICD-10-CM

## 2023-01-19 DIAGNOSIS — I10 PRIMARY HYPERTENSION: ICD-10-CM

## 2023-01-19 DIAGNOSIS — G47.33 OBSTRUCTIVE SLEEP APNEA SYNDROME: ICD-10-CM

## 2023-01-19 DIAGNOSIS — F41.9 ANXIETY: ICD-10-CM

## 2023-01-19 DIAGNOSIS — Z79.4 TYPE 2 DIABETES MELLITUS WITH DIABETIC POLYNEUROPATHY, WITH LONG-TERM CURRENT USE OF INSULIN (HCC): Primary | ICD-10-CM

## 2023-01-19 DIAGNOSIS — R11.10 CHRONIC VOMITING: ICD-10-CM

## 2023-01-19 LAB — HBA1C MFR BLD: 7.8 %

## 2023-01-19 RX ORDER — BUSPIRONE HYDROCHLORIDE 5 MG/1
5 TABLET ORAL 2 TIMES DAILY
Qty: 60 TABLET | Refills: 3 | Status: SHIPPED | OUTPATIENT
Start: 2023-01-19 | End: 2023-02-18

## 2023-01-19 RX ORDER — ONDANSETRON 4 MG/1
4 TABLET, FILM COATED ORAL EVERY 8 HOURS PRN
COMMUNITY

## 2023-01-19 ASSESSMENT — ENCOUNTER SYMPTOMS
NAUSEA: 1
SORE THROAT: 0
CHEST TIGHTNESS: 0
WHEEZING: 0
ABDOMINAL PAIN: 0
DIARRHEA: 0
COUGH: 0
EYE DISCHARGE: 0
VOMITING: 1
SHORTNESS OF BREATH: 0
CONSTIPATION: 0

## 2023-01-19 ASSESSMENT — PATIENT HEALTH QUESTIONNAIRE - PHQ9
3. TROUBLE FALLING OR STAYING ASLEEP: 1
SUM OF ALL RESPONSES TO PHQ QUESTIONS 1-9: 7
7. TROUBLE CONCENTRATING ON THINGS, SUCH AS READING THE NEWSPAPER OR WATCHING TELEVISION: 1
5. POOR APPETITE OR OVEREATING: 3
6. FEELING BAD ABOUT YOURSELF - OR THAT YOU ARE A FAILURE OR HAVE LET YOURSELF OR YOUR FAMILY DOWN: 0
9. THOUGHTS THAT YOU WOULD BE BETTER OFF DEAD, OR OF HURTING YOURSELF: 0
SUM OF ALL RESPONSES TO PHQ QUESTIONS 1-9: 7
1. LITTLE INTEREST OR PLEASURE IN DOING THINGS: 1
SUM OF ALL RESPONSES TO PHQ9 QUESTIONS 1 & 2: 1
SUM OF ALL RESPONSES TO PHQ QUESTIONS 1-9: 7
4. FEELING TIRED OR HAVING LITTLE ENERGY: 1
8. MOVING OR SPEAKING SO SLOWLY THAT OTHER PEOPLE COULD HAVE NOTICED. OR THE OPPOSITE, BEING SO FIGETY OR RESTLESS THAT YOU HAVE BEEN MOVING AROUND A LOT MORE THAN USUAL: 0
10. IF YOU CHECKED OFF ANY PROBLEMS, HOW DIFFICULT HAVE THESE PROBLEMS MADE IT FOR YOU TO DO YOUR WORK, TAKE CARE OF THINGS AT HOME, OR GET ALONG WITH OTHER PEOPLE: 1
SUM OF ALL RESPONSES TO PHQ QUESTIONS 1-9: 7
2. FEELING DOWN, DEPRESSED OR HOPELESS: 0

## 2023-01-19 NOTE — PROGRESS NOTES
[address] PRIMARY CARE  [address] [place] [address] [facility]  Dept: [phone]  Dept Fax: [phone]    David Cesar is a 65 y.o. female who is a Established patient, who presents today for her medical conditions/complaints as noted below:  Chief Complaint   Patient presents with    Hypertension    Diabetes    Medication Check         HPI:     She is here today to follow-up on diabetes and hypertension. She says that her blood sugars have been going up lately, she had decreased her insulin to 45 units daily after she was put on a higher dose of Trulicity. She says that she has noticed that sometimes they are as high as 190 but she has not been taking her sliding scale. She says that her anxiety has been getting worse and nothing has been helping. She also gets nauseous very frequently and says that almost every day she throws up except for the days she takes Trulicity. Says that sometimes she throws up her medications as well. She says that she lives by herself and most of her family members or friends live far away. She does not like to do psychotherapy or counseling because she does not like to open up in front of strangers. She is also not been sleeping well, says that she sleeps about to 3 hours a night. Feels sleepy during the day and snores at night. She had a sleep study done several years ago but never got on CPAP machine. He also has fibromyalgia for which she follows with rheumatology and was put on etodolac which she says has not been helping and she wants to get off of it. She is also a current every day smoker. Hemoglobin A1C (%)   Date Value   01/19/2023 7.8   10/19/2022 6.6   07/19/2022 7.3             ( goal A1Cis < 7)   Microalb/Crt.  Ratio (mcg/mg creat)   Date Value   03/21/2022 120 (H)     LDL Cholesterol (mg/dL)   Date Value   03/21/2022 108   05/03/2021 80   09/17/2020 83       (goal LDL is <100)   AST (U/L)   Date Value   03/21/2022 20     ALT (U/L)   Date Value   03/21/2022 22     BUN (mg/dL)   Date Value   03/21/2022 22     BP Readings from Last 3 Encounters:   01/19/23 130/88   10/19/22 139/78   07/19/22 136/82          (goal 120/80)    Past Medical History:   Diagnosis Date    Anxiety     Asthma     BMI 29.0-29.9,adult 4/22/2021    Bronchitis     Chronic obstructive pulmonary disease (HCC) 9/11/2018    Depression     Fibromyalgia     Gout     HLD (hyperlipidemia) 7/1/2015    Hyperglycemia 10/8/2020    Hypertension     Neuropathy     Obesity     Obstructive sleep apnea     Osteoarthritis     Stage 3a chronic kidney disease  4/22/2021    Type 2 diabetes mellitus with diabetic polyneuropathy, with long-term current use of insulin ([facility]) 9/7/2017    Type II or unspecified type diabetes mellitus without mention of complication, not stated as uncontrolled     Urinary incontinence     Yeast infection 10/8/2020      Past Surgical History:   Procedure Laterality Date    APPENDECTOMY      TAKEN WITH HYSTERECTOMY    BUNIONECTOMY Left 02-22-16    with arthrodesis and 2 through 4 with hallux valgus arthroplasty 5th toe on left    COLONOSCOPY  8/17/2015    2 polyps removed, diverticulosis, internal hemorrhoids    FINGER SURGERY      re-attachment of left middle finger    FOOT SURGERY Right 11/11/2015    bunionectomy, 2-3-4th pinning, 5th arthroplasty    HERNIA REPAIR      UMBILICAL    HYSTERECTOMY, TOTAL ABDOMINAL (CERVIX REMOVED)  2001       Family History   Problem Relation Age of Onset    Cancer Mother 60        breast     High Blood Pressure Mother     Stroke Father 57    Depression Father 61        comitted sucide     Diabetes Sister 40    High Blood Pressure Sister     Depression Sister     Cancer Brother 56        prostate    Diabetes Brother     High Blood Pressure Brother     Cancer Paternal Aunt 69        colon, rectal     Cancer Paternal Grandfather 70        lung    Heart Disease Neg Hx        Social History     Tobacco Use     Smoking status: Every Day     Packs/day: 0.50     Years: 42.00     Pack years: 21.00     Types: Cigarettes    Smokeless tobacco: Never   Substance Use Topics    Alcohol use: Not Currently     Alcohol/week: 0.0 standard drinks     Comment: occassional       Current Outpatient Medications   Medication Sig Dispense Refill    ondansetron (ZOFRAN) 4 MG tablet Take 4 mg by mouth every 8 hours as needed for Nausea or Vomiting      busPIRone (BUSPAR) 5 MG tablet Take 1 tablet by mouth 2 times daily 60 tablet 3    Lancets MISC Inject 1 each into the skin 3 times daily One touch Del PLus 600 each 1    doxycycline hyclate (VIBRA-TABS) 100 MG tablet Take 1 tablet by mouth 2 times daily for 10 days 20 tablet 0    pramipexole (MIRAPEX) 1 MG tablet TAKE 1 TABLET BY MOUTH AT  NIGHT 90 tablet 3    omeprazole (PRILOSEC) 20 MG delayed release capsule TAKE 1 CAPSULE BY MOUTH  DAILY (Patient taking differently: TAKE 1 CAPSULE BY MOUTH  DAILY) 90 capsule 3    blood glucose test strips (ONETOUCH ULTRA) strip TEST 3 TIMES DAILY AND AS NEEDED FOR SYMPTOMS OF IRREGULAR BLOOD  GLUCOSE. 100 strip 9    budesonide-formoterol (SYMBICORT) 160-4.5 MCG/ACT AERO Inhale 2 puffs into the lungs 2 times daily 30.6 g 1    TOUJEO SOLOSTAR 300 UNIT/ML SOPN INJECT SUBCUTANEOUSLY 52  UNITS DAILY 18 mL 3    tiZANidine (ZANAFLEX) 4 MG tablet TAKE 1 TABLET TWICE DAILY 180 tablet 1    milnacipran HCl (SAVELLA) 100 MG TABS Take 1 tablet by mouth 2 times daily 941 tablet 1    TRULICITY 4.5 NN/8.8MM SOPN INJECT THE CONTENTS OF ONE  PEN SUBCUTANEOUSLY WEEKLY  AS DIRECTED 6 mL 3    colchicine (MITIGARE) 0.6 MG capsule TAKE 1 CAPSULE BY MOUTH  DAILY 90 capsule 3    buPROPion (WELLBUTRIN SR) 200 MG extended release tablet TAKE 1 TABLET BY MOUTH  TWICE DAILY 180 tablet 3    allopurinol (ZYLOPRIM) 300 MG tablet TAKE 1 TABLET BY MOUTH  DAILY 90 tablet 3    fenofibrate (TRIGLIDE) 160 MG tablet TAKE 1 TABLET BY MOUTH  DAILY 90 tablet 3    carvedilol (COREG) 25 MG tablet TAKE 1 TABLET BY MOUTH  TWICE DAILY 180 tablet 3    fluticasone (FLONASE) 50 MCG/ACT nasal spray 1 spray by Each Nostril route daily 32 g 1    Insulin Pen Needle (B-D UF III MINI PEN NEEDLES) 31G X 5 MM MISC 1 each by Does not apply route 3 times daily 100 each 3    Magnesium Oxide (MAG-OXIDE) 200 MG TABS Take 1 tablet by mouth at bedtime 30 tablet 1    Handicap Placard MISC by Does not apply route Exp: 7/6676 1 each 0    folic acid (FOLVITE) 1 MG tablet Take 1 mg by mouth daily      Cholecalciferol (VITAMIN D) 125 MCG (5000 UT) CAPS Take 1 tablet by mouth daily 30 capsule 2    albuterol sulfate HFA (VENTOLIN HFA) 108 (90 Base) MCG/ACT inhaler Inhale 2 puffs into the lungs every 6 hours as needed for Wheezing 1 Inhaler 3    b complex vitamins capsule Take 1 capsule by mouth daily      Multiple Vitamins-Minerals (WOMENS DAILY FORMULA PO) Take by mouth      vitamin C (ASCORBIC ACID) 500 MG tablet Take 500 mg by mouth daily      Biotin 5 MG CAPS Take by mouth      Blood Glucose Calibration (TRUE METRIX LEVEL 2) Normal SOLN  (Patient not taking: Reported on 1/19/2023)       Current Facility-Administered Medications   Medication Dose Route Frequency Provider Last Rate Last Admin    ipratropium (ATROVENT) 0.02 % nebulizer solution 0.5 mg  0.5 mg Nebulization Once Shala Gordon MD        albuterol (PROVENTIL) nebulizer solution 2.5 mg  2.5 mg Nebulization Once Poncho Alex Angel MD         Allergies   Allergen Reactions    Duloxetine Hcl Anaphylaxis and Other (See Comments)     Other reaction(s): anaphylaxis    Sitagliptin Shortness Of Breath     Other reaction(s): wheezing    Cephalexin Other (See Comments)    Ciprofloxacin Other (See Comments)     muscle spasms occur  muscle spasms occur  muscle spasms occur  cramping  Other reaction(s): Unknown  Other reaction(s): anaphylaxis    Duloxetine     Metformin Diarrhea    Metformin And Related Diarrhea    Pregabalin Other (See Comments)     Does not help after 2 weeks   Other reaction(s): anaphylaxis    Statins Other (See Comments)     myalgias      Morphine Diarrhea, Nausea And Vomiting, Swelling and Nausea Only     Other reaction(s): anaphylaxis       Health Maintenance   Topic Date Due    COVID-19 Vaccine (4 - Booster for Librado series) 09/12/2022    Low dose CT lung screening  01/06/2023    Diabetic Alb to Cr ratio (uACR) test  03/21/2023    Lipids  03/21/2023    GFR test (Diabetes, CKD 3-4, OR last GFR 15-59)  03/21/2023    Diabetic retinal exam  07/22/2023    Diabetic foot exam  10/12/2023    Depression Monitoring  10/19/2023    Annual Wellness Visit (AWV)  10/20/2023    Breast cancer screen  01/06/2024    A1C test (Diabetic or Prediabetic)  01/19/2024    Colorectal Cancer Screen  05/18/2027    DTaP/Tdap/Td vaccine (2 - Td or Tdap) 07/18/2032    Flu vaccine  Completed    Shingles vaccine  Completed    Pneumococcal 0-64 years Vaccine  Completed    Hepatitis C screen  Completed    HIV screen  Completed    Hepatitis A vaccine  Aged Out    Hib vaccine  Aged Out    Meningococcal (ACWY) vaccine  Aged Out       Subjective:     Review of Systems   Constitutional:  Negative for appetite change, fatigue and fever. HENT:  Negative for congestion, ear pain, hearing loss and sore throat. Eyes:  Negative for discharge and visual disturbance. Respiratory:  Negative for cough, chest tightness, shortness of breath and wheezing. Cardiovascular:  Negative for chest pain, palpitations and leg swelling. Gastrointestinal:  Positive for nausea and vomiting. Negative for abdominal pain, constipation and diarrhea. Genitourinary:  Negative for flank pain, frequency, hematuria and urgency. Musculoskeletal:  Positive for myalgias. Negative for arthralgias, gait problem and joint swelling. Skin: Negative. Neurological:  Negative for dizziness, weakness, numbness and headaches. Psychiatric/Behavioral:  Positive for dysphoric mood and sleep disturbance. The patient is nervous/anxious. Objective:     Physical Exam  Vitals reviewed. Constitutional:       Appearance: Normal appearance. She is obese. HENT:      Head: Normocephalic and atraumatic. Nose: Nose normal.      Mouth/Throat:      Mouth: Mucous membranes are moist.      Pharynx: Oropharynx is clear. Eyes:      Extraocular Movements: Extraocular movements intact. Conjunctiva/sclera: Conjunctivae normal.      Pupils: Pupils are equal, round, and reactive to light. Cardiovascular:      Rate and Rhythm: Normal rate and regular rhythm. Heart sounds: Normal heart sounds. No murmur heard. No gallop. Pulmonary:      Effort: Pulmonary effort is normal. No respiratory distress. Breath sounds: Normal breath sounds. No stridor. No wheezing. Abdominal:      General: Bowel sounds are normal. There is no distension. Palpations: Abdomen is soft. Tenderness: There is no abdominal tenderness. There is no guarding or rebound. Musculoskeletal:         General: No swelling or tenderness. Normal range of motion. Cervical back: Normal range of motion and neck supple. Skin:     General: Skin is warm and dry. Coloration: Skin is not jaundiced. Findings: No rash. Neurological:      General: No focal deficit present. Mental Status: She is alert and oriented to person, place, and time. Psychiatric:         Mood and Affect: Mood normal.         Behavior: Behavior normal.         Thought Content: Thought content normal.         Judgment: Judgment normal.     /88   Pulse 75   Temp 97 °F (36.1 °C)   Ht 5' 10\" (1.778 m)   Wt (!) 316 lb 3.2 oz (143.4 kg)   SpO2 100%   BMI 45.37 kg/m²     Assessment/Plan:   1. Type 2 diabetes mellitus with diabetic polyneuropathy, with long-term current use of insulin ([facility])  -     POCT glycosylated hemoglobin (Hb A1C)  2. Chronic obstructive pulmonary disease, unspecified COPD type ([facility]  3. Primary hypertension  4.  [facility]  5. Anxiety  -     busPIRone (BUSPAR) 5 MG tablet; Take 1 tablet by mouth 2 times daily, Disp-60 tablet, R-3Normal  6. Obstructive sleep apnea syndrome  -     Home Sleep Study; Future  7. Chronic vomiting  -     La Nena Reddy MD, Gastroenterology, [place]  8. Tobacco use      Type 2 diabetes-POCT A1c today 7.8, advised to increase Toujeo to 50 units daily, continue Trulicity 4.5 mg/week and start taking sliding scale    COPD-stable, on daily Symbicort and albuterol as needed    Hypertension-controlled, on carvedilol 25 mg twice daily. Not on ACE inhibitor or ARB due to intolerance    Stage III CKD-stable, discussed avoiding NSAIDs including etodolic    Anxiety-poorly controlled, on Wellbutrin 200 mg daily. No improvement with hydroxyzine. Previously failed SSRIs. On Savella 100 mg twice daily for fibromyalgia. Started on BuSpar 5 mg twice daily      Patient was counseled on tobacco cessation. Based upon patient's motivation to change her behavior, the following plan was agreed upon: patient will avoid triggers and negative influences. She was provided with a list of local tobacco cessation resources. Provider spent 3 minutes counseling patient. Return in about 3 months (around 4/19/2023) for Follow up DM/HTN. Orders Placed This Encounter   Procedures    La Nena Reddy MD, Gastroenterology, [facility]     Referral Priority:   Routine     Referral Type:   Eval and Treat     Referral Reason:   Specialty Services Required     Referred to Provider:   Nicole Parnell MD     Requested Specialty:   Gastroenterology     Number of Visits Requested:   1    POCT glycosylated hemoglobin (Hb A1C)    Home Sleep Study     Standing Status:   Future     Standing Expiration Date:   7/19/2023     Order Specific Question:   Location For Sleep Study     Answer:    [facility] Specific Question:   Select Sleep Lab Location     Answer:   [facility]     Order Specific Question: Pre-Study Patient Questions: Answer:   Complains of daytime sleepiness     Order Specific Question:   Pre-Study Patient Questions: Answer:   Snores loudly during sleep     Order Specific Question:   Pre-Study Patient Questions: Answer:   Reports multiple awakenings throughout the night     Orders Placed This Encounter   Medications    busPIRone (BUSPAR) 5 MG tablet     Sig: Take 1 tablet by mouth 2 times daily     Dispense:  60 tablet     Refill:  3       Patient given educational materials - see patient instructions. Discussed use, benefit, and side effects of prescribed medications. All patientquestions answered. Pt voiced understanding. Reviewed health maintenance. Instructedto continue current medications, diet and exercise. Patient agreed with treatmentplan. Follow up as directed.      Electronically signed by Abad Remy MD on 1/19/2023 at 4:30 PM

## 2023-01-30 DIAGNOSIS — M79.7 FIBROMYALGIA: ICD-10-CM

## 2023-01-30 RX ORDER — TIZANIDINE 4 MG/1
TABLET ORAL
Qty: 180 TABLET | Refills: 1 | Status: SHIPPED | OUTPATIENT
Start: 2023-01-30 | End: 2023-02-01 | Stop reason: SDUPTHER

## 2023-01-30 NOTE — TELEPHONE ENCOUNTER
Wilfrido Davison is calling to request a refill on the following medication(s):    Medication Request:  Requested Prescriptions     Pending Prescriptions Disp Refills    tiZANidine (ZANAFLEX) 4 MG tablet 180 tablet 1     Sig: TAKE 1 TABLET TWICE DAILY       Last Visit Date (If Applicable):  0/29/8696    Next Visit Date:    4/20/2023

## 2023-02-01 ENCOUNTER — TELEPHONE (OUTPATIENT)
Dept: FAMILY MEDICINE CLINIC | Age: 63
End: 2023-02-01

## 2023-02-01 DIAGNOSIS — M79.7 FIBROMYALGIA: ICD-10-CM

## 2023-02-01 RX ORDER — TIZANIDINE 4 MG/1
TABLET ORAL
Qty: 180 TABLET | Refills: 1 | Status: SHIPPED | OUTPATIENT
Start: 2023-02-01

## 2023-02-01 NOTE — TELEPHONE ENCOUNTER
Patient states that she believes she is coming down with Bronchitis. She states that she has a funny taste in her mouth a brunt taste and SOB. She states that she has been using her inhaler and still has clear sputum.   Please advise

## 2023-02-21 DIAGNOSIS — M1A.09X1 IDIOPATHIC CHRONIC GOUT OF MULTIPLE SITES WITH TOPHUS: ICD-10-CM

## 2023-02-21 RX ORDER — PEN NEEDLE, DIABETIC 31 GX5/16"
NEEDLE, DISPOSABLE MISCELLANEOUS
Qty: 100 EACH | Refills: 3 | Status: SHIPPED | OUTPATIENT
Start: 2023-02-21

## 2023-02-21 RX ORDER — COLCHICINE 0.6 MG/1
CAPSULE ORAL
Qty: 30 CAPSULE | Refills: 0 | Status: SHIPPED | OUTPATIENT
Start: 2023-02-21

## 2023-02-21 NOTE — TELEPHONE ENCOUNTER
Sindy Holman is calling to request a refill on the following medication(s):    Medication Request:  Requested Prescriptions     Pending Prescriptions Disp Refills    B-D UF III MINI PEN NEEDLES 31G X 5 MM MISC [Pharmacy Med Name: B-D PEN NDL MINI [id number](3/16)PRPL] 100 each 3     Sig: USE THREE TIMES DAILY       Last Visit Date (If Applicable):  9/94/2600    Next Visit Date:    4/20/2023

## 2023-02-21 NOTE — TELEPHONE ENCOUNTER
Harjit Sarmiento is calling to request a refill on the following medication(s):    Medication Request:  Requested Prescriptions     Pending Prescriptions Disp Refills    MITIGARE 0.6 MG capsule [Pharmacy Med Name: Anton Grier 30 capsule      Sig: TAKE 1 CAPSULE BY MOUTH DAILY       Last Visit Date (If Applicable):  6/40/7955    Next Visit Date:    4/20/2023

## 2023-02-27 ENCOUNTER — OFFICE VISIT (OUTPATIENT)
Dept: PODIATRY | Age: 63
End: 2023-02-27
Payer: MEDICARE

## 2023-02-27 VITALS — BODY MASS INDEX: 41.95 KG/M2 | WEIGHT: 293 LBS | HEIGHT: 70 IN

## 2023-02-27 DIAGNOSIS — R60.0 EDEMA OF LOWER EXTREMITY: ICD-10-CM

## 2023-02-27 DIAGNOSIS — L97.522 ULCER OF LEFT FOOT, WITH FAT LAYER EXPOSED (HCC): ICD-10-CM

## 2023-02-27 DIAGNOSIS — S90.425A BLISTER OF SECOND TOE OF LEFT FOOT, INITIAL ENCOUNTER: Primary | ICD-10-CM

## 2023-02-27 DIAGNOSIS — L03.032 CELLULITIS OF SECOND TOE OF LEFT FOOT: ICD-10-CM

## 2023-02-27 PROCEDURE — 99214 OFFICE O/P EST MOD 30 MIN: CPT | Performed by: PODIATRIST

## 2023-02-27 PROCEDURE — 11042 DBRDMT SUBQ TIS 1ST 20SQCM/<: CPT | Performed by: PODIATRIST

## 2023-02-27 RX ORDER — DOXYCYCLINE HYCLATE 100 MG/1
CAPSULE ORAL
COMMUNITY
Start: 2023-02-24

## 2023-02-27 RX ORDER — HYDROCODONE BITARTRATE AND ACETAMINOPHEN 5; 325 MG/1; MG/1
1 TABLET ORAL EVERY 6 HOURS PRN
Qty: 28 TABLET | Refills: 0 | Status: SHIPPED | OUTPATIENT
Start: 2023-02-27 | End: 2023-03-06

## 2023-02-27 RX ORDER — DOXYCYCLINE HYCLATE 100 MG
100 TABLET ORAL 2 TIMES DAILY
Qty: 20 TABLET | Refills: 0 | Status: SHIPPED | OUTPATIENT
Start: 2023-02-27 | End: 2023-03-09

## 2023-02-27 NOTE — PROGRESS NOTES
[address] [facility] PODIATRY [facility]  [address] [address]  Dept: [phone]  Dept Fax: [phone]    RETURN WOUND CARE PROGRESS NOTE  Date of patient's visit: 2023  Patient's Name:  Leah Soliz YOB: 1960            Patient Care Team:  Tamar Bob MD as PCP - General (Family Medicine)  [facility] Bob [facility]  Vicki Lucio DPM as Surgeon (Podiatry)      Chief Complaint   Patient presents with    Wound Check     Left 2nd toe- ER f/u    Diabetes     Type 2 diabetic        Leah Soliz  AGE: 65 y.o. GENDER: female  : 1960  TODAY'S DATE:  2023  Pt's primary care physician is Tamar Bob MD last seen 2023  Subjective:       Leah Soliz is a 65 y.o. female presents to the office today for follow up of an ulceration. Denies F/C/N/V.   Wound Type:diabetic  Wound Location: left distal 2nd digit  Modifying factors:diabetes            Lab Results   Component Value Date    LABA1C 7.8 2023       ALLERGIES    Allergies   Allergen Reactions    Duloxetine Hcl Anaphylaxis and Other (See Comments)     Other reaction(s): anaphylaxis    Sitagliptin Shortness Of Breath     Other reaction(s): wheezing    Cephalexin Other (See Comments)    Ciprofloxacin Other (See Comments)     muscle spasms occur  muscle spasms occur  muscle spasms occur  cramping  Other reaction(s): Unknown  Other reaction(s): anaphylaxis    Duloxetine     Metformin Diarrhea    Metformin And Related Diarrhea    Pregabalin Other (See Comments)     Does not help after 2 weeks   Other reaction(s): anaphylaxis    Statins Other (See Comments)     myalgias      Morphine Diarrhea, Nausea And Vomiting, Swelling and Nausea Only     Other reaction(s): anaphylaxis       Medications:    Current Outpatient Medications:     mupirocin (BACTROBAN) 2 % ointment, APPLY THIN LAYER TOPICALLY TO THE AFFECTED AREA FOUR TIMES DAILY, Disp: , Rfl:     doxycycline hyclate (VIBRAMYCIN) 100 MG capsule, TAKE 1 CAPSULE BY MOUTH TWICE DAILY, Disp: , Rfl:     MITIGARE 0.6 MG capsule, TAKE 1 CAPSULE BY MOUTH DAILY, Disp: 30 capsule, Rfl: 0    B-D UF III MINI PEN NEEDLES 31G X 5 MM MISC, USE THREE TIMES DAILY, Disp: 100 each, Rfl: 3    tiZANidine (ZANAFLEX) 4 MG tablet, TAKE 1 TABLET TWICE DAILY, Disp: 180 tablet, Rfl: 1    ondansetron (ZOFRAN) 4 MG tablet, Take 4 mg by mouth every 8 hours as needed for Nausea or Vomiting, Disp: , Rfl:     Lancets MISC, Inject 1 each into the skin 3 times daily One touch Del PLus, Disp: 600 each, Rfl: 1    pramipexole (MIRAPEX) 1 MG tablet, TAKE 1 TABLET BY MOUTH AT  NIGHT, Disp: 90 tablet, Rfl: 3    omeprazole (PRILOSEC) 20 MG delayed release capsule, TAKE 1 CAPSULE BY MOUTH  DAILY (Patient taking differently: TAKE 1 CAPSULE BY MOUTH  DAILY), Disp: 90 capsule, Rfl: 3    blood glucose test strips (ONETOUCH ULTRA) strip, TEST 3 TIMES DAILY AND AS NEEDED FOR SYMPTOMS OF IRREGULAR BLOOD  GLUCOSE., Disp: 100 strip, Rfl: 9    budesonide-formoterol (SYMBICORT) 160-4.5 MCG/ACT AERO, Inhale 2 puffs into the lungs 2 times daily, Disp: 30.6 g, Rfl: 1    TOUJEO SOLOSTAR 300 UNIT/ML SOPN, INJECT SUBCUTANEOUSLY 52  UNITS DAILY, Disp: 18 mL, Rfl: 3    milnacipran HCl (SAVELLA) 100 MG TABS, Take 1 tablet by mouth 2 times daily, Disp: 180 tablet, Rfl: 1    TRULICITY 4.5 EN/0.8OW SOPN, INJECT THE CONTENTS OF ONE  PEN SUBCUTANEOUSLY WEEKLY  AS DIRECTED, Disp: 6 mL, Rfl: 3    buPROPion (WELLBUTRIN SR) 200 MG extended release tablet, TAKE 1 TABLET BY MOUTH  TWICE DAILY, Disp: 180 tablet, Rfl: 3    allopurinol (ZYLOPRIM) 300 MG tablet, TAKE 1 TABLET BY MOUTH  DAILY, Disp: 90 tablet, Rfl: 3    fenofibrate (TRIGLIDE) 160 MG tablet, TAKE 1 TABLET BY MOUTH  DAILY, Disp: 90 tablet, Rfl: 3    carvedilol (COREG) 25 MG tablet, TAKE 1 TABLET BY MOUTH  TWICE DAILY, Disp: 180 tablet, Rfl: 3    fluticasone (FLONASE) 50 MCG/ACT nasal spray, 1 spray by Each Nostril route daily, Disp: 32 g, Rfl: 1    Magnesium Oxide (MAG-OXIDE) 200 MG TABS, Take 1 tablet by mouth at bedtime, Disp: 30 tablet, Rfl: 1    Handicap Placard MISC, by Does not apply route Exp: 3/2027, Disp: 1 each, Rfl: 0    folic acid (FOLVITE) 1 MG tablet, Take 1 mg by mouth daily, Disp: , Rfl:     Cholecalciferol (VITAMIN D) 125 MCG (5000 UT) CAPS, Take 1 tablet by mouth daily, Disp: 30 capsule, Rfl: 2    albuterol sulfate HFA (VENTOLIN HFA) 108 (90 Base) MCG/ACT inhaler, Inhale 2 puffs into the lungs every 6 hours as needed for Wheezing, Disp: 1 Inhaler, Rfl: 3    b complex vitamins capsule, Take 1 capsule by mouth daily, Disp: , Rfl:     Multiple Vitamins-Minerals (WOMENS DAILY FORMULA PO), Take by mouth, Disp: , Rfl:     vitamin C (ASCORBIC ACID) 500 MG tablet, Take 500 mg by mouth daily, Disp: , Rfl:     Biotin 5 MG CAPS, Take by mouth, Disp: , Rfl:     Blood Glucose Calibration (TRUE METRIX LEVEL 2) Normal SOLN, , Disp: , Rfl:     Review of Systems  Review of Systems - History obtained from chart review and the patient  General ROS: negative for - chills, fatigue, fever, night sweats or weight gain  Constitutional: Negative for chills, diaphoresis, fatigue, fever and unexpected weight change. Musculoskeletal: Positive for arthralgias, gait problem and joint swelling. Neurological ROS: negative for - behavioral changes, confusion, headaches or seizures. Negative for weakness and numbness. Dermatological ROS: negative for - mole changes, rash  Cardiovascular: Negative for leg swelling. Gastrointestinal: Negative for constipation, diarrhea, nausea and vomiting. Objective:       Physical Exam:  Ht 5' 10\" (1.778 m)   Wt (!) 316 lb (143.3 kg)   BMI 45.34 kg/m²     NV status remains unchanged since last visit. Wound #:   1    diabetic foot ulcer   left foot    Wound measurements:  #1  1.0 cm by 1.0 cm  by   0.3 cm         Wound Depth: full thickness.      Drainage:    minimal Type: serosanguinous    Wound Bed status:   Granulation Tissue: 100%   Necrotic 0%    Hypergranular 0%   Periwound hyperkeratosis:  present  Erythema present  Odor:no  Maceration:yes  Undermining/tract/tunneling:no  Culture taken:   no           Xrays, 3 views, left foot:    No signs of osteolytic lesions. There is noted significant midtarsal joint space narrowing with dorsal lipping. There is evidence of previous surgical correction of hammertoes, 2-5, left foot with dorsally contracted digits 2. Hardware noted to 3rd MTH. Assessment:     Assessment: Patient is a 65 y.o. female with:      Overall Wound Assessment  Wound is has worsened. Size has increased  Appearance has worsened      Plan:     Pt examined and evaluated. Current condition and treatment options discussed in detail. Conchita Noss Age:  65 y.o. Gender:  female   :  1960  Today's Date:  2023      Procedure: With the patient in supine position, Lidocaine soaked gauze was applied per physician order at beginning of wound evaluation. It was subsequently removed. Using a #15 blade scalpel and Pick-up, the wound was debrided down to and included the removal of the following tissue:     [x] epidermis, [x] dermis, [x]  subcutaneous tissue,  [] muscle/fascia tissue,   and/or  [] bone     Also debrided was all  [] fibrin, [x] biofilm, [x] slough,  [] necrotic,  [] hypergranulation tissue, and/or  [x] hyperkeratotic tissue. Wound was copiously irrigated with normal saline solution. Bleeding:  None. Hemostasis:  pressure     100%  of wound debrided. Patient tolerated procedure well. Pain 5 / 10 during procedure and pain 0 / 10 after procedure. Discussed appropriate home care of this wound. Wound redressed. Patient instructions were given. Pt to apply gauze once daily and monitor for infection. Pt to keep clean and dry and return to surgical shoe. RX: doxycycline 100 mg one tab po BID x 10 days.     Discussed appropriate home care of this wound. Dressings: No orders of the defined types were placed in this encounter. All labs were reviewed and all imagining including the above findings were reviewed PRIOR to the patients arrival and with the patient today. Previous patient encounter was reviewed. Encounters from the patients other medical providers were reviewed and noted. Time was spent educating the patient and their families/caregivers on proper care of the feet and ankles. All the above diagnosis were addressed at todays visit and all questions were answered. A total of 35 minutes was spent with this patients encounter which included charting after the patients visit      Follow up: 1 week.       Electronically signed by Estelita Pope DPM on 2/27/2023 at 10:04 AM  2/27/2023

## 2023-03-08 ENCOUNTER — PROCEDURE VISIT (OUTPATIENT)
Dept: PODIATRY | Age: 63
End: 2023-03-08
Payer: MEDICARE

## 2023-03-08 VITALS — BODY MASS INDEX: 41.95 KG/M2 | WEIGHT: 293 LBS | HEIGHT: 70 IN

## 2023-03-08 DIAGNOSIS — M20.42 HAMMERTOE OF SECOND TOE OF LEFT FOOT: ICD-10-CM

## 2023-03-08 DIAGNOSIS — E11.42 DIABETIC POLYNEUROPATHY ASSOCIATED WITH TYPE 2 DIABETES MELLITUS (HCC): ICD-10-CM

## 2023-03-08 DIAGNOSIS — L97.522 ULCER OF LEFT FOOT, WITH FAT LAYER EXPOSED (HCC): Primary | ICD-10-CM

## 2023-03-08 DIAGNOSIS — R60.0 EDEMA OF LOWER EXTREMITY: ICD-10-CM

## 2023-03-08 PROCEDURE — 3051F HG A1C>EQUAL 7.0%<8.0%: CPT | Performed by: PODIATRIST

## 2023-03-08 PROCEDURE — 99214 OFFICE O/P EST MOD 30 MIN: CPT | Performed by: PODIATRIST

## 2023-03-08 PROCEDURE — 28232 INCISION OF TOE TENDON: CPT | Performed by: PODIATRIST

## 2023-03-15 ENCOUNTER — OFFICE VISIT (OUTPATIENT)
Dept: PODIATRY | Age: 63
End: 2023-03-15

## 2023-03-15 VITALS — HEIGHT: 70 IN | BODY MASS INDEX: 41.95 KG/M2 | WEIGHT: 293 LBS

## 2023-03-15 DIAGNOSIS — Z98.890 POSTOPERATIVE STATE: Primary | ICD-10-CM

## 2023-03-15 PROCEDURE — 99024 POSTOP FOLLOW-UP VISIT: CPT | Performed by: PODIATRIST

## 2023-03-15 NOTE — PROGRESS NOTES
[address] [facility] PODIATRY [facility]  [address] [facility] [address] [facility] [address]  Dept: [phone]  Dept Fax: [phone]    POST-OP PROGRESS NOTE  Date of patient's visit: 3/15/2023  Patient's Name:  Murali Vyas YOB: 1960            Patient Care Team:  Ifeoma Constantino MD as PCP - General (Family Medicine)  Ifeoma Constantino MD as PCP - Empaneled Provider  Dee Dee Alvarenga DPM as Surgeon (Podiatry)        Chief Complaint   Patient presents with    Post-Op Check     Rtc 1 week - T&C left great toe           Subjective: Murali Vyas is a 65 y.o. female who presents to the office today 1 week(s)  S/P T&C left great toe for correction of left toe pain and discomfort. Problem List Items Addressed This Visit    None  Visit Diagnoses       Postoperative state    -  Primary        Patient relates pain is Present and improved. Pain is rated 4 out of 10 and is described as constant, mild. Currently denies F/C/N/V. Is patient taking pain medications as prescribed and is controlling pain n/a    Physical Examination:  Incision is coapted. Ulcer noted to left 2nd digit measures 0.5 cm x 0.3 cm x 0.3 cm. Minimal bleeding post operatively. Edema present. minimal erythema. No Pus. Operative correction is satisfactory. Assessment: Murali Vyas is status post as above  Normal post operative course. Doing well          ICD-10-CM    1. Postoperative state  Z98.890             Plan:  Patient examined and evaluated. Current condition and treatment options discussed in detail. Advised pt to closely monitor daily for infection. Apply triple abx and DSD once daily. .  Verbal and written instructions given to patient. Orders: No orders of the defined types were placed in this encounter. Contact office with any questions/problems/concerns. RTC in 3week(s).      Electronically signed by Dee Dee Alvarenga DPM on 3/15/2023 at 12:16 PM  3/15/2023

## 2023-03-15 NOTE — PATIENT INSTRUCTIONS
Schedule a Vaccine  When you qualify to receive the vaccine, call the [facility]) COVID-19 Vaccination Hotline to schedule your appointment or to get additional information about the [facility]) locations which are offering the COVID-19 vaccine. To be 94% effective, it's important that you receive two doses of one of the COVID-19 vaccines. -If you are receiving the Vyas Peter vaccine, your second shot will be scheduled as close to 21 days after the first shot as possible. -If you are receiving the Moderna vaccine, your second shot will be scheduled as close to 28 days after the first shot as possible. [facility]) COVID-19 Vaccination Hotline: [phone]    Links to [facility]) website and [facility] website:    [email or web address]    [email or web address]

## 2023-03-20 NOTE — PROGRESS NOTES
ROS: negative for - chills, fatigue, fever, night sweats or weight gain  Constitutional: Negative for chills, diaphoresis, fatigue, fever and unexpected weight change. Musculoskeletal: Positive for arthralgias, gait problem and joint swelling. Neurological ROS: negative for - behavioral changes, confusion, headaches or seizures. Negative for weakness and numbness. Dermatological ROS: negative for - mole changes, rash  Cardiovascular: Negative for leg swelling. Gastrointestinal: Negative for constipation, diarrhea, nausea and vomiting. Lower Extremity Physical Examination:     Vitals: There were no vitals filed for this visit. General: AAO x 3 in NAD. Dermatologic Exam:  Full thickness ulcer noted to left distal plantar 2nd digit measures 1.0 cm x 1.0 cm x 0.3 cm with 100%granular base. Skin No rashes or nodules noted. .   Skin is thin, with flaky sloughing skin as well as decreased hair growth to the lower leg  Small red hemosiderin deposits seen dorsal foot   Musculoskeletal:     1st MPJ ROM decreased, Bilateral.  Muscle strength 5/5, Bilateral.  Pain present upon palpation of toenails 1-5, Bilateral. decreased medial longitudinal arch, Bilateral.  Ankle ROM decreased,Bilateral.    Dorsally contracted digits present digits left 2nd .       Vascular: DP pulses 1/4 bilateral.  PT pulses 0/4 bilateral.   CFT <5 seconds, Bilateral.  Hair growth absent to the level of the digits, Bilateral.  Edema present, Bilateral.  Varicosities absent, Bilateral. Erythema absent, Bilateral     Neurological: Sensation diminshed to light touch to level of digits, Bilateral.  Protective sensation intact 6/10 sites via 5.07/10g [place] Monofilament, Bilateral.  negative Tinel's, Bilateral.  negative Valleix sign, Bilateral.       Integument: Warm, dry, supple, Bilateral.  Interdigital maceration absent to web spaces 4, Bilateral.  Nails 1-5 left and 1-5 right thickened > 3.0 mm, dystrophic and crumbly,
{Procedures; hemostasis methods:43262}     {0-100%:564691491}  of wound debrided. Patient tolerated procedure {DESC; WELL/FAIRLY WELL/POORLY:27265}. Pain {NUMBERS 0-10:04277} / 10 during procedure and pain {NUMBERS 0-10:57040} / 10 after procedure. Discussed appropriate home care of this wound. Wound redressed. Patient instructions were given. {wound check plan:68187}   Dressings: No orders of the defined types were placed in this encounter. No orders of the defined types were placed in this encounter. Follow up: {NUMBERS 0-4:01573} week.       Electronically signed by Norma Garcia DPM on 3/8/2023 at 9:51 AM  3/8/2023

## 2023-03-25 DIAGNOSIS — M1A.09X1 IDIOPATHIC CHRONIC GOUT OF MULTIPLE SITES WITH TOPHUS: ICD-10-CM

## 2023-03-27 RX ORDER — COLCHICINE 0.6 MG/1
CAPSULE ORAL
Qty: 30 CAPSULE | Refills: 0 | Status: SHIPPED | OUTPATIENT
Start: 2023-03-27

## 2023-03-27 NOTE — TELEPHONE ENCOUNTER
Regla Torrez is calling to request a refill on the following medication(s):    Medication Request:  Requested Prescriptions     Pending Prescriptions Disp Refills    colchicine (MITIGARE) 0.6 MG capsule [Pharmacy Med Name: COLCHICINE 0.6MG CAPSULES] 30 capsule 0     Sig: TAKE 1 CAPSULE BY MOUTH DAILY       Last Visit Date (If Applicable):  2/42/5746    Next Visit Date:    4/20/2023

## 2023-04-20 ENCOUNTER — OFFICE VISIT (OUTPATIENT)
Dept: FAMILY MEDICINE CLINIC | Age: 63
End: 2023-04-20

## 2023-04-20 VITALS
DIASTOLIC BLOOD PRESSURE: 80 MMHG | BODY MASS INDEX: 45.92 KG/M2 | WEIGHT: 293 LBS | RESPIRATION RATE: 16 BRPM | OXYGEN SATURATION: 97 % | HEART RATE: 84 BPM | SYSTOLIC BLOOD PRESSURE: 124 MMHG

## 2023-04-20 DIAGNOSIS — E13.40 NEUROPATHY DUE TO SECONDARY DIABETES (HCC): ICD-10-CM

## 2023-04-20 DIAGNOSIS — Z72.0 TOBACCO USE: ICD-10-CM

## 2023-04-20 DIAGNOSIS — R82.998 FOAMY URINE: ICD-10-CM

## 2023-04-20 DIAGNOSIS — Z79.4 TYPE 2 DIABETES MELLITUS WITH DIABETIC POLYNEUROPATHY, WITH LONG-TERM CURRENT USE OF INSULIN (HCC): Primary | ICD-10-CM

## 2023-04-20 DIAGNOSIS — I10 PRIMARY HYPERTENSION: ICD-10-CM

## 2023-04-20 DIAGNOSIS — N18.31 STAGE 3A CHRONIC KIDNEY DISEASE (HCC): ICD-10-CM

## 2023-04-20 DIAGNOSIS — E11.42 TYPE 2 DIABETES MELLITUS WITH DIABETIC POLYNEUROPATHY, WITH LONG-TERM CURRENT USE OF INSULIN (HCC): Primary | ICD-10-CM

## 2023-04-20 DIAGNOSIS — E55.9 VITAMIN D DEFICIENCY: ICD-10-CM

## 2023-04-20 DIAGNOSIS — Z59.9 FINANCIAL DIFFICULTIES: ICD-10-CM

## 2023-04-20 DIAGNOSIS — G25.81 RLS (RESTLESS LEGS SYNDROME): ICD-10-CM

## 2023-04-20 LAB — HBA1C MFR BLD: 6.6 %

## 2023-04-20 RX ORDER — TIRZEPATIDE 5 MG/.5ML
5 INJECTION, SOLUTION SUBCUTANEOUS WEEKLY
Qty: 2 ADJUSTABLE DOSE PRE-FILLED PEN SYRINGE | Refills: 1 | Status: SHIPPED | OUTPATIENT
Start: 2023-04-20

## 2023-04-20 RX ORDER — INSULIN GLARGINE 300 U/ML
INJECTION, SOLUTION SUBCUTANEOUS
Qty: 18 ML | Refills: 3 | Status: SHIPPED | OUTPATIENT
Start: 2023-04-20

## 2023-04-20 RX ORDER — FENOFIBRATE 160 MG/1
160 TABLET ORAL DAILY
Qty: 90 TABLET | Refills: 3 | Status: SHIPPED | OUTPATIENT
Start: 2023-04-20 | End: 2023-04-21 | Stop reason: SDUPTHER

## 2023-04-20 RX ORDER — BUSPIRONE HYDROCHLORIDE 5 MG/1
TABLET ORAL
COMMUNITY
Start: 2023-04-12

## 2023-04-20 RX ORDER — CYCLOBENZAPRINE HYDROCHLORIDE 7.5 MG/1
7.5 TABLET, FILM COATED ORAL 3 TIMES DAILY PRN
Qty: 90 TABLET | Refills: 1 | Status: SHIPPED | OUTPATIENT
Start: 2023-04-20 | End: 2023-04-30

## 2023-04-20 RX ORDER — PRAMIPEXOLE DIHYDROCHLORIDE 1 MG/1
1 TABLET ORAL NIGHTLY
Qty: 90 TABLET | Refills: 3 | Status: SHIPPED | OUTPATIENT
Start: 2023-04-20

## 2023-04-20 RX ORDER — DULAGLUTIDE 4.5 MG/.5ML
INJECTION, SOLUTION SUBCUTANEOUS
Qty: 6 ML | Refills: 3 | Status: CANCELLED | OUTPATIENT
Start: 2023-04-20

## 2023-04-20 SDOH — ECONOMIC STABILITY: INCOME INSECURITY: HOW HARD IS IT FOR YOU TO PAY FOR THE VERY BASICS LIKE FOOD, HOUSING, MEDICAL CARE, AND HEATING?: NOT HARD AT ALL

## 2023-04-20 SDOH — ECONOMIC STABILITY: FOOD INSECURITY: WITHIN THE PAST 12 MONTHS, THE FOOD YOU BOUGHT JUST DIDN'T LAST AND YOU DIDN'T HAVE MONEY TO GET MORE.: SOMETIMES TRUE

## 2023-04-20 SDOH — ECONOMIC STABILITY: HOUSING INSECURITY
IN THE LAST 12 MONTHS, WAS THERE A TIME WHEN YOU DID NOT HAVE A STEADY PLACE TO SLEEP OR SLEPT IN A SHELTER (INCLUDING NOW)?: NO

## 2023-04-20 SDOH — ECONOMIC STABILITY - INCOME SECURITY: PROBLEM RELATED TO HOUSING AND ECONOMIC CIRCUMSTANCES, UNSPECIFIED: Z59.9

## 2023-04-20 SDOH — ECONOMIC STABILITY: FOOD INSECURITY: WITHIN THE PAST 12 MONTHS, YOU WORRIED THAT YOUR FOOD WOULD RUN OUT BEFORE YOU GOT MONEY TO BUY MORE.: NEVER TRUE

## 2023-04-20 NOTE — PROGRESS NOTES
[address] PRIMARY CARE  [address] [place] [address] [address] [place] [address]  Dept: [phone]  Dept Fax: [phone]    Christ Catalan is a 65 y.o. female who is a Established patient, who presents today for her medical conditions/complaints as noted below:  Chief Complaint   Patient presents with    Weight Gain    Diabetes    Arthritis     Would like something for pain, she quit going to the arthritis dr due to him not doing anything           HPI:     She is here today to follow-up on diabetes and hypertension. She states that her blood sugars have been improving and she is currently taking Toujeo 55 units daily. She is following with rheumatology for her fibromyalgia and diabetic neuropathy. She states that she is no longer seeing them and is wondering if she can increase Savella dose. She is already on the max dose for Savella. She was also taking 2 tablets of tizanidine 4 mg at bedtime which was a high dose. She is agreeable to trying to switch to Flexeril to see if that works better for her. She also complains of having foamy urine lately, denies any blood in urine. She is also been having financial difficulties and would like a social work consult. She is current everyday smoker and does not want to quit yet. Hemoglobin A1C (%)   Date Value   04/20/2023 6.6   01/19/2023 7.8   10/19/2022 6.6             ( goal A1Cis < 7)   Microalb/Crt.  Ratio (mcg/mg creat)   Date Value   03/21/2022 120 (H)     LDL Cholesterol (mg/dL)   Date Value   03/21/2022 108   05/03/2021 80   09/17/2020 83       (goal LDL is <100)   AST (U/L)   Date Value   03/21/2022 20     ALT (U/L)   Date Value   03/21/2022 22     BUN (mg/dL)   Date Value   03/21/2022 22     BP Readings from Last 3 Encounters:   04/20/23 124/80   01/19/23 130/88   10/19/22 139/78          (goal 120/80)    Past Medical History:   Diagnosis Date    Anxiety     Asthma     BMI 29.0-29.9,adult 4/22/2021    Bronchitis

## 2023-04-21 DIAGNOSIS — E78.1 HYPERTRIGLYCERIDEMIA: ICD-10-CM

## 2023-04-21 RX ORDER — FENOFIBRATE 160 MG/1
160 TABLET ORAL DAILY
Qty: 90 TABLET | Refills: 3 | Status: SHIPPED | OUTPATIENT
Start: 2023-04-21

## 2023-04-21 NOTE — TELEPHONE ENCOUNTER
Jeimy Lara is calling to request a refill on the following medication(s):    Medication Request:  Requested Prescriptions     Pending Prescriptions Disp Refills    fenofibrate (TRIGLIDE) 160 MG tablet 90 tablet 3     Sig: Take 1 tablet by mouth daily       Last Visit Date (If Applicable):  2/99/5387    Next Visit Date:    7/21/2023

## 2023-04-23 ASSESSMENT — ENCOUNTER SYMPTOMS
CHEST TIGHTNESS: 0
EYE DISCHARGE: 0
SORE THROAT: 0
NAUSEA: 0
SHORTNESS OF BREATH: 0
VOMITING: 0
COUGH: 0
WHEEZING: 0
CONSTIPATION: 0
ABDOMINAL PAIN: 0
DIARRHEA: 0

## 2023-04-26 ENCOUNTER — TELEPHONE (OUTPATIENT)
Dept: FAMILY MEDICINE CLINIC | Age: 63
End: 2023-04-26

## 2023-04-26 NOTE — TELEPHONE ENCOUNTER
Ref number# [id number]    For the triglide they are stating that the triglide doesn't have a generic if you want her to take fenofibrate you have to write a script for just fenofibrate without the name triglide on it  please advise

## 2023-04-28 ENCOUNTER — HOSPITAL ENCOUNTER (OUTPATIENT)
Dept: SLEEP CENTER | Age: 63
Discharge: HOME | End: 2023-04-28
Payer: COMMERCIAL

## 2023-04-28 DIAGNOSIS — G47.33 OBSTRUCTIVE SLEEP APNEA SYNDROME: ICD-10-CM

## 2023-04-28 PROCEDURE — G0399 HOME SLEEP TEST/TYPE 3 PORTA: HCPCS

## 2023-04-28 NOTE — TELEPHONE ENCOUNTER
The generic is fenofibrate but the newer name for the brand if lofibra so I tried that but still reverts it back to the same script so I verbally called it in just a fyi

## 2023-05-11 PROBLEM — G47.33 OBSTRUCTIVE SLEEP APNEA SYNDROME: Status: ACTIVE | Noted: 2023-05-11

## 2023-05-11 LAB — STATUS: NORMAL

## 2023-05-15 DIAGNOSIS — Z79.4 TYPE 2 DIABETES MELLITUS WITH DIABETIC POLYNEUROPATHY, WITH LONG-TERM CURRENT USE OF INSULIN (HCC): ICD-10-CM

## 2023-05-15 DIAGNOSIS — G47.33 SLEEP APNEA, OBSTRUCTIVE: Primary | ICD-10-CM

## 2023-05-15 DIAGNOSIS — E11.42 TYPE 2 DIABETES MELLITUS WITH DIABETIC POLYNEUROPATHY, WITH LONG-TERM CURRENT USE OF INSULIN (HCC): ICD-10-CM

## 2023-05-16 RX ORDER — TIRZEPATIDE 5 MG/.5ML
5 INJECTION, SOLUTION SUBCUTANEOUS WEEKLY
Qty: 2 ADJUSTABLE DOSE PRE-FILLED PEN SYRINGE | Refills: 1 | Status: SHIPPED | OUTPATIENT
Start: 2023-05-16

## 2023-05-16 NOTE — TELEPHONE ENCOUNTER
Zabrina Whitley is calling to request a refill on the following medication(s):    Medication Request:  Requested Prescriptions     Pending Prescriptions Disp Refills    Tirzepatide (MOUNJARO) 5 MG/0.5ML SOPN SC injection 2 Adjustable Dose Pre-filled Pen Syringe 1     Sig: Inject 0.5 mLs into the skin once a week       Last Visit Date (If Applicable):  5/52/6667    Next Visit Date:    7/21/2023

## 2023-05-20 RX ORDER — BUSPIRONE HYDROCHLORIDE 5 MG/1
5 TABLET ORAL 2 TIMES DAILY
Qty: 60 TABLET | Refills: 3 | Status: SHIPPED | OUTPATIENT
Start: 2023-05-20 | End: 2023-05-22 | Stop reason: SDUPTHER

## 2023-05-22 DIAGNOSIS — M1A.09X1 IDIOPATHIC CHRONIC GOUT OF MULTIPLE SITES WITH TOPHUS: ICD-10-CM

## 2023-05-22 DIAGNOSIS — F41.9 ANXIETY: ICD-10-CM

## 2023-05-22 DIAGNOSIS — E13.40 NEUROPATHY DUE TO SECONDARY DIABETES (HCC): ICD-10-CM

## 2023-05-22 RX ORDER — CYCLOBENZAPRINE HYDROCHLORIDE 7.5 MG/1
7.5 TABLET, FILM COATED ORAL 3 TIMES DAILY PRN
Qty: 90 TABLET | Refills: 1 | Status: SHIPPED | OUTPATIENT
Start: 2023-05-22 | End: 2023-06-01

## 2023-05-22 RX ORDER — ONDANSETRON 4 MG/1
4 TABLET, FILM COATED ORAL EVERY 8 HOURS PRN
Qty: 180 TABLET | Refills: 1 | Status: SHIPPED | OUTPATIENT
Start: 2023-05-22

## 2023-05-22 RX ORDER — COLCHICINE 0.6 MG/1
0.6 CAPSULE ORAL DAILY
Qty: 90 CAPSULE | Refills: 1 | Status: SHIPPED | OUTPATIENT
Start: 2023-05-22

## 2023-05-22 RX ORDER — BUSPIRONE HYDROCHLORIDE 5 MG/1
5 TABLET ORAL 2 TIMES DAILY
Qty: 180 TABLET | Refills: 1 | Status: SHIPPED | OUTPATIENT
Start: 2023-05-22

## 2023-05-22 RX ORDER — BUPROPION HYDROCHLORIDE 200 MG/1
200 TABLET, EXTENDED RELEASE ORAL 2 TIMES DAILY
Qty: 180 TABLET | Refills: 3 | Status: SHIPPED | OUTPATIENT
Start: 2023-05-22

## 2023-05-22 RX ORDER — OMEPRAZOLE 20 MG/1
CAPSULE, DELAYED RELEASE ORAL
Qty: 90 CAPSULE | Refills: 3 | Status: SHIPPED | OUTPATIENT
Start: 2023-05-22

## 2023-05-22 NOTE — TELEPHONE ENCOUNTER
Zenon Barron is calling to request a refill on the following medication(s):    Medication Request:  Requested Prescriptions     Pending Prescriptions Disp Refills    busPIRone (BUSPAR) 5 MG tablet 180 tablet 1     Sig: Take 1 tablet by mouth 2 times daily    colchicine (MITIGARE) 0.6 MG capsule 90 capsule 1     Sig: Take 1 capsule by mouth daily    ondansetron (ZOFRAN) 4 MG tablet 180 tablet 1     Sig: Take 1 tablet by mouth every 8 hours as needed for Nausea or Vomiting    cyclobenzaprine (FEXMID) 7.5 MG tablet 90 tablet 1     Sig: Take 1 tablet by mouth 3 times daily as needed for Muscle spasms    milnacipran HCl (SAVELLA) 100 MG TABS 180 tablet 1     Sig: Take 1 tablet by mouth 2 times daily    buPROPion (WELLBUTRIN SR) 200 MG extended release tablet 180 tablet 3     Sig: Take 1 tablet by mouth 2 times daily    omeprazole (PRILOSEC) 20 MG delayed release capsule 90 capsule 3     Sig: TAKE 1 CAPSULE BY MOUTH  DAILY  Strength: 20 mg       Last Visit Date (If Applicable):  1/28/7417    Next Visit Date:    7/21/2023

## 2023-06-08 ENCOUNTER — CARE COORDINATION (OUTPATIENT)
Dept: CARE COORDINATION | Age: 63
End: 2023-06-08

## 2023-06-08 NOTE — CARE COORDINATION
Patient was referred to  by [employer], who stated that   this patient was referred to her indicating that she needs food resources, medical   transportation, and assistance with utilities. HC phoned this patient, made introductions  and stated the reason for the call. Patient was very receptive. HC completed the   application for [facility], and completed a referral to [employer]  and  provided the patient with the phone number for [employer] [facility]. HC also provided   her with the phone for [employer] appointment line for utility assistance. Patient was  very receptive and appreciative.     Plan of Care  [facility], [facility]. will follow up with patient regarding various service provided

## 2023-07-06 DIAGNOSIS — E11.42 TYPE 2 DIABETES MELLITUS WITH DIABETIC POLYNEUROPATHY, WITH LONG-TERM CURRENT USE OF INSULIN (HCC): ICD-10-CM

## 2023-07-06 DIAGNOSIS — Z79.4 TYPE 2 DIABETES MELLITUS WITH DIABETIC POLYNEUROPATHY, WITH LONG-TERM CURRENT USE OF INSULIN (HCC): ICD-10-CM

## 2023-07-07 RX ORDER — TIRZEPATIDE 5 MG/.5ML
INJECTION, SOLUTION SUBCUTANEOUS
Qty: 2 ML | Refills: 1 | Status: SHIPPED | OUTPATIENT
Start: 2023-07-07

## 2023-07-07 NOTE — TELEPHONE ENCOUNTER
Laly Kelley is calling to request a refill on the following medication(s):    Medication Request:  Requested Prescriptions     Pending Prescriptions Disp Refills    MOUNJARO 5 MG/0.5ML SOPN SC injection [Pharmacy Med Name: Bienvenido Cesar (4) PEN 5/0.5] 2 mL 1     Sig: INJECT 0.5ML (=5MG)        SUBCUTANEOUSLY WEEKLY       Last Visit Date (If Applicable):  3/98/6725    Next Visit Date:    7/21/2023

## 2023-07-11 ENCOUNTER — HOSPITAL ENCOUNTER (OUTPATIENT)
Age: 63
Discharge: HOME | End: 2023-07-11
Payer: COMMERCIAL

## 2023-07-11 DIAGNOSIS — R82.998 FOAMY URINE: ICD-10-CM

## 2023-07-11 DIAGNOSIS — Z79.4 TYPE 2 DIABETES MELLITUS WITH DIABETIC POLYNEUROPATHY, WITH LONG-TERM CURRENT USE OF INSULIN (HCC): ICD-10-CM

## 2023-07-11 DIAGNOSIS — E11.42 TYPE 2 DIABETES MELLITUS WITH DIABETIC POLYNEUROPATHY, WITH LONG-TERM CURRENT USE OF INSULIN (HCC): ICD-10-CM

## 2023-07-11 DIAGNOSIS — E55.9 VITAMIN D DEFICIENCY: ICD-10-CM

## 2023-07-11 DIAGNOSIS — I10 PRIMARY HYPERTENSION: ICD-10-CM

## 2023-07-11 DIAGNOSIS — N18.31 STAGE 3A CHRONIC KIDNEY DISEASE (HCC): ICD-10-CM

## 2023-07-11 LAB
25(OH)D3 SERPL-MCNC: 23.8 NG/ML
ALBUMIN SERPL-MCNC: 4.1 G/DL (ref 3.5–5.2)
ALBUMIN/GLOB SERPL: 1.3 {RATIO} (ref 1–2.5)
ALP SERPL-CCNC: 97 U/L (ref 35–104)
ALT SERPL-CCNC: 31 U/L (ref 5–33)
ANION GAP SERPL CALCULATED.3IONS-SCNC: 14 MMOL/L (ref 9–17)
AST SERPL-CCNC: 34 U/L
BASOPHILS # BLD: 0.05 K/UL (ref 0–0.2)
BASOPHILS NFR BLD: 1 % (ref 0–2)
BILIRUB SERPL-MCNC: 0.4 MG/DL (ref 0.3–1.2)
BILIRUB UR QL STRIP: NEGATIVE
BUN SERPL-MCNC: 24 MG/DL (ref 8–23)
CALCIUM SERPL-MCNC: 10 MG/DL (ref 8.6–10.4)
CHLORIDE SERPL-SCNC: 104 MMOL/L (ref 98–107)
CHOLEST SERPL-MCNC: 165 MG/DL
CHOLESTEROL/HDL RATIO: 4.2
CLARITY UR: CLEAR
CO2 SERPL-SCNC: 22 MMOL/L (ref 20–31)
COLOR UR: YELLOW
CREAT SERPL-MCNC: 1 MG/DL (ref 0.5–0.9)
CREAT UR-MCNC: 80.2 MG/DL (ref 28–217)
EOSINOPHIL # BLD: 0.23 K/UL (ref 0–0.44)
EOSINOPHILS RELATIVE PERCENT: 3 % (ref 1–4)
EPI CELLS #/AREA URNS HPF: ABNORMAL /HPF (ref 0–5)
ERYTHROCYTE [DISTWIDTH] IN BLOOD BY AUTOMATED COUNT: 13.2 % (ref 11.8–14.4)
GFR SERPL CREATININE-BSD FRML MDRD: >60 ML/MIN/1.73M2
GLUCOSE P FAST SERPL-MCNC: 162 MG/DL (ref 70–99)
GLUCOSE UR STRIP-MCNC: NEGATIVE MG/DL
HCT VFR BLD AUTO: 40.4 % (ref 36.3–47.1)
HDLC SERPL-MCNC: 39 MG/DL
HGB BLD-MCNC: 13.5 G/DL (ref 11.9–15.1)
HGB UR QL STRIP.AUTO: NEGATIVE
IMM GRANULOCYTES # BLD AUTO: 0.07 K/UL (ref 0–0.3)
IMM GRANULOCYTES NFR BLD: 1 %
KETONES UR STRIP-MCNC: NEGATIVE MG/DL
LDLC SERPL CALC-MCNC: 83 MG/DL (ref 0–130)
LEUKOCYTE ESTERASE UR QL STRIP: ABNORMAL
LYMPHOCYTES # BLD: 12 % (ref 24–43)
LYMPHOCYTES NFR BLD: 1.11 K/UL (ref 1.1–3.7)
MCH RBC QN AUTO: 31.4 PG (ref 25.2–33.5)
MCHC RBC AUTO-ENTMCNC: 33.4 G/DL (ref 28.4–34.8)
MCV RBC AUTO: 94 FL (ref 82.6–102.9)
MICROALBUMIN UR-MCNC: 386 MG/L
MICROALBUMIN/CREAT UR-RTO: 481 MCG/MG CREAT
MONOCYTES NFR BLD: 0.6 K/UL (ref 0.1–1.2)
MONOCYTES NFR BLD: 7 % (ref 3–12)
NEUTROPHILS NFR BLD: 76 % (ref 36–65)
NEUTS SEG NFR BLD: 6.99 K/UL (ref 1.5–8.1)
NITRITE UR QL STRIP: NEGATIVE
NRBC BLD-RTO: 0 PER 100 WBC
PH UR STRIP: 6 [PH] (ref 5–8)
PLATELET # BLD AUTO: 233 K/UL (ref 138–453)
PMV BLD AUTO: 11.6 FL (ref 8.1–13.5)
POTASSIUM SERPL-SCNC: 4.4 MMOL/L (ref 3.7–5.3)
PROT SERPL-MCNC: 7.3 G/DL (ref 6.4–8.3)
PROT UR STRIP-MCNC: ABNORMAL MG/DL
RBC # BLD AUTO: 4.3 M/UL (ref 3.95–5.11)
RBC #/AREA URNS HPF: ABNORMAL /HPF (ref 0–4)
SODIUM SERPL-SCNC: 140 MMOL/L (ref 135–144)
SP GR UR STRIP: 1.01 (ref 1–1.03)
TRIGL SERPL-MCNC: 214 MG/DL
TSH SERPL DL<=0.05 MIU/L-ACNC: 1.74 UIU/ML (ref 0.3–5)
UROBILINOGEN UR STRIP-ACNC: NORMAL
WBC #/AREA URNS HPF: ABNORMAL /HPF (ref 0–5)
WBC OTHER # BLD: 9.1 K/UL (ref 3.5–11.3)

## 2023-07-11 PROCEDURE — 82043 UR ALBUMIN QUANTITATIVE: CPT

## 2023-07-11 PROCEDURE — 80053 COMPREHEN METABOLIC PANEL: CPT

## 2023-07-11 PROCEDURE — 81001 URINALYSIS AUTO W/SCOPE: CPT

## 2023-07-11 PROCEDURE — 82306 VITAMIN D 25 HYDROXY: CPT

## 2023-07-11 PROCEDURE — 85027 COMPLETE CBC AUTOMATED: CPT

## 2023-07-11 PROCEDURE — 36415 COLL VENOUS BLD VENIPUNCTURE: CPT

## 2023-07-11 PROCEDURE — 80061 LIPID PANEL: CPT

## 2023-07-11 PROCEDURE — 82570 ASSAY OF URINE CREATININE: CPT

## 2023-07-11 PROCEDURE — 84443 ASSAY THYROID STIM HORMONE: CPT

## 2023-08-05 DIAGNOSIS — E13.40 NEUROPATHY DUE TO SECONDARY DIABETES (HCC): ICD-10-CM

## 2023-08-07 NOTE — TELEPHONE ENCOUNTER
Shakira Tao is calling to request a refill on the following medication(s):    Medication Request:  Requested Prescriptions     Pending Prescriptions Disp Refills    cyclobenzaprine (FEXMID) 7.5 MG tablet [Pharmacy Med Name: Harithajob Stacie TAB 7.5MG] 90 tablet 1     Sig: TAKE 1 TABLET 3 TIMES DAILYAS NEEDED FOR MUSCLE SPASMS       Last Visit Date (If Applicable):  5/52/3069    Next Visit Date:    8/9/2023

## 2023-08-08 RX ORDER — CYCLOBENZAPRINE HYDROCHLORIDE 7.5 MG/1
TABLET, FILM COATED ORAL
Qty: 90 TABLET | Refills: 1 | Status: SHIPPED | OUTPATIENT
Start: 2023-08-08

## 2023-08-17 DIAGNOSIS — I10 ESSENTIAL HYPERTENSION: ICD-10-CM

## 2023-08-18 NOTE — TELEPHONE ENCOUNTER
Tesfaye Hughes is calling to request a refill on the following medication(s):    Medication Request:  Requested Prescriptions     Pending Prescriptions Disp Refills    carvedilol (COREG) 25 MG tablet 180 tablet 3     Sig: TAKE 1 TABLET BY MOUTH  TWICE DAILY    Insulin Pen Needle (B-D UF III MINI PEN NEEDLES) 31G X 5 MM MISC 100 each 3       Last Visit Date (If Applicable):  2/47/5775    Next Visit Date:    9/28/2023

## 2023-08-21 RX ORDER — CARVEDILOL 25 MG/1
TABLET ORAL
Qty: 180 TABLET | Refills: 3 | Status: SHIPPED | OUTPATIENT
Start: 2023-08-21 | End: 2023-08-25 | Stop reason: SDUPTHER

## 2023-08-21 RX ORDER — FLURBIPROFEN SODIUM 0.3 MG/ML
SOLUTION/ DROPS OPHTHALMIC
Qty: 100 EACH | Refills: 3 | Status: SHIPPED | OUTPATIENT
Start: 2023-08-21 | End: 2023-08-28 | Stop reason: SDUPTHER

## 2023-08-22 ENCOUNTER — CARE COORDINATION (OUTPATIENT)
Dept: CARE COORDINATION | Age: 63
End: 2023-08-22

## 2023-08-22 NOTE — CARE COORDINATION
[facility], [facility]. phoned patient to follow up with her social needs. Patient stated that she lost her [place] w/o  any notice, and subsequently didn't receive the $125.   for food, nor was her dental appointment covered. She   stated that she just received a letter from [facility] on 08/21/23,  and will be filing an appeal due to lack of information.    suggested that the patient call [employer] and she stated that  they have blessed her for the last couple of months. Patient also  stated that her place is total electric and she only uses the air   conditioner about three months a year and after that her electric  bill is very small.   For that reason, patient denies the need for the  PIPP program.     Plan of Care  [facility], [facility]. will contact patient gain to follow up on her social needs

## 2023-08-24 DIAGNOSIS — I10 ESSENTIAL HYPERTENSION: ICD-10-CM

## 2023-08-24 NOTE — TELEPHONE ENCOUNTER
Aaron Lawler is calling to request a refill on the following medication(s):    Medication Request:  Requested Prescriptions     Pending Prescriptions Disp Refills    carvedilol (COREG) 25 MG tablet 180 tablet 3     Sig: TAKE 1 TABLET BY MOUTH  TWICE DAILY       Last Visit Date (If Applicable):  9/01/3983    Next Visit Date:    9/28/2023

## 2023-08-25 RX ORDER — CARVEDILOL 25 MG/1
TABLET ORAL
Qty: 180 TABLET | Refills: 3 | Status: SHIPPED | OUTPATIENT
Start: 2023-08-25

## 2023-08-28 DIAGNOSIS — E11.42 TYPE 2 DIABETES MELLITUS WITH DIABETIC POLYNEUROPATHY, WITH LONG-TERM CURRENT USE OF INSULIN (HCC): Primary | ICD-10-CM

## 2023-08-28 DIAGNOSIS — Z79.4 TYPE 2 DIABETES MELLITUS WITH DIABETIC POLYNEUROPATHY, WITH LONG-TERM CURRENT USE OF INSULIN (HCC): Primary | ICD-10-CM

## 2023-08-28 RX ORDER — FLURBIPROFEN SODIUM 0.3 MG/ML
SOLUTION/ DROPS OPHTHALMIC
Qty: 100 EACH | Refills: 3 | Status: SHIPPED | OUTPATIENT
Start: 2023-08-28

## 2023-09-05 DIAGNOSIS — E13.40 NEUROPATHY DUE TO SECONDARY DIABETES (HCC): ICD-10-CM

## 2023-09-05 DIAGNOSIS — M10.9 GOUT, UNSPECIFIED CAUSE, UNSPECIFIED CHRONICITY, UNSPECIFIED SITE: ICD-10-CM

## 2023-09-05 RX ORDER — CYCLOBENZAPRINE HYDROCHLORIDE 7.5 MG/1
TABLET, FILM COATED ORAL
Qty: 90 TABLET | Refills: 1 | Status: SHIPPED | OUTPATIENT
Start: 2023-09-05

## 2023-09-05 RX ORDER — ALLOPURINOL 300 MG/1
TABLET ORAL
Qty: 90 TABLET | Refills: 3 | Status: SHIPPED | OUTPATIENT
Start: 2023-09-05

## 2023-09-05 NOTE — TELEPHONE ENCOUNTER
Xavier Espinoza is calling to request a refill on the following medication(s):    Medication Request:  Requested Prescriptions     Pending Prescriptions Disp Refills    allopurinol (ZYLOPRIM) 300 MG tablet 90 tablet 3     Sig: TAKE 1 TABLET BY MOUTH  DAILY    cyclobenzaprine (FEXMID) 7.5 MG tablet 90 tablet 1       Last Visit Date (If Applicable):  5/41/6969    Next Visit Date:    9/28/2023

## 2023-09-13 DIAGNOSIS — Z79.4 TYPE 2 DIABETES MELLITUS WITH DIABETIC POLYNEUROPATHY, WITH LONG-TERM CURRENT USE OF INSULIN (HCC): ICD-10-CM

## 2023-09-13 DIAGNOSIS — E11.42 TYPE 2 DIABETES MELLITUS WITH DIABETIC POLYNEUROPATHY, WITH LONG-TERM CURRENT USE OF INSULIN (HCC): ICD-10-CM

## 2023-09-13 RX ORDER — TIRZEPATIDE 5 MG/.5ML
5 INJECTION, SOLUTION SUBCUTANEOUS WEEKLY
Qty: 2 ML | Refills: 1 | Status: SHIPPED | OUTPATIENT
Start: 2023-09-13

## 2023-09-13 NOTE — TELEPHONE ENCOUNTER
Morenita Tony is calling to request a refill on the following medication(s):    Medication Request:  Requested Prescriptions     Pending Prescriptions Disp Refills    Tirzepatide (MOUNJARO) 5 MG/0.5ML SOPN SC injection 2 mL 1       Last Visit Date (If Applicable):  7/46/2799    Next Visit Date:    9/28/2023

## 2023-09-28 ENCOUNTER — HOSPITAL ENCOUNTER (OUTPATIENT)
Dept: CT IMAGING | Age: 63
Discharge: HOME | End: 2023-09-30
Payer: MEDICARE

## 2023-09-28 ENCOUNTER — OFFICE VISIT (OUTPATIENT)
Dept: FAMILY MEDICINE CLINIC | Age: 63
End: 2023-09-28
Payer: MEDICARE

## 2023-09-28 ENCOUNTER — HOSPITAL ENCOUNTER (OUTPATIENT)
Age: 63
Setting detail: SPECIMEN
Discharge: HOME | End: 2023-09-28

## 2023-09-28 VITALS
SYSTOLIC BLOOD PRESSURE: 128 MMHG | OXYGEN SATURATION: 95 % | BODY MASS INDEX: 41.95 KG/M2 | DIASTOLIC BLOOD PRESSURE: 84 MMHG | HEIGHT: 70 IN | WEIGHT: 293 LBS | HEART RATE: 77 BPM

## 2023-09-28 DIAGNOSIS — R10.84 GENERALIZED ABDOMINAL PAIN: Primary | ICD-10-CM

## 2023-09-28 DIAGNOSIS — R10.84 GENERALIZED ABDOMINAL PAIN: ICD-10-CM

## 2023-09-28 DIAGNOSIS — E11.42 TYPE 2 DIABETES MELLITUS WITH DIABETIC POLYNEUROPATHY, WITH LONG-TERM CURRENT USE OF INSULIN (HCC): ICD-10-CM

## 2023-09-28 DIAGNOSIS — Z79.4 TYPE 2 DIABETES MELLITUS WITH DIABETIC POLYNEUROPATHY, WITH LONG-TERM CURRENT USE OF INSULIN (HCC): ICD-10-CM

## 2023-09-28 LAB
BILIRUBIN, POC: ABNORMAL
BLOOD URINE, POC: ABNORMAL
CLARITY, POC: CLEAR
COLOR, POC: YELLOW
CREAT BLD-MCNC: 1.1 MG/DL (ref 0.6–1.4)
GLUCOSE URINE, POC: ABNORMAL
HBA1C MFR BLD: 7.1 %
KETONES, POC: ABNORMAL
LEUKOCYTE EST, POC: ABNORMAL
NITRITE, POC: ABNORMAL
PH, POC: 6
PROTEIN, POC: ABNORMAL
SPECIFIC GRAVITY, POC: 1.01
UROBILINOGEN, POC: 0.2

## 2023-09-28 PROCEDURE — G8427 DOCREV CUR MEDS BY ELIG CLIN: HCPCS | Performed by: STUDENT IN AN ORGANIZED HEALTH CARE EDUCATION/TRAINING PROGRAM

## 2023-09-28 PROCEDURE — 4004F PT TOBACCO SCREEN RCVD TLK: CPT | Performed by: STUDENT IN AN ORGANIZED HEALTH CARE EDUCATION/TRAINING PROGRAM

## 2023-09-28 PROCEDURE — 2580000003 HC RX 258: Performed by: STUDENT IN AN ORGANIZED HEALTH CARE EDUCATION/TRAINING PROGRAM

## 2023-09-28 PROCEDURE — 3079F DIAST BP 80-89 MM HG: CPT | Performed by: STUDENT IN AN ORGANIZED HEALTH CARE EDUCATION/TRAINING PROGRAM

## 2023-09-28 PROCEDURE — G8417 CALC BMI ABV UP PARAM F/U: HCPCS | Performed by: STUDENT IN AN ORGANIZED HEALTH CARE EDUCATION/TRAINING PROGRAM

## 2023-09-28 PROCEDURE — 3074F SYST BP LT 130 MM HG: CPT | Performed by: STUDENT IN AN ORGANIZED HEALTH CARE EDUCATION/TRAINING PROGRAM

## 2023-09-28 PROCEDURE — 2022F DILAT RTA XM EVC RTNOPTHY: CPT | Performed by: STUDENT IN AN ORGANIZED HEALTH CARE EDUCATION/TRAINING PROGRAM

## 2023-09-28 PROCEDURE — 6360000004 HC RX CONTRAST MEDICATION: Performed by: STUDENT IN AN ORGANIZED HEALTH CARE EDUCATION/TRAINING PROGRAM

## 2023-09-28 PROCEDURE — 3051F HG A1C>EQUAL 7.0%<8.0%: CPT | Performed by: STUDENT IN AN ORGANIZED HEALTH CARE EDUCATION/TRAINING PROGRAM

## 2023-09-28 PROCEDURE — 83036 HEMOGLOBIN GLYCOSYLATED A1C: CPT | Performed by: STUDENT IN AN ORGANIZED HEALTH CARE EDUCATION/TRAINING PROGRAM

## 2023-09-28 PROCEDURE — 74177 CT ABD & PELVIS W/CONTRAST: CPT

## 2023-09-28 PROCEDURE — 81003 URINALYSIS AUTO W/O SCOPE: CPT | Performed by: STUDENT IN AN ORGANIZED HEALTH CARE EDUCATION/TRAINING PROGRAM

## 2023-09-28 PROCEDURE — 82565 ASSAY OF CREATININE: CPT

## 2023-09-28 PROCEDURE — 99214 OFFICE O/P EST MOD 30 MIN: CPT | Performed by: STUDENT IN AN ORGANIZED HEALTH CARE EDUCATION/TRAINING PROGRAM

## 2023-09-28 PROCEDURE — 3017F COLORECTAL CA SCREEN DOC REV: CPT | Performed by: STUDENT IN AN ORGANIZED HEALTH CARE EDUCATION/TRAINING PROGRAM

## 2023-09-28 RX ORDER — 0.9 % SODIUM CHLORIDE 0.9 %
80 INTRAVENOUS SOLUTION INTRAVENOUS ONCE
Status: COMPLETED | OUTPATIENT
Start: 2023-09-28 | End: 2023-09-28

## 2023-09-28 RX ORDER — SODIUM CHLORIDE 0.9 % (FLUSH) 0.9 %
10 SYRINGE (ML) INJECTION PRN
Status: DISCONTINUED | OUTPATIENT
Start: 2023-09-28 | End: 2023-10-01 | Stop reason: HOSPADM

## 2023-09-28 RX ADMIN — IOPAMIDOL 75 ML: 755 INJECTION, SOLUTION INTRAVENOUS at 10:59

## 2023-09-28 RX ADMIN — SODIUM CHLORIDE 80 ML: 9 INJECTION, SOLUTION INTRAVENOUS at 10:59

## 2023-09-28 RX ADMIN — SODIUM CHLORIDE, PRESERVATIVE FREE 10 ML: 5 INJECTION INTRAVENOUS at 10:59

## 2023-09-28 ASSESSMENT — ENCOUNTER SYMPTOMS
DIARRHEA: 0
SORE THROAT: 0
CONSTIPATION: 0
VOMITING: 0
EYE DISCHARGE: 0
SHORTNESS OF BREATH: 0
COUGH: 0
WHEEZING: 0
NAUSEA: 1
CHEST TIGHTNESS: 0
ABDOMINAL PAIN: 1

## 2023-09-28 NOTE — PROGRESS NOTES
[address] [place] [address] PRIMARY CARE  [address] [place] [address]  Dept: [phone]  Dept Fax: [phone]    Tesfaye Hughes is a 59 y.o. female who is a Established patient, who presents today for her medical conditions/complaints as noted below:  Chief Complaint   Patient presents with    Diabetes    Hernia     Stomach area   Pain scale 8         HPI:     She is here today complaining of generalized abdominal pain, nausea and vomiting since Saturday. She states that the pain is slowly getting worse and she has not eaten anything since yesterday. She is also been having lower abdominal discomfort and pressure along with some urinary discomfort. She states that she started taking Pyridium for UTI for the last 2 days and that has helped her symptoms relieve a bit. She also feels pain going around her left flank area as well. No previous history of kidney stone, or diverticulosis. She has ventral hernias which have been incarcerated before and she is worried about that. She states that she had few episodes of vomiting with small bile in it but that stopped after first few days and she has not had it again. She also reports mild constipation but states that her last bowel movement was yesterday. She has been on [facility] for past 2 to 3 months now after she was switched from Trulicity. She denies having any side effects from the [facility].           Hemoglobin A1C (%)   Date Value   09/28/2023 7.1   04/20/2023 6.6   01/19/2023 7.8             ( goal A1Cis < 7)   No components found for: \"LABMICR\"  LDL Cholesterol (mg/dL)   Date Value   07/11/2023 83   03/21/2022 108   05/03/2021 80       (goal LDL is <100)   AST (U/L)   Date Value   07/11/2023 34 (H)     ALT (U/L)   Date Value   07/11/2023 31     BUN (mg/dL)   Date Value   07/11/2023 24 (H)     BP Readings from Last 3 Encounters:   09/28/23 128/84   04/20/23 124/80   01/19/23 130/88          (goal 120/80)    Past Medical

## 2023-09-29 LAB
MICROORGANISM SPEC CULT: NO GROWTH
SPECIMEN DESCRIPTION: NORMAL

## 2023-10-04 ENCOUNTER — TELEPHONE (OUTPATIENT)
Dept: FAMILY MEDICINE CLINIC | Age: 63
End: 2023-10-04

## 2023-10-04 DIAGNOSIS — K42.9 PERIUMBILICAL HERNIA: Primary | ICD-10-CM

## 2023-10-04 RX ORDER — TRAMADOL HYDROCHLORIDE 50 MG/1
50 TABLET ORAL EVERY 4 HOURS PRN
Qty: 18 TABLET | Refills: 0 | Status: SHIPPED | OUTPATIENT
Start: 2023-10-04 | End: 2023-10-07

## 2023-10-04 NOTE — TELEPHONE ENCOUNTER
Spoke with patient in regards to her result, let her know that we hadnt received it yet because its not marked as final, but I am looking into it, but I did let her know that what we can see is normal and then she stated she was still having the same symptoms and then I connected her to Dr. Samuel Joiner

## 2023-10-16 SDOH — HEALTH STABILITY: PHYSICAL HEALTH: ON AVERAGE, HOW MANY DAYS PER WEEK DO YOU ENGAGE IN MODERATE TO STRENUOUS EXERCISE (LIKE A BRISK WALK)?: 0 DAYS

## 2023-10-16 SDOH — HEALTH STABILITY: PHYSICAL HEALTH: ON AVERAGE, HOW MANY MINUTES DO YOU ENGAGE IN EXERCISE AT THIS LEVEL?: 0 MIN

## 2023-10-16 ASSESSMENT — PATIENT HEALTH QUESTIONNAIRE - PHQ9
SUM OF ALL RESPONSES TO PHQ QUESTIONS 1-9: 2
SUM OF ALL RESPONSES TO PHQ QUESTIONS 1-9: 2
2. FEELING DOWN, DEPRESSED OR HOPELESS: 1
SUM OF ALL RESPONSES TO PHQ QUESTIONS 1-9: 2
1. LITTLE INTEREST OR PLEASURE IN DOING THINGS: 1
SUM OF ALL RESPONSES TO PHQ9 QUESTIONS 1 & 2: 2
SUM OF ALL RESPONSES TO PHQ QUESTIONS 1-9: 2

## 2023-10-16 ASSESSMENT — LIFESTYLE VARIABLES
HOW MANY STANDARD DRINKS CONTAINING ALCOHOL DO YOU HAVE ON A TYPICAL DAY: 0
HOW OFTEN DO YOU HAVE A DRINK CONTAINING ALCOHOL: NEVER
HOW OFTEN DO YOU HAVE A DRINK CONTAINING ALCOHOL: 1
HOW MANY STANDARD DRINKS CONTAINING ALCOHOL DO YOU HAVE ON A TYPICAL DAY: PATIENT DOES NOT DRINK
HOW OFTEN DO YOU HAVE SIX OR MORE DRINKS ON ONE OCCASION: 1

## 2023-10-19 ENCOUNTER — OFFICE VISIT (OUTPATIENT)
Dept: FAMILY MEDICINE CLINIC | Age: 63
End: 2023-10-19
Payer: MEDICARE

## 2023-10-19 VITALS
WEIGHT: 293 LBS | SYSTOLIC BLOOD PRESSURE: 144 MMHG | DIASTOLIC BLOOD PRESSURE: 92 MMHG | OXYGEN SATURATION: 98 % | HEART RATE: 75 BPM | BODY MASS INDEX: 41.95 KG/M2 | HEIGHT: 70 IN

## 2023-10-19 DIAGNOSIS — Z87.891 PERSONAL HISTORY OF TOBACCO USE: ICD-10-CM

## 2023-10-19 DIAGNOSIS — Z12.31 ENCOUNTER FOR SCREENING MAMMOGRAM FOR MALIGNANT NEOPLASM OF BREAST: ICD-10-CM

## 2023-10-19 DIAGNOSIS — Z00.00 MEDICARE ANNUAL WELLNESS VISIT, SUBSEQUENT: Primary | ICD-10-CM

## 2023-10-19 DIAGNOSIS — E55.9 VITAMIN D DEFICIENCY: ICD-10-CM

## 2023-10-19 DIAGNOSIS — F19.951 DRUG INDUCED HALLUCINATIONS (HCC): ICD-10-CM

## 2023-10-19 PROCEDURE — G0439 PPPS, SUBSEQ VISIT: HCPCS | Performed by: STUDENT IN AN ORGANIZED HEALTH CARE EDUCATION/TRAINING PROGRAM

## 2023-10-19 PROCEDURE — 3078F DIAST BP <80 MM HG: CPT | Performed by: STUDENT IN AN ORGANIZED HEALTH CARE EDUCATION/TRAINING PROGRAM

## 2023-10-19 PROCEDURE — 3074F SYST BP LT 130 MM HG: CPT | Performed by: STUDENT IN AN ORGANIZED HEALTH CARE EDUCATION/TRAINING PROGRAM

## 2023-10-19 PROCEDURE — G0296 VISIT TO DETERM LDCT ELIG: HCPCS | Performed by: STUDENT IN AN ORGANIZED HEALTH CARE EDUCATION/TRAINING PROGRAM

## 2023-10-19 PROCEDURE — 3017F COLORECTAL CA SCREEN DOC REV: CPT | Performed by: STUDENT IN AN ORGANIZED HEALTH CARE EDUCATION/TRAINING PROGRAM

## 2023-10-19 PROCEDURE — G8484 FLU IMMUNIZE NO ADMIN: HCPCS | Performed by: STUDENT IN AN ORGANIZED HEALTH CARE EDUCATION/TRAINING PROGRAM

## 2023-10-19 RX ORDER — CHOLECALCIFEROL (VITAMIN D3) 125 MCG
1 CAPSULE ORAL DAILY
Qty: 90 TABLET | Refills: 1 | Status: SHIPPED | OUTPATIENT
Start: 2023-10-19 | End: 2024-04-16

## 2023-10-19 ASSESSMENT — PATIENT HEALTH QUESTIONNAIRE - PHQ9
3. TROUBLE FALLING OR STAYING ASLEEP: 0
7. TROUBLE CONCENTRATING ON THINGS, SUCH AS READING THE NEWSPAPER OR WATCHING TELEVISION: 0
4. FEELING TIRED OR HAVING LITTLE ENERGY: 1
SUM OF ALL RESPONSES TO PHQ QUESTIONS 1-9: 4
5. POOR APPETITE OR OVEREATING: 1
9. THOUGHTS THAT YOU WOULD BE BETTER OFF DEAD, OR OF HURTING YOURSELF: 0
SUM OF ALL RESPONSES TO PHQ QUESTIONS 1-9: 4
SUM OF ALL RESPONSES TO PHQ9 QUESTIONS 1 & 2: 2
SUM OF ALL RESPONSES TO PHQ QUESTIONS 1-9: 4
SUM OF ALL RESPONSES TO PHQ QUESTIONS 1-9: 4
2. FEELING DOWN, DEPRESSED OR HOPELESS: 1
8. MOVING OR SPEAKING SO SLOWLY THAT OTHER PEOPLE COULD HAVE NOTICED. OR THE OPPOSITE, BEING SO FIGETY OR RESTLESS THAT YOU HAVE BEEN MOVING AROUND A LOT MORE THAN USUAL: 0
1. LITTLE INTEREST OR PLEASURE IN DOING THINGS: 1
6. FEELING BAD ABOUT YOURSELF - OR THAT YOU ARE A FAILURE OR HAVE LET YOURSELF OR YOUR FAMILY DOWN: 0

## 2023-10-19 NOTE — PROGRESS NOTES
8 hours as needed for Nausea or Vomiting Yes Negar Stuart MD   milnacipran HCl (SAVELLA) 100 MG TABS Take 1 tablet by mouth 2 times daily Yes Negar Stuart MD   buPROPion (WELLBUTRIN SR) 200 MG extended release tablet Take 1 tablet by mouth 2 times daily Yes Negar Stuart MD   omeprazole (PRILOSEC) 20 MG delayed release capsule TAKE 1 CAPSULE BY MOUTH  DAILY Yes Negar Stuart MD   fenofibrate (TRIGLIDE) 160 MG tablet Take 1 tablet by mouth daily Yes Negar Stuart MD   pramipexole (MIRAPEX) 1 MG tablet Take 1 tablet by mouth nightly Yes Negar Stuart MD   insulin glargine, 1 unit dial, (TOUJEO SOLOSTAR) 300 UNIT/ML concentrated injection pen INJECT SUBCUTANEOUSLY 52  UNITS DAILY Yes Negar Stuart MD   mupirocin (BACTROBAN) 2 % ointment APPLY THIN LAYER TOPICALLY TO THE AFFECTED AREA FOUR TIMES DAILY Yes Provider, MD Sebastián   Lancets MISC Inject 1 each into the skin 3 times daily One touch Del PLus Yes Negar Stuart MD   blood glucose test strips (ONETOUCH ULTRA) strip TEST 3 TIMES DAILY AND AS NEEDED FOR SYMPTOMS OF IRREGULAR BLOOD  GLUCOSE.  Yes Negar Stuart MD   budesonide-formoterol (SYMBICORT) 160-4.5 MCG/ACT AERO Inhale 2 puffs into the lungs 2 times daily Yes Negar Stuart MD   fluticasone (FLONASE) 50 MCG/ACT nasal spray 1 spray by Each Nostril route daily Yes Negar Stuart MD   Magnesium Oxide (MAG-OXIDE) 200 MG TABS Take 1 tablet by mouth at bedtime Yes MD Minerva Talamantes MISC by Does not apply route Exp: 3/2027 Yes Negar Stuart MD   Blood Glucose Calibration (TRUE METRIX LEVEL 2) Normal SOLN  Yes ProviderSebastián MD   folic acid (FOLVITE) 1 MG tablet Take 1 tablet by mouth daily Yes ProviderSebastián MD   albuterol sulfate HFA (VENTOLIN HFA) 108 (90 Base) MCG/ACT inhaler Inhale 2 puffs into the lungs every 6 hours as needed for Wheezing Yes Negar Stuart MD   b complex vitamins capsule Take 1 capsule by mouth daily Yes ProviderSebastián MD   Multiple

## 2023-11-01 ENCOUNTER — TELEPHONE (OUTPATIENT)
Dept: FAMILY MEDICINE CLINIC | Age: 63
End: 2023-11-01

## 2023-11-06 DIAGNOSIS — K42.9 PERIUMBILICAL HERNIA: ICD-10-CM

## 2023-11-06 NOTE — TELEPHONE ENCOUNTER
Madyson Ferrari is calling to request a refill on the following medication(s):    Medication Request:  Requested Prescriptions     Pending Prescriptions Disp Refills    traMADol (ULTRAM) 50 MG tablet [Pharmacy Med Name: TRAMADOL 50MG TABLETS] 18 tablet      Sig: TAKE 1 TABLET BY MOUTH EVERY 4 HOURS FOR UP TO 3 DAYS AS NEEDED FOR PAIN. TAKE LOWEST DOSE POSSIBLE TO MANAGE PAIN.  MAX DAILY AMOUNT: 300 MG       Last Visit Date (If Applicable):  49/30/1628    Next Visit Date:    1/19/2024

## 2023-11-07 DIAGNOSIS — M1A.09X1 IDIOPATHIC CHRONIC GOUT OF MULTIPLE SITES WITH TOPHUS: ICD-10-CM

## 2023-11-07 RX ORDER — TRAMADOL HYDROCHLORIDE 50 MG/1
TABLET ORAL
Qty: 18 TABLET | Refills: 0 | Status: SHIPPED | OUTPATIENT
Start: 2023-11-07 | End: 2023-12-07

## 2023-11-07 RX ORDER — COLCHICINE 0.6 MG/1
0.6 CAPSULE ORAL DAILY
Qty: 90 CAPSULE | Refills: 1 | Status: CANCELLED | OUTPATIENT
Start: 2023-11-07

## 2023-11-07 NOTE — TELEPHONE ENCOUNTER
Palma Coon is calling to request a refill on the following medication(s):    Medication Request:  Requested Prescriptions     Pending Prescriptions Disp Refills    colchicine (MITIGARE) 0.6 MG capsule 90 capsule 1     Sig: Take 1 capsule by mouth daily       Last Visit Date (If Applicable):  10/19/2023    Next Visit Date:    1/19/2024

## 2023-11-08 DIAGNOSIS — F41.9 ANXIETY: ICD-10-CM

## 2023-11-08 DIAGNOSIS — M1A.09X1 IDIOPATHIC CHRONIC GOUT OF MULTIPLE SITES WITH TOPHUS: ICD-10-CM

## 2023-11-08 RX ORDER — COLCHICINE 0.6 MG/1
0.6 CAPSULE ORAL DAILY
Qty: 90 CAPSULE | Refills: 1 | Status: SHIPPED | OUTPATIENT
Start: 2023-11-08

## 2023-11-13 DIAGNOSIS — Z79.4 TYPE 2 DIABETES MELLITUS WITH DIABETIC POLYNEUROPATHY, WITH LONG-TERM CURRENT USE OF INSULIN (HCC): ICD-10-CM

## 2023-11-13 DIAGNOSIS — E11.42 TYPE 2 DIABETES MELLITUS WITH DIABETIC POLYNEUROPATHY, WITH LONG-TERM CURRENT USE OF INSULIN (HCC): ICD-10-CM

## 2023-11-13 RX ORDER — TIRZEPATIDE 5 MG/.5ML
INJECTION, SOLUTION SUBCUTANEOUS
Qty: 2 ML | Refills: 1 | Status: SHIPPED | OUTPATIENT
Start: 2023-11-13

## 2023-11-13 RX ORDER — TIRZEPATIDE 5 MG/.5ML
5 INJECTION, SOLUTION SUBCUTANEOUS WEEKLY
Qty: 2 ML | Refills: 1 | Status: SHIPPED | OUTPATIENT
Start: 2023-11-13 | End: 2023-11-13

## 2023-11-13 NOTE — TELEPHONE ENCOUNTER
Manjit Jennings is calling to request a refill on the following medication(s):    Medication Request:  Requested Prescriptions     Pending Prescriptions Disp Refills    MOUNJARO 5 MG/0.5ML SOPN SC injection [Pharmacy Med Name: Charlie Mcrae (4) PEN 5/0.5] 2 mL 1     Sig: INJECT 0.5ML (=5MG)        SUBCUTANEOUSLY ONCE WEEKLY (EVERY 7 DAYS)       Last Visit Date (If Applicable):  92/47/6322    Next Visit Date:    1/19/2024

## 2023-11-14 DIAGNOSIS — G25.81 RLS (RESTLESS LEGS SYNDROME): ICD-10-CM

## 2023-11-14 DIAGNOSIS — F41.9 ANXIETY: ICD-10-CM

## 2023-11-14 DIAGNOSIS — I10 ESSENTIAL HYPERTENSION: ICD-10-CM

## 2023-11-14 DIAGNOSIS — M10.9 GOUT, UNSPECIFIED CAUSE, UNSPECIFIED CHRONICITY, UNSPECIFIED SITE: ICD-10-CM

## 2023-11-14 DIAGNOSIS — Z79.4 TYPE 2 DIABETES MELLITUS WITH DIABETIC POLYNEUROPATHY, WITH LONG-TERM CURRENT USE OF INSULIN (HCC): ICD-10-CM

## 2023-11-14 DIAGNOSIS — I10 PRIMARY HYPERTENSION: ICD-10-CM

## 2023-11-14 DIAGNOSIS — J44.9 CHRONIC OBSTRUCTIVE PULMONARY DISEASE, UNSPECIFIED COPD TYPE (HCC): ICD-10-CM

## 2023-11-14 DIAGNOSIS — E78.1 HYPERTRIGLYCERIDEMIA: ICD-10-CM

## 2023-11-14 DIAGNOSIS — E11.42 TYPE 2 DIABETES MELLITUS WITH DIABETIC POLYNEUROPATHY, WITH LONG-TERM CURRENT USE OF INSULIN (HCC): ICD-10-CM

## 2023-11-14 DIAGNOSIS — J30.2 SEASONAL ALLERGIES: ICD-10-CM

## 2023-11-14 RX ORDER — ALLOPURINOL 300 MG/1
TABLET ORAL
Qty: 90 TABLET | Refills: 3 | Status: SHIPPED | OUTPATIENT
Start: 2023-11-14

## 2023-11-14 RX ORDER — ALBUTEROL SULFATE 90 UG/1
2 AEROSOL, METERED RESPIRATORY (INHALATION) EVERY 6 HOURS PRN
Qty: 1 EACH | Refills: 3 | Status: SHIPPED | OUTPATIENT
Start: 2023-11-14

## 2023-11-14 RX ORDER — BUDESONIDE AND FORMOTEROL FUMARATE DIHYDRATE 160; 4.5 UG/1; UG/1
2 AEROSOL RESPIRATORY (INHALATION) 2 TIMES DAILY
Qty: 30.6 G | Refills: 1 | Status: SHIPPED | OUTPATIENT
Start: 2023-11-14

## 2023-11-14 RX ORDER — OMEPRAZOLE 20 MG/1
CAPSULE, DELAYED RELEASE ORAL
Qty: 90 CAPSULE | Refills: 3 | Status: SHIPPED | OUTPATIENT
Start: 2023-11-14

## 2023-11-14 RX ORDER — PRAMIPEXOLE DIHYDROCHLORIDE 1 MG/1
1 TABLET ORAL NIGHTLY
Qty: 90 TABLET | Refills: 3 | Status: SHIPPED | OUTPATIENT
Start: 2023-11-14

## 2023-11-14 RX ORDER — FENOFIBRATE 160 MG/1
160 TABLET ORAL DAILY
Qty: 90 TABLET | Refills: 3 | Status: SHIPPED | OUTPATIENT
Start: 2023-11-14

## 2023-11-14 RX ORDER — BUSPIRONE HYDROCHLORIDE 5 MG/1
5 TABLET ORAL 2 TIMES DAILY
Qty: 180 TABLET | Refills: 1 | Status: SHIPPED | OUTPATIENT
Start: 2023-11-14

## 2023-11-14 RX ORDER — BUPROPION HYDROCHLORIDE 200 MG/1
200 TABLET, EXTENDED RELEASE ORAL 2 TIMES DAILY
Qty: 180 TABLET | Refills: 3 | Status: SHIPPED | OUTPATIENT
Start: 2023-11-14

## 2023-11-14 RX ORDER — VITS A,C,E/LUTEIN/MINERALS 300MCG-200
1 TABLET ORAL NIGHTLY
Qty: 30 TABLET | Refills: 1 | Status: SHIPPED | OUTPATIENT
Start: 2023-11-14

## 2023-11-14 RX ORDER — FLUTICASONE PROPIONATE 50 MCG
1 SPRAY, SUSPENSION (ML) NASAL DAILY
Qty: 32 G | Refills: 1 | Status: SHIPPED | OUTPATIENT
Start: 2023-11-14

## 2023-11-14 RX ORDER — CARVEDILOL 25 MG/1
TABLET ORAL
Qty: 180 TABLET | Refills: 3 | Status: SHIPPED | OUTPATIENT
Start: 2023-11-14

## 2023-11-14 RX ORDER — INSULIN GLARGINE 300 U/ML
INJECTION, SOLUTION SUBCUTANEOUS
Qty: 18 ML | Refills: 3 | Status: SHIPPED | OUTPATIENT
Start: 2023-11-14

## 2023-11-23 DIAGNOSIS — E13.40 NEUROPATHY DUE TO SECONDARY DIABETES (HCC): ICD-10-CM

## 2023-11-24 RX ORDER — CYCLOBENZAPRINE HYDROCHLORIDE 7.5 MG/1
TABLET, FILM COATED ORAL
Qty: 90 TABLET | Refills: 1 | Status: SHIPPED | OUTPATIENT
Start: 2023-11-24

## 2023-11-24 NOTE — TELEPHONE ENCOUNTER
Stella Swan is calling to request a refill on the following medication(s):    Medication Request:  Requested Prescriptions     Pending Prescriptions Disp Refills    cyclobenzaprine (FEXMID) 7.5 MG tablet [Pharmacy Med Name: Shazia Kim TAB 7.5MG] 90 tablet 1     Sig: TAKE 1 TABLET 3 TIMES DAILYAS NEEDED FOR MUSCLE SPASMS       Last Visit Date (If Applicable):  10/16/3859    Next Visit Date:    1/19/2024

## 2023-11-27 ENCOUNTER — PATIENT MESSAGE (OUTPATIENT)
Dept: FAMILY MEDICINE CLINIC | Age: 63
End: 2023-11-27

## 2023-11-27 DIAGNOSIS — E13.40 NEUROPATHY DUE TO SECONDARY DIABETES (HCC): ICD-10-CM

## 2023-11-28 ENCOUNTER — TELEPHONE (OUTPATIENT)
Dept: FAMILY MEDICINE CLINIC | Age: 63
End: 2023-11-28

## 2023-11-28 NOTE — TELEPHONE ENCOUNTER
[facility] - is asking  to  reconcile a med list     Please fax to [facility]     [phone]

## 2023-11-29 ENCOUNTER — TELEPHONE (OUTPATIENT)
Dept: ONCOLOGY | Age: 63
End: 2023-11-29

## 2023-11-29 NOTE — TELEPHONE ENCOUNTER
Order received for CT lung screening. EMR and order reviewed. Information regarding ct lung screening and smoking cessation referral mailed to patient.
The patient is a 2y1m Male complaining of ingestion.

## 2023-11-30 ENCOUNTER — TELEPHONE (OUTPATIENT)
Dept: FAMILY MEDICINE CLINIC | Age: 63
End: 2023-11-30

## 2023-11-30 RX ORDER — CYCLOBENZAPRINE HYDROCHLORIDE 7.5 MG/1
7.5 TABLET, FILM COATED ORAL 3 TIMES DAILY PRN
Qty: 90 TABLET | Refills: 1 | Status: SHIPPED | OUTPATIENT
Start: 2023-11-30

## 2023-11-30 NOTE — TELEPHONE ENCOUNTER
We received a release of records requested however there was no signed paper by the patient so I left a detailed message on [facility]ie  that we can not send any type of records without a signed release from the patient and left our fax and phone

## 2023-12-08 ENCOUNTER — HOSPITAL ENCOUNTER (OUTPATIENT)
Dept: CT IMAGING | Age: 63
End: 2023-12-08
Payer: MEDICARE

## 2023-12-08 DIAGNOSIS — Z87.891 PERSONAL HISTORY OF TOBACCO USE: ICD-10-CM

## 2023-12-08 PROCEDURE — 71271 CT THORAX LUNG CANCER SCR C-: CPT

## 2023-12-13 ENCOUNTER — HOSPITAL ENCOUNTER (EMERGENCY)
Age: 63
Discharge: HOME | End: 2023-12-13
Attending: EMERGENCY MEDICINE
Payer: MEDICARE

## 2023-12-13 VITALS
OXYGEN SATURATION: 98 % | TEMPERATURE: 98.4 F | BODY MASS INDEX: 44.05 KG/M2 | HEART RATE: 80 BPM | WEIGHT: 293 LBS | RESPIRATION RATE: 18 BRPM

## 2023-12-13 DIAGNOSIS — L02.91 ABSCESS: Primary | ICD-10-CM

## 2023-12-13 PROCEDURE — 6370000000 HC RX 637 (ALT 250 FOR IP): Performed by: PHYSICIAN ASSISTANT

## 2023-12-13 PROCEDURE — 99283 EMERGENCY DEPT VISIT LOW MDM: CPT

## 2023-12-13 RX ORDER — HYDROCODONE BITARTRATE AND ACETAMINOPHEN 5; 325 MG/1; MG/1
1 TABLET ORAL EVERY 6 HOURS PRN
Qty: 12 TABLET | Refills: 0 | Status: SHIPPED | OUTPATIENT
Start: 2023-12-13 | End: 2023-12-16

## 2023-12-13 RX ORDER — DOXYCYCLINE 100 MG/1
100 CAPSULE ORAL EVERY 12 HOURS SCHEDULED
Status: DISCONTINUED | OUTPATIENT
Start: 2023-12-13 | End: 2023-12-13 | Stop reason: HOSPADM

## 2023-12-13 RX ORDER — NAPROXEN 500 MG/1
500 TABLET ORAL 2 TIMES DAILY WITH MEALS
Qty: 30 TABLET | Refills: 0 | Status: SHIPPED | OUTPATIENT
Start: 2023-12-13

## 2023-12-13 RX ORDER — DOXYCYCLINE HYCLATE 100 MG
100 TABLET ORAL 2 TIMES DAILY
Qty: 20 TABLET | Refills: 0 | Status: SHIPPED | OUTPATIENT
Start: 2023-12-13 | End: 2023-12-23

## 2023-12-13 RX ADMIN — DOXYCYCLINE 100 MG: 100 CAPSULE ORAL at 10:07

## 2023-12-13 ASSESSMENT — PAIN DESCRIPTION - ORIENTATION: ORIENTATION: RIGHT;UPPER

## 2023-12-13 ASSESSMENT — PAIN - FUNCTIONAL ASSESSMENT: PAIN_FUNCTIONAL_ASSESSMENT: 0-10

## 2023-12-13 ASSESSMENT — PAIN SCALES - GENERAL: PAINLEVEL_OUTOF10: 6

## 2023-12-13 ASSESSMENT — PAIN DESCRIPTION - LOCATION: LOCATION: LEG

## 2023-12-13 ASSESSMENT — PAIN DESCRIPTION - DESCRIPTORS: DESCRIPTORS: PRESSURE

## 2023-12-13 NOTE — DISCHARGE INSTRUCTIONS
Take medication as directed. Follow up with the surgeon as directed. Warm compresses to the area once daily.

## 2023-12-13 NOTE — ED PROVIDER NOTES
[facility]  eMERGENCY dEPARTMENT eNCOUnter      Pt Name: Alissa Cronin  MRN: [id number]  [address] [facility] [place] 1960  Date of evaluation: 12/13/2023  Provider: Sasha Blue PA-C    CHIEF COMPLAINT       Chief Complaint   Patient presents with    Abscess     Pt reports abscess to right thigh for 1-2wks. Reports history of abscess in same area in the past and became septic. HISTORY OF PRESENT ILLNESS  (Location/Symptom, Timing/Onset, Context/Setting, Quality, Duration, Modifying Factors, Severity.)   Alissa Cronin is a 59 y.o. female who presents to the emergency department. Patient states that she has a history of having a small area on her upper inner right thigh that has intermittently been present for over 8 months. She states that it gets angry and is painful and then goes away. She has a history of having something similar approximately 6 years ago in which she went septic and had to have surgery. Patient denies any fever or chills. Denies any current drainage from this area. She has not seen anyone in the last 8 months for this. She states that it hurts to touch. Nursing Notes were reviewed. ALLERGIES     Duloxetine hcl, Sitagliptin, Cephalexin, Ciprofloxacin, Duloxetine, Metformin, Metformin and related, Pregabalin, Statins, and Morphine    CURRENT MEDICATIONS       Previous Medications    ALBUTEROL SULFATE HFA (VENTOLIN HFA) 108 (90 BASE) MCG/ACT INHALER    Inhale 2 puffs into the lungs every 6 hours as needed for Wheezing    ALLOPURINOL (ZYLOPRIM) 300 MG TABLET    TAKE 1 TABLET BY MOUTH  DAILY    B COMPLEX VITAMINS CAPSULE    Take 1 capsule by mouth daily    BIOTIN 5 MG CAPS    Take by mouth    BLOOD GLUCOSE CALIBRATION (TRUE METRIX LEVEL 2) NORMAL SOLN        BLOOD GLUCOSE TEST STRIPS (ONETOUCH ULTRA) STRIP    TEST 3 TIMES DAILY AND AS NEEDED FOR SYMPTOMS OF IRREGULAR BLOOD  GLUCOSE.     BUDESONIDE-FORMOTEROL (SYMBICORT) 160-4.5 MCG/ACT AERO    Inhale 2 puffs into the

## 2023-12-28 DIAGNOSIS — Z79.4 TYPE 2 DIABETES MELLITUS WITH DIABETIC POLYNEUROPATHY, WITH LONG-TERM CURRENT USE OF INSULIN (HCC): ICD-10-CM

## 2023-12-28 DIAGNOSIS — E11.42 TYPE 2 DIABETES MELLITUS WITH DIABETIC POLYNEUROPATHY, WITH LONG-TERM CURRENT USE OF INSULIN (HCC): ICD-10-CM

## 2023-12-28 RX ORDER — FLURBIPROFEN SODIUM 0.3 MG/ML
SOLUTION/ DROPS OPHTHALMIC
Qty: 100 EACH | Refills: 3 | Status: SHIPPED | OUTPATIENT
Start: 2023-12-28

## 2023-12-28 NOTE — TELEPHONE ENCOUNTER
Shlomo Cordoba is calling to request a refill on the following medication(s):    Medication Request:  Requested Prescriptions     Pending Prescriptions Disp Refills    Insulin Pen Needle (B-D UF III MINI PEN NEEDLES) 31G X 5 MM MISC [Pharmacy Med Name: B-D PEN NDL MINI [id number](3/16)PRPL] 100 each 3     Sig: USE THREE TIMES DAILY       Last Visit Date (If Applicable):  99/44/0785    Next Visit Date:    1/19/2024

## 2024-01-09 DIAGNOSIS — Z79.4 TYPE 2 DIABETES MELLITUS WITH DIABETIC POLYNEUROPATHY, WITH LONG-TERM CURRENT USE OF INSULIN (HCC): ICD-10-CM

## 2024-01-09 DIAGNOSIS — E11.42 TYPE 2 DIABETES MELLITUS WITH DIABETIC POLYNEUROPATHY, WITH LONG-TERM CURRENT USE OF INSULIN (HCC): ICD-10-CM

## 2024-01-09 NOTE — TELEPHONE ENCOUNTER
Palma Coon is calling to request a refill on the following medication(s):    Medication Request:  Requested Prescriptions     Pending Prescriptions Disp Refills    Tirzepatide (MOUNJARO) 5 MG/0.5ML SOPN SC injection 2 mL 1       Last Visit Date (If Applicable):  10/19/2023    Next Visit Date:    1/26/2024

## 2024-01-10 RX ORDER — TIRZEPATIDE 5 MG/.5ML
INJECTION, SOLUTION SUBCUTANEOUS
Qty: 2 ML | Refills: 1 | Status: SHIPPED | OUTPATIENT
Start: 2024-01-10

## 2024-01-18 DIAGNOSIS — E13.40 NEUROPATHY DUE TO SECONDARY DIABETES (HCC): Primary | ICD-10-CM

## 2024-01-18 RX ORDER — TRAMADOL HYDROCHLORIDE 50 MG/1
50 TABLET ORAL EVERY 4 HOURS PRN
Qty: 18 TABLET | Refills: 0 | Status: SHIPPED | OUTPATIENT
Start: 2024-01-18 | End: 2024-01-21

## 2024-01-22 ENCOUNTER — TELEPHONE (OUTPATIENT)
Dept: PHARMACY | Facility: CLINIC | Age: 64
End: 2024-01-22

## 2024-01-22 NOTE — TELEPHONE ENCOUNTER
Sandra Cox MD - Statin Care Gap    Patient has a visit with you on 1/26/24.  Chart reviewed due to insurer-identified care gap.  Palma Coon is receiving treatment for diabetes and is not currently prescribed statin therapy.      Per chart review, patient has documented myalgias to statins (noted 7/15/16), which qualifies the patient for an exclusion from this care gap.  Per medication history in [facility], patient was previously taking pravastatin 20 mg (4/1/16 to 7/15/16) and atorvastatin 40 mg (3/7/16 to 4/1/16).      CMS is allowing patients to be excluded from statin therapy if certain diagnosis/ICD-10 codes are added annually at a provider visit (diagnosis from visit claim is used to track the exclusion).      If statin therapy is not appropriate for patient, please consider adding the following CMS allowable diagnosis within your 1/26/24 visit encounter for statin exclusion in 2024:  statin myopathy (ICD-10 G72.0, T46.6X5A)  Please note that condition does NOT need to occur in the same year the code is documented (patient's medical chart should reflect a history of the condition).        Thank you,  Noy Gomez, PharmD, [facility]  Population Health Pharmacist  [facility] Clinical Pharmacy  Department, toll free: [phone], option 1    ===============================================================================    [facility] CLINICAL PHARMACY: STATIN THERAPY REVIEW  Identified statin use in persons with diabetes care gap per Reggie. Records dated: 12/15/23.    Last Office Visit: 10/19/23    Patient also appears to be prescribed the following medications in an adherence measure: Mounjaro (excluded - on insulin)    Patient is prescribed the following for lipid management: fenofibrate    Allergies   Allergen Reactions    Duloxetine Hcl Anaphylaxis and Other (See Comments)     Other reaction(s): anaphylaxis    Sitagliptin Shortness Of Breath     Other reaction(s): wheezing    Cephalexin

## 2024-01-24 ENCOUNTER — HOSPITAL ENCOUNTER (OUTPATIENT)
Dept: MAMMOGRAPHY | Age: 64
Discharge: HOME | End: 2024-01-26
Payer: MEDICAID

## 2024-01-24 DIAGNOSIS — Z12.31 ENCOUNTER FOR SCREENING MAMMOGRAM FOR MALIGNANT NEOPLASM OF BREAST: ICD-10-CM

## 2024-01-24 PROCEDURE — 77063 BREAST TOMOSYNTHESIS BI: CPT

## 2024-01-29 ENCOUNTER — TELEPHONE (OUTPATIENT)
Dept: FAMILY MEDICINE CLINIC | Age: 64
End: 2024-01-29

## 2024-01-29 DIAGNOSIS — K42.9 PERIUMBILICAL HERNIA: Primary | ICD-10-CM

## 2024-01-29 NOTE — TELEPHONE ENCOUNTER
----- Message from Sheridan Calvillo sent at 1/29/2024  1:05 PM EST -----  Subject: Message to Provider    QUESTIONS  Information for Provider? Patient needs a refill on Flexeril and it is not   on her med list  ---------------------------------------------------------------------------  --------------  CALL BACK INFO  [phone]; OK to leave message on voicemail  ---------------------------------------------------------------------------  --------------  SCRIPT ANSWERS  Relationship to Patient? Self

## 2024-01-30 DIAGNOSIS — E13.40 NEUROPATHY DUE TO SECONDARY DIABETES (HCC): ICD-10-CM

## 2024-01-30 RX ORDER — CYCLOBENZAPRINE HYDROCHLORIDE 7.5 MG/1
7.5 TABLET, FILM COATED ORAL 3 TIMES DAILY PRN
Qty: 270 TABLET | Refills: 1 | Status: SHIPPED | OUTPATIENT
Start: 2024-01-30 | End: 2024-01-30 | Stop reason: SDUPTHER

## 2024-01-30 RX ORDER — CYCLOBENZAPRINE HYDROCHLORIDE 7.5 MG/1
7.5 TABLET, FILM COATED ORAL 3 TIMES DAILY PRN
Qty: 270 TABLET | Refills: 1 | Status: SHIPPED | OUTPATIENT
Start: 2024-01-30

## 2024-01-30 NOTE — TELEPHONE ENCOUNTER
Patient stated that she never received the refill from December due to it going to the wrong pharmacy ([facility])  which is only for ozempic  . She is asking for a new script to be sent to [facility]

## 2024-01-30 NOTE — TELEPHONE ENCOUNTER
Patient did not have an OV on 1/26/24.     Will continue to follow.      Noy Gomez, PharmD, [facility]  Population Health Pharmacist  [facility] Clinical Pharmacy  Department, toll free: [phone], option 1      For Pharmacy Admin Tracking Only  Program: [place]  CPA in place:  No  Gap Closed?: No   Time Spent (min): 30

## 2024-01-30 NOTE — TELEPHONE ENCOUNTER
Requested Prescriptions     Pending Prescriptions Disp Refills    cyclobenzaprine (FEXMID) 7.5 MG tablet 270 tablet 1     Sig: Take 1 tablet by mouth 3 times daily as needed for Muscle spasms

## 2024-02-19 ENCOUNTER — TELEMEDICINE (OUTPATIENT)
Dept: FAMILY MEDICINE CLINIC | Age: 64
End: 2024-02-19
Payer: MEDICARE

## 2024-02-19 DIAGNOSIS — Z79.4 TYPE 2 DIABETES MELLITUS WITH DIABETIC POLYNEUROPATHY, WITH LONG-TERM CURRENT USE OF INSULIN (HCC): ICD-10-CM

## 2024-02-19 DIAGNOSIS — Z72.0 TOBACCO USE: ICD-10-CM

## 2024-02-19 DIAGNOSIS — E11.42 TYPE 2 DIABETES MELLITUS WITH DIABETIC POLYNEUROPATHY, WITH LONG-TERM CURRENT USE OF INSULIN (HCC): ICD-10-CM

## 2024-02-19 DIAGNOSIS — Z00.00 MEDICARE ANNUAL WELLNESS VISIT, SUBSEQUENT: Primary | ICD-10-CM

## 2024-02-19 DIAGNOSIS — F19.951 DRUG INDUCED HALLUCINATIONS (HCC): ICD-10-CM

## 2024-02-19 DIAGNOSIS — N18.31 STAGE 3A CHRONIC KIDNEY DISEASE (HCC): ICD-10-CM

## 2024-02-19 DIAGNOSIS — J44.9 CHRONIC OBSTRUCTIVE PULMONARY DISEASE, UNSPECIFIED COPD TYPE (HCC): ICD-10-CM

## 2024-02-19 PROCEDURE — 3017F COLORECTAL CA SCREEN DOC REV: CPT | Performed by: STUDENT IN AN ORGANIZED HEALTH CARE EDUCATION/TRAINING PROGRAM

## 2024-02-19 PROCEDURE — 3046F HEMOGLOBIN A1C LEVEL >9.0%: CPT | Performed by: STUDENT IN AN ORGANIZED HEALTH CARE EDUCATION/TRAINING PROGRAM

## 2024-02-19 PROCEDURE — G0439 PPPS, SUBSEQ VISIT: HCPCS | Performed by: STUDENT IN AN ORGANIZED HEALTH CARE EDUCATION/TRAINING PROGRAM

## 2024-02-19 PROCEDURE — G8482 FLU IMMUNIZE ORDER/ADMIN: HCPCS | Performed by: STUDENT IN AN ORGANIZED HEALTH CARE EDUCATION/TRAINING PROGRAM

## 2024-02-19 RX ORDER — PHENTERMINE HYDROCHLORIDE 37.5 MG/1
37.5 TABLET ORAL
Qty: 30 TABLET | Refills: 0 | Status: SHIPPED | OUTPATIENT
Start: 2024-02-19 | End: 2024-03-20

## 2024-02-19 RX ORDER — NICOTINE 21 MG/24HR
1 PATCH, TRANSDERMAL 24 HOURS TRANSDERMAL DAILY
Qty: 42 PATCH | Refills: 0 | Status: SHIPPED | OUTPATIENT
Start: 2024-02-19 | End: 2024-04-01

## 2024-02-19 RX ORDER — DULAGLUTIDE 4.5 MG/.5ML
4.5 INJECTION, SOLUTION SUBCUTANEOUS WEEKLY
Qty: 3 ADJUSTABLE DOSE PRE-FILLED PEN SYRINGE | Refills: 1 | Status: SHIPPED | OUTPATIENT
Start: 2024-02-19

## 2024-02-19 SDOH — HEALTH STABILITY: PHYSICAL HEALTH: ON AVERAGE, HOW MANY MINUTES DO YOU ENGAGE IN EXERCISE AT THIS LEVEL?: 30 MIN

## 2024-02-19 SDOH — HEALTH STABILITY: PHYSICAL HEALTH: ON AVERAGE, HOW MANY DAYS PER WEEK DO YOU ENGAGE IN MODERATE TO STRENUOUS EXERCISE (LIKE A BRISK WALK)?: 1 DAY

## 2024-02-19 ASSESSMENT — PATIENT HEALTH QUESTIONNAIRE - PHQ9
SUM OF ALL RESPONSES TO PHQ QUESTIONS 1-9: 0
SUM OF ALL RESPONSES TO PHQ QUESTIONS 1-9: 0
1. LITTLE INTEREST OR PLEASURE IN DOING THINGS: 0
2. FEELING DOWN, DEPRESSED OR HOPELESS: 0
SUM OF ALL RESPONSES TO PHQ QUESTIONS 1-9: 0
SUM OF ALL RESPONSES TO PHQ9 QUESTIONS 1 & 2: 0
SUM OF ALL RESPONSES TO PHQ QUESTIONS 1-9: 0

## 2024-02-19 ASSESSMENT — LIFESTYLE VARIABLES
HOW OFTEN DO YOU HAVE A DRINK CONTAINING ALCOHOL: 1
HOW OFTEN DO YOU HAVE SIX OR MORE DRINKS ON ONE OCCASION: 1
HOW OFTEN DO YOU HAVE A DRINK CONTAINING ALCOHOL: NEVER
HOW MANY STANDARD DRINKS CONTAINING ALCOHOL DO YOU HAVE ON A TYPICAL DAY: 0
HOW MANY STANDARD DRINKS CONTAINING ALCOHOL DO YOU HAVE ON A TYPICAL DAY: PATIENT DOES NOT DRINK

## 2024-02-19 NOTE — PROGRESS NOTES
times daily  Sandra Cox MD   Magnesium Oxide -Mg Supplement (MAG-OXIDE) 200 MG TABS Take 1 tablet by mouth at bedtime  Sandra Cox MD   insulin glargine, 1 unit dial, (TOUJEO SOLOSTAR) 300 UNIT/ML concentrated injection pen INJECT SUBCUTANEOUSLY 52  UNITS DAILY  Sandra Cox MD   fluticasone (FLONASE) 50 MCG/ACT nasal spray 1 spray by Each Nostril route daily  Sandra Cox MD   fenofibrate (TRIGLIDE) 160 MG tablet Take 1 tablet by mouth daily  Sandra Cox MD   carvedilol (COREG) 25 MG tablet TAKE 1 TABLET BY MOUTH  TWICE DAILY  Sandra Cox MD   budesonide-formoterol (SYMBICORT) 160-4.5 MCG/ACT AERO Inhale 2 puffs into the lungs 2 times daily  Sandra Cox MD   allopurinol (ZYLOPRIM) 300 MG tablet TAKE 1 TABLET BY MOUTH  DAILY  Sandra Cox MD   albuterol sulfate HFA (VENTOLIN HFA) 108 (90 Base) MCG/ACT inhaler Inhale 2 puffs into the lungs every 6 hours as needed for Wheezing  Sandra Cox MD   buPROPion (WELLBUTRIN SR) 200 MG extended release tablet Take 1 tablet by mouth 2 times daily  Sandra Cox MD   colchicine (MITIGARE) 0.6 MG capsule Take 1 capsule by mouth daily  Sandra Cox MD   Cholecalciferol (VITAMIN D3) 50 MCG (2000 UT) TABS Take 1 tablet by mouth daily  Sandra Cox MD   ondansetron (ZOFRAN) 4 MG tablet Take 1 tablet by mouth every 8 hours as needed for Nausea or Vomiting  Sandra Cox MD   Lancets MISC Inject 1 each into the skin 3 times daily One touch Del PLus  Sandra Cox MD   blood glucose test strips (ONETOUCH ULTRA) strip TEST 3 TIMES DAILY AND AS NEEDED FOR SYMPTOMS OF IRREGULAR BLOOD  GLUCOSE.  Sandra Cox MD   Handicap Placard MISC by Does not apply route Exp: 3/2027  Sandra Cox MD   Blood Glucose Calibration (TRUE METRIX LEVEL 2) Normal SOLN   Sebastián La MD   folic acid (FOLVITE) 1 MG tablet Take 1 tablet by mouth daily  Sebastián La MD   b complex vitamins capsule Take 1 capsule by mouth daily  Provider, MD Sebastián

## 2024-03-18 DIAGNOSIS — Z79.4 TYPE 2 DIABETES MELLITUS WITH DIABETIC POLYNEUROPATHY, WITH LONG-TERM CURRENT USE OF INSULIN (HCC): ICD-10-CM

## 2024-03-18 DIAGNOSIS — E11.42 TYPE 2 DIABETES MELLITUS WITH DIABETIC POLYNEUROPATHY, WITH LONG-TERM CURRENT USE OF INSULIN (HCC): ICD-10-CM

## 2024-03-18 RX ORDER — FLURBIPROFEN SODIUM 0.3 MG/ML
SOLUTION/ DROPS OPHTHALMIC
Qty: 100 EACH | Refills: 3 | Status: SHIPPED | OUTPATIENT
Start: 2024-03-18

## 2024-03-18 NOTE — TELEPHONE ENCOUNTER
Palma Coon is calling to request a refill on the following medication(s):    Medication Request:  Requested Prescriptions     Pending Prescriptions Disp Refills    Insulin Pen Needle (B-D UF III MINI PEN NEEDLES) 31G X 5 MM MISC 100 each 3     Sig: USE THREE TIMES DAILY    phentermine (ADIPEX-P) 37.5 MG tablet 30 tablet 0     Sig: Take 1 tablet by mouth every morning (before breakfast) for 30 days. Max Daily Amount: 37.5 mg       Last Visit Date (If Applicable):  2/19/2024    Next Visit Date:    Visit date not found

## 2024-03-20 RX ORDER — PHENTERMINE HYDROCHLORIDE 37.5 MG/1
37.5 TABLET ORAL
Qty: 30 TABLET | Refills: 0 | OUTPATIENT
Start: 2024-03-20 | End: 2024-04-19

## 2024-05-02 DIAGNOSIS — F41.9 ANXIETY: ICD-10-CM

## 2024-05-02 NOTE — TELEPHONE ENCOUNTER
Palma Coon is calling to request a refill on the following medication(s):    Medication Request:  Requested Prescriptions     Pending Prescriptions Disp Refills    milnacipran HCl (SAVELLA) 100 MG TABS 180 tablet 1     Sig: Take 1 tablet by mouth 2 times daily       Last Visit Date (If Applicable):  2/19/2024    Next Visit Date:    Visit date not found

## 2024-05-18 DIAGNOSIS — E13.40 NEUROPATHY DUE TO SECONDARY DIABETES (HCC): ICD-10-CM

## 2024-05-20 DIAGNOSIS — E13.40 NEUROPATHY DUE TO SECONDARY DIABETES (HCC): ICD-10-CM

## 2024-05-20 RX ORDER — TRAMADOL HYDROCHLORIDE 50 MG/1
50 TABLET ORAL DAILY PRN
Qty: 18 TABLET | Refills: 0 | Status: SHIPPED | OUTPATIENT
Start: 2024-05-20 | End: 2024-06-19

## 2024-05-20 RX ORDER — TRAMADOL HYDROCHLORIDE 50 MG/1
50 TABLET ORAL DAILY PRN
Qty: 18 TABLET | Refills: 0 | Status: SHIPPED | OUTPATIENT
Start: 2024-05-20 | End: 2024-05-20 | Stop reason: SDUPTHER

## 2024-05-20 NOTE — TELEPHONE ENCOUNTER
Palma Coon is calling to request a refill on the following medication(s):    Medication Request:  Requested Prescriptions     Pending Prescriptions Disp Refills    traMADol (ULTRAM) 50 MG tablet 18 tablet 0     Sig: Take 1 tablet by mouth daily as needed for Pain for up to 30 days. Max Daily Amount: 50 mg       Last Visit Date (If Applicable):  2/19/2024    Next Visit Date:    Visit date not found

## 2024-05-20 NOTE — TELEPHONE ENCOUNTER
Palma Coon is calling to request a refill on the following medication(s):    Medication Request:  Requested Prescriptions     Pending Prescriptions Disp Refills    traMADol (ULTRAM) 50 MG tablet [Pharmacy Med Name: TRAMADOL 50MG TABLETS] 18 tablet      Sig: TAKE 1 TABLET BY MOUTH EVERY 4 HOURS FOR UP TO 3 DAYS AS NEEDED FOR PAIN. TAKE LOWEST DOSE POSSIBLE TO MANAGE PAIN. MAX DAILY AMOUNT: 300 MG       Last Visit Date (If Applicable):  2/19/2024    Next Visit Date:    Visit date not found              f

## 2024-05-31 DIAGNOSIS — N30.00 ACUTE CYSTITIS WITHOUT HEMATURIA: Primary | ICD-10-CM

## 2024-05-31 RX ORDER — NITROFURANTOIN 25; 75 MG/1; MG/1
100 CAPSULE ORAL 2 TIMES DAILY
Qty: 6 CAPSULE | Refills: 0 | Status: SHIPPED | OUTPATIENT
Start: 2024-05-31 | End: 2024-06-03

## 2024-06-06 ENCOUNTER — TELEPHONE (OUTPATIENT)
Dept: FAMILY MEDICINE CLINIC | Age: 64
End: 2024-06-06

## 2024-06-06 DIAGNOSIS — Z79.4 TYPE 2 DIABETES MELLITUS WITH DIABETIC POLYNEUROPATHY, WITH LONG-TERM CURRENT USE OF INSULIN (HCC): Primary | ICD-10-CM

## 2024-06-06 DIAGNOSIS — E11.42 TYPE 2 DIABETES MELLITUS WITH DIABETIC POLYNEUROPATHY, WITH LONG-TERM CURRENT USE OF INSULIN (HCC): Primary | ICD-10-CM

## 2024-06-06 RX ORDER — SEMAGLUTIDE 2.68 MG/ML
2 INJECTION, SOLUTION SUBCUTANEOUS
Qty: 3 ML | Refills: 0 | Status: SHIPPED | OUTPATIENT
Start: 2024-06-06

## 2024-06-06 NOTE — TELEPHONE ENCOUNTER
----- Message from Palma Coon sent at 6/6/2024  3:14 PM EDT -----  Regarding: No Trulicity  Contact: [phone]  Dr. Cox ,apparently the [facility] is not attainable.  There is none to get!!  Could you send in a replacement once weekly insulin, until the supply comes in?   I have no once weekly insulin, for my next dose on Monday evening.  Thank you   Palma Coon

## 2024-06-27 DIAGNOSIS — Z79.4 TYPE 2 DIABETES MELLITUS WITH DIABETIC POLYNEUROPATHY, WITH LONG-TERM CURRENT USE OF INSULIN (HCC): ICD-10-CM

## 2024-06-27 DIAGNOSIS — E11.42 TYPE 2 DIABETES MELLITUS WITH DIABETIC POLYNEUROPATHY, WITH LONG-TERM CURRENT USE OF INSULIN (HCC): ICD-10-CM

## 2024-06-28 DIAGNOSIS — E11.42 TYPE 2 DIABETES MELLITUS WITH DIABETIC POLYNEUROPATHY, WITH LONG-TERM CURRENT USE OF INSULIN (HCC): ICD-10-CM

## 2024-06-28 DIAGNOSIS — Z79.4 TYPE 2 DIABETES MELLITUS WITH DIABETIC POLYNEUROPATHY, WITH LONG-TERM CURRENT USE OF INSULIN (HCC): ICD-10-CM

## 2024-06-28 NOTE — TELEPHONE ENCOUNTER
Requested Prescriptions     Pending Prescriptions Disp Refills    semaglutide, 2 MG/DOSE, (OZEMPIC, 2 MG/DOSE,) 8 MG/3ML SOPN sc injection 3 mL 0     Sig: Inject 2 mg into the skin every 7 days

## 2024-06-28 NOTE — TELEPHONE ENCOUNTER
Palma Coon is calling to request a refill on the following medication(s):    Medication Request:  Requested Prescriptions     Pending Prescriptions Disp Refills    OZEMPIC, 2 MG/DOSE, 8 MG/3ML SOPN sc injection [Pharmacy Med Name: OZEMPIC 2MG PER DOSE (8MG/3ML) PFP] 3 mL 0     Sig: INJECT 2MG EVERY 7 DAYS       Last Visit Date (If Applicable):  2/19/2024    Next Visit Date:    7/23/2024

## 2024-07-01 RX ORDER — SEMAGLUTIDE 2.68 MG/ML
INJECTION, SOLUTION SUBCUTANEOUS
Qty: 3 ML | Refills: 0 | Status: SHIPPED | OUTPATIENT
Start: 2024-07-01

## 2024-07-01 RX ORDER — SEMAGLUTIDE 2.68 MG/ML
2 INJECTION, SOLUTION SUBCUTANEOUS
Qty: 3 ML | Refills: 1 | Status: SHIPPED | OUTPATIENT
Start: 2024-07-01

## 2024-07-02 ENCOUNTER — TELEPHONE (OUTPATIENT)
Dept: FAMILY MEDICINE CLINIC | Age: 64
End: 2024-07-02

## 2024-07-23 ENCOUNTER — HOSPITAL ENCOUNTER (OUTPATIENT)
Age: 64
Setting detail: SPECIMEN
Discharge: HOME | End: 2024-07-23

## 2024-07-23 ENCOUNTER — OFFICE VISIT (OUTPATIENT)
Dept: FAMILY MEDICINE CLINIC | Age: 64
End: 2024-07-23
Payer: MEDICARE

## 2024-07-23 VITALS
WEIGHT: 293 LBS | HEIGHT: 70 IN | BODY MASS INDEX: 41.95 KG/M2 | SYSTOLIC BLOOD PRESSURE: 136 MMHG | DIASTOLIC BLOOD PRESSURE: 84 MMHG | HEART RATE: 88 BPM | OXYGEN SATURATION: 96 %

## 2024-07-23 DIAGNOSIS — E78.1 HYPERTRIGLYCERIDEMIA: ICD-10-CM

## 2024-07-23 DIAGNOSIS — M1A.09X1 IDIOPATHIC CHRONIC GOUT OF MULTIPLE SITES WITH TOPHUS: ICD-10-CM

## 2024-07-23 DIAGNOSIS — E11.42 TYPE 2 DIABETES MELLITUS WITH DIABETIC POLYNEUROPATHY, WITH LONG-TERM CURRENT USE OF INSULIN (HCC): Primary | ICD-10-CM

## 2024-07-23 DIAGNOSIS — E55.9 VITAMIN D DEFICIENCY: ICD-10-CM

## 2024-07-23 DIAGNOSIS — R30.0 DYSURIA: ICD-10-CM

## 2024-07-23 DIAGNOSIS — Z72.0 TOBACCO USE: ICD-10-CM

## 2024-07-23 DIAGNOSIS — R33.9 INCOMPLETE EMPTYING OF BLADDER: ICD-10-CM

## 2024-07-23 DIAGNOSIS — M79.7 FIBROMYALGIA: ICD-10-CM

## 2024-07-23 DIAGNOSIS — Z79.4 TYPE 2 DIABETES MELLITUS WITH DIABETIC POLYNEUROPATHY, WITH LONG-TERM CURRENT USE OF INSULIN (HCC): Primary | ICD-10-CM

## 2024-07-23 DIAGNOSIS — I10 ESSENTIAL HYPERTENSION: ICD-10-CM

## 2024-07-23 LAB
BILIRUBIN, POC: ABNORMAL
BLOOD URINE, POC: ABNORMAL
CLARITY, POC: CLEAR
COLOR, POC: ABNORMAL
GLUCOSE URINE, POC: ABNORMAL
HBA1C MFR BLD: 6.1 %
KETONES, POC: ABNORMAL
LEUKOCYTE EST, POC: ABNORMAL
NITRITE, POC: ABNORMAL
PH, POC: 6.5
PROTEIN, POC: 100
SPECIFIC GRAVITY, POC: 1.02
UROBILINOGEN, POC: 0.2

## 2024-07-23 PROCEDURE — 3044F HG A1C LEVEL LT 7.0%: CPT | Performed by: STUDENT IN AN ORGANIZED HEALTH CARE EDUCATION/TRAINING PROGRAM

## 2024-07-23 PROCEDURE — 4004F PT TOBACCO SCREEN RCVD TLK: CPT | Performed by: STUDENT IN AN ORGANIZED HEALTH CARE EDUCATION/TRAINING PROGRAM

## 2024-07-23 PROCEDURE — 99406 BEHAV CHNG SMOKING 3-10 MIN: CPT | Performed by: STUDENT IN AN ORGANIZED HEALTH CARE EDUCATION/TRAINING PROGRAM

## 2024-07-23 PROCEDURE — 3017F COLORECTAL CA SCREEN DOC REV: CPT | Performed by: STUDENT IN AN ORGANIZED HEALTH CARE EDUCATION/TRAINING PROGRAM

## 2024-07-23 PROCEDURE — 83036 HEMOGLOBIN GLYCOSYLATED A1C: CPT | Performed by: STUDENT IN AN ORGANIZED HEALTH CARE EDUCATION/TRAINING PROGRAM

## 2024-07-23 PROCEDURE — G8427 DOCREV CUR MEDS BY ELIG CLIN: HCPCS | Performed by: STUDENT IN AN ORGANIZED HEALTH CARE EDUCATION/TRAINING PROGRAM

## 2024-07-23 PROCEDURE — 3075F SYST BP GE 130 - 139MM HG: CPT | Performed by: STUDENT IN AN ORGANIZED HEALTH CARE EDUCATION/TRAINING PROGRAM

## 2024-07-23 PROCEDURE — 81003 URINALYSIS AUTO W/O SCOPE: CPT | Performed by: STUDENT IN AN ORGANIZED HEALTH CARE EDUCATION/TRAINING PROGRAM

## 2024-07-23 PROCEDURE — 2022F DILAT RTA XM EVC RTNOPTHY: CPT | Performed by: STUDENT IN AN ORGANIZED HEALTH CARE EDUCATION/TRAINING PROGRAM

## 2024-07-23 PROCEDURE — G8417 CALC BMI ABV UP PARAM F/U: HCPCS | Performed by: STUDENT IN AN ORGANIZED HEALTH CARE EDUCATION/TRAINING PROGRAM

## 2024-07-23 PROCEDURE — 99214 OFFICE O/P EST MOD 30 MIN: CPT | Performed by: STUDENT IN AN ORGANIZED HEALTH CARE EDUCATION/TRAINING PROGRAM

## 2024-07-23 PROCEDURE — 3079F DIAST BP 80-89 MM HG: CPT | Performed by: STUDENT IN AN ORGANIZED HEALTH CARE EDUCATION/TRAINING PROGRAM

## 2024-07-23 RX ORDER — COLCHICINE 0.6 MG/1
0.6 CAPSULE ORAL DAILY
Qty: 90 CAPSULE | Refills: 1 | Status: SHIPPED | OUTPATIENT
Start: 2024-07-23

## 2024-07-23 RX ORDER — CYSTEINE HCL 500 MG
CAPSULE ORAL
COMMUNITY
Start: 2020-01-01

## 2024-07-23 RX ORDER — BUSPIRONE HYDROCHLORIDE 5 MG/1
5 TABLET ORAL 2 TIMES DAILY
COMMUNITY
Start: 2024-05-02

## 2024-07-23 ASSESSMENT — ENCOUNTER SYMPTOMS
CHEST TIGHTNESS: 0
ABDOMINAL PAIN: 0
CONSTIPATION: 0
WHEEZING: 0
VOMITING: 0
EYE DISCHARGE: 0
COUGH: 0
SHORTNESS OF BREATH: 0
DIARRHEA: 0
SORE THROAT: 0
NAUSEA: 0

## 2024-07-23 NOTE — PATIENT INSTRUCTIONS
[facility] Food Resources*  (Call [facility] for more resources)        [facility] Food Ministry  What they offer: Food pantry second and fourth Tuesday 4:30-6pm  Phone Number and Address: [phone] Toll Free: [employer], between [employer] and [employer]; [address] [facility] [address]  [facility] Office on Aging of [facility]  What they offer: “Meals & Nutrition” search option on website  Phone Number and Website: [phone]; [email or web address]  [facility] [employer]  What they offer: Dinner is open to all (must be registered and in good standing) and three hot meals a day for shelter residents. Breakfast 7-8am, Lunch 12-1pm, and Dinner 5-6pm daily.  Phone and Address: [phone]; [address] [facility] [address]  [employer] Delivery program  What they offer: Food Box Delivery for those within [facility] who are homebound or without transportation. Applications done by phone. Qualifying individuals are eligible for 3 deliveries for the lifetime of the program.  Phone number: Call for eligibility check at 2-1-1 or 0-149-002BemDireto (4082)  [facility]  What they offer: Food Pantry second and fourth Saturday 1-5pm  Phone and Address: [phone]; [address] [facility] [address]  [employer] Mobile Food Pantries   What they offer: Mobile pantries throughout the [facility]. Anyone in need may visit one pantry per month.  Phone Number and Website: For locations and schedule call 2-1-1 or 2-255-314-HELP (9499) or check the website www.feedtoledo.org  [employer] Char[employer]  What they offer: Food Pantry, call for schedule, open to All  Phone Number: [phone]  [facility]  What they offer: Hot meals, Breakfast: Monday, Wednesday, Friday 8-10am, Lunch: Monday-Friday 10am-12:30pm. Food pantry (serves [address] or east of [facility])

## 2024-07-24 LAB
MICROORGANISM SPEC CULT: NORMAL
SERVICE CMNT-IMP: NORMAL
SPECIMEN DESCRIPTION: NORMAL

## 2024-07-27 DIAGNOSIS — E13.40 NEUROPATHY DUE TO SECONDARY DIABETES (HCC): ICD-10-CM

## 2024-07-27 DIAGNOSIS — I10 ESSENTIAL HYPERTENSION: ICD-10-CM

## 2024-07-29 RX ORDER — CYCLOBENZAPRINE HYDROCHLORIDE 7.5 MG/1
7.5 TABLET, FILM COATED ORAL 3 TIMES DAILY PRN
Qty: 270 TABLET | Refills: 1 | Status: SHIPPED | OUTPATIENT
Start: 2024-07-29

## 2024-07-29 RX ORDER — CARVEDILOL 25 MG/1
TABLET ORAL
Qty: 180 TABLET | Refills: 3 | Status: SHIPPED | OUTPATIENT
Start: 2024-07-29

## 2024-07-29 NOTE — TELEPHONE ENCOUNTER
Palma Coon is calling to request a refill on the following medication(s):    Medication Request:  Requested Prescriptions     Pending Prescriptions Disp Refills    cyclobenzaprine (FEXMID) 7.5 MG tablet [Pharmacy Med Name: CYCLOBENZAPRINE 7.5MG TABLETS] 270 tablet 1     Sig: TAKE 1 TABLET BY MOUTH THREE TIMES DAILY AS NEEDED FOR MUSCLE SPASMS       Last Visit Date (If Applicable):  7/23/2024    Next Visit Date:    10/23/2024

## 2024-07-29 NOTE — TELEPHONE ENCOUNTER
Palma Coon is calling to request a refill on the following medication(s):    Medication Request:  Requested Prescriptions     Pending Prescriptions Disp Refills    cyclobenzaprine (FEXMID) 7.5 MG tablet [Pharmacy Med Name: CYCLOBENZAPRINE 7.5MG TABLETS] 270 tablet 1     Sig: TAKE 1 TABLET BY MOUTH THREE TIMES DAILY AS NEEDED FOR MUSCLE SPASMS    carvedilol (COREG) 25 MG tablet 180 tablet 3     Sig: TAKE 1 TABLET BY MOUTH  TWICE DAILY       Last Visit Date (If Applicable):  7/23/2024    Next Visit Date:    10/23/2024

## 2024-07-30 ENCOUNTER — TELEPHONE (OUTPATIENT)
Dept: FAMILY MEDICINE CLINIC | Age: 64
End: 2024-07-30

## 2024-08-16 DIAGNOSIS — M1A.09X1 IDIOPATHIC CHRONIC GOUT OF MULTIPLE SITES WITH TOPHUS: Primary | ICD-10-CM

## 2024-08-16 DIAGNOSIS — M79.7 FIBROMYALGIA: Primary | ICD-10-CM

## 2024-08-16 DIAGNOSIS — M79.7 FIBROMYALGIA: ICD-10-CM

## 2024-08-16 RX ORDER — COLCHICINE 0.6 MG/1
0.6 TABLET ORAL DAILY
Qty: 30 TABLET | Refills: 3 | Status: SHIPPED | OUTPATIENT
Start: 2024-08-16

## 2024-08-16 RX ORDER — BACLOFEN 10 MG/1
10 TABLET ORAL 3 TIMES DAILY
Qty: 90 TABLET | Refills: 1 | Status: SHIPPED | OUTPATIENT
Start: 2024-08-16

## 2024-08-16 RX ORDER — CYCLOBENZAPRINE HCL 5 MG
5 TABLET ORAL 3 TIMES DAILY PRN
Qty: 90 TABLET | Refills: 1 | Status: SHIPPED | OUTPATIENT
Start: 2024-08-16 | End: 2024-09-15

## 2024-08-29 DIAGNOSIS — E11.42 TYPE 2 DIABETES MELLITUS WITH DIABETIC POLYNEUROPATHY, WITH LONG-TERM CURRENT USE OF INSULIN (HCC): ICD-10-CM

## 2024-08-29 DIAGNOSIS — Z79.4 TYPE 2 DIABETES MELLITUS WITH DIABETIC POLYNEUROPATHY, WITH LONG-TERM CURRENT USE OF INSULIN (HCC): ICD-10-CM

## 2024-08-29 RX ORDER — SEMAGLUTIDE 2.68 MG/ML
2 INJECTION, SOLUTION SUBCUTANEOUS
Qty: 3 ML | Refills: 1 | Status: SHIPPED | OUTPATIENT
Start: 2024-08-29

## 2024-09-03 DIAGNOSIS — E11.42 TYPE 2 DIABETES MELLITUS WITH DIABETIC POLYNEUROPATHY, WITH LONG-TERM CURRENT USE OF INSULIN (HCC): ICD-10-CM

## 2024-09-03 DIAGNOSIS — Z79.4 TYPE 2 DIABETES MELLITUS WITH DIABETIC POLYNEUROPATHY, WITH LONG-TERM CURRENT USE OF INSULIN (HCC): ICD-10-CM

## 2024-09-03 RX ORDER — INSULIN GLARGINE 300 U/ML
INJECTION, SOLUTION SUBCUTANEOUS
Qty: 18 ML | Refills: 3 | Status: SHIPPED | OUTPATIENT
Start: 2024-09-03

## 2024-09-03 NOTE — TELEPHONE ENCOUNTER
Palma Coon is calling to request a refill on the following medication(s):    Medication Request:  Requested Prescriptions     Pending Prescriptions Disp Refills    insulin glargine, 1 unit dial, (TOUJEO SOLOSTAR) 300 UNIT/ML concentrated injection pen 18 mL 3     Sig: INJECT SUBCUTANEOUSLY 52  UNITS DAILY       Last Visit Date (If Applicable):  7/23/2024    Next Visit Date:    10/23/2024

## 2024-09-09 DIAGNOSIS — B37.31 YEAST VAGINITIS: Primary | ICD-10-CM

## 2024-09-09 RX ORDER — FLUCONAZOLE 150 MG/1
150 TABLET ORAL
Qty: 2 TABLET | Refills: 0 | Status: SHIPPED | OUTPATIENT
Start: 2024-09-09 | End: 2024-09-15

## 2024-10-12 DIAGNOSIS — M79.7 FIBROMYALGIA: ICD-10-CM

## 2024-10-14 RX ORDER — BACLOFEN 10 MG/1
10 TABLET ORAL 3 TIMES DAILY
Qty: 90 TABLET | Refills: 1 | Status: SHIPPED | OUTPATIENT
Start: 2024-10-14

## 2024-10-14 NOTE — TELEPHONE ENCOUNTER
Palma Coon is calling to request a refill on the following medication(s):    Medication Request:  Requested Prescriptions     Pending Prescriptions Disp Refills    baclofen (LIORESAL) 10 MG tablet [Pharmacy Med Name: BACLOFEN 10MG TABLETS] 90 tablet 1     Sig: TAKE 1 TABLET BY MOUTH THREE TIMES DAILY       Last Visit Date (If Applicable):  7/23/2024    Next Visit Date:    10/23/2024

## 2024-10-21 RX ORDER — BUPROPION HYDROCHLORIDE 200 MG/1
200 TABLET, EXTENDED RELEASE ORAL 2 TIMES DAILY
Qty: 180 TABLET | Refills: 3 | Status: SHIPPED | OUTPATIENT
Start: 2024-10-21

## 2024-10-21 NOTE — TELEPHONE ENCOUNTER
Palma Coon is calling to request a refill on the following medication(s):    Medication Request:  Requested Prescriptions     Pending Prescriptions Disp Refills    buPROPion (WELLBUTRIN SR) 200 MG extended release tablet 180 tablet 3     Sig: Take 1 tablet by mouth 2 times daily       Last Visit Date (If Applicable):  7/23/2024    Next Visit Date:    10/23/2024

## 2024-10-22 DIAGNOSIS — Z79.4 TYPE 2 DIABETES MELLITUS WITH DIABETIC POLYNEUROPATHY, WITH LONG-TERM CURRENT USE OF INSULIN (HCC): ICD-10-CM

## 2024-10-22 DIAGNOSIS — E11.42 TYPE 2 DIABETES MELLITUS WITH DIABETIC POLYNEUROPATHY, WITH LONG-TERM CURRENT USE OF INSULIN (HCC): ICD-10-CM

## 2024-10-22 RX ORDER — SEMAGLUTIDE 2.68 MG/ML
2 INJECTION, SOLUTION SUBCUTANEOUS
Qty: 3 ML | Refills: 1 | Status: SHIPPED | OUTPATIENT
Start: 2024-10-22

## 2024-10-22 NOTE — TELEPHONE ENCOUNTER
Palma Coon is calling to request a refill on the following medication(s):    Medication Request:  Requested Prescriptions     Pending Prescriptions Disp Refills    semaglutide, 2 MG/DOSE, (OZEMPIC, 2 MG/DOSE,) 8 MG/3ML SOPN sc injection 3 mL 1     Sig: Inject 2 mg into the skin every 7 days       Last Visit Date (If Applicable):  7/23/2024    Next Visit Date:    10/23/2024              f

## 2024-10-23 ENCOUNTER — OFFICE VISIT (OUTPATIENT)
Dept: FAMILY MEDICINE CLINIC | Age: 64
End: 2024-10-23

## 2024-10-23 VITALS
HEIGHT: 70 IN | HEART RATE: 84 BPM | WEIGHT: 290 LBS | SYSTOLIC BLOOD PRESSURE: 114 MMHG | DIASTOLIC BLOOD PRESSURE: 70 MMHG | OXYGEN SATURATION: 100 % | BODY MASS INDEX: 41.52 KG/M2

## 2024-10-23 DIAGNOSIS — E11.42 TYPE 2 DIABETES MELLITUS WITH DIABETIC POLYNEUROPATHY, WITH LONG-TERM CURRENT USE OF INSULIN (HCC): ICD-10-CM

## 2024-10-23 DIAGNOSIS — R33.9 INCOMPLETE EMPTYING OF BLADDER: ICD-10-CM

## 2024-10-23 DIAGNOSIS — M1A.09X1 IDIOPATHIC CHRONIC GOUT OF MULTIPLE SITES WITH TOPHUS: ICD-10-CM

## 2024-10-23 DIAGNOSIS — Z79.4 TYPE 2 DIABETES MELLITUS WITH DIABETIC POLYNEUROPATHY, WITH LONG-TERM CURRENT USE OF INSULIN (HCC): ICD-10-CM

## 2024-10-23 DIAGNOSIS — M79.7 FIBROMYALGIA: ICD-10-CM

## 2024-10-23 DIAGNOSIS — K59.04 CHRONIC IDIOPATHIC CONSTIPATION: ICD-10-CM

## 2024-10-23 DIAGNOSIS — Z72.0 TOBACCO USE: ICD-10-CM

## 2024-10-23 DIAGNOSIS — I10 ESSENTIAL HYPERTENSION: Primary | ICD-10-CM

## 2024-10-23 DIAGNOSIS — E55.9 VITAMIN D DEFICIENCY: ICD-10-CM

## 2024-10-23 LAB — HBA1C MFR BLD: 6.2 %

## 2024-10-23 RX ORDER — POLYETHYLENE GLYCOL 3350 17 G/17G
17 POWDER, FOR SOLUTION ORAL DAILY
Qty: 1530 G | Refills: 1 | Status: SHIPPED | OUTPATIENT
Start: 2024-10-23 | End: 2024-11-22

## 2024-10-23 RX ORDER — DOCUSATE SODIUM 100 MG/1
100 CAPSULE, LIQUID FILLED ORAL 2 TIMES DAILY
Qty: 60 CAPSULE | Refills: 1 | Status: SHIPPED | OUTPATIENT
Start: 2024-10-23 | End: 2024-11-22

## 2024-10-23 RX ORDER — SEMAGLUTIDE 2.68 MG/ML
INJECTION, SOLUTION SUBCUTANEOUS
Qty: 3 ML | Refills: 1 | OUTPATIENT
Start: 2024-10-23

## 2024-10-23 ASSESSMENT — ENCOUNTER SYMPTOMS
ABDOMINAL PAIN: 0
CHEST TIGHTNESS: 0
SORE THROAT: 0
DIARRHEA: 0
SHORTNESS OF BREATH: 0
VOMITING: 0
COUGH: 0
WHEEZING: 0
NAUSEA: 0
EYE DISCHARGE: 0
CONSTIPATION: 1

## 2024-10-23 NOTE — PROGRESS NOTES
[facility]  [facility] PRIMARY CARE  [address] [place] [address]  [address]  [facility] [address]  Dept: [phone]  Dept Fax: [phone]    Palma Coon is a 64 y.o. female who is a Established patient, who presents today for her medical conditions/complaints as noted below:  Chief Complaint   Patient presents with    Diabetes    Hypertension         HPI:     HPI  History of Present Illness  She is here today to follow-up on diabetes and hypertension.    She reports frequent constipation, which she attributes to her Ozempic medication. She has been on Ozempic for 3 months and has noticed a worsening of her constipation. Despite this, she believes the medication is beneficial as it has led to weight loss. She maintains a high-fiber diet, stays hydrated, consumes prune juice and prunes, and includes vegetables in her diet. Her last colonoscopy in 2017 showed no abnormalities. She has not consulted a gastroenterologist for her constipation.    She experiences recurrent yeast infections, which she attributes to her diabetes. She had a hysterectomy 23 years ago and wonders if this could be related to her yeast infections. She has not consulted a urogynecologist.    She has discontinued Savella due to its ineffectiveness after being on it for 4 to 5 years. She has tried Lyrica and Cymbalta but cannot tolerate them. Muscle relaxers provide some relief, but she prefers not to rely on them constantly. She has consulted with a rheumatologist at [facility].   She takes medication for gout and frequently requests tramadol refills. She had a minor gout episode 3 weeks ago, which she managed herself. She visited her podiatrist on Monday.    She has not yet followed up for CPAP titration study due to transportation issues.    SOCIAL HISTORY  She smokes 6 cigarettes a day now.    FAMILY HISTORY  She denies any family history of colon cancer.    IMMUNIZATIONS  She had her influenza, COVID-19, and pneumonia

## 2024-10-23 NOTE — TELEPHONE ENCOUNTER
Palma Coon is calling to request a refill on the following medication(s):    Medication Request:  Requested Prescriptions     Pending Prescriptions Disp Refills    OZEMPIC, 2 MG/DOSE, 8 MG/3ML SOPN sc injection [Pharmacy Med Name: OZEMPIC 2MG PER DOSE (8MG/3ML) PFP] 3 mL 1     Sig: INJECT 2MG UNDER THE SKIN EVERY WEEK       Last Visit Date (If Applicable):  7/23/2024    Next Visit Date:    10/23/2024

## 2024-10-25 ENCOUNTER — TELEPHONE (OUTPATIENT)
Age: 64
End: 2024-10-25

## 2024-11-13 DIAGNOSIS — E11.42 TYPE 2 DIABETES MELLITUS WITH DIABETIC POLYNEUROPATHY, WITH LONG-TERM CURRENT USE OF INSULIN (HCC): ICD-10-CM

## 2024-11-13 DIAGNOSIS — M10.9 GOUT, UNSPECIFIED CAUSE, UNSPECIFIED CHRONICITY, UNSPECIFIED SITE: ICD-10-CM

## 2024-11-13 DIAGNOSIS — J44.9 CHRONIC OBSTRUCTIVE PULMONARY DISEASE, UNSPECIFIED COPD TYPE (HCC): ICD-10-CM

## 2024-11-13 DIAGNOSIS — Z79.4 TYPE 2 DIABETES MELLITUS WITH DIABETIC POLYNEUROPATHY, WITH LONG-TERM CURRENT USE OF INSULIN (HCC): ICD-10-CM

## 2024-11-13 DIAGNOSIS — E78.1 HYPERTRIGLYCERIDEMIA: ICD-10-CM

## 2024-11-13 DIAGNOSIS — I10 ESSENTIAL HYPERTENSION: ICD-10-CM

## 2024-11-13 DIAGNOSIS — M1A.09X1 IDIOPATHIC CHRONIC GOUT OF MULTIPLE SITES WITH TOPHUS: ICD-10-CM

## 2024-11-13 DIAGNOSIS — M79.7 FIBROMYALGIA: ICD-10-CM

## 2024-11-13 RX ORDER — CARVEDILOL 25 MG/1
TABLET ORAL
Qty: 180 TABLET | Refills: 3 | Status: SHIPPED | OUTPATIENT
Start: 2024-11-13

## 2024-11-13 RX ORDER — BUSPIRONE HYDROCHLORIDE 5 MG/1
5 TABLET ORAL 2 TIMES DAILY
Qty: 60 TABLET | Refills: 2 | Status: SHIPPED | OUTPATIENT
Start: 2024-11-13

## 2024-11-13 RX ORDER — FENOFIBRATE 160 MG/1
160 TABLET ORAL DAILY
Qty: 90 TABLET | Refills: 3 | Status: SHIPPED | OUTPATIENT
Start: 2024-11-13

## 2024-11-13 RX ORDER — COLCHICINE 0.6 MG/1
0.6 TABLET ORAL DAILY
Qty: 30 TABLET | Refills: 3 | Status: SHIPPED | OUTPATIENT
Start: 2024-11-13

## 2024-11-13 RX ORDER — BUDESONIDE AND FORMOTEROL FUMARATE DIHYDRATE 160; 4.5 UG/1; UG/1
2 AEROSOL RESPIRATORY (INHALATION) 2 TIMES DAILY
Qty: 30.6 G | Refills: 1 | Status: SHIPPED | OUTPATIENT
Start: 2024-11-13

## 2024-11-13 RX ORDER — BACLOFEN 10 MG/1
10 TABLET ORAL 3 TIMES DAILY
Qty: 90 TABLET | Refills: 1 | Status: SHIPPED | OUTPATIENT
Start: 2024-11-13

## 2024-11-13 RX ORDER — BUPROPION HYDROCHLORIDE 200 MG/1
200 TABLET, EXTENDED RELEASE ORAL 2 TIMES DAILY
Qty: 180 TABLET | Refills: 3 | Status: SHIPPED | OUTPATIENT
Start: 2024-11-13

## 2024-11-13 RX ORDER — SEMAGLUTIDE 2.68 MG/ML
2 INJECTION, SOLUTION SUBCUTANEOUS
Qty: 3 ML | Refills: 1 | Status: SHIPPED | OUTPATIENT
Start: 2024-11-13

## 2024-11-13 RX ORDER — ALBUTEROL SULFATE 90 UG/1
2 INHALANT RESPIRATORY (INHALATION) EVERY 6 HOURS PRN
Qty: 1 EACH | Refills: 3 | Status: SHIPPED | OUTPATIENT
Start: 2024-11-13

## 2024-11-13 RX ORDER — ALLOPURINOL 300 MG/1
TABLET ORAL
Qty: 90 TABLET | Refills: 3 | Status: SHIPPED | OUTPATIENT
Start: 2024-11-13

## 2024-11-13 NOTE — TELEPHONE ENCOUNTER
Requested Prescriptions     Pending Prescriptions Disp Refills    albuterol sulfate HFA (VENTOLIN HFA) 108 (90 Base) MCG/ACT inhaler 1 each 3     Sig: Inhale 2 puffs into the lungs every 6 hours as needed for Wheezing    allopurinol (ZYLOPRIM) 300 MG tablet 90 tablet 3     Sig: TAKE 1 TABLET BY MOUTH  DAILY    baclofen (LIORESAL) 10 MG tablet 90 tablet 1     Sig: Take 1 tablet by mouth 3 times daily    budesonide-formoterol (SYMBICORT) 160-4.5 MCG/ACT AERO 30.6 g 1     Sig: Inhale 2 puffs into the lungs 2 times daily    buPROPion (WELLBUTRIN SR) 200 MG extended release tablet 180 tablet 3     Sig: Take 1 tablet by mouth 2 times daily    carvedilol (COREG) 25 MG tablet 180 tablet 3     Sig: TAKE 1 TABLET BY MOUTH  TWICE DAILY    colchicine (COLCRYS) 0.6 MG tablet 30 tablet 3     Sig: Take 1 tablet by mouth daily    fenofibrate 160 MG tablet 90 tablet 3     Sig: Take 1 tablet by mouth daily    omeprazole (PRILOSEC) 20 MG delayed release capsule 90 capsule 3     Sig: TAKE 1 CAPSULE BY MOUTH  DAILY    semaglutide, 2 MG/DOSE, (OZEMPIC, 2 MG/DOSE,) 8 MG/3ML SOPN sc injection 3 mL 1     Sig: Inject 2 mg into the skin every 7 days    busPIRone (BUSPAR) 5 MG tablet       Sig: Take 1 tablet by mouth 2 times daily

## 2024-11-25 DIAGNOSIS — E11.42 TYPE 2 DIABETES MELLITUS WITH DIABETIC POLYNEUROPATHY, WITH LONG-TERM CURRENT USE OF INSULIN (HCC): ICD-10-CM

## 2024-11-25 DIAGNOSIS — Z79.4 TYPE 2 DIABETES MELLITUS WITH DIABETIC POLYNEUROPATHY, WITH LONG-TERM CURRENT USE OF INSULIN (HCC): ICD-10-CM

## 2024-11-26 NOTE — TELEPHONE ENCOUNTER
Palma Coon is calling to request a refill on the following medication(s):    Medication Request:  Requested Prescriptions     Pending Prescriptions Disp Refills    OZEMPIC, 2 MG/DOSE, 8 MG/3ML SOPN sc injection [Pharmacy Med Name: OZEMPIC 2MG PER DOSE (8MG/3ML) PFP] 3 mL 1     Sig: INJECT 2MG UNDER THE SKIN EVERY 7 DAYS       Last Visit Date (If Applicable):  10/23/2024    Next Visit Date:    2/25/2025

## 2024-11-27 RX ORDER — SEMAGLUTIDE 2.68 MG/ML
INJECTION, SOLUTION SUBCUTANEOUS
Qty: 3 ML | Refills: 1 | Status: SHIPPED | OUTPATIENT
Start: 2024-11-27

## 2024-11-30 ENCOUNTER — PATIENT MESSAGE (OUTPATIENT)
Dept: FAMILY MEDICINE CLINIC | Age: 64
End: 2024-11-30

## 2024-11-30 DIAGNOSIS — G25.81 RLS (RESTLESS LEGS SYNDROME): ICD-10-CM

## 2024-12-02 RX ORDER — PRAMIPEXOLE DIHYDROCHLORIDE 1 MG/1
1 TABLET ORAL NIGHTLY
Qty: 90 TABLET | Refills: 3 | Status: SHIPPED | OUTPATIENT
Start: 2024-12-02

## 2024-12-02 NOTE — TELEPHONE ENCOUNTER
Palma Coon is calling to request a refill on the following medication(s):    Medication Request:  Requested Prescriptions     Pending Prescriptions Disp Refills    pramipexole (MIRAPEX) 1 MG tablet 90 tablet 3     Sig: Take 1 tablet by mouth nightly       Last Visit Date (If Applicable):  10/23/2024    Next Visit Date:    2/25/2025

## 2024-12-06 DIAGNOSIS — G25.81 RLS (RESTLESS LEGS SYNDROME): ICD-10-CM

## 2024-12-06 RX ORDER — PRAMIPEXOLE DIHYDROCHLORIDE 1 MG/1
1 TABLET ORAL NIGHTLY
Qty: 90 TABLET | Refills: 3 | OUTPATIENT
Start: 2024-12-06

## 2024-12-06 NOTE — TELEPHONE ENCOUNTER
Palma Coon is calling to request a refill on the following medication(s):    Medication Request:  Requested Prescriptions     Pending Prescriptions Disp Refills    pramipexole (MIRAPEX) 1 MG tablet [Pharmacy Med Name: PRAMIPEXOLE 1MG TABLETS] 90 tablet 3     Sig: TAKE 1 TABLET BY MOUTH EVERY NIGHT       Last Visit Date (If Applicable):  10/23/2024    Next Visit Date:    2/25/2025

## 2024-12-26 DIAGNOSIS — Z79.4 TYPE 2 DIABETES MELLITUS WITH DIABETIC POLYNEUROPATHY, WITH LONG-TERM CURRENT USE OF INSULIN (HCC): ICD-10-CM

## 2024-12-26 DIAGNOSIS — E11.42 TYPE 2 DIABETES MELLITUS WITH DIABETIC POLYNEUROPATHY, WITH LONG-TERM CURRENT USE OF INSULIN (HCC): ICD-10-CM

## 2024-12-26 RX ORDER — INSULIN GLARGINE 300 U/ML
INJECTION, SOLUTION SUBCUTANEOUS
Qty: 18 ML | Refills: 0 | Status: SHIPPED | OUTPATIENT
Start: 2024-12-26

## 2024-12-26 NOTE — TELEPHONE ENCOUNTER
Palma Coon is calling to request a refill on the following medication(s):    Medication Request:  Requested Prescriptions     Pending Prescriptions Disp Refills    insulin glargine, 1 unit dial, (TOUJEO SOLOSTAR) 300 UNIT/ML concentrated injection pen 18 mL 3     Sig: INJECT SUBCUTANEOUSLY 52  UNITS DAILY       Last Visit Date (If Applicable):  10/23/2024    Next Visit Date:    2/25/2025

## 2025-01-16 RX ORDER — BUSPIRONE HYDROCHLORIDE 5 MG/1
5 TABLET ORAL 2 TIMES DAILY
Qty: 180 TABLET | Refills: 1 | Status: SHIPPED | OUTPATIENT
Start: 2025-01-16

## 2025-01-16 NOTE — TELEPHONE ENCOUNTER
Palma Coon is calling to request a refill on the following medication(s):    Medication Request:  Requested Prescriptions     Pending Prescriptions Disp Refills    busPIRone (BUSPAR) 5 MG tablet [Pharmacy Med Name: Buspirone Hydrochloride 5mg Tablet] 60 tablet 1     Sig: Take 1 tablet by mouth twice daily.       Last Visit Date (If Applicable):  10/23/2024    Next Visit Date:    2/25/2025

## 2025-01-24 DIAGNOSIS — Z79.4 TYPE 2 DIABETES MELLITUS WITH DIABETIC POLYNEUROPATHY, WITH LONG-TERM CURRENT USE OF INSULIN (HCC): ICD-10-CM

## 2025-01-24 DIAGNOSIS — E11.42 TYPE 2 DIABETES MELLITUS WITH DIABETIC POLYNEUROPATHY, WITH LONG-TERM CURRENT USE OF INSULIN (HCC): ICD-10-CM

## 2025-01-24 DIAGNOSIS — E13.40 NEUROPATHY DUE TO SECONDARY DIABETES (HCC): ICD-10-CM

## 2025-01-27 RX ORDER — TRAMADOL HYDROCHLORIDE 50 MG/1
50 TABLET ORAL EVERY 8 HOURS PRN
Qty: 18 TABLET | Refills: 0 | Status: SHIPPED | OUTPATIENT
Start: 2025-01-27 | End: 2025-02-26

## 2025-01-27 RX ORDER — SEMAGLUTIDE 2.68 MG/ML
INJECTION, SOLUTION SUBCUTANEOUS
Qty: 3 ML | Refills: 1 | Status: SHIPPED | OUTPATIENT
Start: 2025-01-27

## 2025-01-27 NOTE — TELEPHONE ENCOUNTER
Palma Coon is calling to request a refill on the following medication(s):    Medication Request:  Requested Prescriptions     Pending Prescriptions Disp Refills    traMADol (ULTRAM) 50 MG tablet [Pharmacy Med Name: TRAMADOL 50MG TABLETS] 18 tablet      Sig: TAKE 1 TABLET BY MOUTH DAILY AS NEEDED FOR PAIN. MAX DAILY AMOUNT: 50 MG    OZEMPIC, 2 MG/DOSE, 8 MG/3ML SOPN sc injection [Pharmacy Med Name: OZEMPIC 2MG PER DOSE (8MG/3ML) PFP] 3 mL 1     Sig: INJECT 2MG UNDER THE SKIN EVERY 7 DAYS       Last Visit Date (If Applicable):  10/23/2024    Next Visit Date:    2/25/2025

## 2025-02-19 ENCOUNTER — HOSPITAL ENCOUNTER (OUTPATIENT)
Age: 65
Discharge: HOME | End: 2025-02-19
Payer: MEDICARE

## 2025-02-19 DIAGNOSIS — Z79.4 TYPE 2 DIABETES MELLITUS WITH DIABETIC POLYNEUROPATHY, WITH LONG-TERM CURRENT USE OF INSULIN (HCC): ICD-10-CM

## 2025-02-19 DIAGNOSIS — E55.9 VITAMIN D DEFICIENCY: ICD-10-CM

## 2025-02-19 DIAGNOSIS — E11.42 TYPE 2 DIABETES MELLITUS WITH DIABETIC POLYNEUROPATHY, WITH LONG-TERM CURRENT USE OF INSULIN (HCC): ICD-10-CM

## 2025-02-19 DIAGNOSIS — I10 ESSENTIAL HYPERTENSION: ICD-10-CM

## 2025-02-19 DIAGNOSIS — M1A.09X1 IDIOPATHIC CHRONIC GOUT OF MULTIPLE SITES WITH TOPHUS: ICD-10-CM

## 2025-02-19 LAB
25(OH)D3 SERPL-MCNC: 23.2 NG/ML (ref 30–100)
ALBUMIN SERPL-MCNC: 3.9 G/DL (ref 3.5–5.2)
ALBUMIN/GLOB SERPL: 1.3 {RATIO} (ref 1–2.5)
ALP SERPL-CCNC: 87 U/L (ref 35–104)
ALT SERPL-CCNC: 25 U/L (ref 10–35)
ANION GAP SERPL CALCULATED.3IONS-SCNC: 10 MMOL/L (ref 9–16)
AST SERPL-CCNC: 25 U/L (ref 10–35)
BASOPHILS # BLD: 0.03 K/UL (ref 0–0.2)
BASOPHILS NFR BLD: 0 % (ref 0–2)
BILIRUB SERPL-MCNC: 0.3 MG/DL (ref 0–1.2)
BUN SERPL-MCNC: 29 MG/DL (ref 8–23)
CALCIUM SERPL-MCNC: 9.8 MG/DL (ref 8.6–10.4)
CHLORIDE SERPL-SCNC: 105 MMOL/L (ref 98–107)
CHOLEST SERPL-MCNC: 134 MG/DL (ref 0–199)
CHOLESTEROL/HDL RATIO: 3.1
CO2 SERPL-SCNC: 27 MMOL/L (ref 20–31)
CREAT SERPL-MCNC: 1.1 MG/DL (ref 0.6–0.9)
CREAT UR-MCNC: 81 MG/DL (ref 28–217)
EOSINOPHIL # BLD: 0.18 K/UL (ref 0–0.44)
EOSINOPHILS RELATIVE PERCENT: 2 % (ref 1–4)
ERYTHROCYTE [DISTWIDTH] IN BLOOD BY AUTOMATED COUNT: 13.3 % (ref 11.8–14.4)
GFR, ESTIMATED: 56 ML/MIN/1.73M2
GLUCOSE SERPL-MCNC: 87 MG/DL (ref 74–99)
HCT VFR BLD AUTO: 37.3 % (ref 36.3–47.1)
HDLC SERPL-MCNC: 43 MG/DL
HGB BLD-MCNC: 12.3 G/DL (ref 11.9–15.1)
IMM GRANULOCYTES # BLD AUTO: 0.03 K/UL (ref 0–0.3)
IMM GRANULOCYTES NFR BLD: 0 %
LDLC SERPL CALC-MCNC: 67 MG/DL (ref 0–100)
LYMPHOCYTES NFR BLD: 0.98 K/UL (ref 1.1–3.7)
LYMPHOCYTES RELATIVE PERCENT: 12 % (ref 24–43)
MCH RBC QN AUTO: 32 PG (ref 25.2–33.5)
MCHC RBC AUTO-ENTMCNC: 33 G/DL (ref 28.4–34.8)
MCV RBC AUTO: 97.1 FL (ref 82.6–102.9)
MICROALBUMIN UR-MCNC: 402 MG/L (ref 0–20)
MICROALBUMIN/CREAT UR-RTO: 496 MCG/MG CREAT (ref 0–25)
MONOCYTES NFR BLD: 0.56 K/UL (ref 0.1–1.2)
MONOCYTES NFR BLD: 7 % (ref 3–12)
NEUTROPHILS NFR BLD: 79 % (ref 36–65)
NEUTS SEG NFR BLD: 6.18 K/UL (ref 1.5–8.1)
NRBC BLD-RTO: 0 PER 100 WBC
PLATELET # BLD AUTO: 237 K/UL (ref 138–453)
PMV BLD AUTO: 11.6 FL (ref 8.1–13.5)
POTASSIUM SERPL-SCNC: 4.9 MMOL/L (ref 3.7–5.3)
PROT SERPL-MCNC: 6.9 G/DL (ref 6.6–8.7)
RBC # BLD AUTO: 3.84 M/UL (ref 3.95–5.11)
SODIUM SERPL-SCNC: 142 MMOL/L (ref 136–145)
TRIGL SERPL-MCNC: 119 MG/DL (ref 0–149)
TSH SERPL DL<=0.05 MIU/L-ACNC: 1.35 UIU/ML (ref 0.27–4.2)
URATE SERPL-MCNC: 4.2 MG/DL (ref 2.4–5.7)
VLDLC SERPL CALC-MCNC: 24 MG/DL (ref 1–30)
WBC OTHER # BLD: 8 K/UL (ref 3.5–11.3)

## 2025-02-19 PROCEDURE — 84550 ASSAY OF BLOOD/URIC ACID: CPT

## 2025-02-19 PROCEDURE — 80061 LIPID PANEL: CPT

## 2025-02-19 PROCEDURE — 85025 COMPLETE CBC W/AUTO DIFF WBC: CPT

## 2025-02-19 PROCEDURE — 82043 UR ALBUMIN QUANTITATIVE: CPT

## 2025-02-19 PROCEDURE — 84443 ASSAY THYROID STIM HORMONE: CPT

## 2025-02-19 PROCEDURE — 80053 COMPREHEN METABOLIC PANEL: CPT

## 2025-02-19 PROCEDURE — 36415 COLL VENOUS BLD VENIPUNCTURE: CPT

## 2025-02-19 PROCEDURE — 82306 VITAMIN D 25 HYDROXY: CPT

## 2025-02-19 PROCEDURE — 82570 ASSAY OF URINE CREATININE: CPT

## 2025-02-22 DIAGNOSIS — M79.7 FIBROMYALGIA: ICD-10-CM

## 2025-02-22 SDOH — HEALTH STABILITY: PHYSICAL HEALTH: ON AVERAGE, HOW MANY MINUTES DO YOU ENGAGE IN EXERCISE AT THIS LEVEL?: 0 MIN

## 2025-02-22 SDOH — HEALTH STABILITY: PHYSICAL HEALTH: ON AVERAGE, HOW MANY DAYS PER WEEK DO YOU ENGAGE IN MODERATE TO STRENUOUS EXERCISE (LIKE A BRISK WALK)?: 0 DAYS

## 2025-02-22 SDOH — ECONOMIC STABILITY: FOOD INSECURITY: WITHIN THE PAST 12 MONTHS, YOU WORRIED THAT YOUR FOOD WOULD RUN OUT BEFORE YOU GOT MONEY TO BUY MORE.: SOMETIMES TRUE

## 2025-02-22 SDOH — ECONOMIC STABILITY: TRANSPORTATION INSECURITY
IN THE PAST 12 MONTHS, HAS LACK OF TRANSPORTATION KEPT YOU FROM MEETINGS, WORK, OR FROM GETTING THINGS NEEDED FOR DAILY LIVING?: YES

## 2025-02-22 SDOH — ECONOMIC STABILITY: FOOD INSECURITY: WITHIN THE PAST 12 MONTHS, THE FOOD YOU BOUGHT JUST DIDN'T LAST AND YOU DIDN'T HAVE MONEY TO GET MORE.: SOMETIMES TRUE

## 2025-02-22 SDOH — ECONOMIC STABILITY: INCOME INSECURITY: IN THE LAST 12 MONTHS, WAS THERE A TIME WHEN YOU WERE NOT ABLE TO PAY THE MORTGAGE OR RENT ON TIME?: NO

## 2025-02-22 SDOH — ECONOMIC STABILITY: TRANSPORTATION INSECURITY
IN THE PAST 12 MONTHS, HAS THE LACK OF TRANSPORTATION KEPT YOU FROM MEDICAL APPOINTMENTS OR FROM GETTING MEDICATIONS?: YES

## 2025-02-22 ASSESSMENT — PATIENT HEALTH QUESTIONNAIRE - PHQ9
7. TROUBLE CONCENTRATING ON THINGS, SUCH AS READING THE NEWSPAPER OR WATCHING TELEVISION: NOT AT ALL
SUM OF ALL RESPONSES TO PHQ QUESTIONS 1-9: 8
1. LITTLE INTEREST OR PLEASURE IN DOING THINGS: SEVERAL DAYS
5. POOR APPETITE OR OVEREATING: NOT AT ALL
3. TROUBLE FALLING OR STAYING ASLEEP: MORE THAN HALF THE DAYS
SUM OF ALL RESPONSES TO PHQ QUESTIONS 1-9: 8
10. IF YOU CHECKED OFF ANY PROBLEMS, HOW DIFFICULT HAVE THESE PROBLEMS MADE IT FOR YOU TO DO YOUR WORK, TAKE CARE OF THINGS AT HOME, OR GET ALONG WITH OTHER PEOPLE: SOMEWHAT DIFFICULT
2. FEELING DOWN, DEPRESSED OR HOPELESS: SEVERAL DAYS
9. THOUGHTS THAT YOU WOULD BE BETTER OFF DEAD, OR OF HURTING YOURSELF: NOT AT ALL
8. MOVING OR SPEAKING SO SLOWLY THAT OTHER PEOPLE COULD HAVE NOTICED. OR THE OPPOSITE, BEING SO FIGETY OR RESTLESS THAT YOU HAVE BEEN MOVING AROUND A LOT MORE THAN USUAL: NOT AT ALL
4. FEELING TIRED OR HAVING LITTLE ENERGY: MORE THAN HALF THE DAYS
6. FEELING BAD ABOUT YOURSELF - OR THAT YOU ARE A FAILURE OR HAVE LET YOURSELF OR YOUR FAMILY DOWN: MORE THAN HALF THE DAYS
SUM OF ALL RESPONSES TO PHQ9 QUESTIONS 1 & 2: 2
SUM OF ALL RESPONSES TO PHQ QUESTIONS 1-9: 8
SUM OF ALL RESPONSES TO PHQ QUESTIONS 1-9: 8

## 2025-02-22 ASSESSMENT — LIFESTYLE VARIABLES
HOW MANY STANDARD DRINKS CONTAINING ALCOHOL DO YOU HAVE ON A TYPICAL DAY: PATIENT DOES NOT DRINK
HOW OFTEN DO YOU HAVE A DRINK CONTAINING ALCOHOL: NEVER
HOW OFTEN DO YOU HAVE A DRINK CONTAINING ALCOHOL: 1
HOW MANY STANDARD DRINKS CONTAINING ALCOHOL DO YOU HAVE ON A TYPICAL DAY: 0
HOW OFTEN DO YOU HAVE SIX OR MORE DRINKS ON ONE OCCASION: 1

## 2025-02-22 NOTE — TELEPHONE ENCOUNTER
Palma Coon is calling to request a refill on the following medication(s):    Medication Request:  Requested Prescriptions     Pending Prescriptions Disp Refills    baclofen (LIORESAL) 10 MG tablet [Pharmacy Med Name: Baclofen 10mg Tablet] 90 tablet 0     Sig: Take 1 tablet by mouth three times daily.       Last Visit Date (If Applicable):  10/23/2024    Next Visit Date:    2/25/2025

## 2025-02-24 ENCOUNTER — TELEPHONE (OUTPATIENT)
Dept: PHARMACY | Facility: CLINIC | Age: 65
End: 2025-02-24

## 2025-02-24 RX ORDER — BACLOFEN 10 MG/1
10 TABLET ORAL 3 TIMES DAILY
Qty: 90 TABLET | Refills: 2 | Status: SHIPPED | OUTPATIENT
Start: 2025-02-24

## 2025-02-25 ENCOUNTER — OFFICE VISIT (OUTPATIENT)
Dept: FAMILY MEDICINE CLINIC | Age: 65
End: 2025-02-25
Payer: MEDICARE

## 2025-02-25 VITALS
BODY MASS INDEX: 41.95 KG/M2 | SYSTOLIC BLOOD PRESSURE: 122 MMHG | WEIGHT: 293 LBS | HEART RATE: 77 BPM | DIASTOLIC BLOOD PRESSURE: 72 MMHG | HEIGHT: 70 IN | OXYGEN SATURATION: 99 %

## 2025-02-25 DIAGNOSIS — Z12.31 ENCOUNTER FOR SCREENING MAMMOGRAM FOR BREAST CANCER: ICD-10-CM

## 2025-02-25 DIAGNOSIS — Z79.4 TYPE 2 DIABETES MELLITUS WITH DIABETIC POLYNEUROPATHY, WITH LONG-TERM CURRENT USE OF INSULIN (HCC): ICD-10-CM

## 2025-02-25 DIAGNOSIS — F19.951 DRUG INDUCED HALLUCINATIONS (HCC): ICD-10-CM

## 2025-02-25 DIAGNOSIS — T46.6X5A STATIN MYOPATHY: ICD-10-CM

## 2025-02-25 DIAGNOSIS — E66.813 OBESITY, CLASS 3: ICD-10-CM

## 2025-02-25 DIAGNOSIS — J44.9 CHRONIC OBSTRUCTIVE PULMONARY DISEASE, UNSPECIFIED COPD TYPE (HCC): ICD-10-CM

## 2025-02-25 DIAGNOSIS — G72.0 STATIN MYOPATHY: ICD-10-CM

## 2025-02-25 DIAGNOSIS — N18.31 STAGE 3A CHRONIC KIDNEY DISEASE (HCC): ICD-10-CM

## 2025-02-25 DIAGNOSIS — Z87.891 PERSONAL HISTORY OF TOBACCO USE: ICD-10-CM

## 2025-02-25 DIAGNOSIS — E11.42 TYPE 2 DIABETES MELLITUS WITH DIABETIC POLYNEUROPATHY, WITH LONG-TERM CURRENT USE OF INSULIN (HCC): ICD-10-CM

## 2025-02-25 DIAGNOSIS — Z00.00 MEDICARE ANNUAL WELLNESS VISIT, SUBSEQUENT: Primary | ICD-10-CM

## 2025-02-25 LAB — HBA1C MFR BLD: 5.6 %

## 2025-02-25 PROCEDURE — 3074F SYST BP LT 130 MM HG: CPT | Performed by: STUDENT IN AN ORGANIZED HEALTH CARE EDUCATION/TRAINING PROGRAM

## 2025-02-25 PROCEDURE — 3044F HG A1C LEVEL LT 7.0%: CPT | Performed by: STUDENT IN AN ORGANIZED HEALTH CARE EDUCATION/TRAINING PROGRAM

## 2025-02-25 PROCEDURE — G0439 PPPS, SUBSEQ VISIT: HCPCS | Performed by: STUDENT IN AN ORGANIZED HEALTH CARE EDUCATION/TRAINING PROGRAM

## 2025-02-25 PROCEDURE — 83036 HEMOGLOBIN GLYCOSYLATED A1C: CPT | Performed by: STUDENT IN AN ORGANIZED HEALTH CARE EDUCATION/TRAINING PROGRAM

## 2025-02-25 PROCEDURE — G0296 VISIT TO DETERM LDCT ELIG: HCPCS | Performed by: STUDENT IN AN ORGANIZED HEALTH CARE EDUCATION/TRAINING PROGRAM

## 2025-02-25 PROCEDURE — 3078F DIAST BP <80 MM HG: CPT | Performed by: STUDENT IN AN ORGANIZED HEALTH CARE EDUCATION/TRAINING PROGRAM

## 2025-02-25 RX ORDER — INSULIN GLARGINE 300 U/ML
INJECTION, SOLUTION SUBCUTANEOUS
Qty: 18 ML | Refills: 1 | Status: SHIPPED | OUTPATIENT
Start: 2025-02-25 | End: 2025-02-25

## 2025-02-25 RX ORDER — INSULIN GLARGINE 300 U/ML
INJECTION, SOLUTION SUBCUTANEOUS
Qty: 18 ML | Refills: 1 | Status: SHIPPED | OUTPATIENT
Start: 2025-02-25

## 2025-02-25 RX ORDER — DOXYCYCLINE 100 MG/1
100 CAPSULE ORAL EVERY 12 HOURS
COMMUNITY
Start: 2024-12-16

## 2025-02-25 NOTE — PROGRESS NOTES
concentrated injection pen INJECT SUBCUTANEOUSLY 40 UNITS DAILY Yes Sandra Cox MD   baclofen (LIORESAL) 10 MG tablet Take 1 tablet by mouth three times daily. Yes Sandra Cox MD   traMADol (ULTRAM) 50 MG tablet Take 1 tablet by mouth every 8 hours as needed for Pain for up to 30 days. Max Daily Amount: 150 mg Yes Sandra Cox MD   OZEMPIC, 2 MG/DOSE, 8 MG/3ML SOPN sc injection INJECT 2MG UNDER THE SKIN EVERY 7 DAYS Yes Sandra Cox MD   busPIRone (BUSPAR) 5 MG tablet Take 1 tablet by mouth twice daily. Yes Sandra Cox MD   pramipexole (MIRAPEX) 1 MG tablet Take 1 tablet by mouth nightly Yes Sandra Cox MD   albuterol sulfate HFA (VENTOLIN HFA) 108 (90 Base) MCG/ACT inhaler Inhale 2 puffs into the lungs every 6 hours as needed for Wheezing Yes Sandra Cox MD   allopurinol (ZYLOPRIM) 300 MG tablet TAKE 1 TABLET BY MOUTH  DAILY Yes Sandra Cox MD   budesonide-formoterol (SYMBICORT) 160-4.5 MCG/ACT AERO Inhale 2 puffs into the lungs 2 times daily Yes Sandra Cox MD   buPROPion (WELLBUTRIN SR) 200 MG extended release tablet Take 1 tablet by mouth 2 times daily Yes Sandra Cox MD   carvedilol (COREG) 25 MG tablet TAKE 1 TABLET BY MOUTH  TWICE DAILY Yes Sandra Cox MD   colchicine (COLCRYS) 0.6 MG tablet Take 1 tablet by mouth daily Yes Sandra Cox MD   fenofibrate 160 MG tablet Take 1 tablet by mouth daily Yes Sandra Cox MD   omeprazole (PRILOSEC) 20 MG delayed release capsule TAKE 1 CAPSULE BY MOUTH  DAILY Yes Sandra Cox MD   Taurine (MARYBETH TAURINE) 1000 MG CAPS  Yes ProviderSebastián MD   Insulin Pen Needle (B-D UF III MINI PEN NEEDLES) 31G X 5 MM MISC USE THREE TIMES DAILY Yes Sandra Cox MD   Magnesium Oxide -Mg Supplement (MAG-OXIDE) 200 MG TABS Take 1 tablet by mouth at bedtime Yes Sandra Cox MD   fluticasone (FLONASE) 50 MCG/ACT nasal spray 1 spray by Each Nostril route daily Yes Sandra Cox MD   ondansetron (ZOFRAN) 4 MG tablet Take 1 tablet by mouth every 8

## 2025-02-25 NOTE — PATIENT INSTRUCTIONS
Long-term stress is caused by stressful situations or events. Examples of long-term stress include long-term health problems, ongoing problems at work, and conflicts in your family. Long-term stress can harm your health.  Mindfulness is a focus only on things happening in the present moment. It's a process of purposefully paying attention to and being aware of your surroundings, your emotions, your thoughts, and how your body feels. You are aware of these things, but you aren't judging these experiences as \"good\" or \"bad.\" Mindfulness can help you learn to calm your mind and body to help you cope with illness, pain, and stress.  How does mindfulness help to relieve stress?  Mindfulness can help quiet your mind and relax your body. Studies show that it can help some people sleep better, feel less anxious, and bring their blood pressure down. And it's been shown to help some people live and cope better with certain health problems like heart disease, depression, chronic pain, and cancer.  How do you practice mindfulness?  To be mindful is to pay attention, to be present, and to be accepting. Like any new skill or habit, being mindful can take practice.  When you're mindful, you do just one thing and you pay close attention to that one thing. For example, you may sit quietly and notice your emotions or how your food tastes and smells.  When you're present, you focus on the things that are happening right now. You let go of your thoughts about the past and the future. When you dwell on the past or the future, you miss moments that can heal and strengthen you. You may miss moments like hearing a child laugh or seeing a friendly face when you think you're all alone.  When you're accepting, you don't  the present moment. Instead you accept your thoughts and feelings as they come.  You can practice anytime, anywhere, and in any way you choose. You can practice in many ways. Here are a few ideas:  While doing your

## 2025-03-05 NOTE — TELEPHONE ENCOUNTER
Provider response noted - thank you!     Patient had OV on 2/25/25 - diagnosis code done that should exclude patient from SUPD measure for 2025:  statin myopathy (ICD-10 G72.0, T46.6X5A)      Noy Gomez, PharmD, [facility]  Population Health Pharmacist  [facility] Clinical Pharmacy  Department, toll free: [phone], option 1      For Pharmacy Admin Tracking Only  Program: [employer]  CPA in place:  No  Recommendation Provided To: Provider: 1 via Note to Provider  Intervention Accepted By: Provider: 1  Gap Closed?: Yes   Time Spent (min): 30      
without ASCVD: moderate-intensity statin is recommended.   Patients with diabetes (age 40-75) at higher cardiovascular risk (including patients with one or more ASCVD risk factors: family history of premature ASCVD, obesity, hypertension, dyslipidemia, smoking, and/or CKD or albuminuria): high-intensity statin is recommended.  It is recommended to reduce LDL >/=50% from baseline and to a target LDL goal <70.        Per chart review, patient has documented myalgias to statins (noted 7/15/16), which qualifies the patient for an exclusion from this care gap.  Per medication history in [facility], patient was previously taking pravastatin 20 mg (4/1/16 to 7/15/16) and atorvastatin 40 mg (3/7/16 to 4/1/16).  Per 7/23/24 visit note:  Hyperlipidemia-on fenofibrate 160 mg daily, intolerance to statins due to myalgia         PLAN  Interventions that have been identified include:  - Patient identified as having SUPD gap  Suggest diagnosis/ICD-10 code at provider visit to exclude patient from SUPD measure:  statin myopathy (ICD-10 G72.0, T46.6X5A)      Future Appointments   Date Time Provider Department Center   2/25/2025 11:00 AM Sandra Cox MD [facility] DEP     Noy Gomez, PharmD, BCPS  Population Health Pharmacist  [facility] Clinical Pharmacy  Department, toll free: [phone], option 1

## 2025-03-17 DIAGNOSIS — Z79.4 TYPE 2 DIABETES MELLITUS WITH DIABETIC POLYNEUROPATHY, WITH LONG-TERM CURRENT USE OF INSULIN (HCC): ICD-10-CM

## 2025-03-17 DIAGNOSIS — E11.42 TYPE 2 DIABETES MELLITUS WITH DIABETIC POLYNEUROPATHY, WITH LONG-TERM CURRENT USE OF INSULIN (HCC): ICD-10-CM

## 2025-03-17 RX ORDER — INSULIN GLARGINE 300 U/ML
INJECTION, SOLUTION SUBCUTANEOUS
Qty: 18 ML | Refills: 1 | Status: SHIPPED | OUTPATIENT
Start: 2025-03-17

## 2025-03-28 DIAGNOSIS — Z79.4 TYPE 2 DIABETES MELLITUS WITH DIABETIC POLYNEUROPATHY, WITH LONG-TERM CURRENT USE OF INSULIN (HCC): ICD-10-CM

## 2025-03-28 DIAGNOSIS — E11.42 TYPE 2 DIABETES MELLITUS WITH DIABETIC POLYNEUROPATHY, WITH LONG-TERM CURRENT USE OF INSULIN (HCC): ICD-10-CM

## 2025-03-31 RX ORDER — SEMAGLUTIDE 2.68 MG/ML
INJECTION, SOLUTION SUBCUTANEOUS
Qty: 3 ML | Refills: 1 | Status: SHIPPED | OUTPATIENT
Start: 2025-03-31

## 2025-03-31 NOTE — TELEPHONE ENCOUNTER
Palma Coon is calling to request a refill on the following medication(s):    Medication Request:  Requested Prescriptions     Pending Prescriptions Disp Refills    OZEMPIC, 2 MG/DOSE, 8 MG/3ML SOPN sc injection [Pharmacy Med Name: OZEMPIC 2MG PER DOSE (8MG/3ML) PFP] 3 mL 1     Sig: INJECT 2MG UNDER THE SKIN EVERY 7 DAYS       Last Visit Date (If Applicable):  2/25/2025    Next Visit Date:    5/27/2025

## 2025-04-17 ENCOUNTER — HOSPITAL ENCOUNTER (OUTPATIENT)
Dept: CT IMAGING | Age: 65
Discharge: HOME | End: 2025-04-19
Payer: MEDICARE

## 2025-04-17 ENCOUNTER — HOSPITAL ENCOUNTER (OUTPATIENT)
Dept: MAMMOGRAPHY | Age: 65
Discharge: HOME | End: 2025-04-19
Payer: MEDICARE

## 2025-04-17 VITALS — WEIGHT: 290 LBS | HEIGHT: 70 IN | BODY MASS INDEX: 41.52 KG/M2

## 2025-04-17 DIAGNOSIS — Z12.31 ENCOUNTER FOR SCREENING MAMMOGRAM FOR BREAST CANCER: ICD-10-CM

## 2025-04-17 DIAGNOSIS — Z87.891 PERSONAL HISTORY OF TOBACCO USE: ICD-10-CM

## 2025-04-17 PROCEDURE — 71271 CT THORAX LUNG CANCER SCR C-: CPT

## 2025-04-17 PROCEDURE — 77063 BREAST TOMOSYNTHESIS BI: CPT

## 2025-04-18 ENCOUNTER — RESULTS FOLLOW-UP (OUTPATIENT)
Dept: FAMILY MEDICINE CLINIC | Age: 65
End: 2025-04-18

## 2025-05-06 DIAGNOSIS — M1A.09X1 IDIOPATHIC CHRONIC GOUT OF MULTIPLE SITES WITH TOPHUS: ICD-10-CM

## 2025-05-07 RX ORDER — COLCHICINE 0.6 MG/1
0.6 TABLET ORAL DAILY
Qty: 30 TABLET | Refills: 2 | Status: SHIPPED | OUTPATIENT
Start: 2025-05-07

## 2025-05-07 NOTE — TELEPHONE ENCOUNTER
Palma Coon is calling to request a refill on the following medication(s):    Medication Request:  Requested Prescriptions     Pending Prescriptions Disp Refills    colchicine (COLCRYS) 0.6 MG tablet [Pharmacy Med Name: Colchicine 0.6mg Tablet] 30 tablet 2     Sig: Take 1 tablet by mouth daily.       Last Visit Date (If Applicable):  2/25/2025    Next Visit Date:    5/27/2025

## 2025-05-14 DIAGNOSIS — M79.7 FIBROMYALGIA: ICD-10-CM

## 2025-05-14 RX ORDER — BACLOFEN 10 MG/1
10 TABLET ORAL 3 TIMES DAILY
Qty: 90 TABLET | Refills: 1 | Status: SHIPPED | OUTPATIENT
Start: 2025-05-14

## 2025-05-14 NOTE — TELEPHONE ENCOUNTER
Requested Prescriptions     Pending Prescriptions Disp Refills    baclofen (LIORESAL) 10 MG tablet [Pharmacy Med Name: Baclofen 10mg Tablet] 90 tablet 1     Sig: Take 1 tablet by mouth three times daily.

## 2025-05-27 ENCOUNTER — OFFICE VISIT (OUTPATIENT)
Dept: FAMILY MEDICINE CLINIC | Age: 65
End: 2025-05-27
Payer: MEDICARE

## 2025-05-27 VITALS
DIASTOLIC BLOOD PRESSURE: 74 MMHG | HEIGHT: 70 IN | OXYGEN SATURATION: 98 % | HEART RATE: 75 BPM | BODY MASS INDEX: 41.95 KG/M2 | WEIGHT: 293 LBS | SYSTOLIC BLOOD PRESSURE: 126 MMHG

## 2025-05-27 DIAGNOSIS — M51.362 DEGENERATION OF INTERVERTEBRAL DISC OF LUMBAR REGION WITH DISCOGENIC BACK PAIN AND LOWER EXTREMITY PAIN: ICD-10-CM

## 2025-05-27 DIAGNOSIS — Z79.4 TYPE 2 DIABETES MELLITUS WITH DIABETIC POLYNEUROPATHY, WITH LONG-TERM CURRENT USE OF INSULIN (HCC): Primary | ICD-10-CM

## 2025-05-27 DIAGNOSIS — R26.89 BALANCE DISORDER: ICD-10-CM

## 2025-05-27 DIAGNOSIS — E55.9 VITAMIN D DEFICIENCY: ICD-10-CM

## 2025-05-27 DIAGNOSIS — Z72.0 TOBACCO USE: ICD-10-CM

## 2025-05-27 DIAGNOSIS — G47.10 DISORDER OF EXCESSIVE SOMNOLENCE: ICD-10-CM

## 2025-05-27 DIAGNOSIS — E11.42 TYPE 2 DIABETES MELLITUS WITH DIABETIC POLYNEUROPATHY, WITH LONG-TERM CURRENT USE OF INSULIN (HCC): Primary | ICD-10-CM

## 2025-05-27 DIAGNOSIS — G47.33 OSA (OBSTRUCTIVE SLEEP APNEA): ICD-10-CM

## 2025-05-27 DIAGNOSIS — R06.02 SHORTNESS OF BREATH: ICD-10-CM

## 2025-05-27 LAB — HBA1C MFR BLD: 5.7 %

## 2025-05-27 PROCEDURE — 3078F DIAST BP <80 MM HG: CPT | Performed by: STUDENT IN AN ORGANIZED HEALTH CARE EDUCATION/TRAINING PROGRAM

## 2025-05-27 PROCEDURE — 99214 OFFICE O/P EST MOD 30 MIN: CPT | Performed by: STUDENT IN AN ORGANIZED HEALTH CARE EDUCATION/TRAINING PROGRAM

## 2025-05-27 PROCEDURE — 3044F HG A1C LEVEL LT 7.0%: CPT | Performed by: STUDENT IN AN ORGANIZED HEALTH CARE EDUCATION/TRAINING PROGRAM

## 2025-05-27 PROCEDURE — 83036 HEMOGLOBIN GLYCOSYLATED A1C: CPT | Performed by: STUDENT IN AN ORGANIZED HEALTH CARE EDUCATION/TRAINING PROGRAM

## 2025-05-27 PROCEDURE — 3074F SYST BP LT 130 MM HG: CPT | Performed by: STUDENT IN AN ORGANIZED HEALTH CARE EDUCATION/TRAINING PROGRAM

## 2025-05-27 PROCEDURE — 99406 BEHAV CHNG SMOKING 3-10 MIN: CPT | Performed by: STUDENT IN AN ORGANIZED HEALTH CARE EDUCATION/TRAINING PROGRAM

## 2025-05-27 RX ORDER — SEMAGLUTIDE 2.68 MG/ML
2 INJECTION, SOLUTION SUBCUTANEOUS
Qty: 3 ML | Refills: 1 | Status: SHIPPED | OUTPATIENT
Start: 2025-05-27

## 2025-05-27 RX ORDER — CHOLECALCIFEROL (VITAMIN D3) 50 MCG
1 TABLET ORAL DAILY
Qty: 90 TABLET | Refills: 1 | Status: SHIPPED | OUTPATIENT
Start: 2025-05-27

## 2025-05-27 RX ORDER — FLURBIPROFEN SODIUM 0.3 MG/ML
SOLUTION/ DROPS OPHTHALMIC
Qty: 100 EACH | Refills: 3 | Status: SHIPPED | OUTPATIENT
Start: 2025-05-27

## 2025-05-27 ASSESSMENT — ENCOUNTER SYMPTOMS
VOMITING: 0
DIARRHEA: 0
EYE DISCHARGE: 0
SORE THROAT: 0
ABDOMINAL PAIN: 0
NAUSEA: 0
CHEST TIGHTNESS: 0
CONSTIPATION: 0
COUGH: 0
SHORTNESS OF BREATH: 1
WHEEZING: 0

## 2025-05-27 ASSESSMENT — PATIENT HEALTH QUESTIONNAIRE - PHQ9
4. FEELING TIRED OR HAVING LITTLE ENERGY: NEARLY EVERY DAY
1. LITTLE INTEREST OR PLEASURE IN DOING THINGS: MORE THAN HALF THE DAYS
3. TROUBLE FALLING OR STAYING ASLEEP: NEARLY EVERY DAY
SUM OF ALL RESPONSES TO PHQ QUESTIONS 1-9: 15
9. THOUGHTS THAT YOU WOULD BE BETTER OFF DEAD, OR OF HURTING YOURSELF: NOT AT ALL
5. POOR APPETITE OR OVEREATING: MORE THAN HALF THE DAYS
8. MOVING OR SPEAKING SO SLOWLY THAT OTHER PEOPLE COULD HAVE NOTICED. OR THE OPPOSITE, BEING SO FIGETY OR RESTLESS THAT YOU HAVE BEEN MOVING AROUND A LOT MORE THAN USUAL: NOT AT ALL
SUM OF ALL RESPONSES TO PHQ QUESTIONS 1-9: 15
7. TROUBLE CONCENTRATING ON THINGS, SUCH AS READING THE NEWSPAPER OR WATCHING TELEVISION: NOT AT ALL
10. IF YOU CHECKED OFF ANY PROBLEMS, HOW DIFFICULT HAVE THESE PROBLEMS MADE IT FOR YOU TO DO YOUR WORK, TAKE CARE OF THINGS AT HOME, OR GET ALONG WITH OTHER PEOPLE: VERY DIFFICULT
SUM OF ALL RESPONSES TO PHQ QUESTIONS 1-9: 15
6. FEELING BAD ABOUT YOURSELF - OR THAT YOU ARE A FAILURE OR HAVE LET YOURSELF OR YOUR FAMILY DOWN: NEARLY EVERY DAY
SUM OF ALL RESPONSES TO PHQ QUESTIONS 1-9: 15
2. FEELING DOWN, DEPRESSED OR HOPELESS: MORE THAN HALF THE DAYS

## 2025-05-27 NOTE — PROGRESS NOTES
[facility]  [facility] PRIMARY [address]  [facility] [address]  Dept: [phone]  Dept Fax: [phone]    Palma Coon is a 65 y.o. female who is a Established patient, who presents today for her medical conditions/complaints as noted below:  Chief Complaint   Patient presents with    Hypertension    Diabetes    falls asleep  often     Extremity Weakness     Right leg          HPI:     HPI  History of Present Illness  The patient presents for evaluation of excessive daytime sleepiness, right-sided weakness, chronic pain, and management of diabetes mellitus, vitamin D deficiency, and COPD.    She reports experiencing excessive daytime sleepiness, often falling asleep during meals. She has a history of sleep apnea but does not use a CPAP machine due to discomfort with the mask and difficulty breathing through her nose at night. Her sleep is frequently interrupted by the need to urinate every 2 hours. She no longer drives and reports feeling excessively tired, which she suspects may be narcolepsy. She also reports balance issues, necessitating the use of a cane for mobility. Occasional dizziness and headaches are noted, but these are not daily occurrences.    She reports progressive weakness on her right side, which has become so severe that she is unable to enter vehicles from the 's side. She can only lift her leg approximately 8 inches, a new symptom that has gradually worsened. She also reports a sensation of leaning to one side and requires support when standing or walking.    She has chronic back, shoulder, and neck pain, which she describes as unmanageable. She is not currently seeking treatment for her back pain and expresses reluctance to visit a pain clinic or consider surgical intervention.    She is currently on Ozempic and Toujeo for diabetes management and requires a refill of her Ozempic prescription. She has previously tried Mounjaro, but it was not

## 2025-06-26 DIAGNOSIS — M79.7 FIBROMYALGIA: Primary | ICD-10-CM

## 2025-06-26 RX ORDER — TRAMADOL HYDROCHLORIDE 50 MG/1
50 TABLET ORAL EVERY 4 HOURS PRN
Qty: 18 TABLET | Refills: 0 | Status: SHIPPED | OUTPATIENT
Start: 2025-06-26 | End: 2025-06-29

## 2025-07-07 RX ORDER — BUSPIRONE HYDROCHLORIDE 5 MG/1
5 TABLET ORAL 2 TIMES DAILY
Qty: 180 TABLET | Refills: 0 | Status: SHIPPED | OUTPATIENT
Start: 2025-07-07

## 2025-07-07 NOTE — TELEPHONE ENCOUNTER
Palma Coon is calling to request a refill on the following medication(s):    Medication Request:  Requested Prescriptions     Pending Prescriptions Disp Refills    busPIRone (BUSPAR) 5 MG tablet [Pharmacy Med Name: Buspirone Hydrochloride 5mg Tablet] 180 tablet 0     Sig: Take 1 tablet by mouth twice daily.       Last Visit Date (If Applicable):  5/27/2025    Next Visit Date:    9/2/2025

## 2025-07-22 DIAGNOSIS — M1A.09X1 IDIOPATHIC CHRONIC GOUT OF MULTIPLE SITES WITH TOPHUS: ICD-10-CM

## 2025-07-22 DIAGNOSIS — M79.7 FIBROMYALGIA: ICD-10-CM

## 2025-07-22 RX ORDER — COLCHICINE 0.6 MG/1
0.6 TABLET ORAL DAILY
Qty: 30 TABLET | Refills: 1 | Status: SHIPPED | OUTPATIENT
Start: 2025-07-22

## 2025-07-22 NOTE — TELEPHONE ENCOUNTER
Requested Prescriptions     Pending Prescriptions Disp Refills    colchicine (COLCRYS) 0.6 MG tablet [Pharmacy Med Name: Colchicine 0.6mg Tablet] 30 tablet 1     Sig: Take 1 tablet by mouth daily.

## 2025-07-23 RX ORDER — BACLOFEN 10 MG/1
10 TABLET ORAL 3 TIMES DAILY
Qty: 90 TABLET | Refills: 0 | Status: SHIPPED | OUTPATIENT
Start: 2025-07-23

## 2025-07-23 NOTE — TELEPHONE ENCOUNTER
Palma Coon is calling to request a refill on the following medication(s):    Medication Request:  Requested Prescriptions     Pending Prescriptions Disp Refills    baclofen (LIORESAL) 10 MG tablet [Pharmacy Med Name: Baclofen 10mg Tablet] 90 tablet 0     Sig: Take 1 tablet by mouth three times daily.       Last Visit Date (If Applicable):  5/27/2025    Next Visit Date:    9/2/2025

## 2025-07-24 DIAGNOSIS — Z79.4 TYPE 2 DIABETES MELLITUS WITH DIABETIC POLYNEUROPATHY, WITH LONG-TERM CURRENT USE OF INSULIN (HCC): ICD-10-CM

## 2025-07-24 DIAGNOSIS — E11.42 TYPE 2 DIABETES MELLITUS WITH DIABETIC POLYNEUROPATHY, WITH LONG-TERM CURRENT USE OF INSULIN (HCC): ICD-10-CM

## 2025-07-24 NOTE — TELEPHONE ENCOUNTER
Requested Prescriptions     Pending Prescriptions Disp Refills    semaglutide, 2 MG/DOSE, (OZEMPIC, 2 MG/DOSE,) 8 MG/3ML SOPN sc injection 3 mL 1     Sig: Inject 2 mg into the skin every 7 days

## 2025-07-25 DIAGNOSIS — Z79.4 TYPE 2 DIABETES MELLITUS WITH DIABETIC POLYNEUROPATHY, WITH LONG-TERM CURRENT USE OF INSULIN (HCC): ICD-10-CM

## 2025-07-25 DIAGNOSIS — E11.42 TYPE 2 DIABETES MELLITUS WITH DIABETIC POLYNEUROPATHY, WITH LONG-TERM CURRENT USE OF INSULIN (HCC): ICD-10-CM

## 2025-07-25 RX ORDER — SEMAGLUTIDE 2.68 MG/ML
2 INJECTION, SOLUTION SUBCUTANEOUS
Qty: 3 ML | Refills: 1 | Status: SHIPPED | OUTPATIENT
Start: 2025-07-25 | End: 2025-07-25 | Stop reason: SDUPTHER

## 2025-07-25 NOTE — TELEPHONE ENCOUNTER
Palma Coon is calling to request a refill on the following medication(s):    Medication Request:  Requested Prescriptions     Pending Prescriptions Disp Refills    semaglutide, 2 MG/DOSE, (OZEMPIC, 2 MG/DOSE,) 8 MG/3ML SOPN sc injection 3 mL 1     Sig: Inject 2 mg into the skin every 7 days       Last Visit Date (If Applicable):  5/27/2025    Next Visit Date:    9/2/2025

## 2025-07-26 RX ORDER — SEMAGLUTIDE 2.68 MG/ML
2 INJECTION, SOLUTION SUBCUTANEOUS
Qty: 3 ML | Refills: 1 | Status: SHIPPED | OUTPATIENT
Start: 2025-07-26

## 2025-07-31 ENCOUNTER — PATIENT MESSAGE (OUTPATIENT)
Dept: FAMILY MEDICINE CLINIC | Age: 65
End: 2025-07-31

## 2025-07-31 ENCOUNTER — E-VISIT (OUTPATIENT)
Dept: FAMILY MEDICINE CLINIC | Age: 65
End: 2025-07-31
Payer: MEDICARE

## 2025-07-31 DIAGNOSIS — N89.8 VAGINAL ITCHING: Primary | ICD-10-CM

## 2025-07-31 DIAGNOSIS — R35.0 URINATION FREQUENCY: Primary | ICD-10-CM

## 2025-07-31 DIAGNOSIS — R35.0 URINATION FREQUENCY: ICD-10-CM

## 2025-07-31 DIAGNOSIS — N89.8 VAGINAL ITCHING: ICD-10-CM

## 2025-07-31 PROCEDURE — 99422 OL DIG E/M SVC 11-20 MIN: CPT | Performed by: STUDENT IN AN ORGANIZED HEALTH CARE EDUCATION/TRAINING PROGRAM

## 2025-07-31 RX ORDER — FLUCONAZOLE 150 MG/1
150 TABLET ORAL ONCE
Qty: 1 TABLET | Refills: 0 | Status: SHIPPED | OUTPATIENT
Start: 2025-07-31 | End: 2025-07-31

## 2025-07-31 RX ORDER — NITROFURANTOIN 25; 75 MG/1; MG/1
100 CAPSULE ORAL 2 TIMES DAILY
Qty: 20 CAPSULE | Refills: 0 | Status: SHIPPED | OUTPATIENT
Start: 2025-07-31 | End: 2025-08-10

## 2025-07-31 NOTE — PROGRESS NOTES
prescribed antibiotics, take them as directed. Do not stop taking them just because you feel better. You need to take the full course of antibiotics.  Do not eat or drink anything that has alcohol if you are taking metronidazole (Flagyl).  Wash your vaginal area daily with water. You also can use a mild, unscented soap if you want.  Do not use scented bath products. And do not use vaginal sprays or douches.  Put a washcloth soaked in cool water on the area to relieve itching. Or you can take cool baths.  If you have dryness because of menopause, use estrogen cream or pills that your doctor prescribes.  Use a personal lubricant before sex if you have dryness. Examples are Astroglide, K-Y Jelly, and Wet Lubricant Gel.    © 7947-1649 [employer].  Care instructions adapted under license by "[employer]. If you have questions about a medical condition or this instruction, always ask your healthcare professional. [employer] disclaims any warranty or liability for your use of this information.      On this date, 7/31/2025 I have spent 15  minutes reviewing previous notes, test results, as well as documenting on the day of the visit.

## 2025-08-24 DIAGNOSIS — M79.7 FIBROMYALGIA: ICD-10-CM

## 2025-08-25 RX ORDER — BACLOFEN 10 MG/1
10 TABLET ORAL 3 TIMES DAILY
Qty: 90 TABLET | Refills: 0 | Status: SHIPPED | OUTPATIENT
Start: 2025-08-25